# Patient Record
Sex: MALE | Race: WHITE | Employment: OTHER | URBAN - METROPOLITAN AREA
[De-identification: names, ages, dates, MRNs, and addresses within clinical notes are randomized per-mention and may not be internally consistent; named-entity substitution may affect disease eponyms.]

---

## 2019-10-05 ENCOUNTER — APPOINTMENT (OUTPATIENT)
Dept: GENERAL RADIOLOGY | Age: 84
DRG: 864 | End: 2019-10-05
Payer: MEDICARE

## 2019-10-05 ENCOUNTER — HOSPITAL ENCOUNTER (INPATIENT)
Age: 84
LOS: 4 days | Discharge: HOME | DRG: 864 | End: 2019-10-09
Attending: EMERGENCY MEDICINE | Admitting: FAMILY MEDICINE
Payer: MEDICARE

## 2019-10-05 DIAGNOSIS — R57.9 SHOCK (HCC): ICD-10-CM

## 2019-10-05 DIAGNOSIS — R50.9 FEVER, UNSPECIFIED FEVER CAUSE: Primary | ICD-10-CM

## 2019-10-05 PROBLEM — R65.21 SEPTIC SHOCK (HCC): Status: ACTIVE | Noted: 2019-10-05

## 2019-10-05 PROBLEM — A41.9 SEPTIC SHOCK (HCC): Status: ACTIVE | Noted: 2019-10-05

## 2019-10-05 LAB
ALBUMIN SERPL-MCNC: 4.5 G/DL (ref 3.5–5.2)
ALP BLD-CCNC: 108 U/L (ref 40–129)
ALT SERPL-CCNC: 23 U/L (ref 0–40)
ANION GAP SERPL CALCULATED.3IONS-SCNC: 14 MMOL/L (ref 7–16)
APTT: 31.3 SEC (ref 24.5–35.1)
AST SERPL-CCNC: 44 U/L (ref 0–39)
BACTERIA: ABNORMAL /HPF
BASOPHILS ABSOLUTE: 0.05 E9/L (ref 0–0.2)
BASOPHILS RELATIVE PERCENT: 0.5 % (ref 0–2)
BILIRUB SERPL-MCNC: 0.5 MG/DL (ref 0–1.2)
BILIRUBIN URINE: NEGATIVE
BLOOD, URINE: ABNORMAL
BUN BLDV-MCNC: 16 MG/DL (ref 8–23)
CALCIUM SERPL-MCNC: 10 MG/DL (ref 8.6–10.2)
CHLORIDE BLD-SCNC: 101 MMOL/L (ref 98–107)
CHP ED QC CHECK: YES
CLARITY: CLEAR
CO2: 29 MMOL/L (ref 22–29)
COLOR: YELLOW
CREAT SERPL-MCNC: 1.3 MG/DL (ref 0.7–1.2)
EOSINOPHILS ABSOLUTE: 0.05 E9/L (ref 0.05–0.5)
EOSINOPHILS RELATIVE PERCENT: 0.5 % (ref 0–6)
GFR AFRICAN AMERICAN: >60
GFR NON-AFRICAN AMERICAN: 52 ML/MIN/1.73
GLUCOSE BLD-MCNC: 99 MG/DL
GLUCOSE BLD-MCNC: 99 MG/DL (ref 74–99)
GLUCOSE URINE: NEGATIVE MG/DL
HCT VFR BLD CALC: 52.4 % (ref 37–54)
HEMOGLOBIN: 16.7 G/DL (ref 12.5–16.5)
IMMATURE GRANULOCYTES #: 0.15 E9/L
IMMATURE GRANULOCYTES %: 1.5 % (ref 0–5)
INR BLD: 1
KETONES, URINE: NEGATIVE MG/DL
LACTIC ACID: 1.3 MMOL/L (ref 0.5–2.2)
LACTIC ACID: 2.3 MMOL/L (ref 0.5–2.2)
LEUKOCYTE ESTERASE, URINE: NEGATIVE
LYMPHOCYTES ABSOLUTE: 1.54 E9/L (ref 1.5–4)
LYMPHOCYTES RELATIVE PERCENT: 15.7 % (ref 20–42)
MCH RBC QN AUTO: 27.1 PG (ref 26–35)
MCHC RBC AUTO-ENTMCNC: 31.9 % (ref 32–34.5)
MCV RBC AUTO: 84.9 FL (ref 80–99.9)
MONOCYTES ABSOLUTE: 1.53 E9/L (ref 0.1–0.95)
MONOCYTES RELATIVE PERCENT: 15.6 % (ref 2–12)
NEUTROPHILS ABSOLUTE: 6.48 E9/L (ref 1.8–7.3)
NEUTROPHILS RELATIVE PERCENT: 66.2 % (ref 43–80)
NITRITE, URINE: NEGATIVE
PDW BLD-RTO: 14.8 FL (ref 11.5–15)
PH UA: 6 (ref 5–9)
PLATELET # BLD: 259 E9/L (ref 130–450)
PMV BLD AUTO: 10.8 FL (ref 7–12)
POTASSIUM SERPL-SCNC: 4.2 MMOL/L (ref 3.5–5)
PROTEIN UA: ABNORMAL MG/DL
PROTHROMBIN TIME: 11.8 SEC (ref 9.3–12.4)
RBC # BLD: 6.17 E12/L (ref 3.8–5.8)
RBC # BLD: NORMAL 10*6/UL
RBC UA: ABNORMAL /HPF (ref 0–2)
SODIUM BLD-SCNC: 144 MMOL/L (ref 132–146)
SPECIFIC GRAVITY UA: 1.01 (ref 1–1.03)
TOTAL PROTEIN: 8.4 G/DL (ref 6.4–8.3)
TROPONIN: <0.01 NG/ML (ref 0–0.03)
UROBILINOGEN, URINE: 0.2 E.U./DL
WBC # BLD: 9.8 E9/L (ref 4.5–11.5)
WBC UA: ABNORMAL /HPF (ref 0–5)

## 2019-10-05 PROCEDURE — 85730 THROMBOPLASTIN TIME PARTIAL: CPT

## 2019-10-05 PROCEDURE — 84484 ASSAY OF TROPONIN QUANT: CPT

## 2019-10-05 PROCEDURE — 87581 M.PNEUMON DNA AMP PROBE: CPT

## 2019-10-05 PROCEDURE — 2580000003 HC RX 258: Performed by: EMERGENCY MEDICINE

## 2019-10-05 PROCEDURE — 85025 COMPLETE CBC W/AUTO DIFF WBC: CPT

## 2019-10-05 PROCEDURE — 85610 PROTHROMBIN TIME: CPT

## 2019-10-05 PROCEDURE — 84145 PROCALCITONIN (PCT): CPT

## 2019-10-05 PROCEDURE — 80053 COMPREHEN METABOLIC PANEL: CPT

## 2019-10-05 PROCEDURE — 87040 BLOOD CULTURE FOR BACTERIA: CPT

## 2019-10-05 PROCEDURE — 83605 ASSAY OF LACTIC ACID: CPT

## 2019-10-05 PROCEDURE — 87798 DETECT AGENT NOS DNA AMP: CPT

## 2019-10-05 PROCEDURE — 96365 THER/PROPH/DIAG IV INF INIT: CPT

## 2019-10-05 PROCEDURE — 93005 ELECTROCARDIOGRAM TRACING: CPT | Performed by: EMERGENCY MEDICINE

## 2019-10-05 PROCEDURE — 2580000003 HC RX 258

## 2019-10-05 PROCEDURE — 2000000000 HC ICU R&B

## 2019-10-05 PROCEDURE — 71045 X-RAY EXAM CHEST 1 VIEW: CPT

## 2019-10-05 PROCEDURE — 96375 TX/PRO/DX INJ NEW DRUG ADDON: CPT

## 2019-10-05 PROCEDURE — 81001 URINALYSIS AUTO W/SCOPE: CPT

## 2019-10-05 PROCEDURE — 6370000000 HC RX 637 (ALT 250 FOR IP): Performed by: EMERGENCY MEDICINE

## 2019-10-05 PROCEDURE — 82533 TOTAL CORTISOL: CPT

## 2019-10-05 PROCEDURE — 6360000002 HC RX W HCPCS: Performed by: EMERGENCY MEDICINE

## 2019-10-05 PROCEDURE — 96367 TX/PROPH/DG ADDL SEQ IV INF: CPT

## 2019-10-05 PROCEDURE — 87633 RESP VIRUS 12-25 TARGETS: CPT

## 2019-10-05 PROCEDURE — 36415 COLL VENOUS BLD VENIPUNCTURE: CPT

## 2019-10-05 PROCEDURE — 87088 URINE BACTERIA CULTURE: CPT

## 2019-10-05 PROCEDURE — 2500000003 HC RX 250 WO HCPCS

## 2019-10-05 PROCEDURE — 96376 TX/PRO/DX INJ SAME DRUG ADON: CPT

## 2019-10-05 PROCEDURE — 87486 CHLMYD PNEUM DNA AMP PROBE: CPT

## 2019-10-05 PROCEDURE — 02HV33Z INSERTION OF INFUSION DEVICE INTO SUPERIOR VENA CAVA, PERCUTANEOUS APPROACH: ICD-10-PCS | Performed by: EMERGENCY MEDICINE

## 2019-10-05 PROCEDURE — 99285 EMERGENCY DEPT VISIT HI MDM: CPT

## 2019-10-05 RX ORDER — ACETAMINOPHEN 500 MG
1000 TABLET ORAL ONCE
Status: COMPLETED | OUTPATIENT
Start: 2019-10-05 | End: 2019-10-05

## 2019-10-05 RX ORDER — LEVOTHYROXINE SODIUM 137 UG/1
137 TABLET ORAL DAILY
Status: ON HOLD | COMMUNITY
End: 2019-10-09 | Stop reason: HOSPADM

## 2019-10-05 RX ORDER — HYDROCORTISONE 5 MG/1
5 TABLET ORAL DAILY
Status: ON HOLD | COMMUNITY
End: 2019-10-09 | Stop reason: HOSPADM

## 2019-10-05 RX ORDER — KETOROLAC TROMETHAMINE 30 MG/ML
15 INJECTION, SOLUTION INTRAMUSCULAR; INTRAVENOUS ONCE
Status: COMPLETED | OUTPATIENT
Start: 2019-10-05 | End: 2019-10-05

## 2019-10-05 RX ORDER — SODIUM CHLORIDE 9 MG/ML
INJECTION, SOLUTION INTRAVENOUS CONTINUOUS
Status: DISCONTINUED | OUTPATIENT
Start: 2019-10-05 | End: 2019-10-06

## 2019-10-05 RX ORDER — 0.9 % SODIUM CHLORIDE 0.9 %
1000 INTRAVENOUS SOLUTION INTRAVENOUS ONCE
Status: COMPLETED | OUTPATIENT
Start: 2019-10-05 | End: 2019-10-05

## 2019-10-05 RX ADMIN — Medication 2 MCG/MIN: at 21:13

## 2019-10-05 RX ADMIN — SODIUM CHLORIDE: 9 INJECTION, SOLUTION INTRAVENOUS at 20:06

## 2019-10-05 RX ADMIN — SODIUM CHLORIDE 1000 ML: 9 INJECTION, SOLUTION INTRAVENOUS at 18:31

## 2019-10-05 RX ADMIN — CEFEPIME HYDROCHLORIDE 2 G: 2 INJECTION, POWDER, FOR SOLUTION INTRAVENOUS at 20:36

## 2019-10-05 RX ADMIN — SODIUM CHLORIDE 1000 ML: 9 INJECTION, SOLUTION INTRAVENOUS at 18:15

## 2019-10-05 RX ADMIN — HYDROCORTISONE SODIUM SUCCINATE 100 MG: 100 INJECTION, POWDER, FOR SOLUTION INTRAMUSCULAR; INTRAVENOUS at 20:36

## 2019-10-05 RX ADMIN — WATER 10 ML: 1 INJECTION INTRAMUSCULAR; INTRAVENOUS; SUBCUTANEOUS at 20:36

## 2019-10-05 RX ADMIN — KETOROLAC TROMETHAMINE 15 MG: 30 INJECTION, SOLUTION INTRAMUSCULAR; INTRAVENOUS at 20:06

## 2019-10-05 RX ADMIN — SODIUM CHLORIDE 1000 ML: 9 INJECTION, SOLUTION INTRAVENOUS at 20:34

## 2019-10-05 RX ADMIN — ACETAMINOPHEN 1000 MG: 500 TABLET ORAL at 18:31

## 2019-10-05 RX ADMIN — VANCOMYCIN HYDROCHLORIDE 2000 MG: 10 INJECTION, POWDER, LYOPHILIZED, FOR SOLUTION INTRAVENOUS at 21:55

## 2019-10-05 ASSESSMENT — PAIN SCALES - GENERAL
PAINLEVEL_OUTOF10: 10
PAINLEVEL_OUTOF10: 0
PAINLEVEL_OUTOF10: 6

## 2019-10-06 ENCOUNTER — APPOINTMENT (OUTPATIENT)
Dept: ULTRASOUND IMAGING | Age: 84
DRG: 864 | End: 2019-10-06
Payer: MEDICARE

## 2019-10-06 PROBLEM — R94.31 QT PROLONGATION: Status: ACTIVE | Noted: 2019-10-06

## 2019-10-06 PROBLEM — E87.20 LACTIC ACIDOSIS: Status: ACTIVE | Noted: 2019-10-06

## 2019-10-06 PROBLEM — E23.0 HYPOPITUITARISM (HCC): Status: ACTIVE | Noted: 2019-10-06

## 2019-10-06 PROBLEM — E03.9 HYPOTHYROIDISM: Status: ACTIVE | Noted: 2019-10-06

## 2019-10-06 PROBLEM — I48.91 ATRIAL FIBRILLATION WITH RVR (HCC): Status: ACTIVE | Noted: 2019-10-06

## 2019-10-06 PROBLEM — R53.1 WEAKNESS: Status: ACTIVE | Noted: 2019-10-06

## 2019-10-06 PROBLEM — N28.9 RENAL INSUFFICIENCY: Status: ACTIVE | Noted: 2019-10-06

## 2019-10-06 PROBLEM — I45.10 RBBB: Status: ACTIVE | Noted: 2019-10-06

## 2019-10-06 LAB
ANION GAP SERPL CALCULATED.3IONS-SCNC: 8 MMOL/L (ref 7–16)
BASOPHILS ABSOLUTE: 0.02 E9/L (ref 0–0.2)
BASOPHILS RELATIVE PERCENT: 0.2 % (ref 0–2)
BUN BLDV-MCNC: 15 MG/DL (ref 8–23)
CALCIUM SERPL-MCNC: 8.6 MG/DL (ref 8.6–10.2)
CHLORIDE BLD-SCNC: 105 MMOL/L (ref 98–107)
CO2: 26 MMOL/L (ref 22–29)
CORTISOL TOTAL: 97.3 MCG/DL (ref 2.68–18.4)
CREAT SERPL-MCNC: 1.1 MG/DL (ref 0.7–1.2)
D DIMER: 407 NG/ML DDU
EKG ATRIAL RATE: 104 BPM
EKG ATRIAL RATE: 62 BPM
EKG P AXIS: 30 DEGREES
EKG P-R INTERVAL: 204 MS
EKG Q-T INTERVAL: 382 MS
EKG Q-T INTERVAL: 458 MS
EKG QRS DURATION: 144 MS
EKG QRS DURATION: 94 MS
EKG QTC CALCULATION (BAZETT): 464 MS
EKG QTC CALCULATION (BAZETT): 502 MS
EKG R AXIS: -63 DEGREES
EKG R AXIS: -89 DEGREES
EKG T AXIS: 0 DEGREES
EKG T AXIS: 4 DEGREES
EKG VENTRICULAR RATE: 104 BPM
EKG VENTRICULAR RATE: 62 BPM
EOSINOPHILS ABSOLUTE: 0 E9/L (ref 0.05–0.5)
EOSINOPHILS RELATIVE PERCENT: 0 % (ref 0–6)
FILM ARRAY ADENOVIRUS: NORMAL
FILM ARRAY BORDETELLA PERTUSSIS: NORMAL
FILM ARRAY CHLAMYDOPHILIA PNEUMONIAE: NORMAL
FILM ARRAY CORONAVIRUS 229E: NORMAL
FILM ARRAY CORONAVIRUS HKU1: NORMAL
FILM ARRAY CORONAVIRUS NL63: NORMAL
FILM ARRAY CORONAVIRUS OC43: NORMAL
FILM ARRAY INFLUENZA A VIRUS 09H1: NORMAL
FILM ARRAY INFLUENZA A VIRUS H1: NORMAL
FILM ARRAY INFLUENZA A VIRUS H3: NORMAL
FILM ARRAY INFLUENZA A VIRUS: NORMAL
FILM ARRAY INFLUENZA B: NORMAL
FILM ARRAY METAPNEUMOVIRUS: NORMAL
FILM ARRAY MYCOPLASMA PNEUMONIAE: NORMAL
FILM ARRAY PARAINFLUENZA VIRUS 1: NORMAL
FILM ARRAY PARAINFLUENZA VIRUS 2: NORMAL
FILM ARRAY PARAINFLUENZA VIRUS 3: NORMAL
FILM ARRAY PARAINFLUENZA VIRUS 4: NORMAL
FILM ARRAY RESPIRATORY SYNCITIAL VIRUS: NORMAL
FILM ARRAY RHINOVIRUS/ENTEROVIRUS: NORMAL
GFR AFRICAN AMERICAN: >60
GFR NON-AFRICAN AMERICAN: >60 ML/MIN/1.73
GLUCOSE BLD-MCNC: 173 MG/DL (ref 74–99)
HCT VFR BLD CALC: 43 % (ref 37–54)
HEMOGLOBIN: 13.3 G/DL (ref 12.5–16.5)
IMMATURE GRANULOCYTES #: 0.12 E9/L
IMMATURE GRANULOCYTES %: 1.3 % (ref 0–5)
LYMPHOCYTES ABSOLUTE: 0.7 E9/L (ref 1.5–4)
LYMPHOCYTES RELATIVE PERCENT: 7.7 % (ref 20–42)
MAGNESIUM: 1.9 MG/DL (ref 1.6–2.6)
MCH RBC QN AUTO: 26.3 PG (ref 26–35)
MCHC RBC AUTO-ENTMCNC: 30.9 % (ref 32–34.5)
MCV RBC AUTO: 85 FL (ref 80–99.9)
MONOCYTES ABSOLUTE: 0.65 E9/L (ref 0.1–0.95)
MONOCYTES RELATIVE PERCENT: 7.1 % (ref 2–12)
NEUTROPHILS ABSOLUTE: 7.65 E9/L (ref 1.8–7.3)
NEUTROPHILS RELATIVE PERCENT: 83.7 % (ref 43–80)
PDW BLD-RTO: 14.4 FL (ref 11.5–15)
PLATELET # BLD: 251 E9/L (ref 130–450)
PMV BLD AUTO: 10.8 FL (ref 7–12)
POTASSIUM SERPL-SCNC: 3.7 MMOL/L (ref 3.5–5)
PROCALCITONIN: 0.19 NG/ML (ref 0–0.08)
PROCALCITONIN: 0.21 NG/ML (ref 0–0.08)
RBC # BLD: 5.06 E12/L (ref 3.8–5.8)
SODIUM BLD-SCNC: 139 MMOL/L (ref 132–146)
T3 TOTAL: 84.8 NG/DL (ref 80–200)
T4 FREE: 1.38 NG/DL (ref 0.93–1.7)
TROPONIN: <0.01 NG/ML (ref 0–0.03)
TROPONIN: <0.01 NG/ML (ref 0–0.03)
TSH SERPL DL<=0.05 MIU/L-ACNC: 0.15 UIU/ML (ref 0.27–4.2)
WBC # BLD: 9.1 E9/L (ref 4.5–11.5)

## 2019-10-06 PROCEDURE — 36415 COLL VENOUS BLD VENIPUNCTURE: CPT

## 2019-10-06 PROCEDURE — 6360000002 HC RX W HCPCS: Performed by: INTERNAL MEDICINE

## 2019-10-06 PROCEDURE — 2580000003 HC RX 258

## 2019-10-06 PROCEDURE — 93010 ELECTROCARDIOGRAM REPORT: CPT | Performed by: INTERNAL MEDICINE

## 2019-10-06 PROCEDURE — 6370000000 HC RX 637 (ALT 250 FOR IP): Performed by: INTERNAL MEDICINE

## 2019-10-06 PROCEDURE — 2580000003 HC RX 258: Performed by: FAMILY MEDICINE

## 2019-10-06 PROCEDURE — 94664 DEMO&/EVAL PT USE INHALER: CPT

## 2019-10-06 PROCEDURE — 84443 ASSAY THYROID STIM HORMONE: CPT

## 2019-10-06 PROCEDURE — 93005 ELECTROCARDIOGRAM TRACING: CPT | Performed by: INTERNAL MEDICINE

## 2019-10-06 PROCEDURE — 84484 ASSAY OF TROPONIN QUANT: CPT

## 2019-10-06 PROCEDURE — 84439 ASSAY OF FREE THYROXINE: CPT

## 2019-10-06 PROCEDURE — 80048 BASIC METABOLIC PNL TOTAL CA: CPT

## 2019-10-06 PROCEDURE — 83735 ASSAY OF MAGNESIUM: CPT

## 2019-10-06 PROCEDURE — 6360000002 HC RX W HCPCS: Performed by: FAMILY MEDICINE

## 2019-10-06 PROCEDURE — 85025 COMPLETE CBC W/AUTO DIFF WBC: CPT

## 2019-10-06 PROCEDURE — 2580000003 HC RX 258: Performed by: INTERNAL MEDICINE

## 2019-10-06 PROCEDURE — 94640 AIRWAY INHALATION TREATMENT: CPT

## 2019-10-06 PROCEDURE — 84480 ASSAY TRIIODOTHYRONINE (T3): CPT

## 2019-10-06 PROCEDURE — 93970 EXTREMITY STUDY: CPT

## 2019-10-06 PROCEDURE — 2000000000 HC ICU R&B

## 2019-10-06 PROCEDURE — 85378 FIBRIN DEGRADE SEMIQUANT: CPT

## 2019-10-06 PROCEDURE — 6370000000 HC RX 637 (ALT 250 FOR IP): Performed by: FAMILY MEDICINE

## 2019-10-06 PROCEDURE — 84145 PROCALCITONIN (PCT): CPT

## 2019-10-06 PROCEDURE — 99223 1ST HOSP IP/OBS HIGH 75: CPT | Performed by: INTERNAL MEDICINE

## 2019-10-06 RX ORDER — HYDROCORTISONE 5 MG/1
5 TABLET ORAL DAILY
Status: DISCONTINUED | OUTPATIENT
Start: 2019-10-06 | End: 2019-10-06

## 2019-10-06 RX ORDER — HEPARIN SODIUM 10000 [USP'U]/ML
5000 INJECTION, SOLUTION INTRAVENOUS; SUBCUTANEOUS EVERY 8 HOURS
Status: DISCONTINUED | OUTPATIENT
Start: 2019-10-06 | End: 2019-10-06

## 2019-10-06 RX ORDER — ACETAMINOPHEN 325 MG/1
650 TABLET ORAL EVERY 4 HOURS PRN
Status: DISCONTINUED | OUTPATIENT
Start: 2019-10-06 | End: 2019-10-06

## 2019-10-06 RX ORDER — MAGNESIUM SULFATE 1 G/100ML
1 INJECTION INTRAVENOUS ONCE
Status: COMPLETED | OUTPATIENT
Start: 2019-10-06 | End: 2019-10-06

## 2019-10-06 RX ORDER — HYDROCORTISONE 5 MG/1
5 TABLET ORAL
Status: DISCONTINUED | OUTPATIENT
Start: 2019-10-06 | End: 2019-10-09 | Stop reason: HOSPADM

## 2019-10-06 RX ORDER — ONDANSETRON 2 MG/ML
4 INJECTION INTRAMUSCULAR; INTRAVENOUS EVERY 6 HOURS PRN
Status: DISCONTINUED | OUTPATIENT
Start: 2019-10-06 | End: 2019-10-06 | Stop reason: ALTCHOICE

## 2019-10-06 RX ORDER — SODIUM CHLORIDE 9 MG/ML
INJECTION, SOLUTION INTRAVENOUS CONTINUOUS
Status: DISCONTINUED | OUTPATIENT
Start: 2019-10-06 | End: 2019-10-06

## 2019-10-06 RX ORDER — HEPARIN SODIUM 10000 [USP'U]/ML
5000 INJECTION, SOLUTION INTRAVENOUS; SUBCUTANEOUS EVERY 8 HOURS SCHEDULED
Status: DISCONTINUED | OUTPATIENT
Start: 2019-10-06 | End: 2019-10-09 | Stop reason: HOSPADM

## 2019-10-06 RX ORDER — SODIUM CHLORIDE 0.9 % (FLUSH) 0.9 %
10 SYRINGE (ML) INJECTION EVERY 12 HOURS SCHEDULED
Status: DISCONTINUED | OUTPATIENT
Start: 2019-10-06 | End: 2019-10-09 | Stop reason: HOSPADM

## 2019-10-06 RX ORDER — SODIUM CHLORIDE 0.9 % (FLUSH) 0.9 %
10 SYRINGE (ML) INJECTION PRN
Status: DISCONTINUED | OUTPATIENT
Start: 2019-10-06 | End: 2019-10-09 | Stop reason: HOSPADM

## 2019-10-06 RX ORDER — POTASSIUM CHLORIDE 29.8 MG/ML
20 INJECTION INTRAVENOUS ONCE
Status: COMPLETED | OUTPATIENT
Start: 2019-10-06 | End: 2019-10-06

## 2019-10-06 RX ORDER — FLUCONAZOLE 100 MG/1
400 TABLET ORAL DAILY
Status: DISCONTINUED | OUTPATIENT
Start: 2019-10-06 | End: 2019-10-08

## 2019-10-06 RX ORDER — POLYETHYLENE GLYCOL 3350 17 G/17G
17 POWDER, FOR SOLUTION ORAL DAILY PRN
Status: DISCONTINUED | OUTPATIENT
Start: 2019-10-06 | End: 2019-10-09 | Stop reason: HOSPADM

## 2019-10-06 RX ORDER — LEVOTHYROXINE SODIUM 0.12 MG/1
125 TABLET ORAL DAILY
Status: DISCONTINUED | OUTPATIENT
Start: 2019-10-06 | End: 2019-10-09 | Stop reason: HOSPADM

## 2019-10-06 RX ORDER — ACETAMINOPHEN 325 MG/1
650 TABLET ORAL EVERY 4 HOURS PRN
Status: DISCONTINUED | OUTPATIENT
Start: 2019-10-06 | End: 2019-10-09 | Stop reason: HOSPADM

## 2019-10-06 RX ORDER — MAGNESIUM SULFATE 1 G/100ML
1 INJECTION INTRAVENOUS PRN
Status: DISCONTINUED | OUTPATIENT
Start: 2019-10-06 | End: 2019-10-09 | Stop reason: HOSPADM

## 2019-10-06 RX ORDER — HYDROCORTISONE 5 MG/1
10 TABLET ORAL EVERY MORNING
Status: DISCONTINUED | OUTPATIENT
Start: 2019-10-07 | End: 2019-10-09 | Stop reason: HOSPADM

## 2019-10-06 RX ORDER — DOXYCYCLINE HYCLATE 100 MG/1
100 CAPSULE ORAL EVERY 12 HOURS SCHEDULED
Status: DISCONTINUED | OUTPATIENT
Start: 2019-10-06 | End: 2019-10-06

## 2019-10-06 RX ORDER — POTASSIUM CHLORIDE 29.8 MG/ML
20 INJECTION INTRAVENOUS PRN
Status: DISCONTINUED | OUTPATIENT
Start: 2019-10-06 | End: 2019-10-09 | Stop reason: HOSPADM

## 2019-10-06 RX ORDER — FLUDROCORTISONE ACETATE 0.1 MG/1
0.1 TABLET ORAL DAILY
Status: DISCONTINUED | OUTPATIENT
Start: 2019-10-06 | End: 2019-10-09 | Stop reason: HOSPADM

## 2019-10-06 RX ADMIN — CEFEPIME HYDROCHLORIDE 2 G: 2 INJECTION, POWDER, FOR SOLUTION INTRAVENOUS at 13:55

## 2019-10-06 RX ADMIN — HEPARIN SODIUM 5000 UNITS: 10000 INJECTION INTRAVENOUS; SUBCUTANEOUS at 21:23

## 2019-10-06 RX ADMIN — HEPARIN SODIUM 5000 UNITS: 10000 INJECTION INTRAVENOUS; SUBCUTANEOUS at 13:54

## 2019-10-06 RX ADMIN — POTASSIUM CHLORIDE 20 MEQ: 29.8 INJECTION, SOLUTION INTRAVENOUS at 10:36

## 2019-10-06 RX ADMIN — IPRATROPIUM BROMIDE 0.5 MG: 0.5 SOLUTION RESPIRATORY (INHALATION) at 20:15

## 2019-10-06 RX ADMIN — HYDROCORTISONE SODIUM SUCCINATE 50 MG: 100 INJECTION, POWDER, FOR SOLUTION INTRAMUSCULAR; INTRAVENOUS at 01:58

## 2019-10-06 RX ADMIN — MAGNESIUM SULFATE 1 G: 1 INJECTION INTRAVENOUS at 10:36

## 2019-10-06 RX ADMIN — HEPARIN SODIUM 5000 UNITS: 10000 INJECTION, SOLUTION INTRAVENOUS; SUBCUTANEOUS at 07:52

## 2019-10-06 RX ADMIN — Medication 10 ML: at 21:23

## 2019-10-06 RX ADMIN — HYDROCORTISONE 5 MG: 5 TABLET ORAL at 17:18

## 2019-10-06 RX ADMIN — CEFTRIAXONE SODIUM 1 G: 1 INJECTION, POWDER, FOR SOLUTION INTRAMUSCULAR; INTRAVENOUS at 10:36

## 2019-10-06 RX ADMIN — SODIUM CHLORIDE: 9 INJECTION, SOLUTION INTRAVENOUS at 05:11

## 2019-10-06 RX ADMIN — LEVOTHYROXINE SODIUM 125 MCG: 125 TABLET ORAL at 06:54

## 2019-10-06 RX ADMIN — Medication 10 ML: at 21:28

## 2019-10-06 RX ADMIN — CEFEPIME HYDROCHLORIDE 1 G: 1 INJECTION, POWDER, FOR SOLUTION INTRAMUSCULAR; INTRAVENOUS at 05:09

## 2019-10-06 RX ADMIN — Medication 1.5 G: at 21:22

## 2019-10-06 RX ADMIN — SODIUM CHLORIDE: 9 INJECTION, SOLUTION INTRAVENOUS at 11:57

## 2019-10-06 RX ADMIN — FLUCONAZOLE 400 MG: 100 TABLET ORAL at 13:56

## 2019-10-06 RX ADMIN — HYDROCORTISONE SODIUM SUCCINATE 50 MG: 100 INJECTION, POWDER, FOR SOLUTION INTRAMUSCULAR; INTRAVENOUS at 07:44

## 2019-10-06 RX ADMIN — WATER 10 ML: 1 INJECTION INTRAMUSCULAR; INTRAVENOUS; SUBCUTANEOUS at 01:58

## 2019-10-06 RX ADMIN — IPRATROPIUM BROMIDE 0.5 MG: 0.5 SOLUTION RESPIRATORY (INHALATION) at 16:10

## 2019-10-06 RX ADMIN — HYDROCORTISONE 5 MG: 5 TABLET ORAL at 11:58

## 2019-10-06 RX ADMIN — DOXYCYCLINE HYCLATE 100 MG: 100 CAPSULE ORAL at 10:36

## 2019-10-06 RX ADMIN — HEPARIN SODIUM 5000 UNITS: 10000 INJECTION, SOLUTION INTRAVENOUS; SUBCUTANEOUS at 01:58

## 2019-10-06 RX ADMIN — Medication 10 ML: at 10:36

## 2019-10-06 RX ADMIN — FLUDROCORTISONE ACETATE 0.1 MG: 0.1 TABLET ORAL at 11:58

## 2019-10-06 ASSESSMENT — PAIN SCALES - GENERAL
PAINLEVEL_OUTOF10: 0

## 2019-10-07 LAB
ALBUMIN SERPL-MCNC: 3.1 G/DL (ref 3.5–5.2)
ALP BLD-CCNC: 72 U/L (ref 40–129)
ALT SERPL-CCNC: 18 U/L (ref 0–40)
ANION GAP SERPL CALCULATED.3IONS-SCNC: 10 MMOL/L (ref 7–16)
AST SERPL-CCNC: 24 U/L (ref 0–39)
BASOPHILS ABSOLUTE: 0.04 E9/L (ref 0–0.2)
BASOPHILS RELATIVE PERCENT: 0.3 % (ref 0–2)
BILIRUB SERPL-MCNC: 0.2 MG/DL (ref 0–1.2)
BUN BLDV-MCNC: 22 MG/DL (ref 8–23)
CALCIUM SERPL-MCNC: 8.6 MG/DL (ref 8.6–10.2)
CHLORIDE BLD-SCNC: 107 MMOL/L (ref 98–107)
CO2: 23 MMOL/L (ref 22–29)
CREAT SERPL-MCNC: 1.2 MG/DL (ref 0.7–1.2)
EOSINOPHILS ABSOLUTE: 0.14 E9/L (ref 0.05–0.5)
EOSINOPHILS RELATIVE PERCENT: 1.2 % (ref 0–6)
GFR AFRICAN AMERICAN: >60
GFR NON-AFRICAN AMERICAN: 57 ML/MIN/1.73
GLUCOSE BLD-MCNC: 95 MG/DL (ref 74–99)
HCT VFR BLD CALC: 37.4 % (ref 37–54)
HEMOGLOBIN: 11.7 G/DL (ref 12.5–16.5)
IMMATURE GRANULOCYTES #: 0.14 E9/L
IMMATURE GRANULOCYTES %: 1.2 % (ref 0–5)
LYMPHOCYTES ABSOLUTE: 2.02 E9/L (ref 1.5–4)
LYMPHOCYTES RELATIVE PERCENT: 16.7 % (ref 20–42)
MAGNESIUM: 2.1 MG/DL (ref 1.6–2.6)
MCH RBC QN AUTO: 26.4 PG (ref 26–35)
MCHC RBC AUTO-ENTMCNC: 31.3 % (ref 32–34.5)
MCV RBC AUTO: 84.2 FL (ref 80–99.9)
MONOCYTES ABSOLUTE: 1.83 E9/L (ref 0.1–0.95)
MONOCYTES RELATIVE PERCENT: 15.2 % (ref 2–12)
NEUTROPHILS ABSOLUTE: 7.89 E9/L (ref 1.8–7.3)
NEUTROPHILS RELATIVE PERCENT: 65.4 % (ref 43–80)
PDW BLD-RTO: 14.5 FL (ref 11.5–15)
PHOSPHORUS: 3.1 MG/DL (ref 2.5–4.5)
PLATELET # BLD: 218 E9/L (ref 130–450)
PMV BLD AUTO: 11.3 FL (ref 7–12)
POTASSIUM REFLEX MAGNESIUM: 3.8 MMOL/L (ref 3.5–5)
RBC # BLD: 4.44 E12/L (ref 3.8–5.8)
RBC # BLD: NORMAL 10*6/UL
SODIUM BLD-SCNC: 140 MMOL/L (ref 132–146)
TOTAL PROTEIN: 6 G/DL (ref 6.4–8.3)
URINE CULTURE, ROUTINE: NORMAL
WBC # BLD: 12.1 E9/L (ref 4.5–11.5)

## 2019-10-07 PROCEDURE — 6360000002 HC RX W HCPCS: Performed by: INTERNAL MEDICINE

## 2019-10-07 PROCEDURE — 6370000000 HC RX 637 (ALT 250 FOR IP): Performed by: INTERNAL MEDICINE

## 2019-10-07 PROCEDURE — 97166 OT EVAL MOD COMPLEX 45 MIN: CPT

## 2019-10-07 PROCEDURE — 97161 PT EVAL LOW COMPLEX 20 MIN: CPT

## 2019-10-07 PROCEDURE — 36415 COLL VENOUS BLD VENIPUNCTURE: CPT

## 2019-10-07 PROCEDURE — 97530 THERAPEUTIC ACTIVITIES: CPT

## 2019-10-07 PROCEDURE — 84100 ASSAY OF PHOSPHORUS: CPT

## 2019-10-07 PROCEDURE — 2580000003 HC RX 258: Performed by: INTERNAL MEDICINE

## 2019-10-07 PROCEDURE — 83735 ASSAY OF MAGNESIUM: CPT

## 2019-10-07 PROCEDURE — 80053 COMPREHEN METABOLIC PANEL: CPT

## 2019-10-07 PROCEDURE — 85025 COMPLETE CBC W/AUTO DIFF WBC: CPT

## 2019-10-07 PROCEDURE — 1200000000 HC SEMI PRIVATE

## 2019-10-07 PROCEDURE — 87449 NOS EACH ORGANISM AG IA: CPT

## 2019-10-07 PROCEDURE — 94640 AIRWAY INHALATION TREATMENT: CPT

## 2019-10-07 PROCEDURE — 6370000000 HC RX 637 (ALT 250 FOR IP): Performed by: FAMILY MEDICINE

## 2019-10-07 PROCEDURE — 2580000003 HC RX 258: Performed by: FAMILY MEDICINE

## 2019-10-07 PROCEDURE — 97535 SELF CARE MNGMENT TRAINING: CPT

## 2019-10-07 RX ADMIN — HEPARIN SODIUM 5000 UNITS: 10000 INJECTION INTRAVENOUS; SUBCUTANEOUS at 13:26

## 2019-10-07 RX ADMIN — CEFEPIME HYDROCHLORIDE 2 G: 2 INJECTION, POWDER, FOR SOLUTION INTRAVENOUS at 13:28

## 2019-10-07 RX ADMIN — HEPARIN SODIUM 5000 UNITS: 10000 INJECTION INTRAVENOUS; SUBCUTANEOUS at 22:30

## 2019-10-07 RX ADMIN — HEPARIN SODIUM 5000 UNITS: 10000 INJECTION INTRAVENOUS; SUBCUTANEOUS at 05:45

## 2019-10-07 RX ADMIN — FLUDROCORTISONE ACETATE 0.1 MG: 0.1 TABLET ORAL at 08:27

## 2019-10-07 RX ADMIN — HYDROCORTISONE 5 MG: 5 TABLET ORAL at 13:26

## 2019-10-07 RX ADMIN — IPRATROPIUM BROMIDE 0.5 MG: 0.5 SOLUTION RESPIRATORY (INHALATION) at 21:33

## 2019-10-07 RX ADMIN — Medication 1.5 G: at 22:30

## 2019-10-07 RX ADMIN — IPRATROPIUM BROMIDE 0.5 MG: 0.5 SOLUTION RESPIRATORY (INHALATION) at 08:16

## 2019-10-07 RX ADMIN — IPRATROPIUM BROMIDE 0.5 MG: 0.5 SOLUTION RESPIRATORY (INHALATION) at 11:47

## 2019-10-07 RX ADMIN — Medication 10 ML: at 08:27

## 2019-10-07 RX ADMIN — HYDROCORTISONE 10 MG: 5 TABLET ORAL at 08:26

## 2019-10-07 RX ADMIN — IPRATROPIUM BROMIDE 0.5 MG: 0.5 SOLUTION RESPIRATORY (INHALATION) at 16:08

## 2019-10-07 RX ADMIN — CEFEPIME HYDROCHLORIDE 2 G: 2 INJECTION, POWDER, FOR SOLUTION INTRAVENOUS at 00:35

## 2019-10-07 RX ADMIN — FLUCONAZOLE 400 MG: 100 TABLET ORAL at 08:26

## 2019-10-07 RX ADMIN — HYDROCORTISONE 5 MG: 5 TABLET ORAL at 18:48

## 2019-10-07 RX ADMIN — Medication 10 ML: at 22:30

## 2019-10-07 RX ADMIN — LEVOTHYROXINE SODIUM 125 MCG: 125 TABLET ORAL at 05:45

## 2019-10-07 ASSESSMENT — PAIN SCALES - GENERAL
PAINLEVEL_OUTOF10: 0

## 2019-10-08 LAB
(1,3)-BETA-D-GLUCAN (FUNGITELL) INTERPRETATION: NEGATIVE
(1,3)-BETA-D-GLUCAN (FUNGITELL): <31 PG/ML
ALBUMIN SERPL-MCNC: 3.3 G/DL (ref 3.5–5.2)
ALP BLD-CCNC: 78 U/L (ref 40–129)
ALT SERPL-CCNC: 16 U/L (ref 0–40)
ANION GAP SERPL CALCULATED.3IONS-SCNC: 13 MMOL/L (ref 7–16)
AST SERPL-CCNC: 21 U/L (ref 0–39)
BASOPHILS ABSOLUTE: 0.04 E9/L (ref 0–0.2)
BASOPHILS RELATIVE PERCENT: 0.4 % (ref 0–2)
BILIRUB SERPL-MCNC: 0.3 MG/DL (ref 0–1.2)
BUN BLDV-MCNC: 17 MG/DL (ref 8–23)
CALCIUM SERPL-MCNC: 8.8 MG/DL (ref 8.6–10.2)
CHLORIDE BLD-SCNC: 105 MMOL/L (ref 98–107)
CO2: 22 MMOL/L (ref 22–29)
CREAT SERPL-MCNC: 1.1 MG/DL (ref 0.7–1.2)
EOSINOPHILS ABSOLUTE: 0.28 E9/L (ref 0.05–0.5)
EOSINOPHILS RELATIVE PERCENT: 2.6 % (ref 0–6)
GFR AFRICAN AMERICAN: >60
GFR NON-AFRICAN AMERICAN: >60 ML/MIN/1.73
GLUCOSE BLD-MCNC: 90 MG/DL (ref 74–99)
HCT VFR BLD CALC: 40 % (ref 37–54)
HEMOGLOBIN: 12.8 G/DL (ref 12.5–16.5)
IMMATURE GRANULOCYTES #: 0.15 E9/L
IMMATURE GRANULOCYTES %: 1.4 % (ref 0–5)
LYMPHOCYTES ABSOLUTE: 2.16 E9/L (ref 1.5–4)
LYMPHOCYTES RELATIVE PERCENT: 20.3 % (ref 20–42)
MAGNESIUM: 2.1 MG/DL (ref 1.6–2.6)
MCH RBC QN AUTO: 26.8 PG (ref 26–35)
MCHC RBC AUTO-ENTMCNC: 32 % (ref 32–34.5)
MCV RBC AUTO: 83.7 FL (ref 80–99.9)
MONOCYTES ABSOLUTE: 1.54 E9/L (ref 0.1–0.95)
MONOCYTES RELATIVE PERCENT: 14.4 % (ref 2–12)
NEUTROPHILS ABSOLUTE: 6.49 E9/L (ref 1.8–7.3)
NEUTROPHILS RELATIVE PERCENT: 60.9 % (ref 43–80)
PDW BLD-RTO: 14.6 FL (ref 11.5–15)
PHOSPHORUS: 3.2 MG/DL (ref 2.5–4.5)
PLATELET # BLD: 236 E9/L (ref 130–450)
PMV BLD AUTO: 11.1 FL (ref 7–12)
POTASSIUM REFLEX MAGNESIUM: 3.8 MMOL/L (ref 3.5–5)
RBC # BLD: 4.78 E12/L (ref 3.8–5.8)
RBC # BLD: NORMAL 10*6/UL
SODIUM BLD-SCNC: 140 MMOL/L (ref 132–146)
TOTAL PROTEIN: 6.5 G/DL (ref 6.4–8.3)
WBC # BLD: 10.7 E9/L (ref 4.5–11.5)

## 2019-10-08 PROCEDURE — 2580000003 HC RX 258: Performed by: FAMILY MEDICINE

## 2019-10-08 PROCEDURE — 6360000002 HC RX W HCPCS: Performed by: INTERNAL MEDICINE

## 2019-10-08 PROCEDURE — 80053 COMPREHEN METABOLIC PANEL: CPT

## 2019-10-08 PROCEDURE — 85025 COMPLETE CBC W/AUTO DIFF WBC: CPT

## 2019-10-08 PROCEDURE — 2580000003 HC RX 258: Performed by: INTERNAL MEDICINE

## 2019-10-08 PROCEDURE — 36415 COLL VENOUS BLD VENIPUNCTURE: CPT

## 2019-10-08 PROCEDURE — 83735 ASSAY OF MAGNESIUM: CPT

## 2019-10-08 PROCEDURE — 1200000000 HC SEMI PRIVATE

## 2019-10-08 PROCEDURE — 6370000000 HC RX 637 (ALT 250 FOR IP): Performed by: INTERNAL MEDICINE

## 2019-10-08 PROCEDURE — 84100 ASSAY OF PHOSPHORUS: CPT

## 2019-10-08 PROCEDURE — 6370000000 HC RX 637 (ALT 250 FOR IP): Performed by: FAMILY MEDICINE

## 2019-10-08 PROCEDURE — 94640 AIRWAY INHALATION TREATMENT: CPT

## 2019-10-08 RX ADMIN — HYDROCORTISONE 5 MG: 5 TABLET ORAL at 13:27

## 2019-10-08 RX ADMIN — Medication 10 ML: at 21:04

## 2019-10-08 RX ADMIN — CEFEPIME HYDROCHLORIDE 2 G: 2 INJECTION, POWDER, FOR SOLUTION INTRAVENOUS at 02:02

## 2019-10-08 RX ADMIN — IPRATROPIUM BROMIDE 0.5 MG: 0.5 SOLUTION RESPIRATORY (INHALATION) at 15:29

## 2019-10-08 RX ADMIN — IPRATROPIUM BROMIDE 0.5 MG: 0.5 SOLUTION RESPIRATORY (INHALATION) at 12:20

## 2019-10-08 RX ADMIN — IPRATROPIUM BROMIDE 0.5 MG: 0.5 SOLUTION RESPIRATORY (INHALATION) at 09:10

## 2019-10-08 RX ADMIN — Medication 10 ML: at 08:33

## 2019-10-08 RX ADMIN — HEPARIN SODIUM 5000 UNITS: 10000 INJECTION INTRAVENOUS; SUBCUTANEOUS at 13:32

## 2019-10-08 RX ADMIN — HYDROCORTISONE 5 MG: 5 TABLET ORAL at 18:24

## 2019-10-08 RX ADMIN — FLUCONAZOLE 400 MG: 100 TABLET ORAL at 08:32

## 2019-10-08 RX ADMIN — HEPARIN SODIUM 5000 UNITS: 10000 INJECTION INTRAVENOUS; SUBCUTANEOUS at 21:04

## 2019-10-08 RX ADMIN — HYDROCORTISONE 10 MG: 5 TABLET ORAL at 08:32

## 2019-10-08 RX ADMIN — FLUDROCORTISONE ACETATE 0.1 MG: 0.1 TABLET ORAL at 08:33

## 2019-10-08 RX ADMIN — LEVOTHYROXINE SODIUM 125 MCG: 125 TABLET ORAL at 06:19

## 2019-10-08 RX ADMIN — HEPARIN SODIUM 5000 UNITS: 10000 INJECTION INTRAVENOUS; SUBCUTANEOUS at 06:19

## 2019-10-08 RX ADMIN — IPRATROPIUM BROMIDE 0.5 MG: 0.5 SOLUTION RESPIRATORY (INHALATION) at 19:26

## 2019-10-08 ASSESSMENT — PAIN SCALES - GENERAL
PAINLEVEL_OUTOF10: 2
PAINLEVEL_OUTOF10: 0
PAINLEVEL_OUTOF10: 0

## 2019-10-09 VITALS
WEIGHT: 207.5 LBS | DIASTOLIC BLOOD PRESSURE: 87 MMHG | BODY MASS INDEX: 27.5 KG/M2 | HEIGHT: 73 IN | OXYGEN SATURATION: 92 % | TEMPERATURE: 96.9 F | RESPIRATION RATE: 18 BRPM | HEART RATE: 77 BPM | SYSTOLIC BLOOD PRESSURE: 132 MMHG

## 2019-10-09 LAB
ALBUMIN SERPL-MCNC: 3.7 G/DL (ref 3.5–5.2)
ALP BLD-CCNC: 88 U/L (ref 40–129)
ALT SERPL-CCNC: 16 U/L (ref 0–40)
ANION GAP SERPL CALCULATED.3IONS-SCNC: 13 MMOL/L (ref 7–16)
AST SERPL-CCNC: 23 U/L (ref 0–39)
BASOPHILS ABSOLUTE: 0.08 E9/L (ref 0–0.2)
BASOPHILS RELATIVE PERCENT: 0.8 % (ref 0–2)
BILIRUB SERPL-MCNC: 0.4 MG/DL (ref 0–1.2)
BUN BLDV-MCNC: 17 MG/DL (ref 8–23)
CALCIUM SERPL-MCNC: 9.7 MG/DL (ref 8.6–10.2)
CHLORIDE BLD-SCNC: 103 MMOL/L (ref 98–107)
CO2: 26 MMOL/L (ref 22–29)
CREAT SERPL-MCNC: 1.1 MG/DL (ref 0.7–1.2)
EOSINOPHILS ABSOLUTE: 0.46 E9/L (ref 0.05–0.5)
EOSINOPHILS RELATIVE PERCENT: 4.7 % (ref 0–6)
GFR AFRICAN AMERICAN: >60
GFR NON-AFRICAN AMERICAN: >60 ML/MIN/1.73
GLUCOSE BLD-MCNC: 88 MG/DL (ref 74–99)
HCT VFR BLD CALC: 44.9 % (ref 37–54)
HEMOGLOBIN: 13.8 G/DL (ref 12.5–16.5)
IMMATURE GRANULOCYTES #: 0.34 E9/L
IMMATURE GRANULOCYTES %: 3.5 % (ref 0–5)
LYMPHOCYTES ABSOLUTE: 2.5 E9/L (ref 1.5–4)
LYMPHOCYTES RELATIVE PERCENT: 25.5 % (ref 20–42)
MAGNESIUM: 2.4 MG/DL (ref 1.6–2.6)
MCH RBC QN AUTO: 26.3 PG (ref 26–35)
MCHC RBC AUTO-ENTMCNC: 30.7 % (ref 32–34.5)
MCV RBC AUTO: 85.5 FL (ref 80–99.9)
MONOCYTES ABSOLUTE: 1.27 E9/L (ref 0.1–0.95)
MONOCYTES RELATIVE PERCENT: 12.9 % (ref 2–12)
NEUTROPHILS ABSOLUTE: 5.16 E9/L (ref 1.8–7.3)
NEUTROPHILS RELATIVE PERCENT: 52.6 % (ref 43–80)
PDW BLD-RTO: 14.6 FL (ref 11.5–15)
PHOSPHORUS: 3.6 MG/DL (ref 2.5–4.5)
PLATELET # BLD: 285 E9/L (ref 130–450)
PMV BLD AUTO: 11.2 FL (ref 7–12)
POTASSIUM REFLEX MAGNESIUM: 4.4 MMOL/L (ref 3.5–5)
RBC # BLD: 5.25 E12/L (ref 3.8–5.8)
SODIUM BLD-SCNC: 142 MMOL/L (ref 132–146)
TOTAL PROTEIN: 7.3 G/DL (ref 6.4–8.3)
WBC # BLD: 9.8 E9/L (ref 4.5–11.5)

## 2019-10-09 PROCEDURE — 85025 COMPLETE CBC W/AUTO DIFF WBC: CPT

## 2019-10-09 PROCEDURE — 84100 ASSAY OF PHOSPHORUS: CPT

## 2019-10-09 PROCEDURE — 80053 COMPREHEN METABOLIC PANEL: CPT

## 2019-10-09 PROCEDURE — 2580000003 HC RX 258: Performed by: INTERNAL MEDICINE

## 2019-10-09 PROCEDURE — 94640 AIRWAY INHALATION TREATMENT: CPT

## 2019-10-09 PROCEDURE — 83735 ASSAY OF MAGNESIUM: CPT

## 2019-10-09 PROCEDURE — 6370000000 HC RX 637 (ALT 250 FOR IP): Performed by: FAMILY MEDICINE

## 2019-10-09 PROCEDURE — 6360000002 HC RX W HCPCS: Performed by: INTERNAL MEDICINE

## 2019-10-09 PROCEDURE — 6370000000 HC RX 637 (ALT 250 FOR IP): Performed by: INTERNAL MEDICINE

## 2019-10-09 PROCEDURE — 36415 COLL VENOUS BLD VENIPUNCTURE: CPT

## 2019-10-09 RX ORDER — LEVOTHYROXINE SODIUM 0.12 MG/1
125 TABLET ORAL DAILY
Qty: 30 TABLET | Refills: 3 | Status: ON HOLD | OUTPATIENT
Start: 2019-10-10 | End: 2021-12-07

## 2019-10-09 RX ORDER — HYDROCORTISONE 5 MG/1
5 TABLET ORAL
Qty: 30 TABLET | Refills: 0 | Status: ON HOLD | OUTPATIENT
Start: 2019-10-09 | End: 2021-12-07

## 2019-10-09 RX ORDER — HYDROCORTISONE 10 MG/1
10 TABLET ORAL
Qty: 30 TABLET | Refills: 0 | Status: ON HOLD | OUTPATIENT
Start: 2019-10-09 | End: 2021-12-07

## 2019-10-09 RX ORDER — FLUDROCORTISONE ACETATE 0.1 MG/1
0.1 TABLET ORAL
Qty: 30 TABLET | Refills: 0 | Status: SHIPPED | OUTPATIENT
Start: 2019-10-09 | End: 2019-11-08

## 2019-10-09 RX ADMIN — IPRATROPIUM BROMIDE 0.5 MG: 0.5 SOLUTION RESPIRATORY (INHALATION) at 08:26

## 2019-10-09 RX ADMIN — Medication 10 ML: at 09:48

## 2019-10-09 RX ADMIN — HYDROCORTISONE 10 MG: 5 TABLET ORAL at 09:47

## 2019-10-09 RX ADMIN — HEPARIN SODIUM 5000 UNITS: 10000 INJECTION INTRAVENOUS; SUBCUTANEOUS at 06:16

## 2019-10-09 RX ADMIN — LEVOTHYROXINE SODIUM 125 MCG: 125 TABLET ORAL at 06:16

## 2019-10-09 RX ADMIN — FLUDROCORTISONE ACETATE 0.1 MG: 0.1 TABLET ORAL at 09:47

## 2019-10-10 LAB
BLOOD CULTURE, ROUTINE: NORMAL
CULTURE, BLOOD 2: NORMAL

## 2020-03-14 ENCOUNTER — HOSPITAL ENCOUNTER (INPATIENT)
Age: 85
LOS: 9 days | Discharge: NURSING, REHAB OR LONG-TERM CARE | DRG: 870 | End: 2020-03-23
Attending: EMERGENCY MEDICINE | Admitting: INTERNAL MEDICINE
Payer: MEDICARE

## 2020-03-14 ENCOUNTER — APPOINTMENT (OUTPATIENT)
Dept: CT IMAGING | Age: 85
DRG: 870 | End: 2020-03-14
Payer: MEDICARE

## 2020-03-14 ENCOUNTER — APPOINTMENT (OUTPATIENT)
Dept: GENERAL RADIOLOGY | Age: 85
DRG: 870 | End: 2020-03-14
Payer: MEDICARE

## 2020-03-14 PROBLEM — J96.01 ACUTE RESPIRATORY FAILURE WITH HYPOXIA (HCC): Status: ACTIVE | Noted: 2020-03-14

## 2020-03-14 LAB
AADO2: 549.2 MMHG
AADO2: 571.9 MMHG
ALBUMIN SERPL-MCNC: 3.1 G/DL (ref 3.5–5.2)
ALBUMIN SERPL-MCNC: 3.5 G/DL (ref 3.5–5.2)
ALP BLD-CCNC: 64 U/L (ref 40–129)
ALP BLD-CCNC: 75 U/L (ref 40–129)
ALT SERPL-CCNC: 20 U/L (ref 0–40)
ALT SERPL-CCNC: 25 U/L (ref 0–40)
AMMONIA: 37 UMOL/L (ref 16–60)
ANION GAP SERPL CALCULATED.3IONS-SCNC: 15 MMOL/L (ref 7–16)
ANION GAP SERPL CALCULATED.3IONS-SCNC: 15 MMOL/L (ref 7–16)
ANISOCYTOSIS: ABNORMAL
APTT: 30.7 SEC (ref 24.5–35.1)
APTT: 34.6 SEC (ref 24.5–35.1)
AST SERPL-CCNC: 47 U/L (ref 0–39)
AST SERPL-CCNC: 54 U/L (ref 0–39)
B.E.: -7.7 MMOL/L (ref -3–3)
B.E.: -7.9 MMOL/L (ref -3–3)
BASOPHILS ABSOLUTE: 0.05 E9/L (ref 0–0.2)
BASOPHILS RELATIVE PERCENT: 0.2 % (ref 0–2)
BILIRUB SERPL-MCNC: 0.3 MG/DL (ref 0–1.2)
BILIRUB SERPL-MCNC: 0.4 MG/DL (ref 0–1.2)
BILIRUBIN URINE: NEGATIVE
BLOOD, URINE: NEGATIVE
BUN BLDV-MCNC: 22 MG/DL (ref 8–23)
BUN BLDV-MCNC: 24 MG/DL (ref 8–23)
CALCIUM IONIZED: 1.09 MMOL/L (ref 1.15–1.33)
CALCIUM SERPL-MCNC: 7.4 MG/DL (ref 8.6–10.2)
CALCIUM SERPL-MCNC: 7.5 MG/DL (ref 8.6–10.2)
CHLORIDE BLD-SCNC: 103 MMOL/L (ref 98–107)
CHLORIDE BLD-SCNC: 105 MMOL/L (ref 98–107)
CK MB: 12.9 NG/ML (ref 0–7.7)
CLARITY: CLEAR
CO2: 20 MMOL/L (ref 22–29)
CO2: 21 MMOL/L (ref 22–29)
COHB: 0.6 % (ref 0–1.5)
COHB: 0.7 % (ref 0–1.5)
COLOR: YELLOW
CREAT SERPL-MCNC: 2.5 MG/DL (ref 0.7–1.2)
CREAT SERPL-MCNC: 3.1 MG/DL (ref 0.7–1.2)
CRITICAL: ABNORMAL
CRITICAL: ABNORMAL
DATE ANALYZED: ABNORMAL
DATE ANALYZED: ABNORMAL
DATE OF COLLECTION: ABNORMAL
DATE OF COLLECTION: ABNORMAL
EOSINOPHILS ABSOLUTE: 0.01 E9/L (ref 0.05–0.5)
EOSINOPHILS RELATIVE PERCENT: 0 % (ref 0–6)
FIO2: 100 %
FIO2: 100 %
GFR AFRICAN AMERICAN: 23
GFR AFRICAN AMERICAN: 26
GFR AFRICAN AMERICAN: 30
GFR NON-AFRICAN AMERICAN: 19 ML/MIN/1.73
GFR NON-AFRICAN AMERICAN: 21 ML/MIN/1.73
GFR NON-AFRICAN AMERICAN: 24 ML/MIN/1.73
GLUCOSE BLD-MCNC: 122 MG/DL (ref 74–99)
GLUCOSE BLD-MCNC: 129 MG/DL (ref 74–99)
GLUCOSE BLD-MCNC: 89 MG/DL (ref 74–99)
GLUCOSE URINE: NEGATIVE MG/DL
HCO3: 18.9 MMOL/L (ref 22–26)
HCO3: 19.1 MMOL/L (ref 22–26)
HCT VFR BLD CALC: 40.2 % (ref 37–54)
HCT VFR BLD CALC: 45.3 % (ref 37–54)
HEMOGLOBIN: 12.3 G/DL (ref 12.5–16.5)
HEMOGLOBIN: 13.4 G/DL (ref 12.5–16.5)
HHB: 4.1 % (ref 0–5)
HHB: 6 % (ref 0–5)
IMMATURE GRANULOCYTES #: 0.39 E9/L
IMMATURE GRANULOCYTES %: 1.9 % (ref 0–5)
KETONES, URINE: NEGATIVE MG/DL
LAB: ABNORMAL
LAB: ABNORMAL
LACTIC ACID, SEPSIS: 1.5 MMOL/L (ref 0.5–1.9)
LACTIC ACID, SEPSIS: 1.9 MMOL/L (ref 0.5–1.9)
LEUKOCYTE ESTERASE, URINE: NEGATIVE
LYMPHOCYTES ABSOLUTE: 1.66 E9/L (ref 1.5–4)
LYMPHOCYTES RELATIVE PERCENT: 8 % (ref 20–42)
Lab: ABNORMAL
Lab: ABNORMAL
MAGNESIUM: 1.5 MG/DL (ref 1.6–2.6)
MAGNESIUM: 2.3 MG/DL (ref 1.6–2.6)
MCH RBC QN AUTO: 24.3 PG (ref 26–35)
MCH RBC QN AUTO: 24.5 PG (ref 26–35)
MCHC RBC AUTO-ENTMCNC: 29.6 % (ref 32–34.5)
MCHC RBC AUTO-ENTMCNC: 30.6 % (ref 32–34.5)
MCV RBC AUTO: 79.9 FL (ref 80–99.9)
MCV RBC AUTO: 82.1 FL (ref 80–99.9)
METER GLUCOSE: 123 MG/DL (ref 74–99)
METER GLUCOSE: 77 MG/DL (ref 74–99)
METHB: 0.1 % (ref 0–1.5)
METHB: 0.3 % (ref 0–1.5)
MODE: AC
MODE: AC
MONOCYTES ABSOLUTE: 3.18 E9/L (ref 0.1–0.95)
MONOCYTES RELATIVE PERCENT: 15.3 % (ref 2–12)
NEUTROPHILS ABSOLUTE: 15.52 E9/L (ref 1.8–7.3)
NEUTROPHILS RELATIVE PERCENT: 74.6 % (ref 43–80)
NITRITE, URINE: NEGATIVE
O2 CONTENT: 18.4 ML/DL
O2 CONTENT: 18.7 ML/DL
O2 SATURATION: 94 % (ref 92–98.5)
O2 SATURATION: 95.9 % (ref 92–98.5)
O2HB: 93.2 % (ref 94–97)
O2HB: 95 % (ref 94–97)
OPERATOR ID: 187
OPERATOR ID: 914
PATIENT TEMP: 37 C
PATIENT TEMP: 37 C
PCO2: 42.1 MMHG (ref 35–45)
PCO2: 44.5 MMHG (ref 35–45)
PDW BLD-RTO: 15.8 FL (ref 11.5–15)
PDW BLD-RTO: 15.9 FL (ref 11.5–15)
PEEP/CPAP: 5 CMH2O
PEEP/CPAP: 5 CMH2O
PERFORMED ON: ABNORMAL
PFO2: 0.74 MMHG/%
PFO2: 0.94 MMHG/%
PH BLOOD GAS: 7.25 (ref 7.35–7.45)
PH BLOOD GAS: 7.27 (ref 7.35–7.45)
PH UA: 7.5 (ref 5–9)
PLATELET # BLD: 217 E9/L (ref 130–450)
PLATELET # BLD: 271 E9/L (ref 130–450)
PMV BLD AUTO: 11.1 FL (ref 7–12)
PMV BLD AUTO: 11.2 FL (ref 7–12)
PO2: 74 MMHG (ref 75–100)
PO2: 94.3 MMHG (ref 75–100)
POC CHLORIDE: 107 MMOL/L (ref 100–108)
POC CREATININE: 2.8 MG/DL (ref 0.7–1.2)
POC POTASSIUM: 3.6 MMOL/L (ref 3.5–5)
POC SODIUM: 139 MMOL/L (ref 132–146)
POTASSIUM REFLEX MAGNESIUM: 3.2 MMOL/L (ref 3.5–5)
POTASSIUM REFLEX MAGNESIUM: 3.5 MMOL/L (ref 3.5–5)
PRO-BNP: 4241 PG/ML (ref 0–450)
PROTEIN UA: NEGATIVE MG/DL
RBC # BLD: 5.03 E12/L (ref 3.8–5.8)
RBC # BLD: 5.52 E12/L (ref 3.8–5.8)
RI(T): 582 %
RI(T): 7.73
RR MECHANICAL: 16 B/MIN
RR MECHANICAL: 16 B/MIN
SODIUM BLD-SCNC: 138 MMOL/L (ref 132–146)
SODIUM BLD-SCNC: 141 MMOL/L (ref 132–146)
SOURCE, BLOOD GAS: ABNORMAL
SOURCE, BLOOD GAS: ABNORMAL
SPECIFIC GRAVITY UA: 1.02 (ref 1–1.03)
T4 FREE: 1.22 NG/DL (ref 0.93–1.7)
T4 TOTAL: 8.4 MCG/DL (ref 4.5–11.7)
THB: 13.9 G/DL (ref 11.5–16.5)
THB: 14 G/DL (ref 11.5–16.5)
TIME ANALYZED: 1619
TIME ANALYZED: 2249
TOTAL CK: 307 U/L (ref 20–200)
TOTAL PROTEIN: 6.2 G/DL (ref 6.4–8.3)
TOTAL PROTEIN: 6.5 G/DL (ref 6.4–8.3)
TROPONIN: 0.08 NG/ML (ref 0–0.03)
TROPONIN: 0.12 NG/ML (ref 0–0.03)
TROPONIN: 0.16 NG/ML (ref 0–0.03)
TSH SERPL DL<=0.05 MIU/L-ACNC: 0.43 UIU/ML (ref 0.27–4.2)
UROBILINOGEN, URINE: 0.2 E.U./DL
VT MECHANICAL: 500 ML
VT MECHANICAL: 500 ML
WBC # BLD: 20.8 E9/L (ref 4.5–11.5)
WBC # BLD: 33.1 E9/L (ref 4.5–11.5)

## 2020-03-14 PROCEDURE — 81003 URINALYSIS AUTO W/O SCOPE: CPT

## 2020-03-14 PROCEDURE — 6360000002 HC RX W HCPCS: Performed by: EMERGENCY MEDICINE

## 2020-03-14 PROCEDURE — 2500000003 HC RX 250 WO HCPCS: Performed by: EMERGENCY MEDICINE

## 2020-03-14 PROCEDURE — 99223 1ST HOSP IP/OBS HIGH 75: CPT | Performed by: INTERNAL MEDICINE

## 2020-03-14 PROCEDURE — 82330 ASSAY OF CALCIUM: CPT

## 2020-03-14 PROCEDURE — 2500000003 HC RX 250 WO HCPCS

## 2020-03-14 PROCEDURE — G0480 DRUG TEST DEF 1-7 CLASSES: HCPCS

## 2020-03-14 PROCEDURE — 36620 INSERTION CATHETER ARTERY: CPT

## 2020-03-14 PROCEDURE — 93005 ELECTROCARDIOGRAM TRACING: CPT | Performed by: STUDENT IN AN ORGANIZED HEALTH CARE EDUCATION/TRAINING PROGRAM

## 2020-03-14 PROCEDURE — 82805 BLOOD GASES W/O2 SATURATION: CPT

## 2020-03-14 PROCEDURE — 93005 ELECTROCARDIOGRAM TRACING: CPT | Performed by: EMERGENCY MEDICINE

## 2020-03-14 PROCEDURE — 82553 CREATINE MB FRACTION: CPT

## 2020-03-14 PROCEDURE — 83880 ASSAY OF NATRIURETIC PEPTIDE: CPT

## 2020-03-14 PROCEDURE — 94761 N-INVAS EAR/PLS OXIMETRY MLT: CPT

## 2020-03-14 PROCEDURE — 36556 INSERT NON-TUNNEL CV CATH: CPT

## 2020-03-14 PROCEDURE — 82962 GLUCOSE BLOOD TEST: CPT

## 2020-03-14 PROCEDURE — 96375 TX/PRO/DX INJ NEW DRUG ADDON: CPT

## 2020-03-14 PROCEDURE — 84295 ASSAY OF SERUM SODIUM: CPT

## 2020-03-14 PROCEDURE — 2500000003 HC RX 250 WO HCPCS: Performed by: STUDENT IN AN ORGANIZED HEALTH CARE EDUCATION/TRAINING PROGRAM

## 2020-03-14 PROCEDURE — 0100U HC RESPIRPTHGN MULT REV TRANS & AMP PRB TECH 21 TRGT: CPT

## 2020-03-14 PROCEDURE — 82570 ASSAY OF URINE CREATININE: CPT

## 2020-03-14 PROCEDURE — 83930 ASSAY OF BLOOD OSMOLALITY: CPT

## 2020-03-14 PROCEDURE — 6370000000 HC RX 637 (ALT 250 FOR IP): Performed by: STUDENT IN AN ORGANIZED HEALTH CARE EDUCATION/TRAINING PROGRAM

## 2020-03-14 PROCEDURE — 94770 HC ETCO2 MONITOR DAILY: CPT

## 2020-03-14 PROCEDURE — 84300 ASSAY OF URINE SODIUM: CPT

## 2020-03-14 PROCEDURE — 6360000002 HC RX W HCPCS: Performed by: INTERNAL MEDICINE

## 2020-03-14 PROCEDURE — 2580000003 HC RX 258: Performed by: INTERNAL MEDICINE

## 2020-03-14 PROCEDURE — 82435 ASSAY OF BLOOD CHLORIDE: CPT

## 2020-03-14 PROCEDURE — 83605 ASSAY OF LACTIC ACID: CPT

## 2020-03-14 PROCEDURE — 51702 INSERT TEMP BLADDER CATH: CPT

## 2020-03-14 PROCEDURE — 0BH17EZ INSERTION OF ENDOTRACHEAL AIRWAY INTO TRACHEA, VIA NATURAL OR ARTIFICIAL OPENING: ICD-10-PCS | Performed by: EMERGENCY MEDICINE

## 2020-03-14 PROCEDURE — 74176 CT ABD & PELVIS W/O CONTRAST: CPT

## 2020-03-14 PROCEDURE — 36415 COLL VENOUS BLD VENIPUNCTURE: CPT

## 2020-03-14 PROCEDURE — 5A1955Z RESPIRATORY VENTILATION, GREATER THAN 96 CONSECUTIVE HOURS: ICD-10-PCS | Performed by: EMERGENCY MEDICINE

## 2020-03-14 PROCEDURE — 85027 COMPLETE CBC AUTOMATED: CPT

## 2020-03-14 PROCEDURE — 70450 CT HEAD/BRAIN W/O DYE: CPT

## 2020-03-14 PROCEDURE — 72125 CT NECK SPINE W/O DYE: CPT

## 2020-03-14 PROCEDURE — 83935 ASSAY OF URINE OSMOLALITY: CPT

## 2020-03-14 PROCEDURE — 80053 COMPREHEN METABOLIC PANEL: CPT

## 2020-03-14 PROCEDURE — 71045 X-RAY EXAM CHEST 1 VIEW: CPT

## 2020-03-14 PROCEDURE — 80307 DRUG TEST PRSMV CHEM ANLYZR: CPT

## 2020-03-14 PROCEDURE — 82947 ASSAY GLUCOSE BLOOD QUANT: CPT

## 2020-03-14 PROCEDURE — 84133 ASSAY OF URINE POTASSIUM: CPT

## 2020-03-14 PROCEDURE — 82550 ASSAY OF CK (CPK): CPT

## 2020-03-14 PROCEDURE — 87040 BLOOD CULTURE FOR BACTERIA: CPT

## 2020-03-14 PROCEDURE — 31500 INSERT EMERGENCY AIRWAY: CPT

## 2020-03-14 PROCEDURE — 02HV33Z INSERTION OF INFUSION DEVICE INTO SUPERIOR VENA CAVA, PERCUTANEOUS APPROACH: ICD-10-PCS | Performed by: EMERGENCY MEDICINE

## 2020-03-14 PROCEDURE — 74018 RADEX ABDOMEN 1 VIEW: CPT

## 2020-03-14 PROCEDURE — 94002 VENT MGMT INPAT INIT DAY: CPT

## 2020-03-14 PROCEDURE — 6360000002 HC RX W HCPCS

## 2020-03-14 PROCEDURE — 84439 ASSAY OF FREE THYROXINE: CPT

## 2020-03-14 PROCEDURE — 99291 CRITICAL CARE FIRST HOUR: CPT

## 2020-03-14 PROCEDURE — 94640 AIRWAY INHALATION TREATMENT: CPT

## 2020-03-14 PROCEDURE — 84132 ASSAY OF SERUM POTASSIUM: CPT

## 2020-03-14 PROCEDURE — 84484 ASSAY OF TROPONIN QUANT: CPT

## 2020-03-14 PROCEDURE — 82565 ASSAY OF CREATININE: CPT

## 2020-03-14 PROCEDURE — 82436 ASSAY OF URINE CHLORIDE: CPT

## 2020-03-14 PROCEDURE — 2580000003 HC RX 258: Performed by: STUDENT IN AN ORGANIZED HEALTH CARE EDUCATION/TRAINING PROGRAM

## 2020-03-14 PROCEDURE — 83735 ASSAY OF MAGNESIUM: CPT

## 2020-03-14 PROCEDURE — 87081 CULTURE SCREEN ONLY: CPT

## 2020-03-14 PROCEDURE — 96376 TX/PRO/DX INJ SAME DRUG ADON: CPT

## 2020-03-14 PROCEDURE — 71250 CT THORAX DX C-: CPT

## 2020-03-14 PROCEDURE — 87450 HC DIRECT STREP B ANTIGEN: CPT

## 2020-03-14 PROCEDURE — 6360000002 HC RX W HCPCS: Performed by: STUDENT IN AN ORGANIZED HEALTH CARE EDUCATION/TRAINING PROGRAM

## 2020-03-14 PROCEDURE — 85730 THROMBOPLASTIN TIME PARTIAL: CPT

## 2020-03-14 PROCEDURE — 82140 ASSAY OF AMMONIA: CPT

## 2020-03-14 PROCEDURE — 2580000003 HC RX 258: Performed by: EMERGENCY MEDICINE

## 2020-03-14 PROCEDURE — 96365 THER/PROPH/DIAG IV INF INIT: CPT

## 2020-03-14 PROCEDURE — 2000000000 HC ICU R&B

## 2020-03-14 PROCEDURE — 85025 COMPLETE CBC W/AUTO DIFF WBC: CPT

## 2020-03-14 PROCEDURE — 36620 INSERTION CATHETER ARTERY: CPT | Performed by: INTERNAL MEDICINE

## 2020-03-14 PROCEDURE — 84443 ASSAY THYROID STIM HORMONE: CPT

## 2020-03-14 RX ORDER — PROMETHAZINE HYDROCHLORIDE 25 MG/1
12.5 TABLET ORAL EVERY 6 HOURS PRN
Status: DISCONTINUED | OUTPATIENT
Start: 2020-03-14 | End: 2020-03-23 | Stop reason: HOSPADM

## 2020-03-14 RX ORDER — HEPARIN SODIUM 1000 [USP'U]/ML
80 INJECTION, SOLUTION INTRAVENOUS; SUBCUTANEOUS PRN
Status: DISCONTINUED | OUTPATIENT
Start: 2020-03-14 | End: 2020-03-14

## 2020-03-14 RX ORDER — SODIUM CHLORIDE 0.9 % (FLUSH) 0.9 %
10 SYRINGE (ML) INJECTION PRN
Status: DISCONTINUED | OUTPATIENT
Start: 2020-03-14 | End: 2020-03-15 | Stop reason: SDUPTHER

## 2020-03-14 RX ORDER — ACETAMINOPHEN 650 MG/1
650 SUPPOSITORY RECTAL EVERY 6 HOURS PRN
Status: DISCONTINUED | OUTPATIENT
Start: 2020-03-14 | End: 2020-03-23 | Stop reason: HOSPADM

## 2020-03-14 RX ORDER — SODIUM CHLORIDE 0.9 % (FLUSH) 0.9 %
10 SYRINGE (ML) INJECTION EVERY 12 HOURS SCHEDULED
Status: DISCONTINUED | OUTPATIENT
Start: 2020-03-14 | End: 2020-03-23 | Stop reason: HOSPADM

## 2020-03-14 RX ORDER — HEPARIN SODIUM 10000 [USP'U]/100ML
12 INJECTION, SOLUTION INTRAVENOUS CONTINUOUS
Status: DISCONTINUED | OUTPATIENT
Start: 2020-03-14 | End: 2020-03-23

## 2020-03-14 RX ORDER — ROCURONIUM BROMIDE 10 MG/ML
100 INJECTION, SOLUTION INTRAVENOUS ONCE
Status: COMPLETED | OUTPATIENT
Start: 2020-03-14 | End: 2020-03-14

## 2020-03-14 RX ORDER — LIDOCAINE HYDROCHLORIDE 10 MG/ML
5 INJECTION, SOLUTION INFILTRATION; PERINEURAL ONCE
Status: COMPLETED | OUTPATIENT
Start: 2020-03-14 | End: 2020-03-14

## 2020-03-14 RX ORDER — ALBUTEROL SULFATE 2.5 MG/3ML
2.5 SOLUTION RESPIRATORY (INHALATION) EVERY 4 HOURS
Status: DISCONTINUED | OUTPATIENT
Start: 2020-03-14 | End: 2020-03-19

## 2020-03-14 RX ORDER — AMIODARONE HYDROCHLORIDE 50 MG/ML
150 INJECTION, SOLUTION INTRAVENOUS ONCE
Status: COMPLETED | OUTPATIENT
Start: 2020-03-14 | End: 2020-03-14

## 2020-03-14 RX ORDER — HEPARIN SODIUM 1000 [USP'U]/ML
INJECTION, SOLUTION INTRAVENOUS; SUBCUTANEOUS
Status: DISPENSED
Start: 2020-03-14 | End: 2020-03-15

## 2020-03-14 RX ORDER — HEPARIN SODIUM 1000 [USP'U]/ML
80 INJECTION, SOLUTION INTRAVENOUS; SUBCUTANEOUS ONCE
Status: DISCONTINUED | OUTPATIENT
Start: 2020-03-14 | End: 2020-03-14

## 2020-03-14 RX ORDER — HEPARIN SODIUM 1000 [USP'U]/ML
3000 INJECTION, SOLUTION INTRAVENOUS; SUBCUTANEOUS PRN
Status: DISCONTINUED | OUTPATIENT
Start: 2020-03-14 | End: 2020-03-23

## 2020-03-14 RX ORDER — POTASSIUM CHLORIDE 7.45 MG/ML
10 INJECTION INTRAVENOUS ONCE
Status: DISCONTINUED | OUTPATIENT
Start: 2020-03-14 | End: 2020-03-14

## 2020-03-14 RX ORDER — PANTOPRAZOLE SODIUM 40 MG/10ML
40 INJECTION, POWDER, LYOPHILIZED, FOR SOLUTION INTRAVENOUS DAILY
Status: DISCONTINUED | OUTPATIENT
Start: 2020-03-15 | End: 2020-03-20

## 2020-03-14 RX ORDER — 0.9 % SODIUM CHLORIDE 0.9 %
30 INTRAVENOUS SOLUTION INTRAVENOUS ONCE
Status: COMPLETED | OUTPATIENT
Start: 2020-03-14 | End: 2020-03-14

## 2020-03-14 RX ORDER — ROCURONIUM BROMIDE 10 MG/ML
INJECTION, SOLUTION INTRAVENOUS
Status: COMPLETED
Start: 2020-03-14 | End: 2020-03-14

## 2020-03-14 RX ORDER — HEPARIN SODIUM 1000 [USP'U]/ML
6000 INJECTION, SOLUTION INTRAVENOUS; SUBCUTANEOUS ONCE
Status: COMPLETED | OUTPATIENT
Start: 2020-03-14 | End: 2020-03-14

## 2020-03-14 RX ORDER — HEPARIN SODIUM 10000 [USP'U]/100ML
18 INJECTION, SOLUTION INTRAVENOUS CONTINUOUS
Status: DISCONTINUED | OUTPATIENT
Start: 2020-03-14 | End: 2020-03-14

## 2020-03-14 RX ORDER — POTASSIUM CHLORIDE 29.8 MG/ML
20 INJECTION INTRAVENOUS ONCE
Status: COMPLETED | OUTPATIENT
Start: 2020-03-14 | End: 2020-03-14

## 2020-03-14 RX ORDER — HEPARIN SODIUM 1000 [USP'U]/ML
6000 INJECTION, SOLUTION INTRAVENOUS; SUBCUTANEOUS PRN
Status: DISCONTINUED | OUTPATIENT
Start: 2020-03-14 | End: 2020-03-23

## 2020-03-14 RX ORDER — ETOMIDATE 2 MG/ML
20 INJECTION INTRAVENOUS ONCE
Status: COMPLETED | OUTPATIENT
Start: 2020-03-14 | End: 2020-03-14

## 2020-03-14 RX ORDER — ACETAMINOPHEN 650 MG/1
650 SUPPOSITORY RECTAL ONCE
Status: COMPLETED | OUTPATIENT
Start: 2020-03-14 | End: 2020-03-14

## 2020-03-14 RX ORDER — ONDANSETRON 2 MG/ML
4 INJECTION INTRAMUSCULAR; INTRAVENOUS EVERY 6 HOURS PRN
Status: DISCONTINUED | OUTPATIENT
Start: 2020-03-14 | End: 2020-03-15

## 2020-03-14 RX ORDER — ACETAMINOPHEN 325 MG/1
650 TABLET ORAL EVERY 6 HOURS PRN
Status: DISCONTINUED | OUTPATIENT
Start: 2020-03-14 | End: 2020-03-23 | Stop reason: HOSPADM

## 2020-03-14 RX ORDER — SODIUM CHLORIDE 0.9 % (FLUSH) 0.9 %
10 SYRINGE (ML) INJECTION PRN
Status: DISCONTINUED | OUTPATIENT
Start: 2020-03-14 | End: 2020-03-23 | Stop reason: HOSPADM

## 2020-03-14 RX ORDER — HEPARIN SODIUM 1000 [USP'U]/ML
40 INJECTION, SOLUTION INTRAVENOUS; SUBCUTANEOUS PRN
Status: DISCONTINUED | OUTPATIENT
Start: 2020-03-14 | End: 2020-03-14

## 2020-03-14 RX ORDER — LEVOTHYROXINE SODIUM 0.12 MG/1
125 TABLET ORAL DAILY
Status: DISCONTINUED | OUTPATIENT
Start: 2020-03-15 | End: 2020-03-23 | Stop reason: HOSPADM

## 2020-03-14 RX ORDER — SODIUM CHLORIDE 0.9 % (FLUSH) 0.9 %
10 SYRINGE (ML) INJECTION EVERY 12 HOURS SCHEDULED
Status: DISCONTINUED | OUTPATIENT
Start: 2020-03-14 | End: 2020-03-15 | Stop reason: SDUPTHER

## 2020-03-14 RX ORDER — SODIUM CHLORIDE 9 MG/ML
10 INJECTION INTRAVENOUS DAILY
Status: DISCONTINUED | OUTPATIENT
Start: 2020-03-15 | End: 2020-03-20

## 2020-03-14 RX ORDER — HEPARIN SODIUM 10000 [USP'U]/100ML
INJECTION, SOLUTION INTRAVENOUS
Status: COMPLETED
Start: 2020-03-14 | End: 2020-03-14

## 2020-03-14 RX ORDER — FENTANYL CITRATE 50 UG/ML
100 INJECTION, SOLUTION INTRAMUSCULAR; INTRAVENOUS ONCE
Status: COMPLETED | OUTPATIENT
Start: 2020-03-14 | End: 2020-03-14

## 2020-03-14 RX ORDER — MAGNESIUM SULFATE IN WATER 40 MG/ML
2 INJECTION, SOLUTION INTRAVENOUS ONCE
Status: COMPLETED | OUTPATIENT
Start: 2020-03-14 | End: 2020-03-14

## 2020-03-14 RX ORDER — POLYETHYLENE GLYCOL 3350 17 G/17G
17 POWDER, FOR SOLUTION ORAL DAILY PRN
Status: DISCONTINUED | OUTPATIENT
Start: 2020-03-14 | End: 2020-03-23 | Stop reason: HOSPADM

## 2020-03-14 RX ORDER — LIDOCAINE HYDROCHLORIDE 10 MG/ML
INJECTION, SOLUTION EPIDURAL; INFILTRATION; INTRACAUDAL; PERINEURAL
Status: COMPLETED
Start: 2020-03-14 | End: 2020-03-14

## 2020-03-14 RX ADMIN — ALBUTEROL SULFATE 2.5 MG: 2.5 SOLUTION RESPIRATORY (INHALATION) at 23:53

## 2020-03-14 RX ADMIN — LIDOCAINE HYDROCHLORIDE 5 ML: 10 INJECTION, SOLUTION EPIDURAL; INFILTRATION; INTRACAUDAL; PERINEURAL at 22:01

## 2020-03-14 RX ADMIN — HYDROCORTISONE SODIUM SUCCINATE 100 MG: 100 INJECTION, POWDER, FOR SOLUTION INTRAMUSCULAR; INTRAVENOUS at 16:52

## 2020-03-14 RX ADMIN — ROCURONIUM BROMIDE 100 MG: 10 SOLUTION INTRAVENOUS at 14:48

## 2020-03-14 RX ADMIN — AMIODARONE HYDROCHLORIDE 1 MG/MIN: 50 INJECTION, SOLUTION INTRAVENOUS at 19:27

## 2020-03-14 RX ADMIN — HEPARIN SODIUM 6000 UNITS: 1000 INJECTION, SOLUTION INTRAVENOUS; SUBCUTANEOUS at 19:33

## 2020-03-14 RX ADMIN — SODIUM CHLORIDE 2994 ML: 9 INJECTION, SOLUTION INTRAVENOUS at 15:04

## 2020-03-14 RX ADMIN — LIDOCAINE HYDROCHLORIDE 5 ML: 10 INJECTION, SOLUTION INFILTRATION; PERINEURAL at 22:01

## 2020-03-14 RX ADMIN — ROCURONIUM BROMIDE 100 MG: 10 INJECTION, SOLUTION INTRAVENOUS at 14:48

## 2020-03-14 RX ADMIN — ACETAMINOPHEN 650 MG: 650 SUPPOSITORY RECTAL at 15:25

## 2020-03-14 RX ADMIN — AMIODARONE HYDROCHLORIDE 150 MG: 50 INJECTION, SOLUTION INTRAVENOUS at 19:10

## 2020-03-14 RX ADMIN — MAGNESIUM SULFATE HEPTAHYDRATE 2 G: 40 INJECTION, SOLUTION INTRAVENOUS at 17:35

## 2020-03-14 RX ADMIN — DOXYCYCLINE 100 MG: 100 INJECTION, POWDER, LYOPHILIZED, FOR SOLUTION INTRAVENOUS at 16:53

## 2020-03-14 RX ADMIN — HEPARIN SODIUM 12 UNITS/KG/HR: 10000 INJECTION, SOLUTION INTRAVENOUS at 19:33

## 2020-03-14 RX ADMIN — THIAMINE HYDROCHLORIDE 200 MG: 100 INJECTION, SOLUTION INTRAMUSCULAR; INTRAVENOUS at 23:18

## 2020-03-14 RX ADMIN — VANCOMYCIN HYDROCHLORIDE 2000 MG: 10 INJECTION, POWDER, LYOPHILIZED, FOR SOLUTION INTRAVENOUS at 23:30

## 2020-03-14 RX ADMIN — Medication 25 MCG/HR: at 15:54

## 2020-03-14 RX ADMIN — ETOMIDATE 20 MG: 20 INJECTION, SOLUTION INTRAVENOUS at 14:48

## 2020-03-14 RX ADMIN — POTASSIUM CHLORIDE 20 MEQ: 400 INJECTION, SOLUTION INTRAVENOUS at 18:15

## 2020-03-14 RX ADMIN — ALBUTEROL SULFATE 2.5 MG: 2.5 SOLUTION RESPIRATORY (INHALATION) at 22:08

## 2020-03-14 RX ADMIN — VASOPRESSIN 0.02 UNITS/MIN: 20 INJECTION INTRAVENOUS at 19:24

## 2020-03-14 RX ADMIN — FENTANYL CITRATE 100 MCG: 50 INJECTION, SOLUTION INTRAMUSCULAR; INTRAVENOUS at 15:25

## 2020-03-14 RX ADMIN — CEFTRIAXONE SODIUM 1 G: 1 INJECTION, POWDER, FOR SOLUTION INTRAMUSCULAR; INTRAVENOUS at 16:53

## 2020-03-14 RX ADMIN — Medication 2 MCG/MIN: at 17:39

## 2020-03-14 ASSESSMENT — PAIN SCALES - GENERAL
PAINLEVEL_OUTOF10: 0

## 2020-03-14 ASSESSMENT — PULMONARY FUNCTION TESTS
PIF_VALUE: 29
PIF_VALUE: 31
PIF_VALUE: 30
PIF_VALUE: 27
PIF_VALUE: 25
PIF_VALUE: 25
PIF_VALUE: 30
PIF_VALUE: 33
PIF_VALUE: 29
PIF_VALUE: 0
PIF_VALUE: 26

## 2020-03-14 NOTE — H&P
has never smoked. He has never used smokeless tobacco.  ETOH:   reports previous alcohol use. Family History:    Reviewed in detail and negative for DM, CAD, Cancer, CVA. Positive as follows:    History reviewed. No pertinent family history. REVIEW OF SYSTEMS:   Pertinent positives as noted in the HPI. All other systems reviewed and negative. PHYSICAL EXAM:    BP 95/60   Pulse 163   Temp 99.6 °F (37.6 °C) (Axillary)   Resp 16   Wt 220 lb (99.8 kg)   SpO2 98%   BMI 29.03 kg/m²     General appearance:  Unresponsive; dry urine on pt clothing  HEENT:  Normal cephalic, atraumatic without obvious deformity. Pupils equal, round, and reactive to light. Extra ocular muscles intact. Conjunctivae/corneas clear. Neck: Supple, with full range of motion. No jugular venous distention. Trachea midline. Respiratory:  Normal respiratory effort. Clear to auscultation, bilaterally without Rales/Wheezes/Rhonchi. Intubated. Coarse breath sounds b/l   Cardiovascular:  Regular rate and rhythm with normal S1/S2 without murmurs, rubs or gallops. Abdomen: Soft, non-tender, non-distended with normal bowel sounds. Musculoskeletal:  No clubbing, cyanosis or edema bilaterally. Skin: Skin color, texture, turgor normal.  No rashes or lesions. Neurologic:  Sedated     XR CHEST PORTABLE   Final Result   Central venous catheter terminating in the SVC. No pneumothorax. No   new abnormal findings. CT Head WO Contrast   Final Result   No acute intracranial process         CT Cervical Spine WO Contrast   Final Result   1. Degenerative changes in the disc space of C5-6, C6-7 mainly. 2. Loss of height of vertebral bodies of C5, C6 and C7 likely to be   more an old event but there are no previous studies available for   comparison to determine the baseline. Age is not determined based on   this study. See above comments and recommendations. 3. Otherwise no acute displaced fractures seen in the cervical spine.

## 2020-03-14 NOTE — ED PROVIDER NOTES
large bore needle was used to identify the vein. A guide wire was then inserted into the vein through the needle. A triple lumen catheter was then inserted into the vessel over the guide wire using the Seldinger technique. All ports showed good, free flowing blood return and were flushed with saline solution. The catheter was then securely fastened to the skin with suture at 15 cm. Two sutures were placed into the kit included tube clamp\", \"proximal eyelets\", \"a suture end from each of the securing sutures was extended around the catheter and tied to the proximal eyelets as an added measure to prevent dislodgement. An antibiotic disk was placed and the site was then covered with a sterile dressing. A post procedure X-ray was ordered and is still pending at this time. The patient tolerated the procedure well. Complications: Initial attempt was to the right femoral vein but was unsuccessful and the wire bent.         [WL]   1826 Patient presents to the ED for evaluation. Work-up was performed with concerns for but not limited to pneumonia, stroke. Patient was found to be hypotensive and tachycardic upon arrival and had no response to commands. He was sedated and intubated and placed on the ventilator. He is found to have pneumonia and believe this is likely the cause to his septic shock. After he was given a sepsis bolus of fluid he remained hypotensive so a central line was placed. Levo was started. Patient requires continued workup and management of their symptoms and will be admitted to the hospital for further evaluation and treatment.        [WL]      ED Course User Index  Joselyn Damian MD  [WL] Norbert Johnson, DO          --------------------------------------------- PAST HISTORY ---------------------------------------------  Past Medical History:  has no past medical history on file. Past Surgical History:  has no past surgical history on file.     Social History:  reports that he has never smoked. He has never used smokeless tobacco. He reports previous alcohol use. He reports that he does not use drugs. Family History: family history is not on file. The patients home medications have been reviewed.     Allergies: Codeine; Diazepam; Lorazepam; and Morphine    -------------------------------------------------- RESULTS -------------------------------------------------    Lab  Results for orders placed or performed during the hospital encounter of 03/14/20   CBC Auto Differential   Result Value Ref Range    WBC 20.8 (H) 4.5 - 11.5 E9/L    RBC 5.03 3.80 - 5.80 E12/L    Hemoglobin 12.3 (L) 12.5 - 16.5 g/dL    Hematocrit 40.2 37.0 - 54.0 %    MCV 79.9 (L) 80.0 - 99.9 fL    MCH 24.5 (L) 26.0 - 35.0 pg    MCHC 30.6 (L) 32.0 - 34.5 %    RDW 15.8 (H) 11.5 - 15.0 fL    Platelets 992 967 - 766 E9/L    MPV 11.2 7.0 - 12.0 fL    Neutrophils % 74.6 43.0 - 80.0 %    Immature Granulocytes % 1.9 0.0 - 5.0 %    Lymphocytes % 8.0 (L) 20.0 - 42.0 %    Monocytes % 15.3 (H) 2.0 - 12.0 %    Eosinophils % 0.0 0.0 - 6.0 %    Basophils % 0.2 0.0 - 2.0 %    Neutrophils Absolute 15.52 (H) 1.80 - 7.30 E9/L    Immature Granulocytes # 0.39 E9/L    Lymphocytes Absolute 1.66 1.50 - 4.00 E9/L    Monocytes Absolute 3.18 (H) 0.10 - 0.95 E9/L    Eosinophils Absolute 0.01 (L) 0.05 - 0.50 E9/L    Basophils Absolute 0.05 0.00 - 0.20 E9/L    Anisocytosis 1+    TSH without Reflex   Result Value Ref Range    TSH 0.435 0.270 - 4.200 uIU/mL   APTT   Result Value Ref Range    aPTT 30.7 24.5 - 35.1 sec   Troponin   Result Value Ref Range    Troponin 0.08 (H) 0.00 - 0.03 ng/mL   Urinalysis   Result Value Ref Range    Color, UA Yellow Straw/Yellow    Clarity, UA Clear Clear    Glucose, Ur Negative Negative mg/dL    Bilirubin Urine Negative Negative    Ketones, Urine Negative Negative mg/dL    Specific Gravity, UA 1.020 1.005 - 1.030    Blood, Urine Negative Negative    pH, UA 7.5 5.0 - 9.0    Protein, UA Negative Negative Value Ref Range    Meter Glucose 77 74 - 99 mg/dL   EKG 12 Lead   Result Value Ref Range    Ventricular Rate 139 BPM    Atrial Rate 139 BPM    P-R Interval 160 ms    QRS Duration 142 ms    Q-T Interval 342 ms    QTc Calculation (Bazett) 520 ms    R Axis -103 degrees    T Axis 5 degrees       Radiology  XR CHEST PORTABLE   Final Result   Central venous catheter terminating in the SVC. No pneumothorax. No   new abnormal findings. CT Head WO Contrast   Final Result   No acute intracranial process         CT Cervical Spine WO Contrast   Final Result   1. Degenerative changes in the disc space of C5-6, C6-7 mainly. 2. Loss of height of vertebral bodies of C5, C6 and C7 likely to be   more an old event but there are no previous studies available for   comparison to determine the baseline. Age is not determined based on   this study. See above comments and recommendations. 3. Otherwise no acute displaced fractures seen in the cervical spine. CT CHEST WO CONTRAST   Final Result   1. Endotracheal tube just above the sabas, can be pulled back about 2   cm. 2. NG tube coiled in the body of the stomach, can be pulled back about   5 cm.      3, Bilateral multi segmental atelectasis in the lower lobes with   consolidation but there is an overall patent central airways. The   pathways of multisegmental atelectasis of the lower lobes are. Underlying pneumonia or aspiration cannot be excluded. Please   correlate clinically. CT ABDOMEN PELVIS WO CONTRAST Additional Contrast? None   Final Result      1. Suspected pneumonia at the right lower lobe. Left lower lobe   atelectasis. 2. Colonic diverticulosis and hiatus hernia. XR ABDOMEN FOR NG/OG/NE TUBE PLACEMENT   Final Result      Satisfactory position of nasogastric tube. XR CHEST PORTABLE   Final Result   Endotracheal tube in good position.  NG tube partial coiled   in the body of the stomach and be pulled back stabilized during their ED course. Please note that the withdrawal or failure to initiate urgent interventions for this patient would likely result in a life threatening deterioration or permanent disability. Accordingly this patient received 30 minutes of critical care time, excluding separately billable procedures. Clinical Impression  1. Pneumonia due to organism    2. Septic shock ([address]    3. Acute respiratory failure, unsp w hypoxia or hypercapnia (HCC)          Disposition  Patient's disposition: Admit to CCU/ICU  Patient's condition is critical.    NOTE:  This report was transcribed using voice recognition software. Efforts were made to ensure accuracy; however, inadvertent computerized transcription errors may be present.             Luis Rose DO  Resident  03/14/20 [id number]

## 2020-03-15 ENCOUNTER — APPOINTMENT (OUTPATIENT)
Dept: GENERAL RADIOLOGY | Age: 85
DRG: 870 | End: 2020-03-15
Payer: MEDICARE

## 2020-03-15 PROBLEM — R57.9 SHOCK (HCC): Status: ACTIVE | Noted: 2019-10-05

## 2020-03-15 LAB
AADO2: 186 MMHG
AADO2: 423.6 MMHG
AADO2: 560.6 MMHG
ACETAMINOPHEN LEVEL: <5 MCG/ML (ref 10–30)
ADENOVIRUS BY PCR: NOT DETECTED
ANION GAP SERPL CALCULATED.3IONS-SCNC: 16 MMOL/L (ref 7–16)
ANION GAP SERPL CALCULATED.3IONS-SCNC: 17 MMOL/L (ref 7–16)
ANION GAP SERPL CALCULATED.3IONS-SCNC: 18 MMOL/L (ref 7–16)
ANISOCYTOSIS: ABNORMAL
APTT: 154.5 SEC (ref 24.5–35.1)
APTT: 66.5 SEC (ref 24.5–35.1)
APTT: 86.5 SEC (ref 24.5–35.1)
B.E.: -10.1 MMOL/L (ref -3–3)
B.E.: -8.9 MMOL/L
B.E.: -9.8 MMOL/L (ref -3–3)
BASOPHILS ABSOLUTE: 0 E9/L (ref 0–0.2)
BASOPHILS RELATIVE PERCENT: 0.3 % (ref 0–2)
BORDETELLA PARAPERTUSSIS BY PCR: NOT DETECTED
BORDETELLA PERTUSSIS BY PCR: NOT DETECTED
BUN BLDV-MCNC: 26 MG/DL (ref 8–23)
BUN BLDV-MCNC: 28 MG/DL (ref 8–23)
BUN BLDV-MCNC: 29 MG/DL (ref 8–23)
CALCIUM SERPL-MCNC: 6.6 MG/DL (ref 8.6–10.2)
CALCIUM SERPL-MCNC: 6.8 MG/DL (ref 8.6–10.2)
CALCIUM SERPL-MCNC: 6.9 MG/DL (ref 8.6–10.2)
CHLAMYDOPHILIA PNEUMONIAE BY PCR: NOT DETECTED
CHLORIDE BLD-SCNC: 97 MMOL/L (ref 98–107)
CHLORIDE BLD-SCNC: 99 MMOL/L (ref 98–107)
CHLORIDE BLD-SCNC: 99 MMOL/L (ref 98–107)
CHLORIDE URINE RANDOM: 157 MMOL/L
CO2: 16 MMOL/L (ref 22–29)
CO2: 18 MMOL/L (ref 22–29)
CO2: 21 MMOL/L (ref 22–29)
COHB: 0.3 % (ref 0–1.5)
COHB: 0.8 % (ref 0–1.5)
COHB: 0.8 % (ref 0–1.5)
CORONAVIRUS 229E BY PCR: NOT DETECTED
CORONAVIRUS HKU1 BY PCR: NOT DETECTED
CORONAVIRUS NL63 BY PCR: NOT DETECTED
CORONAVIRUS OC43 BY PCR: NOT DETECTED
CREAT SERPL-MCNC: 2.9 MG/DL (ref 0.7–1.2)
CREAT SERPL-MCNC: 2.9 MG/DL (ref 0.7–1.2)
CREAT SERPL-MCNC: 3 MG/DL (ref 0.7–1.2)
CREATININE URINE: 67 MG/DL (ref 40–278)
CRITICAL: ABNORMAL
DATE ANALYZED: ABNORMAL
DATE OF COLLECTION: ABNORMAL
EKG ATRIAL RATE: 85 BPM
EKG P AXIS: 35 DEGREES
EKG P-R INTERVAL: 214 MS
EKG Q-T INTERVAL: 462 MS
EKG QRS DURATION: 104 MS
EKG QTC CALCULATION (BAZETT): 549 MS
EKG R AXIS: -68 DEGREES
EKG T AXIS: 25 DEGREES
EKG VENTRICULAR RATE: 85 BPM
EOSINOPHILS ABSOLUTE: 0 E9/L (ref 0.05–0.5)
EOSINOPHILS RELATIVE PERCENT: 0 % (ref 0–6)
ETHANOL: <10 MG/DL (ref 0–0.08)
FIO2: 100 %
FIO2: 40 %
FIO2: 80 %
GFR AFRICAN AMERICAN: 24
GFR AFRICAN AMERICAN: 25
GFR AFRICAN AMERICAN: 25
GFR NON-AFRICAN AMERICAN: 20 ML/MIN/1.73
GFR NON-AFRICAN AMERICAN: 21 ML/MIN/1.73
GFR NON-AFRICAN AMERICAN: 21 ML/MIN/1.73
GLUCOSE BLD-MCNC: 203 MG/DL (ref 74–99)
GLUCOSE BLD-MCNC: 247 MG/DL (ref 74–99)
GLUCOSE BLD-MCNC: 283 MG/DL (ref 74–99)
HCO3: 17.2 MMOL/L (ref 22–26)
HCO3: 17.2 MMOL/L (ref 22–26)
HCO3: 17.9 MMOL/L
HCT VFR BLD CALC: 40.1 % (ref 37–54)
HEMOGLOBIN: 11.9 G/DL (ref 12.5–16.5)
HHB: 24.8 %
HHB: 4.5 % (ref 0–5)
HHB: 4.6 % (ref 0–5)
HUMAN METAPNEUMOVIRUS BY PCR: NOT DETECTED
HUMAN RHINOVIRUS/ENTEROVIRUS BY PCR: NOT DETECTED
INFLUENZA A H1-2009 BY PCR: DETECTED
INFLUENZA B BY PCR: NOT DETECTED
L. PNEUMOPHILA SEROGP 1 UR AG: NORMAL
LAB: ABNORMAL
LACTIC ACID: 3.7 MMOL/L (ref 0.5–2.2)
LACTIC ACID: 3.7 MMOL/L (ref 0.5–2.2)
LACTIC ACID: 4 MMOL/L (ref 0.5–2.2)
LACTIC ACID: 4.6 MMOL/L (ref 0.5–2.2)
LYMPHOCYTES ABSOLUTE: 0 E9/L (ref 1.5–4)
LYMPHOCYTES RELATIVE PERCENT: 2.3 % (ref 20–42)
Lab: ABNORMAL
MCH RBC QN AUTO: 23.9 PG (ref 26–35)
MCHC RBC AUTO-ENTMCNC: 29.7 % (ref 32–34.5)
MCV RBC AUTO: 80.7 FL (ref 80–99.9)
METHB: 0.3 % (ref 0–1.5)
METHB: 0.3 % (ref 0–1.5)
METHB: 0.4 % (ref 0–1.5)
MODE: AC
MONOCYTES ABSOLUTE: 3.46 E9/L (ref 0.1–0.95)
MONOCYTES RELATIVE PERCENT: 7.8 % (ref 2–12)
MYCOPLASMA PNEUMONIAE BY PCR: NOT DETECTED
NEUTROPHILS ABSOLUTE: 39.84 E9/L (ref 1.8–7.3)
NEUTROPHILS RELATIVE PERCENT: 92.2 % (ref 43–80)
O2 CONTENT: 12.9 ML/DL
O2 CONTENT: 17.4 ML/DL
O2 CONTENT: 17.6 ML/DL
O2 SATURATION: 74.9 %
O2 SATURATION: 95.4 % (ref 92–98.5)
O2 SATURATION: 95.4 % (ref 92–98.5)
O2HB: 74.1 %
O2HB: 94.3 % (ref 94–97)
O2HB: 94.8 % (ref 94–97)
OPERATOR ID: 2577
OPERATOR ID: 3957
OPERATOR ID: ABNORMAL
OSMOLALITY URINE: 498 MOSM/KG (ref 300–900)
OSMOLALITY: 292 MOSM/KG (ref 285–310)
OVALOCYTES: ABNORMAL
PARAINFLUENZA VIRUS 1 BY PCR: NOT DETECTED
PARAINFLUENZA VIRUS 2 BY PCR: NOT DETECTED
PARAINFLUENZA VIRUS 3 BY PCR: NOT DETECTED
PARAINFLUENZA VIRUS 4 BY PCR: NOT DETECTED
PATIENT TEMP: 37 C
PCO2: 41.7 MMHG (ref 35–45)
PCO2: 42 MMHG (ref 40–52)
PCO2: 43.1 MMHG (ref 35–45)
PDW BLD-RTO: 15.7 FL (ref 11.5–15)
PEEP/CPAP: 5 CMH2O
PEEP/CPAP: 8 CMH2O
PEEP/CPAP: 8 CMH2O
PFO2: 0.84 MMHG/%
PFO2: 1.02 MMHG/%
PFO2: 1.04 MMHG/%
PH BLOOD GAS: 7.22 (ref 7.35–7.45)
PH BLOOD GAS: 7.23 (ref 7.35–7.45)
PH BLOOD GAS: 7.25 (ref 7.3–7.42)
PLATELET # BLD: 278 E9/L (ref 130–450)
PMV BLD AUTO: 10.9 FL (ref 7–12)
PO2: 40.9 MMHG (ref 30–50)
PO2: 83 MMHG (ref 75–100)
PO2: 84.3 MMHG (ref 75–100)
POIKILOCYTES: ABNORMAL
POTASSIUM REFLEX MAGNESIUM: 3.7 MMOL/L (ref 3.5–5)
POTASSIUM REFLEX MAGNESIUM: 3.7 MMOL/L (ref 3.5–5)
POTASSIUM REFLEX MAGNESIUM: 4.2 MMOL/L (ref 3.5–5)
POTASSIUM, UR: 30 MMOL/L
RBC # BLD: 4.97 E12/L (ref 3.8–5.8)
RESPIRATORY SYNCYTIAL VIRUS BY PCR: NOT DETECTED
RI(T): 4.55
RI(T): 5.1
RI(T): 6.65
RR MECHANICAL: 16 B/MIN
RR MECHANICAL: 18 B/MIN
RR MECHANICAL: 18 B/MIN
SALICYLATE, SERUM: 0.7 MG/DL (ref 0–30)
SODIUM BLD-SCNC: 133 MMOL/L (ref 132–146)
SODIUM BLD-SCNC: 133 MMOL/L (ref 132–146)
SODIUM BLD-SCNC: 135 MMOL/L (ref 132–146)
SODIUM URINE: 177 MMOL/L
SOURCE, BLOOD GAS: ABNORMAL
STREP PNEUMONIAE ANTIGEN, URINE: NORMAL
THB: 12.4 G/DL (ref 11.5–16.5)
THB: 13 G/DL (ref 11.5–16.5)
THB: 13.2 G/DL (ref 11.5–16.5)
TIME ANALYZED: 1117
TIME ANALYZED: 309
TIME ANALYZED: 531
TOTAL CK: 1119 U/L (ref 20–200)
TRICYCLIC ANTIDEPRESSANTS SCREEN SERUM: NEGATIVE NG/ML
TROPONIN: 0.18 NG/ML (ref 0–0.03)
VANCOMYCIN RANDOM: 21 MCG/ML (ref 5–40)
VT MECHANICAL: 500 ML
WBC # BLD: 43.3 E9/L (ref 4.5–11.5)

## 2020-03-15 PROCEDURE — 6370000000 HC RX 637 (ALT 250 FOR IP): Performed by: INTERNAL MEDICINE

## 2020-03-15 PROCEDURE — 2580000003 HC RX 258: Performed by: INTERNAL MEDICINE

## 2020-03-15 PROCEDURE — 94640 AIRWAY INHALATION TREATMENT: CPT

## 2020-03-15 PROCEDURE — 85025 COMPLETE CBC W/AUTO DIFF WBC: CPT

## 2020-03-15 PROCEDURE — 6360000002 HC RX W HCPCS: Performed by: SPECIALIST

## 2020-03-15 PROCEDURE — 6360000002 HC RX W HCPCS: Performed by: INTERNAL MEDICINE

## 2020-03-15 PROCEDURE — 36556 INSERT NON-TUNNEL CV CATH: CPT | Performed by: INTERNAL MEDICINE

## 2020-03-15 PROCEDURE — APPSS60 APP SPLIT SHARED TIME 46-60 MINUTES: Performed by: NURSE PRACTITIONER

## 2020-03-15 PROCEDURE — 93010 ELECTROCARDIOGRAM REPORT: CPT | Performed by: INTERNAL MEDICINE

## 2020-03-15 PROCEDURE — 2500000003 HC RX 250 WO HCPCS: Performed by: INTERNAL MEDICINE

## 2020-03-15 PROCEDURE — 36415 COLL VENOUS BLD VENIPUNCTURE: CPT

## 2020-03-15 PROCEDURE — 82805 BLOOD GASES W/O2 SATURATION: CPT

## 2020-03-15 PROCEDURE — 83605 ASSAY OF LACTIC ACID: CPT

## 2020-03-15 PROCEDURE — 87070 CULTURE OTHR SPECIMN AEROBIC: CPT

## 2020-03-15 PROCEDURE — 71045 X-RAY EXAM CHEST 1 VIEW: CPT

## 2020-03-15 PROCEDURE — 85730 THROMBOPLASTIN TIME PARTIAL: CPT

## 2020-03-15 PROCEDURE — 36556 INSERT NON-TUNNEL CV CATH: CPT

## 2020-03-15 PROCEDURE — 2000000000 HC ICU R&B

## 2020-03-15 PROCEDURE — 84484 ASSAY OF TROPONIN QUANT: CPT

## 2020-03-15 PROCEDURE — 94003 VENT MGMT INPAT SUBQ DAY: CPT

## 2020-03-15 PROCEDURE — 87206 SMEAR FLUORESCENT/ACID STAI: CPT

## 2020-03-15 PROCEDURE — C9113 INJ PANTOPRAZOLE SODIUM, VIA: HCPCS | Performed by: INTERNAL MEDICINE

## 2020-03-15 PROCEDURE — 82550 ASSAY OF CK (CPK): CPT

## 2020-03-15 PROCEDURE — 80202 ASSAY OF VANCOMYCIN: CPT

## 2020-03-15 PROCEDURE — 99222 1ST HOSP IP/OBS MODERATE 55: CPT | Performed by: INTERNAL MEDICINE

## 2020-03-15 PROCEDURE — 02HV33Z INSERTION OF INFUSION DEVICE INTO SUPERIOR VENA CAVA, PERCUTANEOUS APPROACH: ICD-10-PCS | Performed by: INTERNAL MEDICINE

## 2020-03-15 PROCEDURE — 99291 CRITICAL CARE FIRST HOUR: CPT | Performed by: INTERNAL MEDICINE

## 2020-03-15 PROCEDURE — 93005 ELECTROCARDIOGRAM TRACING: CPT | Performed by: INTERNAL MEDICINE

## 2020-03-15 PROCEDURE — 80048 BASIC METABOLIC PNL TOTAL CA: CPT

## 2020-03-15 RX ORDER — OSELTAMIVIR PHOSPHATE 30 MG/1
30 CAPSULE ORAL DAILY
Status: DISCONTINUED | OUTPATIENT
Start: 2020-03-15 | End: 2020-03-15 | Stop reason: CLARIF

## 2020-03-15 RX ORDER — 0.9 % SODIUM CHLORIDE 0.9 %
1000 INTRAVENOUS SOLUTION INTRAVENOUS ONCE
Status: COMPLETED | OUTPATIENT
Start: 2020-03-15 | End: 2020-03-15

## 2020-03-15 RX ORDER — OSELTAMIVIR PHOSPHATE 30 MG/1
30 CAPSULE ORAL DAILY
Status: COMPLETED | OUTPATIENT
Start: 2020-03-15 | End: 2020-03-19

## 2020-03-15 RX ORDER — FLUDROCORTISONE ACETATE 0.1 MG/1
0.1 TABLET ORAL DAILY
Status: DISCONTINUED | OUTPATIENT
Start: 2020-03-15 | End: 2020-03-23 | Stop reason: HOSPADM

## 2020-03-15 RX ORDER — LEVOFLOXACIN 5 MG/ML
750 INJECTION, SOLUTION INTRAVENOUS ONCE
Status: COMPLETED | OUTPATIENT
Start: 2020-03-15 | End: 2020-03-15

## 2020-03-15 RX ORDER — FLUDROCORTISONE ACETATE 0.1 MG/1
0.1 TABLET ORAL DAILY
COMMUNITY

## 2020-03-15 RX ORDER — AMIODARONE HYDROCHLORIDE 200 MG/1
400 TABLET ORAL 2 TIMES DAILY
Status: DISCONTINUED | OUTPATIENT
Start: 2020-03-15 | End: 2020-03-20

## 2020-03-15 RX ADMIN — MIDAZOLAM 1 MG/HR: 5 INJECTION INTRAMUSCULAR; INTRAVENOUS at 00:05

## 2020-03-15 RX ADMIN — PIPERACILLIN AND TAZOBACTAM 3.38 G: 3; .375 INJECTION, POWDER, LYOPHILIZED, FOR SOLUTION INTRAVENOUS at 11:32

## 2020-03-15 RX ADMIN — HYDROCORTISONE SODIUM SUCCINATE 50 MG: 100 INJECTION, POWDER, FOR SOLUTION INTRAMUSCULAR; INTRAVENOUS at 00:00

## 2020-03-15 RX ADMIN — SODIUM BICARBONATE 100 MEQ: 84 INJECTION, SOLUTION INTRAVENOUS at 12:43

## 2020-03-15 RX ADMIN — HYDROCORTISONE SODIUM SUCCINATE 50 MG: 100 INJECTION, POWDER, FOR SOLUTION INTRAMUSCULAR; INTRAVENOUS at 03:47

## 2020-03-15 RX ADMIN — ASCORBIC ACID 1500 MG: 500 INJECTION INTRAVENOUS at 10:38

## 2020-03-15 RX ADMIN — AMIODARONE HYDROCHLORIDE 0.5 MG/MIN: 50 INJECTION, SOLUTION INTRAVENOUS at 03:27

## 2020-03-15 RX ADMIN — ASCORBIC ACID 1500 MG: 500 INJECTION INTRAVENOUS at 00:44

## 2020-03-15 RX ADMIN — HYDROCORTISONE SODIUM SUCCINATE 50 MG: 100 INJECTION, POWDER, FOR SOLUTION INTRAMUSCULAR; INTRAVENOUS at 14:23

## 2020-03-15 RX ADMIN — FLUDROCORTISONE ACETATE 0.1 MG: 0.1 TABLET ORAL at 12:43

## 2020-03-15 RX ADMIN — ALBUTEROL SULFATE 2.5 MG: 2.5 SOLUTION RESPIRATORY (INHALATION) at 03:39

## 2020-03-15 RX ADMIN — HYDROCORTISONE SODIUM SUCCINATE 50 MG: 100 INJECTION, POWDER, FOR SOLUTION INTRAMUSCULAR; INTRAVENOUS at 20:53

## 2020-03-15 RX ADMIN — ALBUTEROL SULFATE 2.5 MG: 2.5 SOLUTION RESPIRATORY (INHALATION) at 11:48

## 2020-03-15 RX ADMIN — PERAMIVIR 100 MG: 600 SOLUTION INTRAVENOUS at 04:31

## 2020-03-15 RX ADMIN — SODIUM CHLORIDE, PRESERVATIVE FREE 10 ML: 5 INJECTION INTRAVENOUS at 08:02

## 2020-03-15 RX ADMIN — HYDROCORTISONE SODIUM SUCCINATE 50 MG: 100 INJECTION, POWDER, FOR SOLUTION INTRAMUSCULAR; INTRAVENOUS at 08:02

## 2020-03-15 RX ADMIN — LEVOTHYROXINE SODIUM 125 MCG: 0.12 TABLET ORAL at 06:33

## 2020-03-15 RX ADMIN — AMIODARONE HYDROCHLORIDE 400 MG: 200 TABLET ORAL at 10:37

## 2020-03-15 RX ADMIN — VASOPRESSIN 0.02 UNITS/MIN: 20 INJECTION INTRAVENOUS at 03:26

## 2020-03-15 RX ADMIN — VASOPRESSIN 0.03 UNITS/MIN: 20 INJECTION INTRAVENOUS at 13:35

## 2020-03-15 RX ADMIN — ASCORBIC ACID 1500 MG: 500 INJECTION INTRAVENOUS at 04:32

## 2020-03-15 RX ADMIN — ASCORBIC ACID 1500 MG: 500 INJECTION, SOLUTION INTRAMUSCULAR; INTRAVENOUS; SUBCUTANEOUS at 16:47

## 2020-03-15 RX ADMIN — Medication 100 MCG/HR: at 08:47

## 2020-03-15 RX ADMIN — PIPERACILLIN AND TAZOBACTAM 3.38 G: 3; .375 INJECTION, POWDER, LYOPHILIZED, FOR SOLUTION INTRAVENOUS at 00:44

## 2020-03-15 RX ADMIN — ALBUTEROL SULFATE 2.5 MG: 2.5 SOLUTION RESPIRATORY (INHALATION) at 21:36

## 2020-03-15 RX ADMIN — ASCORBIC ACID 1500 MG: 500 INJECTION, SOLUTION INTRAMUSCULAR; INTRAVENOUS; SUBCUTANEOUS at 22:43

## 2020-03-15 RX ADMIN — SODIUM CHLORIDE, PRESERVATIVE FREE 10 ML: 5 INJECTION INTRAVENOUS at 00:47

## 2020-03-15 RX ADMIN — THIAMINE HYDROCHLORIDE 200 MG: 100 INJECTION, SOLUTION INTRAMUSCULAR; INTRAVENOUS at 10:38

## 2020-03-15 RX ADMIN — OSELTAMIVIR PHOSPHATE 30 MG: 30 CAPSULE ORAL at 04:31

## 2020-03-15 RX ADMIN — ALBUTEROL SULFATE 2.5 MG: 2.5 SOLUTION RESPIRATORY (INHALATION) at 16:15

## 2020-03-15 RX ADMIN — PANTOPRAZOLE SODIUM 40 MG: 40 INJECTION, POWDER, FOR SOLUTION INTRAVENOUS at 08:02

## 2020-03-15 RX ADMIN — Medication 30 MCG/MIN: at 03:16

## 2020-03-15 RX ADMIN — THIAMINE HYDROCHLORIDE 200 MG: 100 INJECTION, SOLUTION INTRAMUSCULAR; INTRAVENOUS at 23:37

## 2020-03-15 RX ADMIN — ALBUTEROL SULFATE 2.5 MG: 2.5 SOLUTION RESPIRATORY (INHALATION) at 08:44

## 2020-03-15 RX ADMIN — Medication 15 MCG/MIN: at 13:35

## 2020-03-15 RX ADMIN — AMIODARONE HYDROCHLORIDE 400 MG: 200 TABLET ORAL at 20:59

## 2020-03-15 RX ADMIN — SODIUM CHLORIDE 1000 ML: 9 INJECTION, SOLUTION INTRAVENOUS at 09:57

## 2020-03-15 RX ADMIN — SODIUM CHLORIDE, PRESERVATIVE FREE 10 ML: 5 INJECTION INTRAVENOUS at 20:53

## 2020-03-15 RX ADMIN — SODIUM BICARBONATE: 84 INJECTION, SOLUTION INTRAVENOUS at 16:15

## 2020-03-15 RX ADMIN — LEVOFLOXACIN 750 MG: 5 INJECTION, SOLUTION INTRAVENOUS at 11:17

## 2020-03-15 ASSESSMENT — PAIN SCALES - GENERAL
PAINLEVEL_OUTOF10: 0
PAINLEVEL_OUTOF10: 4
PAINLEVEL_OUTOF10: 0

## 2020-03-15 ASSESSMENT — PULMONARY FUNCTION TESTS
PIF_VALUE: 19
PIF_VALUE: 21
PIF_VALUE: 22
PIF_VALUE: 23
PIF_VALUE: 21
PIF_VALUE: 22
PIF_VALUE: 21
PEFR_L/MIN: 70
PIF_VALUE: 21
PIF_VALUE: 20
PIF_VALUE: 21
PIF_VALUE: 22
PIF_VALUE: 24
PIF_VALUE: 20
PIF_VALUE: 22
PIF_VALUE: 19
PIF_VALUE: 27
PIF_VALUE: 25
PIF_VALUE: 20
PIF_VALUE: 19
PIF_VALUE: 19
PIF_VALUE: 22
PIF_VALUE: 19
PIF_VALUE: 20
PEFR_L/MIN: 70
PIF_VALUE: 20
PIF_VALUE: 21
PIF_VALUE: 25
PIF_VALUE: 20
PIF_VALUE: 21

## 2020-03-15 NOTE — CONSULTS
Inpatient Cardiology Consultation      Reason for Consult: Elevated troponin/ Atrial flutter with RVR vs VT     Consulting Physician: Dr. Crissy Godoy    Requesting Physician:  Dr. Reynaldo Henry    Date of Consultation: 3/15/2020    HISTORY OF PRESENT ILLNESS:   Mr. Bettie Antunez is an 80-year-old  obese male not known to [facility] cardiology. 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 [facility] on 3/14/2020 at approximately 2:30 PM after being found down (time down unknown) during a welfare check. Patient was noted to be unresponsive with emesis, and dried urine on patient's underware. EMS was summoned. Per ED documentation, patient was noted to be in sinus tachycardia with PACs. EKG revealed sinus tachycardia, rate 135 bpm, right bundle branch block. He was hypotensive and started on Levophed. He was intubated for acute hypoxic respiratory failure with suspected pneumonia. Upon admission: Blood pressure 63/43, heart rate 132, RR 16, axillary temperature 99.6 °F. Labs: Sodium 138, potassium 3.2, BUN 22, creatinine 2.5 (baseline unknown), troponin 0.08, total , albumin 3.1, ALT 20, AST 47, TSH normal, WBC 20.8, hemoglobin 12.3, hematocrit 40.2, platelet count 988. Blood cultures pending. UA negative. Respiratory panel: Positive influenza A. Patient was noted to have runs of SVT versus VT (strips are unavailable for review). Per documentation patient was ordered a bolus of amiodarone and was started on an amiodarone drip. Cardiology on call overnight recommended starting vasopressin and tapering off the Levophed. Troponins have been cycled (Troponin 0.08>>0.12>>0.16), BNP 4241. >>1119; CKMB 12.9. Lactic acid 4.6. Currently, patient is intubated and sedated. On IV heparin, levophed, vasopressin and amiodarone. Telemetry: SR with PVCs.      Please note: past medical records were reviewed per electronic medical Daily  sodium chloride flush 0.9 % injection 10 mL, 10 mL, Intravenous, 2 times per day  sodium chloride flush 0.9 % injection 10 mL, 10 mL, Intravenous, PRN  acetaminophen (TYLENOL) tablet 650 mg, 650 mg, Oral, Q6H PRN **OR** acetaminophen (TYLENOL) suppository 650 mg, 650 mg, Rectal, Q6H PRN  polyethylene glycol (GLYCOLAX) packet 17 g, 17 g, Oral, Daily PRN  promethazine (PHENERGAN) tablet 12.5 mg, 12.5 mg, Oral, Q6H PRN **OR** ondansetron (ZOFRAN) injection 4 mg, 4 mg, Intravenous, Q6H PRN  perflutren lipid microspheres (DEFINITY) injection 1.65 mg, 1.5 mL, Intravenous, ONCE PRN  albuterol (PROVENTIL) nebulizer solution 2.5 mg, 2.5 mg, Nebulization, Q4H  hydrocortisone sodium succinate PF (SOLU-CORTEF) injection 50 mg, 50 mg, Intravenous, Q6H  pantoprazole (PROTONIX) injection 40 mg, 40 mg, Intravenous, Daily **AND** sodium chloride (PF) 0.9 % injection 10 mL, 10 mL, Intravenous, Daily  piperacillin-tazobactam (ZOSYN) 3.375 g in dextrose 5 % 100 mL IVPB extended infusion (mini-bag), 3.375 g, Intravenous, Q12H  thiamine (B-1) 200 mg in sodium chloride 0.9 % 100 mL IVPB, 200 mg, Intravenous, Q12H  ascorbic acid 1,500 mg in sodium chloride 0.9 % 100 mL IVPB, 1,500 mg, Intravenous, Q6H  midazolam (VERSED) 100 mg in dextrose 5 % 100 mL infusion, 1 mg/hr, Intravenous, Continuous    Allergies:  Codeine; Diazepam; Lorazepam; and Morphine    Social History: The Following information was obtained from the patient's medical record due to the patient is intubated and sedated  Patient was living alone prior to admission.   Lifelong non-smoker  Alcohol and illicit drug use --unknown due to patient's condition    Family History: Noncontributory due to advanced age    REVIEW OF SYSTEMS:   Unable to assess due to the patient is intubated and sedated    PHYSICAL EXAM:   BP (!) 93/56   Pulse 80   Temp 98.6 °F (37 °C) (Bladder)   Resp 18   Ht 6' 0.84\" (1.85 m)   Wt 220 lb 14.4 oz (100.2 kg)   SpO2 95%   BMI 29.28 kg/m² on 3/14/2020 after being found down during a welfare check up (downtime unknown). 2.  Acute hypoxic respiratory failure-intubated  3. Septic shock secondary to pneumonia -- on antibiotics. 4.  Elevated troponin in the setting of Rhabdomyolysis   3. Atrial flutter with RVR on EKG. (strips unavailable for review). Patient remains on amiodarone IV drip. 4.  Hypothyroidism-on replacement therapy  5. Hyponatremia  6. Hyperkalemia  7. Acute renal insufficiency/probable chronic  8.  + Influenza A  9. Adrenal insufficiency  10. Hypotension -- on vasopressin   11. DDD  12. Hiatal hernia  13. Diverticulosis    -Change IV amiodarone to PO  -Continue as per ICU team   -Continue Heparin IV gtt (due to Atrial Flutter on EKG): API3C3-IPVb score at least 3.   -Wean pressors as tolerated  -Supportive treatment   -Recommend hydration in the setting of dehydration / rhado  -ECHO to assess LV function and VHD  -Further recommendations pending the above. Above discussed with Dr. Yolanda Patricia / nursing staff. Electronically signed by KRISTIN Connors - CNP on 3/15/20 at 7:58 AM EDT    Reason for consult: Elevated troponin with questionable VT. Patient seen with KRISTIN Connors  . Agree with the findings and A/P. Management plan was discussed. I have personally interviewed the patient, independently performed a focused cardiac exam, reviewed the pertinent laboratory and diagnostic testing results and directly participated in the medical decision-making. HPI: 45-year-old obese non-smoker male who is seen in consultation in the ICU due to elevated troponin and questionable ventricular tachycardia. He has history of asthma/COPD, BPH, adrenal insufficiency, pituitary adenoma, chronic kidney disease, chronic right bundle branch block, hypothyroidism and hiatal hernia. He lives alone at home. He was found unresponsive on March 14, 2020 and the downtime was unknown.   EMS found him in sinus tachycardia with hypotension with low-grade fever. Patient was intubated and was admitted to the ICU. He has been receiving Levophed and vasopressin for pressure support and he was placed on intravenous amiodarone due to possible VT. He was found to be positive for influenza A and there was suspicion of pneumonia. Reviewed the PMH, social history, FH and ROS from APRN note. Agree with the findings. See the full consult note for details. PE:   BP (!) 93/56   Pulse 82   Temp 99.5 °F (37.5 °C)   Resp 18   Ht 6' 0.84\" (1.85 m)   Wt 220 lb 14.4 oz (100.2 kg)   SpO2 94%   BMI 29.28 kg/m²   CONST: Elderly obese male who appears of stated age. He is on mechanical ventilation and agitated at times   HEENT: Head- normocephalic, atraumatic. Neck: no tenderness, no jugular venous distention. No carotid bruit noted. LUNGS: Decreased air entry bilaterally with no wheezing. CARDIOVASCULAR:  RRR, distant heart sounds, no murmur, s3, s4 or rub noted. PV: No extremity edema. Pedal pulses palpable. ABDOMEN: Soft, non-tender to light palpation. Bowel sounds present. No palpable masses no hepatosplenomegaly or splenomegaly; no abdominal bruit / pulsation  SKIN: dry. NEURO / PSYCH: Unable to assess. EKG as per my interpretation: Atrial flutter with 2:1  block at 151 bpm  with a complete right bundle branch block. CXR :   Increasing atelectasis and/or infiltrate at the left base. 2. The right-sided central venous catheter has been pulled back with   the tip now probably in the right brachiocephalic vein.            Lab Review         Recent Labs     03/14/20  1516 03/14/20  1920 03/15/20  0300   WBC 20.8* 33.1* 43.3*   HGB 12.3* 13.4 11.9*   HCT 40.2 45.3 40.1    271 278       Recent Labs     03/14/20  1516 03/14/20  1853 03/14/20  1920 03/15/20  0300     --  141 133   K 3.2*  --  3.5 3.7     --  105 99   CO2 20*  --  21* 16*   BUN 22  --  24* 26*   CREATININE 2.5* 2.8* 3.1* 3.0*       Recent Labs 03/14/20  1920   AST 54*   ALT 25   ALKPHOS 75       Recent Labs     03/14/20  1516 03/14/20  2245 03/15/20  0300   CKTOTAL 307*  --  1,119*   CKMB  --  12.9*  --        Last 3 Troponin:    Lab Results   Component Value Date    TROPONINI 0.18 03/15/2020    TROPONINI 0.16 03/14/2020    TROPONINI 0.12 03/14/2020            Recent Labs     03/14/20  2245   PROBNP 4,241*         Assessment:  -Elevated troponin: Probably due to rhabdomyolysis, hypotension, tachycardia, hypoxia, and acute kidney injury/chronic kidney disease.  -Wide-complex tachycardia: Probably due to paroxysmal atrial flutter with complete right bundle branch block.  -Acute hypoxic respiratory failure: Due to influenza A and pneumonia  -Septic shock and leukocytosis. -Rhabdomyolysis. -Acute kidney injury/chronic kidney disease.  -History of adrenal insufficiency and pituitary adenoma.  -Hypothyroidism. Plan:  -Switch IV amiodarone to via  mg twice daily   -Continue intravenous heparin to prevent CVA. -Wean pressors as allowed by blood pressure.  -Echocardiogram to assess LV systolic function and to rule out significant valvular heart disease.  -Keep potassium above 4 and magnesium above 2.  -Hydration due to rhabdomyolysis and acute kidney injury. Thank you for the consult. Will follow. Electronically signed by Dae Kumar MD on 3/15/2020 at 2:07 PM  [id number] [facility].

## 2020-03-15 NOTE — PLAN OF CARE
Son called to inform us that patient had a recent Zoledronic acid infusion for his osteopenia at the [facility]. He had never had this before.  Claudell Cota

## 2020-03-15 NOTE — PROGRESS NOTES
Hospitalist Progress Note      Synopsis: Patient admitted on 3/14/2020 with septic shock 2/2 influenza A with aspiration pneumonia. Pt on tamiflu and zosyn. Pt also with DONNA which is improving with IVF. Pt with known hx of COPD/asthma, BPH, adrenal insufficiency w/ hx of pituitary adenoma s/p ESS with hypopituitarism. Subjective    Clinically improving. Feeling better. Stable overnight. No other overnight issues reported. No CP, SOB, palpitations, blurred vision, HA, lightheadedness, LOC or focal neurological deficits    Exam:  BP (!) 93/56   Pulse 83   Temp 99.5 °F (37.5 °C)   Resp 18   Ht 6' 0.84\" (1.85 m)   Wt 220 lb 14.4 oz (100.2 kg)   SpO2 92%   BMI 29.28 kg/m²     General appearance:  on sedation but awake and restless   HEENT:  Normal cephalic, atraumatic without obvious deformity. Pupils equal, round, and reactive to light. Extra ocular muscles intact. Conjunctivae/corneas clear. Neck: Supple, with full range of motion. No jugular venous distention. Trachea midline. Respiratory:  Normal respiratory effort. Clear to auscultation, bilaterally without Rales/Wheezes/Rhonchi. Intubated. Coarse breath sounds b/l   Cardiovascular:  Regular rate and rhythm with normal S1/S2 without murmurs, rubs or gallops. Abdomen: Soft, non-tender, non-distended with normal bowel sounds. Musculoskeletal:  No clubbing, cyanosis or edema bilaterally. Skin: Skin color, texture, turgor normal.  No rashes or lesions.   Neurologic:  on sedation but awake and restless     Medications:  Reviewed    Infusion Medications    sodium bicarbonate infusion      fentaNYL 5 mcg/ml in 0.9%  ml infusion 125 mcg/hr (03/15/20 0956)    norepinephrine 25 mcg/min (03/15/20 1145)    vasopressin (Septic Shock) infusion 0.03 Units/min (03/15/20 1205)    heparin (porcine) 7 Units/kg/hr (03/15/20 1147)    midazolam 3 mg/hr (03/15/20 1117)     Scheduled Medications    peramivir (RAPIVAP) IVPB  100 mg Intravenous Daily  oseltamivir  30 mg Oral Daily    amiodarone  400 mg Oral BID    fludrocortisone  0.1 mg Oral Daily    ascorbic acid  1,500 mg Intravenous Q6H    levothyroxine  125 mcg Oral Daily    sodium chloride flush  10 mL Intravenous 2 times per day    albuterol  2.5 mg Nebulization Q4H    hydrocortisone sodium succinate PF  50 mg Intravenous Q6H    pantoprazole  40 mg Intravenous Daily    And    sodium chloride (PF)  10 mL Intravenous Daily    piperacillin-tazobactam  3.375 g Intravenous Q12H    thiamine (VITAMIN B1) IVPB  200 mg Intravenous Q12H     PRN Meds: heparin (porcine), heparin (porcine), sodium chloride flush, acetaminophen **OR** acetaminophen, polyethylene glycol, promethazine **OR** [DISCONTINUED] ondansetron, perflutren lipid microspheres    I/O    Intake/Output Summary (Last 24 hours) at 3/15/2020 1301  Last data filed at 3/15/2020 1200  Gross per 24 hour   Intake 3115 ml   Output 945 ml   Net 2170 ml       Labs:   Recent Labs     03/14/20  1516 03/14/20  1920 03/15/20  0300   WBC 20.8* 33.1* 43.3*   HGB 12.3* 13.4 11.9*   HCT 40.2 45.3 40.1    271 278       Recent Labs     03/14/20  1516 03/14/20  1853 03/14/20  1920 03/15/20  0300     --  141 133   K 3.2*  --  3.5 3.7     --  105 99   CO2 20*  --  21* 16*   BUN 22  --  24* 26*   CREATININE 2.5* 2.8* 3.1* 3.0*   CALCIUM 7.4*  --  7.5* 6.9*       Recent Labs     03/14/20  1516 03/14/20  1920   PROT 6.2* 6.5   ALKPHOS 64 75   ALT 20 25   AST 47* 54*   BILITOT 0.4 0.3       No results for input(s): INR in the last 72 hours. Recent Labs     03/14/20  1516 03/14/20  1920 03/14/20  2245 03/15/20  0300 03/15/20  0900   CKTOTAL 307*  --   --  1,119*  --    TROPONINI 0.08* 0.12* 0.16*  --  0.18*       Chronic labs:  Lab Results   Component Value Date    TSH 0.435 03/14/2020    INR 1.0 10/05/2019       Radiology:  Imaging studies reviewed today.     ASSESSMENT:  Septic shock 2/2 influenza A and pneumonia   Acute respiratory failure with hypoxia  SVT vs. V tach? DONNA on CKD  Elevated troponin  Adrenal insufficiency  Hypothyroidism  Hypokalemia  Elevated troponin      PLAN:  Bolus as clinically indicated; nephrology initiating bicarbonate   Wean vent, sedation, pressors as able  Cardiology consulted; recommended weaning off levophed and starting vasopressin as well as starting amiodarone bolus/drip    Continue antibiotics and tamiflu, follow cultures  Stress dose steroids  Continue levothyroxine  Replace K, Mg as indicated  Repeat troponin pending   Infectious workup            Diet: Diet NPO Effective Now  Code Status: Full Code  PT/OT Eval Status: On hold  DVT Prophylaxis:   heparin  Recommended disposition at discharge:  pending medical progression     +++++++++++++++++++++++++++++++++++++++++++++++++  Luciano Mills MD   [facility].  +++++++++++++++++++++++++++++++++++++++++++++++++  NOTE: This report was transcribed using voice recognition software.  Every effort was made to ensure accuracy; however, inadvertent computerized transcription errors may be present.

## 2020-03-15 NOTE — FLOWSHEET NOTE
Patient reaches for lines and tubes despite verbal redirection. Restraints remain in place for patient safety.        Estefany Martin RN  3/15/2020  12:00 AM

## 2020-03-15 NOTE — PROGRESS NOTES
Pharmacy Consultation Note  (Antibiotic Dosing and Monitoring)    Initial consult date: 3/14/20  Consulting physician: Sondra  Drug(s): Vancomycin  Indication: Septic Shock/Pneumonia    Age/  Gender Height Weight IBW Dosing weight  Allergy Information   88 y.o./male 6' 0.84\" (185 cm) 220 lb (99.8 kg)     Ideal body weight: 79.5 kg (175 lb 5 oz)  Adjusted ideal body weight: 87.8 kg (193 lb 8.8 oz)  87.7 (Adj)  Codeine; Diazepam; Lorazepam; and Morphine      Temp (24hrs), Av.6 °F (37 °C), Min:97.5 °F (36.4 °C), Max:99.9 °F (37.7 °C)          Date  WBC BUN SCr CrCl  (mL/min) Drug/Dose Time   Given Level(s)   (Time) Comments   3/14 33.1 24 3.1  20   Vancomycin 2,000 mg IV  2330     3/15 43.4 26/3 3 21 - - Random 21 @0900                              Intake/Output Summary (Last 24 hours) at 3/15/2020 0829  Last data filed at 3/15/2020 0600  Gross per 24 hour   Intake 2875 ml   Output 620 ml   Net 2255 ml       Historical Cultures:  No results found for: ORG  No results for input(s): BC in the last 72 hours. Cultures:   3/15: Resp Panel + Influenza A    Assessment:  · 80 y.o. male with a PMH significant for multiple cancers and COPD was found down at home after 2 days during a welfare visit. His O2 sat at the time was 86% for which he was brought to the E and intubated to maintain airway protection following no response to commands  Subsequently, he was also found to be hypotensive with a leukocytosis and lactate of 4.6. CXR was read as Underlying pneumonia or aspiration cannot be excluded and the patient has been initiated on antibiotics for sepsis septic shock/pneumonia. · ID has been consulted. The patient is currently on Zosyn, Tamiflu, Peramivir and Vancomycin.   · Goal trough level = 15-20 mcg/mL  · sCr 3 (baseline 1.1)    Plan:  · Random level tomorrow AM  · Monitor renal function   · Clinical pharmacy to follow      Licha Hall PharmD, BCPS 3/15/2020 8:48 AM

## 2020-03-15 NOTE — CONSULTS
[address] [place] [address] [facility]    Consultation Note     Admit Date: 3/14/2020  2:40 PM    Reason for Consult:   Influenza A with pneumonia    Attending Physician:  Konstantin Ames MD     Chief Complaint: Unresponsive with emesis    HISTORY OF PRESENT ILLNESS:   The patient is a 80 y.o.  male known to the Infectious Diseases service. The patient is admitted through the emergency room after being resuscitated by emergency medical services. This information is taken from chart discussion with the and staff with intensive care unit and reviewed cultures radiographs and labs. Patient is known to cardiology division has problems with cardiac arrhythmias atrial flutter and has a past history of COPD. He was found down at home after welfare check and was found to be unresponsive and emesis as well as incontinence of urine. When EMS was summoned he was found to be in sinus tachycardia with PACs he also was hypotensive and in the field started on Levophed and intubated. Radiographs of this emergency room including a CT of the chest shows bilateral lower lobe infiltrates consistent with aspiration; his respiratory viral panel was positive for influenza A. He was started on Prevnar and Tamiflu as well as Zosyn after having initially gotten doxy and Rocephin in the emergency room. Stool urine for Legionella and strep pneumo are pending. She is responding to therapy although is intubated he demonstrates movement in all 4 extremities his eyes are open. His FiO2 is 60% and has a low-grade temperature. He has prerenal azotemia with a BUN of 24 creatinine 3.1 and renal injury that is acute. He had a proBNP of 4241 and his white count 33,000 with a hemoglobin of 13 which has evolved into 43,000 WBCs and hemoglobin dropped to 11.9.   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 No pneumothorax. No   new abnormal findings. CT Head WO Contrast   Final Result   No acute intracranial process         CT Cervical Spine WO Contrast   Final Result   1. Degenerative changes in the disc space of C5-6, C6-7 mainly. 2. Loss of height of vertebral bodies of C5, C6 and C7 likely to be   more an old event but there are no previous studies available for   comparison to determine the baseline. Age is not determined based on   this study. See above comments and recommendations. 3. Otherwise no acute displaced fractures seen in the cervical spine. CT CHEST WO CONTRAST   Final Result   1. Endotracheal tube just above the sabas, can be pulled back about 2   cm. 2. NG tube coiled in the body of the stomach, can be pulled back about   5 cm.      3, Bilateral multi segmental atelectasis in the lower lobes with   consolidation but there is an overall patent central airways. The   pathways of multisegmental atelectasis of the lower lobes are. Underlying pneumonia or aspiration cannot be excluded. Please   correlate clinically. CT ABDOMEN PELVIS WO CONTRAST Additional Contrast? None   Final Result      1. Suspected pneumonia at the right lower lobe. Left lower lobe   atelectasis. 2. Colonic diverticulosis and hiatus hernia. XR ABDOMEN FOR NG/OG/NE TUBE PLACEMENT   Final Result      Satisfactory position of nasogastric tube. XR CHEST PORTABLE   Final Result   Endotracheal tube in good position. NG tube partial coiled   in the body of the stomach and be pulled back about 5 cm.       No acute cardiac pulmonary process. XR CHEST PORTABLE    (Results Pending)   XR CHEST PORTABLE    (Results Pending)       Microbiology:  Pending  No results for input(s): BC in the last 72 hours. No results for input(s): ORG in the last 72 hours. No results for input(s): Gaynel Quarry in the last 72 hours. No results for input(s): STREPNEUMAGU in the last 72 hours. No results for input(s): LP1UAG in the last 72 hours. No results for input(s): ASO in the last 72 hours. No results for input(s): CULTRESP in the last 72 hours. Assessment:  · Influenza A with aspiration pneumonia-this combination has a 7% mortality with comorbidities such as respirations presenting with    Plan:    · Cont continue Zosyn and Tamiflu plus the Prevnar daily  · Agree with vitamin C every 6 although the studies are not completely supporting other studies of the seem to help and I agree with him in this case  · 1 dose of Levaquin pending the urine for Legionella antigen  · Discussed with critical care/pulmonary  · Check cultures  · Baseline ESR, CRP  · Monitor labs  · Will follow with you    Thank you for having us see this patient in consultation. I will be discussing this case with the treating physicians.       Electronically signed by Zeinab Bardales MD on 3/15/2020 at 10:59 AM

## 2020-03-15 NOTE — PROGRESS NOTES
heparin (porcine) 9 Units/kg/hr (03/15/20 0442)    midazolam 1 mg/hr (03/15/20 0005)     Scheduled Meds:   peramivir (RAPIVAP) IVPB  100 mg Intravenous Daily    oseltamivir  30 mg Oral Daily    levothyroxine  125 mcg Oral Daily    sodium chloride flush  10 mL Intravenous 2 times per day    albuterol  2.5 mg Nebulization Q4H    hydrocortisone sodium succinate PF  50 mg Intravenous Q6H    pantoprazole  40 mg Intravenous Daily    And    sodium chloride (PF)  10 mL Intravenous Daily    piperacillin-tazobactam  3.375 g Intravenous Q12H    thiamine (VITAMIN B1) IVPB  200 mg Intravenous Q12H    ascorbic acid  1,500 mg Intravenous Q6H     PRN Meds: heparin (porcine), heparin (porcine), sodium chloride flush, acetaminophen **OR** acetaminophen, polyethylene glycol, promethazine **OR** ondansetron, perflutren lipid microspheres  Nutrition:   NG/OG tube TF type: Pulmocare/Nephro/Glucerna/Jevity        At rate: ml/h    Labs and Imaging Studies     CBC:   Recent Labs     03/14/20  1516 03/14/20  1920 03/15/20  0300   WBC 20.8* 33.1* 43.3*   HGB 12.3* 13.4 11.9*   HCT 40.2 45.3 40.1   MCV 79.9* 82.1 80.7    271 278       BMP:    Recent Labs     03/14/20 1516 03/14/20  1853 03/14/20  1920 03/15/20  0300     --  141 133   K 3.2*  --  3.5 3.7     --  105 99   CO2 20*  --  21* 16*   BUN 22  --  24* 26*   CREATININE 2.5* 2.8* 3.1* 3.0*   GLUCOSE 89  --  129* 283*       LIVER PROFILE:   Recent Labs     03/14/20 1516 03/14/20 1920   AST 47* 54*   ALT 20 25   BILITOT 0.4 0.3   ALKPHOS 64 75       PT/INR:   No results for input(s): PROTIME, INR in the last 72 hours.     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 tach's/SVTs in the ER, started on amiodarone, consulted cardiology, was hypotensive started on levo after 30 cc/kg fluid bolus. He was also started on vaso-. And was transferred to ICU for further care. His chest CT did show bilateral infiltrates, CT head, CT spine, CT abdomen were unremarkable.             -                Neurologic  Altered mental status  CT head, CT spine unremarkable  Neg  ammonia, UDS/SDS  Normal urine and serum osmolalities        Cardiovascular  Septic shock  2/2 pneumonia  On Unasyn  On levo/vaso-   on vitamin C protocol with steroids  Follow NICOM-lfuid resp, given 1000 bolus  Gave two amp of bicarb  follow pancultures  Respiratory panel flu A+  Follow ABG/chest x-ray daily  CXR R sided infiltrate     VT/SVT  On amiodarone drip/heparin drip  Follow echo   cardiology following  Trending up  trop, CK, CK-MB  Cardio recommendations     Pulmonary  Acute hypoxic/hypercapnic respiratory failure  Secondary to pneumonia  On vent/sedated  acidotic ABGs   On Unasyn/follow pancultures        Gastrointestinal  PUD history  PPIs daily     Endocrine  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 Jazmyn Ruiz

## 2020-03-15 NOTE — PROCEDURES
Statement    This procedure was supervised. The critical elements of this procedure was monitored and if needed I was available for the needs of the procedure.      Yina Nelson DO, Kita Badder  Director, [facility]  Professor of Medicine

## 2020-03-15 NOTE — PLAN OF CARE
Stage  CI HR MAP TPRI SVI   Baseline 3.8 88 90 1892 43   Challenge 4.4 88 87 1566 52   Result (%Ä) 16.8 0 -3.3 -17.2 18.7     Alyssa Wharton 9:08 AM

## 2020-03-15 NOTE — CONSULTS
82/57 -- -- 136 16 95 % --   03/14/20 1611 105/62 -- -- 138 16 96 % --   03/14/20 1609 (!) 100/55 -- -- 112 16 96 % --   03/14/20 1606 109/61 -- -- 111 16 97 % --   03/14/20 1522 (!) 136/91 -- -- 130 16 98 % --   03/14/20 1519 (!) 151/86 -- -- 133 16 98 % --   03/14/20 1514 136/82 -- -- 149 16 100 % --   03/14/20 1509 128/77 -- -- 149 16 100 % --   03/14/20 1505 125/75 -- -- 146 16 -- --       No intake or output data in the 24 hours ending 03/14/20 2101  Wt Readings from Last 2 Encounters:   03/14/20 220 lb 7.4 oz (100 kg)   10/08/19 207 lb 8 oz (94.1 kg)     Body mass index is 29.09 kg/m².       PHYSICAL EXAMINATION:  General appearance - sedated and intubated  Neck - supple, no significant adenopathy  Chest - decreased air entry noted bilaterally  Heart - normal rate, regular rhythm, normal S1, S2, no murmurs, rubs, clicks or gallops  Abdomen - soft, nontender, nondistended, no masses or organomegaly  Neurological - sedated and intubated, but following commands  Extremities - peripheral pulses normal, no pedal edema, no clubbing or cyanosis  Skin - normal coloration and turgor, no rashes, no suspicious skin lesions noted      Any additional physical findings:    MEDICATIONS:  Scheduled Meds:   sodium chloride flush  10 mL Intravenous 2 times per day    hydrocortisone sodium succinate PF  50 mg Intravenous Q6H    piperacillin-tazobactam  2.25 g Intravenous Q6H    [START ON 3/15/2020] pantoprazole  40 mg Intravenous Daily    And    [START ON 3/15/2020] sodium chloride (PF)  10 mL Intravenous Daily     Continuous Infusions:   fentaNYL 5 mcg/ml in 0.9%  ml infusion 75 mcg/hr (03/14/20 1815)    norepinephrine 20 mcg/min (03/14/20 1839)    vasopressin (Septic Shock) infusion 0.03 Units/min (03/14/20 1938)    amiodarone 450mg/250ml D5W infusion 1 mg/min (03/14/20 1927)    heparin (porcine) 12 Units/kg/hr (03/14/20 1933)     PRN Meds:   sodium chloride flush, 10 mL, PRN  heparin (porcine), 6,000 Units, PRN  heparin (porcine), 3,000 Units, PRN        VENT SETTINGS (Comprehensive) (if applicable):  Vent Information  Vent Type:  Other(comment)(alejandro ht 70)  Vent Mode: AC/VC  Vt Ordered: 500 mL  Rate Set: 16 bmp  Peak Flow: 57 L/min  Pressure Support: 0 cmH20  FiO2 : 100 %  PEEP/CPAP: 5  Cuff Pressure (cm H2O): 29 cm H2O  Additional Respiratory  Assessments  Pulse: 88  Resp: 18  SpO2: 95 %  End Tidal CO2: 36 (%)  Subglottic Suction Done?: Yes  Cuff Pressure (cm H2O): 29 cm H2O    ABGs:   Recent Labs     03/14/20  1619   PH 7.250*   PCO2 44.5   PO2 94.3   HCO3 19.1*   BE -7.9*   O2SAT 95.9       Laboratory findings:  Complete Blood Count:   Recent Labs     03/14/20 1516 03/14/20 1920   WBC 20.8* 33.1*   HGB 12.3* 13.4   HCT 40.2 45.3    271        Last 3 Blood Glucose:   Recent Labs     03/14/20 1516 03/14/20  1920   GLUCOSE 89 129*        PT/INR:    Lab Results   Component Value Date    PROTIME 11.8 10/05/2019    INR 1.0 10/05/2019     PTT:    Lab Results   Component Value Date    APTT 34.6 03/14/2020       Comprehensive Metabolic Profile:   Recent Labs     03/14/20 1516 03/14/20  1853 03/14/20 1920     --  141   K 3.2*  --  3.5     --  105   CO2 20*  --  21*   BUN 22  --  24*   CREATININE 2.5* 2.8* 3.1*   GLUCOSE 89  --  129*   CALCIUM 7.4*  --  7.5*   PROT 6.2*  --  6.5   LABALBU 3.1*  --  3.5   BILITOT 0.4  --  0.3   ALKPHOS 64  --  75   AST 47*  --  54*   ALT 20  --  25      Magnesium:   Lab Results   Component Value Date    MG 2.3 03/14/2020     Phosphorus:   Lab Results   Component Value Date    PHOS 3.6 10/09/2019     Ionized Calcium: No results found for: CAION   Troponin:   Recent Labs     03/14/20  1516 03/14/20 1920   TROPONINI 0.08* 0.12*       Other pertinent Labs:     Radiology/Imaging:   Chest Xray (3/14/2020):    ASSESSMENT  And PLAN:     Patient is a 79-year-old male, with history of multiple cancers, 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

## 2020-03-15 NOTE — PROGRESS NOTES
03/15/20 0435   Vent Information   Vent Type 840   Vent Mode AC/VC   Vt Ordered 500 mL   Rate Set 18 bmp   Peak Flow 70 L/min   Pressure Support 0 cmH20   FiO2  80 %   Sensitivity 3   PEEP/CPAP 8   I Time/ I Time % 0 s   Changes made based on last ABG'S

## 2020-03-15 NOTE — PROGRESS NOTES
Dr. James Lopez notified of consult via perfect serve. Dr. Suleiman Flower covering for Dr. Emi Navarrete notified of consult via perfect serve.        Gilberto Rehman RN  3/15/2020  3:59 AM

## 2020-03-15 NOTE — CONSULTS
activity     Days per week: Not on file     Minutes per session: Not on file    Stress: Not on file   Relationships    Social connections     Talks on phone: Not on file     Gets together: Not on file     Attends [employer] service: Not on file     Active member of club or organization: Not on file     Attends meetings of clubs or organizations: Not on file     Relationship status: Not on file    Intimate partner violence     Fear of current or ex partner: Not on file     Emotionally abused: Not on file     Physically abused: Not on file     Forced sexual activity: Not on file   Other Topics Concern    Not on file   Social History Narrative    Not on file       Family History  History reviewed. No pertinent family history. Scheduled Meds:   peramivir (RAPIVAP) IVPB  100 mg Intravenous Daily    oseltamivir  30 mg Oral Daily    amiodarone  400 mg Oral BID    levofloxacin  750 mg Intravenous Once    fludrocortisone  0.1 mg Oral Daily    sodium bicarbonate  100 mEq Intravenous Once    levothyroxine  125 mcg Oral Daily    sodium chloride flush  10 mL Intravenous 2 times per day    albuterol  2.5 mg Nebulization Q4H    hydrocortisone sodium succinate PF  50 mg Intravenous Q6H    pantoprazole  40 mg Intravenous Daily    And    sodium chloride (PF)  10 mL Intravenous Daily    piperacillin-tazobactam  3.375 g Intravenous Q12H    thiamine (VITAMIN B1) IVPB  200 mg Intravenous Q12H    ascorbic acid  1,500 mg Intravenous Q6H       Continuous Infusions:  [unfilled]    PRN meds  heparin (porcine), heparin (porcine), sodium chloride flush, acetaminophen **OR** acetaminophen, polyethylene glycol, promethazine **OR** [DISCONTINUED] ondansetron, perflutren lipid microspheres    Allergies:  Codeine; Diazepam; Lorazepam; and Morphine    Review of Systems     Pertinent positives and negatives as in HPI. Other systems reviewed and were negative.      LAST 3 CMP  Recent Labs     03/14/20  1516 03/14/20  2358 03/14/20  1920 03/15/20  0300     --  141 133   K 3.2*  --  3.5 3.7     --  105 99   CO2 20*  --  21* 16*   BUN 22  --  24* 26*   CREATININE 2.5* 2.8* 3.1* 3.0*   GLUCOSE 89  --  129* 283*   CALCIUM 7.4*  --  7.5* 6.9*   MG 1.5*  --  2.3  --    PROT 6.2*  --  6.5  --    LABALBU 3.1*  --  3.5  --    BILITOT 0.4  --  0.3  --    ALKPHOS 64  --  75  --    AST 47*  --  54*  --        LAST 3 CBC:  Recent Labs     03/14/20  1516 03/14/20  1920 03/15/20  0300   WBC 20.8* 33.1* 43.3*   RBC 5.03 5.52 4.97   HGB 12.3* 13.4 11.9*   HCT 40.2 45.3 40.1    271 278         Intake/Output Summary (Last 24 hours) at 3/15/2020 1233  Last data filed at 3/15/2020 1200  Gross per 24 hour   Intake 3115 ml   Output 945 ml   Net 2170 ml     Patient Vitals for the past 24 hrs:   BP Temp Temp src Pulse Resp SpO2 Height Weight   03/15/20 1200 -- 99.5 °F (37.5 °C) -- 83 -- 92 % -- --   03/15/20 1150 -- -- -- 83 -- 94 % -- --   03/15/20 1000 -- -- -- 87 -- 95 % -- --   03/15/20 0900 -- -- -- 87 18 94 % -- --   03/15/20 0850 -- -- -- 90 8 97 % -- --   03/15/20 0800 -- 99.5 °F (37.5 °C) -- 84 18 95 % -- --   03/15/20 0700 -- -- -- 84 18 95 % -- --   03/15/20 0600 -- -- -- 80 18 95 % -- --   03/15/20 0500 -- -- -- 78 18 94 % -- --   03/15/20 0435 -- -- -- 86 18 96 % -- --   03/15/20 0400 -- 98.6 °F (37 °C) Bladder 79 16 97 % -- --   03/15/20 0339 -- -- -- -- 17 95 % -- --   03/15/20 0300 -- -- -- 84 20 95 % -- --   03/15/20 0200 -- -- -- 87 15 91 % -- --   03/15/20 0100 -- -- -- 87 29 95 % -- --   03/15/20 0041 -- -- -- 91 16 96 % -- --   03/15/20 0000 (!) 93/56 98.1 °F (36.7 °C) Bladder 86 16 95 % -- 220 lb 14.4 oz (100.2 kg)   03/14/20 2353 -- -- -- -- 22 -- -- --   03/14/20 2300 -- -- -- 86 16 94 % -- --   03/14/20 2200 -- -- -- 90 16 92 % -- --   03/14/20 2145 -- -- -- 87 16 91 % -- --   03/14/20 2136 -- -- -- -- -- -- 6' 0.84\" (1.85 m) --   03/14/20 2115 96/67 -- -- 82 16 92 % -- --   03/14/20 2100 97/75 -- -- 89 16 92 % -- -- --   03/14/20 1501 -- -- -- 119 16 99 % -- --   03/14/20 1500 92/60 -- -- 119 16 100 % -- --   03/14/20 1457 (!) 82/50 99.6 °F (37.6 °C) Axillary 122 16 100 % -- --   03/14/20 1455 (!) 63/48 -- -- 132 16 100 % -- --   03/14/20 1444 80/66 -- -- 126 18 100 % -- 220 lb (99.8 kg)       General appearance:  Appears stated age  Skin: color, texture, turgor normal. No rashes or lesions. Neck: supple, no masses, no JVD, No carotid bruits, No thyromegaly  Lungs: respirations unlabored. dominished air entry at bases . No rales, wheezes, no rhonchi. Equal chest excursion with respirations. Heart RRR. pmi not laterally displaced. No S3 or S4, no rub  Abdomen:  Soft, ND, NT. Bowel sounds present. No HSM. No epigastric bruit, no increased Ao pulsation,  Extremities: warm to touch. No LE edema or cyanosis. No fem bruit. 2+ upstrokes and equal bilaterally. PT/Pedal 2+ equal bilat  Neuro: Cr N 2-12 grossly intact. No focal motor neuro deficit. No alteration in recent remote memory.     Assessment/Plan                                    1 Acute renal failure , in the setting of septic shock , with hypotensive episode                                      Renal functions appear to be improving as he now has started making urine                                      Although has metabolic acidosis - will initiate bicarbonate                                      2 Influenza A complicated with possible aspiration pneumonia / acute respiratory                                       Failure / intubated and on vent                                         3 Shock , ? septic culture results pending                                       4 H/O chronic A fib                                                                         5 Hypopituitarism, prior h/o pituitary gland surgery 1980                                        6 COPD         His kidneys are appearing normal on CT scan , he now is non oliguric , will initiate bicarb drip   And

## 2020-03-15 NOTE — PROGRESS NOTES
Pharmacy Consultation Note  (Antibiotic Dosing and Monitoring)    Initial consult date: 3/14/20  Consulting physician: Sondra  Drug(s): Vancomycin  Indication: Septic Shock/Pneumonia    Age/  Gender Height Weight IBW Dosing weight  Allergy Information   88 y.o./male 6' 0.84\" (185 cm) 220 lb (99.8 kg)     Ideal body weight: 79.5 kg (175 lb 5 oz)  Adjusted ideal body weight: 87.7 kg (193 lb 5.9 oz)  87.7 (Adj)  Codeine; Diazepam; Lorazepam; and Morphine      Temp (24hrs), Av.8 °F (37.1 °C), Min:97.5 °F (36.4 °C), Max:99.9 °F (37.7 °C)          Date  WBC BUN SCr CrCl  (mL/min) Drug/Dose Time   Given Level(s)   (Time) Comments   3/14/20 33.1 24 3.1  20   Vancomycin 2,000 mg IV                                              Intake/Output Summary (Last 24 hours) at 3/14/2020 2206  Last data filed at 3/14/2020 2000  Gross per 24 hour   Intake --   Output 200 ml   Net -200 ml       Historical Cultures:  No results found for: ORG  No results for input(s): BC in the last 72 hours. Cultures:  available culture and sensitivity results were reviewed in [facility]    Assessment:  · 80 y.o. male has been initiated on antibiotics for sepsis/pneumonia.   · Estimated CrCl = 20 mL/min  · Goal trough level = 15-20 mcg/mL    Plan:  · Will initiate vancomycin at a dose of 2,000 mg  · Monitor renal function   · Clinical pharmacy to follow      TAM Mendez [facility] 3/14/2020 10:06 PM

## 2020-03-15 NOTE — FLOWSHEET NOTE
03/15/20 0930   Restraint Order   Length of Order 24   Order Upon Application Yes   Face to Face Yes   Assessment   Less Restrictive Alternative DE;RO;VR;PM   Special Consideration/Risk Factors N   Justification   Clinical Justification T;E;U   Education   Discontinuation Criteria Absence   Criteria Explained Yes   Patient's Response NL   Family Notification O  (no family available)   Restraint Monitoring Q60 Minutes   Visual/Safety Check (q 60 mins) SD   Restraint  Monitoring Q2 Hours   Circulation NS   Range of Motion P   Fluids N   Food/Meal N   Elimination UC   Restraint Type   Soft Restraint B Wrist CONTINUED       Pt continues to reach for lines and tubes despite attempts to deter. Restraints continued for pt safety.  Brenita Balloon

## 2020-03-15 NOTE — FLOWSHEET NOTE
03/15/20 1600   Assessment   Less Restrictive Alternative DE;RP;RO;VR;PM   Special Consideration/Risk Factors N   Justification   Clinical Justification T;E;H   Education   Discontinuation Criteria Absence   Criteria Explained Yes   Patient's Response NL   Family Notification O  (son)   Restraint Monitoring Q60 Minutes   Visual/Safety Check (q 60 mins) SD   Restraint  Monitoring Q2 Hours   Circulation NS   Range of Motion P   Fluids N   Food/Meal N   Elimination UC   Restraint Type   Soft Restraint B Wrist CONTINUED   Vital Signs   Temp 99 °F (37.2 °C)   Pulse 82   Resp 18   Level of Consciousness 1       Pt continues to reach for lines and tubes despite attempts to deter. Restraints continued for pt safety.  Liz Greer

## 2020-03-16 ENCOUNTER — APPOINTMENT (OUTPATIENT)
Dept: GENERAL RADIOLOGY | Age: 85
DRG: 870 | End: 2020-03-16
Payer: MEDICARE

## 2020-03-16 LAB
AADO2: 420.3 MMHG
ANION GAP SERPL CALCULATED.3IONS-SCNC: 15 MMOL/L (ref 7–16)
ANION GAP SERPL CALCULATED.3IONS-SCNC: 17 MMOL/L (ref 7–16)
ANISOCYTOSIS: ABNORMAL
APTT: 55.8 SEC (ref 24.5–35.1)
B.E.: -3.7 MMOL/L (ref -3–3)
BASOPHILS ABSOLUTE: 0 E9/L (ref 0–0.2)
BASOPHILS RELATIVE PERCENT: 0.2 % (ref 0–2)
BUN BLDV-MCNC: 28 MG/DL (ref 8–23)
BUN BLDV-MCNC: 28 MG/DL (ref 8–23)
BURR CELLS: ABNORMAL
CALCIUM SERPL-MCNC: 6.5 MG/DL (ref 8.6–10.2)
CALCIUM SERPL-MCNC: 6.6 MG/DL (ref 8.6–10.2)
CHLORIDE BLD-SCNC: 97 MMOL/L (ref 98–107)
CHLORIDE BLD-SCNC: 97 MMOL/L (ref 98–107)
CO2: 22 MMOL/L (ref 22–29)
CO2: 26 MMOL/L (ref 22–29)
COHB: 0.5 % (ref 0–1.5)
CREAT SERPL-MCNC: 2.4 MG/DL (ref 0.7–1.2)
CREAT SERPL-MCNC: 2.5 MG/DL (ref 0.7–1.2)
CRITICAL: ABNORMAL
DATE ANALYZED: ABNORMAL
DATE OF COLLECTION: ABNORMAL
EKG ATRIAL RATE: 135 BPM
EKG ATRIAL RATE: 139 BPM
EKG P AXIS: 96 DEGREES
EKG P-R INTERVAL: 140 MS
EKG P-R INTERVAL: 160 MS
EKG Q-T INTERVAL: 328 MS
EKG Q-T INTERVAL: 342 MS
EKG QRS DURATION: 136 MS
EKG QRS DURATION: 142 MS
EKG QTC CALCULATION (BAZETT): 492 MS
EKG QTC CALCULATION (BAZETT): 520 MS
EKG R AXIS: -103 DEGREES
EKG R AXIS: -99 DEGREES
EKG T AXIS: 17 DEGREES
EKG T AXIS: 5 DEGREES
EKG VENTRICULAR RATE: 135 BPM
EKG VENTRICULAR RATE: 139 BPM
EOSINOPHILS ABSOLUTE: 0 E9/L (ref 0.05–0.5)
EOSINOPHILS RELATIVE PERCENT: 0 % (ref 0–6)
FIO2: 80 %
GFR AFRICAN AMERICAN: 30
GFR AFRICAN AMERICAN: 31
GFR NON-AFRICAN AMERICAN: 24 ML/MIN/1.73
GFR NON-AFRICAN AMERICAN: 26 ML/MIN/1.73
GLUCOSE BLD-MCNC: 247 MG/DL (ref 74–99)
GLUCOSE BLD-MCNC: 275 MG/DL (ref 74–99)
HCO3: 21.2 MMOL/L (ref 22–26)
HCT VFR BLD CALC: 35.9 % (ref 37–54)
HEMOGLOBIN: 11.1 G/DL (ref 12.5–16.5)
HHB: 3.3 % (ref 0–5)
LAB: ABNORMAL
LYMPHOCYTES ABSOLUTE: 0 E9/L (ref 1.5–4)
LYMPHOCYTES RELATIVE PERCENT: 1.1 % (ref 20–42)
Lab: ABNORMAL
MAGNESIUM: 1.7 MG/DL (ref 1.6–2.6)
MAGNESIUM: 2.1 MG/DL (ref 1.6–2.6)
MCH RBC QN AUTO: 23.9 PG (ref 26–35)
MCHC RBC AUTO-ENTMCNC: 30.9 % (ref 32–34.5)
MCV RBC AUTO: 77.4 FL (ref 80–99.9)
METER GLUCOSE: 198 MG/DL (ref 74–99)
METER GLUCOSE: 198 MG/DL (ref 74–99)
METER GLUCOSE: 199 MG/DL (ref 74–99)
METER GLUCOSE: 277 MG/DL (ref 74–99)
METHB: 0.2 % (ref 0–1.5)
MODE: AC
MONOCYTES ABSOLUTE: 1.55 E9/L (ref 0.1–0.95)
MONOCYTES RELATIVE PERCENT: 4.3 % (ref 2–12)
MRSA CULTURE ONLY: NORMAL
NEUTROPHILS ABSOLUTE: 37.15 E9/L (ref 1.8–7.3)
NEUTROPHILS RELATIVE PERCENT: 95.7 % (ref 43–80)
O2 CONTENT: 16.8 ML/DL
O2 SATURATION: 96.7 % (ref 92–98.5)
O2HB: 96 % (ref 94–97)
OPERATOR ID: 2577
OVALOCYTES: ABNORMAL
PATIENT TEMP: 37 C
PCO2: 37.7 MMHG (ref 35–45)
PDW BLD-RTO: 15.7 FL (ref 11.5–15)
PEEP/CPAP: 20 CMH2O
PFO2: 1.13 MMHG/%
PH BLOOD GAS: 7.37 (ref 7.35–7.45)
PLATELET # BLD: 248 E9/L (ref 130–450)
PMV BLD AUTO: 10.7 FL (ref 7–12)
PO2: 90.5 MMHG (ref 75–100)
POIKILOCYTES: ABNORMAL
POLYCHROMASIA: ABNORMAL
POTASSIUM REFLEX MAGNESIUM: 3.5 MMOL/L (ref 3.5–5)
POTASSIUM REFLEX MAGNESIUM: 3.6 MMOL/L (ref 3.5–5)
RBC # BLD: 4.64 E12/L (ref 3.8–5.8)
RI(T): 4.64
RR MECHANICAL: 18 B/MIN
SCHISTOCYTES: ABNORMAL
SODIUM BLD-SCNC: 136 MMOL/L (ref 132–146)
SODIUM BLD-SCNC: 138 MMOL/L (ref 132–146)
SOURCE, BLOOD GAS: ABNORMAL
THB: 12.4 G/DL (ref 11.5–16.5)
TIME ANALYZED: 547
TOTAL CK: 642 U/L (ref 20–200)
VANCOMYCIN RANDOM: 9.9 MCG/ML (ref 5–40)
VT MECHANICAL: 500 ML
WBC # BLD: 38.7 E9/L (ref 4.5–11.5)

## 2020-03-16 PROCEDURE — 2000000000 HC ICU R&B

## 2020-03-16 PROCEDURE — 6370000000 HC RX 637 (ALT 250 FOR IP): Performed by: INTERNAL MEDICINE

## 2020-03-16 PROCEDURE — 36415 COLL VENOUS BLD VENIPUNCTURE: CPT

## 2020-03-16 PROCEDURE — 83735 ASSAY OF MAGNESIUM: CPT

## 2020-03-16 PROCEDURE — 82805 BLOOD GASES W/O2 SATURATION: CPT

## 2020-03-16 PROCEDURE — 2500000003 HC RX 250 WO HCPCS: Performed by: INTERNAL MEDICINE

## 2020-03-16 PROCEDURE — 85025 COMPLETE CBC W/AUTO DIFF WBC: CPT

## 2020-03-16 PROCEDURE — 2580000003 HC RX 258: Performed by: INTERNAL MEDICINE

## 2020-03-16 PROCEDURE — 99233 SBSQ HOSP IP/OBS HIGH 50: CPT | Performed by: INTERNAL MEDICINE

## 2020-03-16 PROCEDURE — 85730 THROMBOPLASTIN TIME PARTIAL: CPT

## 2020-03-16 PROCEDURE — 99232 SBSQ HOSP IP/OBS MODERATE 35: CPT | Performed by: INTERNAL MEDICINE

## 2020-03-16 PROCEDURE — 6360000002 HC RX W HCPCS: Performed by: INTERNAL MEDICINE

## 2020-03-16 PROCEDURE — 6360000002 HC RX W HCPCS

## 2020-03-16 PROCEDURE — 82550 ASSAY OF CK (CPK): CPT

## 2020-03-16 PROCEDURE — 94640 AIRWAY INHALATION TREATMENT: CPT

## 2020-03-16 PROCEDURE — 2580000003 HC RX 258: Performed by: SPECIALIST

## 2020-03-16 PROCEDURE — 82962 GLUCOSE BLOOD TEST: CPT

## 2020-03-16 PROCEDURE — 80202 ASSAY OF VANCOMYCIN: CPT

## 2020-03-16 PROCEDURE — 80048 BASIC METABOLIC PNL TOTAL CA: CPT

## 2020-03-16 PROCEDURE — 94003 VENT MGMT INPAT SUBQ DAY: CPT

## 2020-03-16 PROCEDURE — 71045 X-RAY EXAM CHEST 1 VIEW: CPT

## 2020-03-16 PROCEDURE — 6360000002 HC RX W HCPCS: Performed by: SPECIALIST

## 2020-03-16 PROCEDURE — C9113 INJ PANTOPRAZOLE SODIUM, VIA: HCPCS | Performed by: INTERNAL MEDICINE

## 2020-03-16 RX ORDER — MAGNESIUM SULFATE IN WATER 40 MG/ML
2 INJECTION, SOLUTION INTRAVENOUS ONCE
Status: COMPLETED | OUTPATIENT
Start: 2020-03-16 | End: 2020-03-16

## 2020-03-16 RX ORDER — MAGNESIUM SULFATE 1 G/100ML
1 INJECTION INTRAVENOUS ONCE
Status: COMPLETED | OUTPATIENT
Start: 2020-03-16 | End: 2020-03-16

## 2020-03-16 RX ORDER — NICOTINE POLACRILEX 4 MG
15 LOZENGE BUCCAL PRN
Status: DISCONTINUED | OUTPATIENT
Start: 2020-03-16 | End: 2020-03-17 | Stop reason: SDUPTHER

## 2020-03-16 RX ORDER — DEXTROSE MONOHYDRATE 25 G/50ML
12.5 INJECTION, SOLUTION INTRAVENOUS PRN
Status: DISCONTINUED | OUTPATIENT
Start: 2020-03-16 | End: 2020-03-17 | Stop reason: SDUPTHER

## 2020-03-16 RX ORDER — POTASSIUM CHLORIDE 29.8 MG/ML
20 INJECTION INTRAVENOUS ONCE
Status: COMPLETED | OUTPATIENT
Start: 2020-03-16 | End: 2020-03-16

## 2020-03-16 RX ORDER — POTASSIUM CHLORIDE 7.45 MG/ML
10 INJECTION INTRAVENOUS ONCE
Status: COMPLETED | OUTPATIENT
Start: 2020-03-16 | End: 2020-03-16

## 2020-03-16 RX ORDER — POTASSIUM CHLORIDE 7.45 MG/ML
INJECTION INTRAVENOUS
Status: COMPLETED
Start: 2020-03-16 | End: 2020-03-16

## 2020-03-16 RX ORDER — DEXTROSE MONOHYDRATE 50 MG/ML
100 INJECTION, SOLUTION INTRAVENOUS PRN
Status: DISCONTINUED | OUTPATIENT
Start: 2020-03-16 | End: 2020-03-23 | Stop reason: HOSPADM

## 2020-03-16 RX ORDER — POTASSIUM CHLORIDE 7.45 MG/ML
10 INJECTION INTRAVENOUS
Status: COMPLETED | OUTPATIENT
Start: 2020-03-16 | End: 2020-03-16

## 2020-03-16 RX ORDER — CHLORHEXIDINE GLUCONATE 0.12 MG/ML
15 RINSE ORAL 2 TIMES DAILY
Status: DISCONTINUED | OUTPATIENT
Start: 2020-03-16 | End: 2020-03-20 | Stop reason: ALTCHOICE

## 2020-03-16 RX ADMIN — Medication 100 MCG/HR: at 17:24

## 2020-03-16 RX ADMIN — INSULIN LISPRO 1 UNITS: 100 INJECTION, SOLUTION INTRAVENOUS; SUBCUTANEOUS at 23:50

## 2020-03-16 RX ADMIN — SODIUM BICARBONATE: 84 INJECTION, SOLUTION INTRAVENOUS at 05:02

## 2020-03-16 RX ADMIN — PANTOPRAZOLE SODIUM 40 MG: 40 INJECTION, POWDER, FOR SOLUTION INTRAVENOUS at 08:02

## 2020-03-16 RX ADMIN — MIDAZOLAM 3 MG/HR: 5 INJECTION INTRAMUSCULAR; INTRAVENOUS at 07:18

## 2020-03-16 RX ADMIN — Medication 20 MCG/MIN: at 17:24

## 2020-03-16 RX ADMIN — THIAMINE HYDROCHLORIDE 200 MG: 100 INJECTION, SOLUTION INTRAMUSCULAR; INTRAVENOUS at 23:03

## 2020-03-16 RX ADMIN — PIPERACILLIN AND TAZOBACTAM 3.38 G: 3; .375 INJECTION, POWDER, LYOPHILIZED, FOR SOLUTION INTRAVENOUS at 00:32

## 2020-03-16 RX ADMIN — INSULIN LISPRO 3 UNITS: 100 INJECTION, SOLUTION INTRAVENOUS; SUBCUTANEOUS at 11:41

## 2020-03-16 RX ADMIN — INSULIN LISPRO 1 UNITS: 100 INJECTION, SOLUTION INTRAVENOUS; SUBCUTANEOUS at 21:04

## 2020-03-16 RX ADMIN — MAGNESIUM SULFATE HEPTAHYDRATE 1 G: 1 INJECTION, SOLUTION INTRAVENOUS at 11:42

## 2020-03-16 RX ADMIN — ASCORBIC ACID 1500 MG: 500 INJECTION, SOLUTION INTRAMUSCULAR; INTRAVENOUS; SUBCUTANEOUS at 11:35

## 2020-03-16 RX ADMIN — ALBUTEROL SULFATE 2.5 MG: 2.5 SOLUTION RESPIRATORY (INHALATION) at 08:00

## 2020-03-16 RX ADMIN — AMPICILLIN SODIUM AND SULBACTAM SODIUM 3 G: 2; 1 INJECTION, POWDER, FOR SOLUTION INTRAMUSCULAR; INTRAVENOUS at 21:09

## 2020-03-16 RX ADMIN — CHLORHEXIDINE GLUCONATE 0.12% ORAL RINSE 15 ML: 1.2 LIQUID ORAL at 21:08

## 2020-03-16 RX ADMIN — Medication 20 MCG/MIN: at 15:21

## 2020-03-16 RX ADMIN — HYDROCORTISONE SODIUM SUCCINATE 50 MG: 100 INJECTION, POWDER, FOR SOLUTION INTRAMUSCULAR; INTRAVENOUS at 14:42

## 2020-03-16 RX ADMIN — ALBUTEROL SULFATE 2.5 MG: 2.5 SOLUTION RESPIRATORY (INHALATION) at 17:19

## 2020-03-16 RX ADMIN — OSELTAMIVIR PHOSPHATE 30 MG: 30 CAPSULE ORAL at 08:02

## 2020-03-16 RX ADMIN — AMIODARONE HYDROCHLORIDE 400 MG: 200 TABLET ORAL at 21:08

## 2020-03-16 RX ADMIN — HEPARIN SODIUM 7 UNITS/KG/HR: 10000 INJECTION, SOLUTION INTRAVENOUS at 00:09

## 2020-03-16 RX ADMIN — POTASSIUM CHLORIDE 10 MEQ: 7.46 INJECTION, SOLUTION INTRAVENOUS at 15:48

## 2020-03-16 RX ADMIN — ALBUTEROL SULFATE 2.5 MG: 2.5 SOLUTION RESPIRATORY (INHALATION) at 11:30

## 2020-03-16 RX ADMIN — HYDROCORTISONE SODIUM SUCCINATE 50 MG: 100 INJECTION, POWDER, FOR SOLUTION INTRAMUSCULAR; INTRAVENOUS at 21:08

## 2020-03-16 RX ADMIN — Medication 22 MCG/MIN: at 01:06

## 2020-03-16 RX ADMIN — PERAMIVIR 100 MG: 600 SOLUTION INTRAVENOUS at 10:19

## 2020-03-16 RX ADMIN — CHLORHEXIDINE GLUCONATE 0.12% ORAL RINSE 15 ML: 1.2 LIQUID ORAL at 11:38

## 2020-03-16 RX ADMIN — POTASSIUM CHLORIDE 10 MEQ: 7.46 INJECTION, SOLUTION INTRAVENOUS at 14:43

## 2020-03-16 RX ADMIN — VASOPRESSIN 0.03 UNITS/MIN: 20 INJECTION INTRAVENOUS at 01:57

## 2020-03-16 RX ADMIN — ALBUTEROL SULFATE 2.5 MG: 2.5 SOLUTION RESPIRATORY (INHALATION) at 20:39

## 2020-03-16 RX ADMIN — HYDROCORTISONE SODIUM SUCCINATE 50 MG: 100 INJECTION, POWDER, FOR SOLUTION INTRAMUSCULAR; INTRAVENOUS at 08:01

## 2020-03-16 RX ADMIN — THIAMINE HYDROCHLORIDE 200 MG: 100 INJECTION, SOLUTION INTRAMUSCULAR; INTRAVENOUS at 10:55

## 2020-03-16 RX ADMIN — SODIUM CHLORIDE, PRESERVATIVE FREE 10 ML: 5 INJECTION INTRAVENOUS at 21:09

## 2020-03-16 RX ADMIN — POTASSIUM CHLORIDE 10 MEQ: 7.46 INJECTION, SOLUTION INTRAVENOUS at 03:37

## 2020-03-16 RX ADMIN — ASCORBIC ACID 1500 MG: 500 INJECTION, SOLUTION INTRAMUSCULAR; INTRAVENOUS; SUBCUTANEOUS at 23:02

## 2020-03-16 RX ADMIN — LEVOTHYROXINE SODIUM 125 MCG: 0.12 TABLET ORAL at 08:01

## 2020-03-16 RX ADMIN — SODIUM CHLORIDE, PRESERVATIVE FREE 10 ML: 5 INJECTION INTRAVENOUS at 08:02

## 2020-03-16 RX ADMIN — FLUDROCORTISONE ACETATE 0.1 MG: 0.1 TABLET ORAL at 08:03

## 2020-03-16 RX ADMIN — POTASSIUM CHLORIDE 10 MEQ: 7.45 INJECTION INTRAVENOUS at 03:37

## 2020-03-16 RX ADMIN — SODIUM CHLORIDE, PRESERVATIVE FREE 10 ML: 5 INJECTION INTRAVENOUS at 08:09

## 2020-03-16 RX ADMIN — POTASSIUM CHLORIDE 20 MEQ: 400 INJECTION, SOLUTION INTRAVENOUS at 11:36

## 2020-03-16 RX ADMIN — INSULIN LISPRO 1 UNITS: 100 INJECTION, SOLUTION INTRAVENOUS; SUBCUTANEOUS at 17:00

## 2020-03-16 RX ADMIN — AMIODARONE HYDROCHLORIDE 400 MG: 200 TABLET ORAL at 08:03

## 2020-03-16 RX ADMIN — PIPERACILLIN AND TAZOBACTAM 3.38 G: 3; .375 INJECTION, POWDER, LYOPHILIZED, FOR SOLUTION INTRAVENOUS at 11:49

## 2020-03-16 RX ADMIN — MAGNESIUM SULFATE HEPTAHYDRATE 2 G: 40 INJECTION, SOLUTION INTRAVENOUS at 14:43

## 2020-03-16 RX ADMIN — ALBUTEROL SULFATE 2.5 MG: 2.5 SOLUTION RESPIRATORY (INHALATION) at 01:22

## 2020-03-16 RX ADMIN — HYDROCORTISONE SODIUM SUCCINATE 50 MG: 100 INJECTION, POWDER, FOR SOLUTION INTRAMUSCULAR; INTRAVENOUS at 03:30

## 2020-03-16 RX ADMIN — ALBUTEROL SULFATE 2.5 MG: 2.5 SOLUTION RESPIRATORY (INHALATION) at 04:06

## 2020-03-16 RX ADMIN — ASCORBIC ACID 1500 MG: 500 INJECTION, SOLUTION INTRAMUSCULAR; INTRAVENOUS; SUBCUTANEOUS at 07:20

## 2020-03-16 RX ADMIN — Medication 100 MCG/HR: at 01:06

## 2020-03-16 RX ADMIN — ASCORBIC ACID 1500 MG: 500 INJECTION, SOLUTION INTRAMUSCULAR; INTRAVENOUS; SUBCUTANEOUS at 17:37

## 2020-03-16 ASSESSMENT — PULMONARY FUNCTION TESTS
PIF_VALUE: 19
PIF_VALUE: 20
PIF_VALUE: 19
PIF_VALUE: 26
PIF_VALUE: 21
PIF_VALUE: 19
PIF_VALUE: 20
PIF_VALUE: 19
PIF_VALUE: 19
PIF_VALUE: 20
PIF_VALUE: 19
PIF_VALUE: 20
PIF_VALUE: 21
PIF_VALUE: 19
PIF_VALUE: 19
PIF_VALUE: 20
PIF_VALUE: 20
PIF_VALUE: 21
PIF_VALUE: 19

## 2020-03-16 ASSESSMENT — PAIN SCALES - GENERAL
PAINLEVEL_OUTOF10: 0

## 2020-03-16 NOTE — PROGRESS NOTES
03/16/2020    RDW 15.7 03/16/2020     03/16/2020    MPV 10.7 03/16/2020     CMP:    Lab Results   Component Value Date     03/16/2020    K 3.6 03/16/2020    CL 97 03/16/2020    CO2 22 03/16/2020    BUN 28 03/16/2020    CREATININE 2.5 03/16/2020    GFRAA 30 03/16/2020    LABGLOM 24 03/16/2020    GLUCOSE 275 03/16/2020    PROT 6.5 03/14/2020    LABALBU 3.5 03/14/2020    CALCIUM 6.5 03/16/2020    BILITOT 0.3 03/14/2020    ALKPHOS 75 03/14/2020    AST 54 03/14/2020    ALT 25 03/14/2020     Imaging Studies:    EKG: Rhythm Strip: Sinus with ecotpy    Assessment and Plan     Patient is a 68-year-old male, with history of multiple cancers, 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 tach's/SVTs in the ER, started on amiodarone, consulted cardiology, was hypotensive started on levo after 30 cc/kg fluid bolus. He was also started on vaso-. And was transferred to ICU for further care. His chest CT did show bilateral infiltrates, CT head, CT spine, CT abdomen were unremarkable.             -                Neurologic  Altered mental status  CT head, CT spine unremarkable  Neg  ammonia, UDS/SDS  Normal urine and serum osmolalities        Cardiovascular  Septic shock  2/2 pneumonia  On Unasyn  On levo/vaso-   on vitamin C protocol with steroids  Follow NICOM-lfuid resp, given 1000 bolus  Gave two amp of bicarb  follow pancultures  Respiratory panel flu A+  Follow ABG/chest x-ray daily  CXR R sided infiltrate     VT/SVT  On amiodarone drip/heparin drip  Follow echo   cardiology following  Trending up  trop, CK, CK-MB  Cardio recommendations     Pulmonary  Acute hypoxic/hypercapnic respiratory failure  Secondary to pneumonia  On vent/sedated  acidotic ABGs   On Unasyn/follow pancultures        Gastrointestinal  PUD history  PPIs daily     Endocrine  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 levels        Genitourinary/Renal  DONNA on CKD  Baseline creatinine 1.4 in 2013  Creatinine today 3  fena 5.8%  Monitor urine output  CT abdomen no signs of renal obstruction     Hematology/Oncology  History of malignant skin cancers    Brittany Medina DO, MPH  Professor of Internal  Medicine

## 2020-03-16 NOTE — PROGRESS NOTES
PROGRESS NOTE     CARDIOLOGY    Chief complaint: Seen today for follow up, management & recommendations for elevated troponin, paroxysmal atrial fibrillation    He is intubated and sedated on the ventilator. Wt Readings from Last 3 Encounters:   03/16/20 236 lb (107 kg)   10/08/19 207 lb 8 oz (94.1 kg)     Temp Readings from Last 3 Encounters:   03/16/20 98.4 °F (36.9 °C) (Bladder)   10/09/19 96.9 °F (36.1 °C) (Temporal)     BP Readings from Last 3 Encounters:   03/15/20 (!) 93/56   10/09/19 132/87     Pulse Readings from Last 3 Encounters:   03/16/20 83   10/09/19 77         Intake/Output Summary (Last 24 hours) at 3/16/2020 1338  Last data filed at 3/16/2020 1200  Gross per 24 hour   Intake 2254 ml   Output 2760 ml   Net -506 ml       Recent Labs     03/14/20  1920 03/15/20  0300 03/16/20  0525   WBC 33.1* 43.3* 38.7*   HGB 13.4 11.9* 11.1*   HCT 45.3 40.1 35.9*   MCV 82.1 80.7 77.4*    278 248     Recent Labs     03/14/20  1516  03/14/20  1920  03/15/20  1426 03/15/20  2220 03/16/20  0525     --  141   < > 133 135 136   K 3.2*  --  3.5   < > 4.2 3.7 3.6     --  105   < > 99 97* 97*   CO2 20*  --  21*   < > 18* 21* 22   BUN 22  --  24*   < > 28* 29* 28*   CREATININE 2.5*   < > 3.1*   < > 2.9* 2.9* 2.5*   MG 1.5*  --  2.3  --   --   --  1.7    < > = values in this interval not displayed. No results for input(s): PROTIME, INR in the last 72 hours. Recent Labs     03/14/20  1516 03/14/20  1920 03/14/20  2245 03/15/20  0300 03/15/20  0900 03/16/20  0525   CKTOTAL 307*  --   --  1,119*  --  642*   CKMB  --   --  12.9*  --   --   --    TROPONINI 0.08* 0.12* 0.16*  --  0.18*  --      No results for input(s): BNP in the last 72 hours. No results for input(s): CHOL, HDL, TRIG in the last 72 hours.     Invalid input(s): CHOLHDLR, LDLCALCU      chlorhexidine (PERIDEX) 0.12 % solution 15 mL, BID  insulin lispro (HUMALOG) injection vial 0-6 Units, Q4H  glucose (GLUTOSE) 40 % oral gel 15 g, PRN  dextrose 50 % IV solution, PRN  glucagon (rDNA) injection 1 mg, PRN  dextrose 5 % solution, PRN  peramivir (RAPIVAB) 100 mg in dextrose 5 % 100 mL IVPB, Daily  oseltamivir (TAMIFLU) capsule 30 mg, Daily  amiodarone (CORDARONE) tablet 400 mg, BID  fludrocortisone (FLORINEF) tablet 0.1 mg, Daily  ascorbic acid 1,500 mg in sodium chloride 0.9 % 100 mL IVPB, Q6H  sodium bicarbonate 150 mEq in dextrose 5 % 1,000 mL infusion, Continuous  fentaNYL 5 mcg/ml in 0.9%  ml infusion, Continuous  norepinephrine (LEVOPHED) 16 mg in dextrose 5% 250 mL infusion, Continuous  vasopressin 20 Units in dextrose 5 % 100 mL infusion, Continuous  heparin (porcine) injection 6,000 Units, PRN  heparin (porcine) injection 3,000 Units, PRN  heparin 25,000 units in dextrose 5% 250 mL infusion, Continuous  levothyroxine (SYNTHROID) tablet 125 mcg, Daily  sodium chloride flush 0.9 % injection 10 mL, 2 times per day  sodium chloride flush 0.9 % injection 10 mL, PRN  acetaminophen (TYLENOL) tablet 650 mg, Q6H PRN    Or  acetaminophen (TYLENOL) suppository 650 mg, Q6H PRN  polyethylene glycol (GLYCOLAX) packet 17 g, Daily PRN  promethazine (PHENERGAN) tablet 12.5 mg, Q6H PRN  perflutren lipid microspheres (DEFINITY) injection 1.65 mg, ONCE PRN  albuterol (PROVENTIL) nebulizer solution 2.5 mg, Q4H  hydrocortisone sodium succinate PF (SOLU-CORTEF) injection 50 mg, Q6H  pantoprazole (PROTONIX) injection 40 mg, Daily    And  sodium chloride (PF) 0.9 % injection 10 mL, Daily  piperacillin-tazobactam (ZOSYN) 3.375 g in dextrose 5 % 100 mL IVPB extended infusion (mini-bag), Q12H  thiamine (B-1) 200 mg in sodium chloride 0.9 % 100 mL IVPB, Q12H  midazolam (VERSED) 100 mg in dextrose 5 % 100 mL infusion, Continuous        Review of systems:     Unobtainable         Physical exam:    Constitutional: As above  Eyes: Closed  ENT: clear, no bleeding. No external masses. Lips normal formation. Debated.   Neck: supple, no JVD, no bruits, no

## 2020-03-16 NOTE — PROGRESS NOTES
Progress Note  3/16/2020 11:42 AM  Subjective:   Admit Date: 3/14/2020  PCP: No primary care provider on file. Interval History: Patient examined remains intubated , remains on pressors , opens eyes to verbal commands     Diet: DIET TUBE FEED CONTINUOUS/CYCLIC NPO; STANDARD WITH FIBER; Nasogastric; 25    Data:   Scheduled Meds:   chlorhexidine  15 mL Mouth/Throat BID    magnesium sulfate  1 g Intravenous Once    insulin lispro  0-6 Units Subcutaneous Q4H    peramivir (RAPIVAP) IVPB  100 mg Intravenous Daily    oseltamivir  30 mg Oral Daily    amiodarone  400 mg Oral BID    fludrocortisone  0.1 mg Oral Daily    ascorbic acid  1,500 mg Intravenous Q6H    levothyroxine  125 mcg Oral Daily    sodium chloride flush  10 mL Intravenous 2 times per day    albuterol  2.5 mg Nebulization Q4H    hydrocortisone sodium succinate PF  50 mg Intravenous Q6H    pantoprazole  40 mg Intravenous Daily    And    sodium chloride (PF)  10 mL Intravenous Daily    piperacillin-tazobactam  3.375 g Intravenous Q12H    thiamine (VITAMIN B1) IVPB  200 mg Intravenous Q12H     Continuous Infusions:   dextrose      sodium bicarbonate infusion 75 mL/hr at 03/16/20 0502    fentaNYL 5 mcg/ml in 0.9%  ml infusion 100 mcg/hr (03/16/20 0106)    norepinephrine 15 mcg/min (03/16/20 1135)    vasopressin (Septic Shock) infusion Stopped (03/16/20 1135)    heparin (porcine) 7 Units/kg/hr (03/16/20 0009)    midazolam 3 mg/hr (03/16/20 0718)     PRN Meds:glucose, dextrose, glucagon (rDNA), dextrose, heparin (porcine), heparin (porcine), sodium chloride flush, acetaminophen **OR** acetaminophen, polyethylene glycol, promethazine **OR** [DISCONTINUED] ondansetron, perflutren lipid microspheres  I/O last 3 completed shifts:   In: 2374 [I.V.:2134; NG/GT:240]  Out: 2465 [Urine:2465]  I/O this shift:  In: 120 [NG/GT:120]  Out: 425 [Urine:425]    Intake/Output Summary (Last 24 hours) at 3/16/2020 1142  Last data filed at 3/16/2020 1022  Gross per 24 hour   Intake 2494 ml   Output 2615 ml   Net -121 ml     CBC:   Recent Labs     20  1920 03/15/20  0300 20  0525   WBC 33.1* 43.3* 38.7*   HGB 13.4 11.9* 11.1*    278 248     BMP:    Recent Labs     03/15/20  1426 03/15/20  2220 20  0525    135 136   K 4.2 3.7 3.6   CL 99 97* 97*   CO2 18* 21* 22   BUN 28* 29* 28*   CREATININE 2.9* 2.9* 2.5*   GLUCOSE 203* 247* 275*     Hepatic:   Recent Labs     20  1516 20  1920   AST 47* 54*   ALT 20 25   BILITOT 0.4 0.3   ALKPHOS 64 75     Troponin:   Recent Labs     20  19220  2245 03/15/20  0900   TROPONINI 0.12* 0.16* 0.18*     BNP: No results for input(s): BNP in the last 72 hours. Lipids: No results for input(s): CHOL, HDL in the last 72 hours. Invalid input(s): LDLCALCU  ABGs: No results found for: PHART, PO2ART, KLD1RTV  INR: No results for input(s): INR in the last 72 hours. -----------------------------------------------------------------  RAD: Ct Abdomen Pelvis Wo Contrast Additional Contrast? None    Result Date: 3/14/2020  Patient MRN:  [id number] : 1931 Age: 80 years Gender: Male Order Date:  3/14/2020 3:15 PM EXAM: CT ABDOMEN PELVIS WO CONTRAST NUMBER OF IMAGES \ views:  360 INDICATION: Renal failure and ams COMPARISON: None TECHNIQUE: Axial computerized tomography sections of the abdomen and pelvis with sagittal and coronal MPR reconstructions were obtained from the top of the diaphragm to the pelvis. Low-dose CT  acquisition technique included one of following options; 1 . Automated exposure control, 2. Adjustment of MA and or KV according to patient's size or 3. Use of iterative reconstruction. FINDINGS: THORACIC BASE: There is suspected pneumonia at the right lower lobe. There is atelectasis at the left base. LIVER: Unremarkable. BILIARY: The gallbladder is surgically absent. PANCREAS: Unremarkable. SPLEEN: Unremarkable. ADRENALS:  Unremarkable.  KIDNEYS:  No evidence of hydronephrosis. GI: No evidence of free air or bowel obstruction. The appendix is normal. There is colonic diverticulosis. Hiatus hernia noted. NG tube present. PELVIS: Fat-containing right inguinal hernia noted. MSK: No acute osseous findings. OTHER: None. 1. Suspected pneumonia at the right lower lobe. Left lower lobe atelectasis. 2. Colonic diverticulosis and hiatus hernia. Ct Head Wo Contrast    Result Date: 3/14/2020  Patient MRN:  [id number] : 1931 Age: 80 years Gender: Male Order Date:  3/14/2020 3:00 PM EXAM: CT HEAD WO CONTRAST Technique: Low-dose CT  acquisition technique included one of following options; 1 . Automated exposure control, 2. Adjustment of MA and or KV according to patient's size. 3. Use of interactive reconstruction. Dosage: 1175.4 mGy-cm. INDICATION:  AMS  COMPARISON: None FINDINGS:  There is considerable mucosal thickening in the maxillary sinuses and left ethmoid air cells and to a lesser degree right ethmoid air cells. There are no skull fractures. There is mild chronic ischemic/degenerative changes in the white matter. There is no mass effect, edema or hemorrhage. No acute intracranial process     Ct Chest Wo Contrast    Result Date: 3/14/2020  Patient MRN:  [id number] : 1931 Age: 80 years Gender: Male Order Date:  3/14/2020 3:15 PM TECHNIQUE/NUMBER OF IMAGES/COMPARISON/CLINICAL HISTORY: CT chest. Axial images were obtained sagittal and coronal reconstructions are. The 299 images History altered mental status, intubation. FINDINGS: Endotracheal tube tip is just above the sabas, can be pulled back about 2 cm . NG tube is in the stomach been partial coiled. The can be pulled back about 5 cm. There are no signs for subcutaneous emphysema or pneumomediastinum or normal thorax. Areas of loss of volume with consolidation and patent central airways are seen in multiple segments of both right and left lower lobes with some air bronchograms.  The this represents a C1 and C2 are preserved. The Degenerative changes are seen predominant in the left-sided facet joint of C2-3. All facets appear well aligned. Pedicles and spinous process and transverse process appears intact in the cervical spine. The Some encroachments of the neural foramina are seen in the left side at C5-6. The on the right side encroachments of the neural foramina are seen mildly in C5-6 level. There is no cervical spine canal stenosis. No acute displaced fractures are seen in the cervical spine. 1. Degenerative changes in the disc space of C5-6, C6-7 mainly. 2. Loss of height of vertebral bodies of C5, C6 and C7 likely to be more an old event but there are no previous studies available for comparison to determine the baseline. Age is not determined based on this study. See above comments and recommendations. 3. Otherwise no acute displaced fractures seen in the cervical spine. Xr Chest Portable    Result Date: 3/15/2020  Patient MRN: [id number] : 1931 Age:  80 years Gender: Male Order Date: 3/15/2020 5:45 PM Exam: XR CHEST PORTABLE Number of Images: 1 view Indication:   Left IJV insertion Left IJV insertion Comparison: 15 March 2020 FINDINGS: Left jugular central venous catheter terminates in the SVC. There is no pneumothorax. The right IJ catheter remains in a high position. The endotracheal tube is stable. The nasogastric tube is not clearly changed. There is a stable cardiac mediastinal silhouette. There is persistent opacity at the left lower lobe which may relate to atelectasis and/or infiltrate. New left-sided central venous catheter terminating in the SVC. No pneumothorax. Otherwise, no significant interval change.      Xr Chest Portable    Result Date: 3/15/2020  Patient MRN: [id number] : 1931 Age:  80 years Gender: Male Order Date: 3/15/2020 6:00 AM Exam: XR CHEST PORTABLE Number of Images: 1 view Indication:   PNA PNA Comparison: 2020 FINDINGS: Stable endotracheal and Male Order Date:  3/14/2020 3:15 PM EXAM: XR ABDOMEN FOR NG/OG/NE TUBE PLACEMENT COMPARISON: None INDICATION:  Confirm G-tube placement Confirm G-tube placement FINDINGS: Nasogastric tube courses below the level diaphragm with distal tip coiled in inspected location of the stomach. Satisfactory position of nasogastric tube. Objective:   Vitals: BP (!) 93/56   Pulse 83   Temp 98.2 °F (36.8 °C)   Resp 18   Ht 6' 0.84\" (1.85 m)   Wt 236 lb (107 kg)   SpO2 94%   BMI 31.28 kg/m²   General appearance: appears stated age   Skin:  No rashes or lesions  HEENT: Head: Normocephalic, no lesions, without obvious abnormality.   Neck: no adenopathy, no carotid bruit, no JVD, supple, symmetrical, trachea midline and thyroid not enlarged, symmetric, no tenderness/mass/nodules  Lungs: diminished breath sounds bibasilar  Heart: regular rate and rhythm, S1, S2 normal, no murmur, click, rub or gallop  Abdomen: soft, non-tender; bowel sounds normal; no masses,  no organomegaly  Extremities: extremities normal, atraumatic, no cyanosis or edema  Neurologic: Mental status: Alert, oriented, thought content appropriate    Assessment:   Patient Active Problem List:     Shock ([address]     Hypopituitarism ([address]     Weakness     Hypothyroidism     Lactic acidosis     Renal insufficiency     Atrial fibrillation with RVR (HCC)     QT prolongation     RBBB     Acute respiratory failure with hypoxia (HCC)    Plan:                                   1 Acute renal failure , in the setting of septic shock , with hypotensive episode                                      Renal functions appear to be improving as he now has started making urine                                      metabolic acidosis better on bicarb drip                                       2 Influenza A complicated with possible aspiration pneumonia / acute respiratory                                       Failure / intubated and on vent 3 Shock , ? septic culture results so far negative                                        4 H/O chronic A fib                                                                         5 Hypopituitarism, prior h/o pituitary gland surgery 1980                                         6 COPD          His kidneys are appearing normal on CT scan , he now is non oliguric , will continue bicarb drip    renal functions improving        Aaron Raines

## 2020-03-16 NOTE — PROGRESS NOTES
Units/kg/hr (03/16/20 0009)    midazolam 2 mg/hr (03/16/20 1141)     Scheduled Meds:   chlorhexidine  15 mL Mouth/Throat BID    insulin lispro  0-6 Units Subcutaneous Q4H    peramivir (RAPIVAP) IVPB  100 mg Intravenous Daily    oseltamivir  30 mg Oral Daily    amiodarone  400 mg Oral BID    fludrocortisone  0.1 mg Oral Daily    ascorbic acid  1,500 mg Intravenous Q6H    levothyroxine  125 mcg Oral Daily    sodium chloride flush  10 mL Intravenous 2 times per day    albuterol  2.5 mg Nebulization Q4H    hydrocortisone sodium succinate PF  50 mg Intravenous Q6H    pantoprazole  40 mg Intravenous Daily    And    sodium chloride (PF)  10 mL Intravenous Daily    piperacillin-tazobactam  3.375 g Intravenous Q12H    thiamine (VITAMIN B1) IVPB  200 mg Intravenous Q12H     PRN Meds: glucose, dextrose, glucagon (rDNA), dextrose, heparin (porcine), heparin (porcine), sodium chloride flush, acetaminophen **OR** acetaminophen, polyethylene glycol, promethazine **OR** [DISCONTINUED] ondansetron, perflutren lipid microspheres  Nutrition:   NG/OG tube TF type: Pulmocare/Nephro/Glucerna/Jevity        At rate: ml/h    Labs and Imaging Studies     CBC:   Recent Labs     03/14/20  1920 03/15/20  0300 03/16/20  0525   WBC 33.1* 43.3* 38.7*   HGB 13.4 11.9* 11.1*   HCT 45.3 40.1 35.9*   MCV 82.1 80.7 77.4*    278 248       BMP:    Recent Labs     03/15/20  1426 03/15/20  2220 03/16/20  0525    135 136   K 4.2 3.7 3.6   CL 99 97* 97*   CO2 18* 21* 22   BUN 28* 29* 28*   CREATININE 2.9* 2.9* 2.5*   GLUCOSE 203* 247* 275*       LIVER PROFILE:   Recent Labs     03/14/20  1516 03/14/20  1920   AST 47* 54*   ALT 20 25   BILITOT 0.4 0.3   ALKPHOS 64 75       PT/INR:   No results for input(s): PROTIME, INR in the last 72 hours.     APTT:   Recent Labs     03/15/20  1100 03/15/20  1815 03/16/20  0240   APTT 86.5* 66.5* 55.8*       Fasting Lipid Panel:    No results found for: CHOL, TRIG, HDL    Cardiac Enzymes:    Lab Results   Component Value Date    CKTOTAL 642 (H) 03/16/2020    CKTOTAL 1,119 (H) 03/15/2020    CKTOTAL 307 (H) 03/14/2020    CKMB 12.9 (H) 03/14/2020    TROPONINI 0.18 (H) 03/15/2020    TROPONINI 0.16 (H) 03/14/2020    TROPONINI 0.12 (H) 03/14/2020       Notable Cultures:      Blood cultures   Blood Culture, Routine   Date Value Ref Range Status   03/14/2020 24 Hours- no growth  Preliminary     Respiratory cultures No results found for: RESPCULTURE No results found for: LABGRAM  Urine   Urine Culture, Routine   Date Value Ref Range Status   10/05/2019 Growth not present  Final     Legionella No results found for: LABLEGI  C Diff PCR No results found for: CDIFPCR  Wound culture/abscess: No results for input(s): WNDABS in the last 72 hours. Tip culture:No results for input(s): CXCATHTIP in the last 72 hours. Antibiotic  Days  Day started                                Oxygen:     Vent Information  $Ventilation: $Subsequent Day  Equipment ID: [id number]  Vent Type: 840  Vent Mode: AC/VC  Vt Ordered: 500 mL  Rate Set: 18 bmp  Peak Flow: 70 L/min  Pressure Support: 0 cmH20  FiO2 : 40 %  Sensitivity: 3  PEEP/CPAP: 8  I Time/ I Time %: 0 s  Cuff Pressure (cm H2O): 29 cm H2O  Humidification Source: Heated wire  Humidification Temp: 37  Humidification Temp Measured: 37  Circuit Condensation: Drained  Additional Respiratory  Assessments  Pulse: 83  Resp: 18  SpO2: 94 %  End Tidal CO2: 36 (%)  Position: Zimmerman's  Humidification Source: Heated wire  Humidification Temp: 37  Circuit Condensation: Drained  Oral Care Completed?: Yes  Oral Care:  Other (Comment), Mouth swabbed(peridex)  Subglottic Suction Done?: Yes(no secretions)  Cuff Pressure (cm H2O): 29 cm H2O  Skin barrier applied: Yes     Nasal cannula L/min     Face mask %     Reservoirs mask %       ABG   Vent Settings     PH   Mode      PCO2   TV      PO2   RR      HCO3   PS      Sat%   PEEP      FIO2   FIO2        P/F          Lines:  Site  Day  Date inserted     TLC

## 2020-03-16 NOTE — PROGRESS NOTES
Intravenous Daily    oseltamivir  30 mg Oral Daily    amiodarone  400 mg Oral BID    fludrocortisone  0.1 mg Oral Daily    ascorbic acid  1,500 mg Intravenous Q6H    levothyroxine  125 mcg Oral Daily    sodium chloride flush  10 mL Intravenous 2 times per day    albuterol  2.5 mg Nebulization Q4H    hydrocortisone sodium succinate PF  50 mg Intravenous Q6H    pantoprazole  40 mg Intravenous Daily    And    sodium chloride (PF)  10 mL Intravenous Daily    piperacillin-tazobactam  3.375 g Intravenous Q12H    thiamine (VITAMIN B1) IVPB  200 mg Intravenous Q12H     PRN Meds: glucose, dextrose, glucagon (rDNA), dextrose, heparin (porcine), heparin (porcine), sodium chloride flush, acetaminophen **OR** acetaminophen, polyethylene glycol, promethazine **OR** [DISCONTINUED] ondansetron, perflutren lipid microspheres    I/O    Intake/Output Summary (Last 24 hours) at 3/16/2020 1625  Last data filed at 3/16/2020 1401  Gross per 24 hour   Intake 2919 ml   Output 2905 ml   Net 14 ml       Labs:   Recent Labs     03/14/20  1920 03/15/20  0300 03/16/20  0525   WBC 33.1* 43.3* 38.7*   HGB 13.4 11.9* 11.1*   HCT 45.3 40.1 35.9*    278 248       Recent Labs     03/15/20  2220 03/16/20  0525 03/16/20  1355    136 138   K 3.7 3.6 3.5   CL 97* 97* 97*   CO2 21* 22 26   BUN 29* 28* 28*   CREATININE 2.9* 2.5* 2.4*   CALCIUM 6.8* 6.5* 6.6*       Recent Labs     03/14/20  1516 03/14/20  1920   PROT 6.2* 6.5   ALKPHOS 64 75   ALT 20 25   AST 47* 54*   BILITOT 0.4 0.3       No results for input(s): INR in the last 72 hours. Recent Labs     03/14/20  1516 03/14/20  1920 03/14/20  2245 03/15/20  0300 03/15/20  0900 03/16/20  0525   CKTOTAL 307*  --   --  1,119*  --  642*   TROPONINI 0.08* 0.12* 0.16*  --  0.18*  --        Chronic labs:  Lab Results   Component Value Date    TSH 0.435 03/14/2020    INR 1.0 10/05/2019       Radiology:  Imaging studies reviewed today.     ASSESSMENT:  Septic shock 2/2 influenza A and

## 2020-03-16 NOTE — CARE COORDINATION
3/16 Transition of Care. CM/MARIANO following. Patient admitted on 3/14/2020 with septic shock 2/2 influenza A with aspiration pneumonia. Currently on Vent, On pressors,ATB's. He was found down unresponsive at Home. He lives alone. Unclear at this time discharge need. Will touch base with family when more appropriate to discuss discharge planning.   Drea Llamas RN, CM

## 2020-03-16 NOTE — FLOWSHEET NOTE
03/16/20 0800   Restraint Order   Length of Order 24   Order Upon Application Yes   Face to Face Yes   Assessment   Less Restrictive Alternative DE;RO;VR;PM   Special Consideration/Risk Factors N   Justification   Clinical Justification E;H   Education   Discontinuation Criteria Absence   Criteria Explained Yes   Patient's Response NL   Family Notification O  (son)   Restraint Monitoring Q60 Minutes   Visual/Safety Check (q 60 mins) SD   Restraint  Monitoring Q2 Hours   Circulation NS   Range of Motion P   Fluids N   Food/Meal N   Elimination UC   Restraint Type   Soft Restraint B Wrist CONTINUED   Vital Signs   Temp 98.2 °F (36.8 °C)   Pulse 89   Resp 20   Level of Consciousness 1     Pt continues to reach for lines and tubes despite attempts to deter. Restraints continued for pt safety.  Asad Arellano

## 2020-03-17 ENCOUNTER — APPOINTMENT (OUTPATIENT)
Dept: GENERAL RADIOLOGY | Age: 85
DRG: 870 | End: 2020-03-17
Payer: MEDICARE

## 2020-03-17 LAB
AADO2: 156.4 MMHG
ANION GAP SERPL CALCULATED.3IONS-SCNC: 10 MMOL/L (ref 7–16)
ANION GAP SERPL CALCULATED.3IONS-SCNC: 12 MMOL/L (ref 7–16)
ANION GAP SERPL CALCULATED.3IONS-SCNC: 13 MMOL/L (ref 7–16)
ANISOCYTOSIS: ABNORMAL
APTT: 38.5 SEC (ref 24.5–35.1)
APTT: 49 SEC (ref 24.5–35.1)
APTT: 53.5 SEC (ref 24.5–35.1)
B.E.: 6 MMOL/L (ref -3–3)
BASOPHILS ABSOLUTE: 0 E9/L (ref 0–0.2)
BASOPHILS RELATIVE PERCENT: 0.1 % (ref 0–2)
BUN BLDV-MCNC: 23 MG/DL (ref 8–23)
BUN BLDV-MCNC: 25 MG/DL (ref 8–23)
BUN BLDV-MCNC: 25 MG/DL (ref 8–23)
CALCIUM SERPL-MCNC: 6.3 MG/DL (ref 8.6–10.2)
CALCIUM SERPL-MCNC: 6.6 MG/DL (ref 8.6–10.2)
CALCIUM SERPL-MCNC: 6.6 MG/DL (ref 8.6–10.2)
CHLORIDE BLD-SCNC: 100 MMOL/L (ref 98–107)
CHLORIDE BLD-SCNC: 107 MMOL/L (ref 98–107)
CHLORIDE BLD-SCNC: 97 MMOL/L (ref 98–107)
CO2: 28 MMOL/L (ref 22–29)
CO2: 29 MMOL/L (ref 22–29)
CO2: 29 MMOL/L (ref 22–29)
COHB: 0.6 % (ref 0–1.5)
CREAT SERPL-MCNC: 1.7 MG/DL (ref 0.7–1.2)
CREAT SERPL-MCNC: 1.9 MG/DL (ref 0.7–1.2)
CREAT SERPL-MCNC: 2 MG/DL (ref 0.7–1.2)
CRITICAL: ABNORMAL
CULTURE, RESPIRATORY: NORMAL
DATE ANALYZED: ABNORMAL
DATE OF COLLECTION: ABNORMAL
EOSINOPHILS ABSOLUTE: 0 E9/L (ref 0.05–0.5)
EOSINOPHILS RELATIVE PERCENT: 0 % (ref 0–6)
FIO2: 40 %
GFR AFRICAN AMERICAN: 38
GFR AFRICAN AMERICAN: 41
GFR AFRICAN AMERICAN: 46
GFR NON-AFRICAN AMERICAN: 32 ML/MIN/1.73
GFR NON-AFRICAN AMERICAN: 34 ML/MIN/1.73
GFR NON-AFRICAN AMERICAN: 38 ML/MIN/1.73
GLUCOSE BLD-MCNC: 153 MG/DL (ref 74–99)
GLUCOSE BLD-MCNC: 188 MG/DL (ref 74–99)
GLUCOSE BLD-MCNC: 190 MG/DL (ref 74–99)
HCO3: 29.9 MMOL/L (ref 22–26)
HCT VFR BLD CALC: 34.2 % (ref 37–54)
HEMOGLOBIN: 10.7 G/DL (ref 12.5–16.5)
HHB: 4.4 % (ref 0–5)
LAB: ABNORMAL
LV EF: 63 %
LVEF MODALITY: NORMAL
LYMPHOCYTES ABSOLUTE: 0 E9/L (ref 1.5–4)
LYMPHOCYTES RELATIVE PERCENT: 1.3 % (ref 20–42)
Lab: ABNORMAL
MAGNESIUM: 2.4 MG/DL (ref 1.6–2.6)
MAGNESIUM: 2.4 MG/DL (ref 1.6–2.6)
MCH RBC QN AUTO: 24.2 PG (ref 26–35)
MCHC RBC AUTO-ENTMCNC: 31.3 % (ref 32–34.5)
MCV RBC AUTO: 77.2 FL (ref 80–99.9)
METER GLUCOSE: 156 MG/DL (ref 74–99)
METER GLUCOSE: 188 MG/DL (ref 74–99)
METER GLUCOSE: 188 MG/DL (ref 74–99)
METER GLUCOSE: 192 MG/DL (ref 74–99)
METER GLUCOSE: 195 MG/DL (ref 74–99)
METER GLUCOSE: 247 MG/DL (ref 74–99)
METHB: 0.3 % (ref 0–1.5)
MODE: AC
MONOCYTES ABSOLUTE: 1.48 E9/L (ref 0.1–0.95)
MONOCYTES RELATIVE PERCENT: 5.2 % (ref 2–12)
NEUTROPHILS ABSOLUTE: 28.03 E9/L (ref 1.8–7.3)
NEUTROPHILS RELATIVE PERCENT: 94.8 % (ref 43–80)
O2 CONTENT: 15.4 ML/DL
O2 SATURATION: 95.6 % (ref 92–98.5)
O2HB: 94.7 % (ref 94–97)
OPERATOR ID: ABNORMAL
OVALOCYTES: ABNORMAL
PATIENT TEMP: 37 C
PCO2: 40.6 MMHG (ref 35–45)
PDW BLD-RTO: 15.9 FL (ref 11.5–15)
PEEP/CPAP: 8 CMH2O
PFO2: 1.8 MMHG/%
PH BLOOD GAS: 7.49 (ref 7.35–7.45)
PLATELET # BLD: 255 E9/L (ref 130–450)
PMV BLD AUTO: 11.2 FL (ref 7–12)
PO2: 72.1 MMHG (ref 75–100)
POIKILOCYTES: ABNORMAL
POLYCHROMASIA: ABNORMAL
POTASSIUM REFLEX MAGNESIUM: 2.9 MMOL/L (ref 3.5–5)
POTASSIUM REFLEX MAGNESIUM: 3.2 MMOL/L (ref 3.5–5)
POTASSIUM REFLEX MAGNESIUM: 3.6 MMOL/L (ref 3.5–5)
RBC # BLD: 4.43 E12/L (ref 3.8–5.8)
RI(T): 2.17
RR MECHANICAL: 18 B/MIN
SMEAR, RESPIRATORY: NORMAL
SODIUM BLD-SCNC: 139 MMOL/L (ref 132–146)
SODIUM BLD-SCNC: 141 MMOL/L (ref 132–146)
SODIUM BLD-SCNC: 145 MMOL/L (ref 132–146)
SOURCE, BLOOD GAS: ABNORMAL
THB: 11.5 G/DL (ref 11.5–16.5)
TIME ANALYZED: 431
TOTAL CK: 294 U/L (ref 20–200)
VT MECHANICAL: 500 ML
WBC # BLD: 29.5 E9/L (ref 4.5–11.5)

## 2020-03-17 PROCEDURE — 2500000003 HC RX 250 WO HCPCS: Performed by: INTERNAL MEDICINE

## 2020-03-17 PROCEDURE — 85730 THROMBOPLASTIN TIME PARTIAL: CPT

## 2020-03-17 PROCEDURE — 6370000000 HC RX 637 (ALT 250 FOR IP): Performed by: INTERNAL MEDICINE

## 2020-03-17 PROCEDURE — 71045 X-RAY EXAM CHEST 1 VIEW: CPT

## 2020-03-17 PROCEDURE — 82962 GLUCOSE BLOOD TEST: CPT

## 2020-03-17 PROCEDURE — 2000000000 HC ICU R&B

## 2020-03-17 PROCEDURE — 94003 VENT MGMT INPAT SUBQ DAY: CPT

## 2020-03-17 PROCEDURE — 6360000002 HC RX W HCPCS: Performed by: INTERNAL MEDICINE

## 2020-03-17 PROCEDURE — 6360000004 HC RX CONTRAST MEDICATION: Performed by: INTERNAL MEDICINE

## 2020-03-17 PROCEDURE — 99232 SBSQ HOSP IP/OBS MODERATE 35: CPT | Performed by: INTERNAL MEDICINE

## 2020-03-17 PROCEDURE — 6360000002 HC RX W HCPCS: Performed by: STUDENT IN AN ORGANIZED HEALTH CARE EDUCATION/TRAINING PROGRAM

## 2020-03-17 PROCEDURE — C9113 INJ PANTOPRAZOLE SODIUM, VIA: HCPCS | Performed by: INTERNAL MEDICINE

## 2020-03-17 PROCEDURE — 2580000003 HC RX 258: Performed by: INTERNAL MEDICINE

## 2020-03-17 PROCEDURE — 99291 CRITICAL CARE FIRST HOUR: CPT | Performed by: INTERNAL MEDICINE

## 2020-03-17 PROCEDURE — 85025 COMPLETE CBC W/AUTO DIFF WBC: CPT

## 2020-03-17 PROCEDURE — 80048 BASIC METABOLIC PNL TOTAL CA: CPT

## 2020-03-17 PROCEDURE — 94640 AIRWAY INHALATION TREATMENT: CPT

## 2020-03-17 PROCEDURE — 2580000003 HC RX 258: Performed by: SPECIALIST

## 2020-03-17 PROCEDURE — 82805 BLOOD GASES W/O2 SATURATION: CPT

## 2020-03-17 PROCEDURE — 36415 COLL VENOUS BLD VENIPUNCTURE: CPT

## 2020-03-17 PROCEDURE — 82550 ASSAY OF CK (CPK): CPT

## 2020-03-17 PROCEDURE — 93306 TTE W/DOPPLER COMPLETE: CPT

## 2020-03-17 PROCEDURE — 6360000002 HC RX W HCPCS: Performed by: SPECIALIST

## 2020-03-17 PROCEDURE — 83735 ASSAY OF MAGNESIUM: CPT

## 2020-03-17 PROCEDURE — 2580000003 HC RX 258

## 2020-03-17 RX ORDER — POTASSIUM CHLORIDE 29.8 MG/ML
20 INJECTION INTRAVENOUS
Status: COMPLETED | OUTPATIENT
Start: 2020-03-17 | End: 2020-03-17

## 2020-03-17 RX ORDER — POTASSIUM CHLORIDE 29.8 MG/ML
40 INJECTION INTRAVENOUS ONCE
Status: COMPLETED | OUTPATIENT
Start: 2020-03-17 | End: 2020-03-18

## 2020-03-17 RX ORDER — SODIUM CHLORIDE 9 MG/ML
INJECTION, SOLUTION INTRAVENOUS CONTINUOUS
Status: DISCONTINUED | OUTPATIENT
Start: 2020-03-17 | End: 2020-03-18

## 2020-03-17 RX ORDER — POTASSIUM CHLORIDE 7.45 MG/ML
10 INJECTION INTRAVENOUS
Status: DISCONTINUED | OUTPATIENT
Start: 2020-03-17 | End: 2020-03-17

## 2020-03-17 RX ORDER — POTASSIUM CHLORIDE 29.8 MG/ML
20 INJECTION INTRAVENOUS ONCE
Status: COMPLETED | OUTPATIENT
Start: 2020-03-17 | End: 2020-03-17

## 2020-03-17 RX ORDER — NICOTINE POLACRILEX 4 MG
15 LOZENGE BUCCAL PRN
Status: DISCONTINUED | OUTPATIENT
Start: 2020-03-17 | End: 2020-03-23 | Stop reason: HOSPADM

## 2020-03-17 RX ORDER — ASPIRIN 81 MG/1
81 TABLET, CHEWABLE ORAL DAILY
Status: DISCONTINUED | OUTPATIENT
Start: 2020-03-17 | End: 2020-03-23 | Stop reason: HOSPADM

## 2020-03-17 RX ORDER — DEXTROSE MONOHYDRATE 25 G/50ML
12.5 INJECTION, SOLUTION INTRAVENOUS PRN
Status: DISCONTINUED | OUTPATIENT
Start: 2020-03-17 | End: 2020-03-23 | Stop reason: HOSPADM

## 2020-03-17 RX ORDER — DEXTROSE MONOHYDRATE 50 MG/ML
100 INJECTION, SOLUTION INTRAVENOUS PRN
Status: DISCONTINUED | OUTPATIENT
Start: 2020-03-17 | End: 2020-03-22

## 2020-03-17 RX ADMIN — HEPARIN SODIUM 3000 UNITS: 1000 INJECTION, SOLUTION INTRAVENOUS; SUBCUTANEOUS at 06:37

## 2020-03-17 RX ADMIN — INSULIN LISPRO 2 UNITS: 100 INJECTION, SOLUTION INTRAVENOUS; SUBCUTANEOUS at 11:16

## 2020-03-17 RX ADMIN — LEVOTHYROXINE SODIUM 125 MCG: 0.12 TABLET ORAL at 06:43

## 2020-03-17 RX ADMIN — FLUDROCORTISONE ACETATE 0.1 MG: 0.1 TABLET ORAL at 08:18

## 2020-03-17 RX ADMIN — ASCORBIC ACID 1500 MG: 500 INJECTION, SOLUTION INTRAMUSCULAR; INTRAVENOUS; SUBCUTANEOUS at 05:30

## 2020-03-17 RX ADMIN — CHLORHEXIDINE GLUCONATE 0.12% ORAL RINSE 15 ML: 1.2 LIQUID ORAL at 19:25

## 2020-03-17 RX ADMIN — SODIUM CHLORIDE, PRESERVATIVE FREE 10 ML: 5 INJECTION INTRAVENOUS at 08:17

## 2020-03-17 RX ADMIN — PERAMIVIR 100 MG: 600 SOLUTION INTRAVENOUS at 09:59

## 2020-03-17 RX ADMIN — ASPIRIN 81 MG 81 MG: 81 TABLET ORAL at 11:16

## 2020-03-17 RX ADMIN — AMIODARONE HYDROCHLORIDE 400 MG: 200 TABLET ORAL at 08:17

## 2020-03-17 RX ADMIN — PANTOPRAZOLE SODIUM 40 MG: 40 INJECTION, POWDER, FOR SOLUTION INTRAVENOUS at 08:17

## 2020-03-17 RX ADMIN — HEPARIN SODIUM 9 UNITS/KG/HR: 10000 INJECTION, SOLUTION INTRAVENOUS at 11:17

## 2020-03-17 RX ADMIN — PERFLUTREN 1.65 MG: 6.52 INJECTION, SUSPENSION INTRAVENOUS at 15:55

## 2020-03-17 RX ADMIN — POTASSIUM CHLORIDE 20 MEQ: 400 INJECTION, SOLUTION INTRAVENOUS at 08:58

## 2020-03-17 RX ADMIN — AMPICILLIN SODIUM AND SULBACTAM SODIUM 3 G: 2; 1 INJECTION, POWDER, FOR SOLUTION INTRAMUSCULAR; INTRAVENOUS at 16:02

## 2020-03-17 RX ADMIN — POTASSIUM CHLORIDE 20 MEQ: 400 INJECTION, SOLUTION INTRAVENOUS at 14:41

## 2020-03-17 RX ADMIN — Medication 7 MCG/MIN: at 11:17

## 2020-03-17 RX ADMIN — Medication 100 MCG/HR: at 08:10

## 2020-03-17 RX ADMIN — WATER 10 ML: 1 INJECTION INTRAMUSCULAR; INTRAVENOUS; SUBCUTANEOUS at 02:56

## 2020-03-17 RX ADMIN — INSULIN LISPRO 2 UNITS: 100 INJECTION, SOLUTION INTRAVENOUS; SUBCUTANEOUS at 16:09

## 2020-03-17 RX ADMIN — THIAMINE HYDROCHLORIDE 200 MG: 100 INJECTION, SOLUTION INTRAMUSCULAR; INTRAVENOUS at 11:07

## 2020-03-17 RX ADMIN — ALBUTEROL SULFATE 2.5 MG: 2.5 SOLUTION RESPIRATORY (INHALATION) at 08:03

## 2020-03-17 RX ADMIN — POTASSIUM CHLORIDE 40 MEQ: 400 INJECTION, SOLUTION INTRAVENOUS at 21:45

## 2020-03-17 RX ADMIN — ALBUTEROL SULFATE 2.5 MG: 2.5 SOLUTION RESPIRATORY (INHALATION) at 16:13

## 2020-03-17 RX ADMIN — HYDROCORTISONE SODIUM SUCCINATE 50 MG: 100 INJECTION, POWDER, FOR SOLUTION INTRAMUSCULAR; INTRAVENOUS at 14:01

## 2020-03-17 RX ADMIN — THIAMINE HYDROCHLORIDE 200 MG: 100 INJECTION, SOLUTION INTRAMUSCULAR; INTRAVENOUS at 21:46

## 2020-03-17 RX ADMIN — HYDROCORTISONE SODIUM SUCCINATE 50 MG: 100 INJECTION, POWDER, FOR SOLUTION INTRAMUSCULAR; INTRAVENOUS at 08:17

## 2020-03-17 RX ADMIN — SODIUM CHLORIDE: 9 INJECTION, SOLUTION INTRAVENOUS at 12:32

## 2020-03-17 RX ADMIN — AMPICILLIN SODIUM AND SULBACTAM SODIUM 3 G: 2; 1 INJECTION, POWDER, FOR SOLUTION INTRAMUSCULAR; INTRAVENOUS at 19:26

## 2020-03-17 RX ADMIN — Medication 10 ML: at 08:27

## 2020-03-17 RX ADMIN — HEPARIN SODIUM 3000 UNITS: 1000 INJECTION, SOLUTION INTRAVENOUS; SUBCUTANEOUS at 21:45

## 2020-03-17 RX ADMIN — ALBUTEROL SULFATE 2.5 MG: 2.5 SOLUTION RESPIRATORY (INHALATION) at 11:47

## 2020-03-17 RX ADMIN — ALBUTEROL SULFATE 2.5 MG: 2.5 SOLUTION RESPIRATORY (INHALATION) at 04:50

## 2020-03-17 RX ADMIN — SODIUM CHLORIDE, PRESERVATIVE FREE 10 ML: 5 INJECTION INTRAVENOUS at 19:26

## 2020-03-17 RX ADMIN — ALBUTEROL SULFATE 2.5 MG: 2.5 SOLUTION RESPIRATORY (INHALATION) at 00:14

## 2020-03-17 RX ADMIN — ALBUTEROL SULFATE 2.5 MG: 2.5 SOLUTION RESPIRATORY (INHALATION) at 23:22

## 2020-03-17 RX ADMIN — INSULIN LISPRO 1 UNITS: 100 INJECTION, SOLUTION INTRAVENOUS; SUBCUTANEOUS at 08:14

## 2020-03-17 RX ADMIN — ASCORBIC ACID 1500 MG: 500 INJECTION, SOLUTION INTRAMUSCULAR; INTRAVENOUS; SUBCUTANEOUS at 22:27

## 2020-03-17 RX ADMIN — POTASSIUM CHLORIDE 20 MEQ: 400 INJECTION, SOLUTION INTRAVENOUS at 11:07

## 2020-03-17 RX ADMIN — CHLORHEXIDINE GLUCONATE 0.12% ORAL RINSE 15 ML: 1.2 LIQUID ORAL at 08:17

## 2020-03-17 RX ADMIN — HYDROCORTISONE SODIUM SUCCINATE 50 MG: 100 INJECTION, POWDER, FOR SOLUTION INTRAMUSCULAR; INTRAVENOUS at 19:26

## 2020-03-17 RX ADMIN — SODIUM BICARBONATE: 84 INJECTION, SOLUTION INTRAVENOUS at 10:42

## 2020-03-17 RX ADMIN — SODIUM CHLORIDE, PRESERVATIVE FREE 10 ML: 5 INJECTION INTRAVENOUS at 08:18

## 2020-03-17 RX ADMIN — ALBUTEROL SULFATE 2.5 MG: 2.5 SOLUTION RESPIRATORY (INHALATION) at 20:14

## 2020-03-17 RX ADMIN — OSELTAMIVIR PHOSPHATE 30 MG: 30 CAPSULE ORAL at 08:30

## 2020-03-17 RX ADMIN — AMPICILLIN SODIUM AND SULBACTAM SODIUM 3 G: 2; 1 INJECTION, POWDER, FOR SOLUTION INTRAMUSCULAR; INTRAVENOUS at 08:31

## 2020-03-17 RX ADMIN — Medication 10 ML: at 10:34

## 2020-03-17 RX ADMIN — ASCORBIC ACID 1500 MG: 500 INJECTION, SOLUTION INTRAMUSCULAR; INTRAVENOUS; SUBCUTANEOUS at 16:51

## 2020-03-17 RX ADMIN — AMIODARONE HYDROCHLORIDE 400 MG: 200 TABLET ORAL at 19:26

## 2020-03-17 RX ADMIN — HYDROCORTISONE SODIUM SUCCINATE 50 MG: 100 INJECTION, POWDER, FOR SOLUTION INTRAMUSCULAR; INTRAVENOUS at 02:55

## 2020-03-17 RX ADMIN — INSULIN LISPRO 1 UNITS: 100 INJECTION, SOLUTION INTRAVENOUS; SUBCUTANEOUS at 19:25

## 2020-03-17 RX ADMIN — ASCORBIC ACID 1500 MG: 500 INJECTION, SOLUTION INTRAMUSCULAR; INTRAVENOUS; SUBCUTANEOUS at 12:30

## 2020-03-17 RX ADMIN — AMPICILLIN SODIUM AND SULBACTAM SODIUM 3 G: 2; 1 INJECTION, POWDER, FOR SOLUTION INTRAMUSCULAR; INTRAVENOUS at 12:25

## 2020-03-17 RX ADMIN — POTASSIUM CHLORIDE 20 MEQ: 400 INJECTION, SOLUTION INTRAVENOUS at 12:13

## 2020-03-17 RX ADMIN — INSULIN LISPRO 1 UNITS: 100 INJECTION, SOLUTION INTRAVENOUS; SUBCUTANEOUS at 04:10

## 2020-03-17 ASSESSMENT — PULMONARY FUNCTION TESTS
PIF_VALUE: 20
PIF_VALUE: 20
PIF_VALUE: 19
PIF_VALUE: 20
PIF_VALUE: 19
PIF_VALUE: 20
PIF_VALUE: 19
PIF_VALUE: 19
PIF_VALUE: 20
PIF_VALUE: 19
PIF_VALUE: 19
PIF_VALUE: 20
PIF_VALUE: 21
PIF_VALUE: 20
PIF_VALUE: 19
PIF_VALUE: 20
PIF_VALUE: 19
PIF_VALUE: 20
PIF_VALUE: 19

## 2020-03-17 NOTE — PLAN OF CARE
Problem: Restraint Use - Nonviolent/Non-Self-Destructive Behavior:  Goal: Absence of restraint-related injury  Description: Absence of restraint-related injury  3/17/2020 1159 by Shawn Patino RN  Outcome: Met This Shift     Problem: Gas Exchange - Impaired:  Goal: Levels of oxygenation will improve  Description: Levels of oxygenation will improve  3/17/2020 1159 by Shawn Patino RN  Outcome: Met This Shift     Problem: Infection, Septic Shock:  Goal: Will show no infection signs and symptoms  Description: Will show no infection signs and symptoms  3/17/2020 1159 by Shawn Paitno RN  Outcome: Met This Shift     Problem: Restraint Use - Nonviolent/Non-Self-Destructive Behavior:  Goal: Absence of restraint indications  Description: Absence of restraint indications  3/17/2020 1159 by Shawn Patino RN  Outcome: Not Met This Shift

## 2020-03-17 NOTE — PROGRESS NOTES
Progress Note  3/17/2020 12:10 PM  Subjective:   Admit Date: 3/14/2020  PCP: No primary care provider on file. Interval History: Patient examined remains intubated on low dose pressors     Diet: DIET TUBE FEED CONTINUOUS/CYCLIC NPO; STANDARD WITH FIBER; Nasogastric; 25    Data:   Scheduled Meds:   potassium chloride  20 mEq Intravenous Q1H    aspirin  81 mg Oral Daily    insulin lispro  0-12 Units Subcutaneous TID WC    insulin lispro  0-6 Units Subcutaneous Nightly    chlorhexidine  15 mL Mouth/Throat BID    ampicillin-sulbactam  3 g Intravenous 4x Daily    oseltamivir  30 mg Oral Daily    amiodarone  400 mg Oral BID    fludrocortisone  0.1 mg Oral Daily    ascorbic acid  1,500 mg Intravenous Q6H    levothyroxine  125 mcg Oral Daily    sodium chloride flush  10 mL Intravenous 2 times per day    albuterol  2.5 mg Nebulization Q4H    hydrocortisone sodium succinate PF  50 mg Intravenous Q6H    pantoprazole  40 mg Intravenous Daily    And    sodium chloride (PF)  10 mL Intravenous Daily    thiamine (VITAMIN B1) IVPB  200 mg Intravenous Q12H     Continuous Infusions:   dextrose      dextrose      sodium bicarbonate infusion 75 mL/hr at 03/17/20 1042    fentaNYL 5 mcg/ml in 0.9%  ml infusion 100 mcg/hr (03/17/20 0810)    norepinephrine 7 mcg/min (03/17/20 1117)    heparin (porcine) 9 Units/kg/hr (03/17/20 1117)     PRN Meds:glucose, dextrose, glucagon (rDNA), dextrose, dextrose, heparin (porcine), heparin (porcine), sodium chloride flush, acetaminophen **OR** acetaminophen, polyethylene glycol, promethazine **OR** [DISCONTINUED] ondansetron, perflutren lipid microspheres  I/O last 3 completed shifts:   In: 7721 [I.V.:3769; NG/GT:681; IV Piggyback:300]  Out: 5295 [Urine:5295]  I/O this shift:  In: 60 [NG/GT:60]  Out: 1025 [Urine:1025]    Intake/Output Summary (Last 24 hours) at 3/17/2020 1210  Last data filed at 3/17/2020 1200  Gross per 24 hour   Intake 4690 ml   Output 5670 ml   Net -980 ml appendix is normal. There is colonic diverticulosis. Hiatus hernia noted. NG tube present. PELVIS: Fat-containing right inguinal hernia noted. MSK: No acute osseous findings. OTHER: None. 1. Suspected pneumonia at the right lower lobe. Left lower lobe atelectasis. 2. Colonic diverticulosis and hiatus hernia. Ct Head Wo Contrast    Result Date: 3/14/2020  Patient MRN:  [id number] : 1931 Age: 80 years Gender: Male Order Date:  3/14/2020 3:00 PM EXAM: CT HEAD WO CONTRAST Technique: Low-dose CT  acquisition technique included one of following options; 1 . Automated exposure control, 2. Adjustment of MA and or KV according to patient's size. 3. Use of interactive reconstruction. Dosage: 1175.4 mGy-cm. INDICATION:  AMS  COMPARISON: None FINDINGS:  There is considerable mucosal thickening in the maxillary sinuses and left ethmoid air cells and to a lesser degree right ethmoid air cells. There are no skull fractures. There is mild chronic ischemic/degenerative changes in the white matter. There is no mass effect, edema or hemorrhage. No acute intracranial process     Ct Chest Wo Contrast    Result Date: 3/14/2020  Patient MRN:  [id number] : 1931 Age: 80 years Gender: Male Order Date:  3/14/2020 3:15 PM TECHNIQUE/NUMBER OF IMAGES/COMPARISON/CLINICAL HISTORY: CT chest. Axial images were obtained sagittal and coronal reconstructions are. The 299 images History altered mental status, intubation. FINDINGS: Endotracheal tube tip is just above the sabas, can be pulled back about 2 cm . NG tube is in the stomach been partial coiled. The can be pulled back about 5 cm. There are no signs for subcutaneous emphysema or pneumomediastinum or normal thorax. Areas of loss of volume with consolidation and patent central airways are seen in multiple segments of both right and left lower lobes with some air bronchograms. The this represents a pattern of atelectasis with consolidation.  Underlying pneumonia or aspiration cannot be excluded. Minimal pleural reaction is seen in both costophrenic sulcus posteriorly. Heart has a normal size. Calcifications are seen in coronary arteries are. The there is normal diameters for the thoracic aorta and central pulmonary arteries are. There is no pericardial effusion. Some lymph nodes seen in the mediastinum are borderline size and number. Upper abdominal structures are not fully covered on this study. 1. Endotracheal tube just above the sabas, can be pulled back about 2 cm. 2. NG tube coiled in the body of the stomach, can be pulled back about 5 cm. 3, Bilateral multi segmental atelectasis in the lower lobes with consolidation but there is an overall patent central airways. The pathways of multisegmental atelectasis of the lower lobes are. Underlying pneumonia or aspiration cannot be excluded. Please correlate clinically. Ct Cervical Spine Wo Contrast    Result Date: 3/14/2020  Patient MRN:  [id number] : 1931 Age: 80 years Gender: Male Order Date:  3/14/2020 3:00 PM TECHNIQUE/NUMBER OF IMAGES/COMPARISON/CLINICAL HISTORY: CT scanning cervical spine. Axial images with sagittal and coronal and oblique reconstructions 12 images 66year-old male patient with altered mental status intubation. History altered mental status. Intubated. Trauma. FINDINGS: Degenerative disc disease seen mainly posteriorly in the levels of C5-C6, C6-7, and C7-T1. Some loss of height of the vertebral bodies at the level of C5, C6 and C7 are likely to be more chronic findings but the absence of previous studies available for comparison to determine the baseline, age cannot be determined. The MRI study would be in better advantage to investigate for bone marrow edema/bruise if age determination is needed in clinical management. The odontoid process is intact. The articular relation between the occipital condyles and C1, and between C1 and C2 are preserved.  The Degenerative changes are seen predominant in the left-sided facet joint of C2-3. All facets appear well aligned. Pedicles and spinous process and transverse process appears intact in the cervical spine. The Some encroachments of the neural foramina are seen in the left side at C5-6. The on the right side encroachments of the neural foramina are seen mildly in C5-6 level. There is no cervical spine canal stenosis. No acute displaced fractures are seen in the cervical spine. 1. Degenerative changes in the disc space of C5-6, C6-7 mainly. 2. Loss of height of vertebral bodies of C5, C6 and C7 likely to be more an old event but there are no previous studies available for comparison to determine the baseline. Age is not determined based on this study. See above comments and recommendations. 3. Otherwise no acute displaced fractures seen in the cervical spine. Xr Chest Portable    Result Date: 3/15/2020  Patient MRN: [id number] : 1931 Age:  80 years Gender: Male Order Date: 3/15/2020 5:45 PM Exam: XR CHEST PORTABLE Number of Images: 1 view Indication:   Left IJV insertion Left IJV insertion Comparison: 15 March 2020 FINDINGS: Left jugular central venous catheter terminates in the SVC. There is no pneumothorax. The right IJ catheter remains in a high position. The endotracheal tube is stable. The nasogastric tube is not clearly changed. There is a stable cardiac mediastinal silhouette. There is persistent opacity at the left lower lobe which may relate to atelectasis and/or infiltrate. New left-sided central venous catheter terminating in the SVC. No pneumothorax. Otherwise, no significant interval change. Xr Chest Portable    Result Date: 3/15/2020  Patient MRN: [id number] : 1931 Age:  80 years Gender: Male Order Date: 3/15/2020 6:00 AM Exam: XR CHEST PORTABLE Number of Images: 1 view Indication:   PNA PNA Comparison: 2020 FINDINGS: Stable endotracheal and nasogastric tubes.  The right-sided central venous catheter has been pulled back probably into the right brachiocephalic vein. There is increasing airlessness at the left base which may relate to atelectasis and/or infiltrate. 1. Increasing atelectasis and/or infiltrate at the left base. 2. The right-sided central venous catheter has been pulled back with the tip now probably in the right brachiocephalic vein. Xr Chest Portable    Result Date: 3/14/2020  Patient MRN: [id number] : 1931 Age:  80 years Gender: Male Order Date: 3/14/2020 5:15 PM Exam: XR CHEST PORTABLE Number of Images: 1 view Indication:   central line placement central line placement Comparison: 2020, 1512 hours. FINDINGS: Central venous catheter on the right has been placed which terminates in the SVC. There is no pneumothorax. Tracheal and nasogastric tubes are indwelling. The endotracheal tube is been pulled back to the upper margin of the thoracic inlet. Central venous catheter terminating in the SVC. No pneumothorax. No new abnormal findings. Xr Chest Portable    Result Date: 3/14/2020  Patient MRN:  [id number] : 1931 Age: 80 years Gender: Male Order Date:  3/14/2020 3:00 PM TECHNIQUE/NUMBER OF IMAGES/COMPARISON/CLINICAL HISTORY: Chest AP 1 image one view. Comparison 2018. Altered mental status. Intubation. FINDINGS: Endotracheal tube in good position the level of the arch of the aorta. NG tube is in the area of the stomach may partially coiled. The can pulled back about 5 cm. The Heart has normal size. The no acute infiltrates, consolidations or pleural effusions are seen. There is no perihilar vascular congestion. The     Endotracheal tube in good position. NG tube partial coiled in the body of the stomach and be pulled back about 5 cm. No acute cardiac pulmonary process.     Xr Abdomen For Ng/og/ne Tube Placement    Result Date: 3/14/2020  Patient MRN:  [id number] : 1931 Age: 80 years Gender: Male Order Date:  3/14/2020 3:15 PM EXAM: XR ABDOMEN FOR NG/OG/NE TUBE PLACEMENT COMPARISON: None INDICATION:  Confirm G-tube placement Confirm G-tube placement FINDINGS: Nasogastric tube courses below the level diaphragm with distal tip coiled in inspected location of the stomach. Satisfactory position of nasogastric tube. Objective:   Vitals: BP (!) 93/56   Pulse 83   Temp 98.1 °F (36.7 °C) (Bladder)   Resp 18   Ht 6' 0.84\" (1.85 m)   Wt 223 lb 11.2 oz (101.5 kg)   SpO2 93%   BMI 29.65 kg/m²   General appearance: appears stated age   Skin:  No rashes or lesions  HEENT: Head: Normocephalic, no lesions, without obvious abnormality.   Neck: no adenopathy, no carotid bruit, no JVD, supple, symmetrical, trachea midline and thyroid not enlarged, symmetric, no tenderness/mass/nodules  Lungs: diminished breath sounds bilaterally  Heart: regular rate and rhythm, S1, S2 normal, no murmur, click, rub or gallop  Abdomen: soft, non-tender; bowel sounds normal; no masses,  no organomegaly  Extremities: extremities normal, atraumatic, no cyanosis or edema  Neurologic: Mental status: Alert, oriented, thought content appropriate    Assessment:   Patient Active Problem List:     Shock ([address]     Hypopituitarism ([address]     Weakness     Hypothyroidism     Lactic acidosis     Renal insufficiency     Atrial fibrillation with RVR (HCC)     QT prolongation     RBBB     Acute respiratory failure with hypoxia (HCC)    Plan:                                    1 Acute renal failure , in the setting of septic shock , with hypotensive episode                                      Renal functions appear to be improving as he now has started making urine                                      metabolic acidosis better on bicarb drip - will discontinue as CO2 29                                       2 Influenza A complicated with possible aspiration pneumonia / acute respiratory                                       Failure / intubated and on vent                                        3 Shock , ? septic culture results so far negative                                        8 H/O chronic A fib                                        5 Hypopituitarism, prior h/o pituitary gland surgery 1980                                         6 COPD                                        7 Hypokalemia - replace K         His kidneys are appearing normal on CT scan , he now is non oliguric , will discontinue bicarb drip    renal functions improving        [facility]

## 2020-03-17 NOTE — PROGRESS NOTES
Therapies:  · Oral Diet Orders: NPO   · Tube Feeding (TF) Orders:   · Feeding Route: Orogastric  · Formula: Standard w/Fiber  · Rate (ml/hr):25 ml/hr    · Volume (ml/day): 600 ml tv  · Water Flushes: 30 ml q 4 hr  · Current TF & Flush Orders Provides: 720 kcals, 33 gm pro, 484 ml free water     · Anthropometric Measures:  · Ht: 6' 0.84\" (185 cm)   · Current Body Wt: 223 lb (101.2 kg)(3/17 actual)  · Admission Body Wt: 220 lb (99.8 kg)(3/14 first measured)  · Usual Body Wt: 207 lb (93.9 kg)(10/2019 EMR actual )  · Weight Change: EMR reflects ~ 13lb wt gain x 5 mon   · Ideal Body Wt: 190 lb (86.2 kg), % Ideal Body 117%  · BMI Classification: BMI 25.0 - 29.9 Overweight    Nutrition Interventions:   Continued Inpatient Monitoring, Education not appropriate at this time    Nutrition Evaluation:   · Evaluation: Goals set   · Goals: Pt to tolerate TF at goal rate   · Monitoring: TF Intake, TF Tolerance, Skin Integrity, I&O, Mental Status/Confusion, Weight, Monitor Bowel Function, Pertinent Labs, Monitor Hemodynamic Status      Electronically signed by Karina Nicole RD, LD on 3/17/20 at 1:27 PM EDT    Contact Number: Ext [phone]

## 2020-03-17 NOTE — PROGRESS NOTES
PROGRESS NOTE     CARDIOLOGY    Chief complaint: Seen today for follow up, management & recommendations for elevated troponin, paroxysmal atrial fibrillation    He was intubated and sedated on the ventilator when I saw him this morning. Wt Readings from Last 3 Encounters:   03/17/20 223 lb 11.2 oz (101.5 kg)   10/08/19 207 lb 8 oz (94.1 kg)     Temp Readings from Last 3 Encounters:   03/17/20 98.2 °F (36.8 °C) (Bladder)   10/09/19 96.9 °F (36.1 °C) (Temporal)     BP Readings from Last 3 Encounters:   03/15/20 (!) 93/56   10/09/19 132/87     Pulse Readings from Last 3 Encounters:   03/17/20 79   10/09/19 77         Intake/Output Summary (Last 24 hours) at 3/17/2020 1432  Last data filed at 3/17/2020 1400  Gross per 24 hour   Intake 4087 ml   Output 5670 ml   Net -1583 ml       Recent Labs     03/15/20  0300 03/16/20  0525 03/17/20  0410   WBC 43.3* 38.7* 29.5*   HGB 11.9* 11.1* 10.7*   HCT 40.1 35.9* 34.2*   MCV 80.7 77.4* 77.2*    248 255     Recent Labs     03/16/20  0525 03/16/20  1355 03/17/20  0410 03/17/20  1400    138 139 141   K 3.6 3.5 2.9* 3.6   CL 97* 97* 97* 100   CO2 22 26 29 29   BUN 28* 28* 25* 23   CREATININE 2.5* 2.4* 2.0* 1.9*   MG 1.7 2.1 2.4  --      No results for input(s): PROTIME, INR in the last 72 hours. Recent Labs     03/14/20  1920 03/14/20  2245 03/15/20  0300 03/15/20  0900 03/16/20  0525 03/17/20  0410   CKTOTAL  --   --  1,119*  --  642* 294*   CKMB  --  12.9*  --   --   --   --    TROPONINI 0.12* 0.16*  --  0.18*  --   --      No results for input(s): BNP in the last 72 hours. No results for input(s): CHOL, HDL, TRIG in the last 72 hours.     Invalid input(s): CHOLHDLR, LDLCALCU      aspirin chewable tablet 81 mg, Daily  insulin lispro (HUMALOG) injection vial 0-12 Units, TID WC  insulin lispro (HUMALOG) injection vial 0-6 Units, Nightly  glucose (GLUTOSE) 40 % oral gel 15 g, PRN  dextrose 50 % IV solution, PRN  glucagon (rDNA) injection 1 mg, PRN  dextrose 5 % solution, PRN  0.9 % sodium chloride infusion, Continuous  potassium chloride 10 mEq/100 mL IVPB (Peripheral Line), Q2H  chlorhexidine (PERIDEX) 0.12 % solution 15 mL, BID  dextrose 5 % solution, PRN  ampicillin-sulbactam (UNASYN) 3 g ivpb minibag, 4x Daily  oseltamivir (TAMIFLU) capsule 30 mg, Daily  amiodarone (CORDARONE) tablet 400 mg, BID  fludrocortisone (FLORINEF) tablet 0.1 mg, Daily  ascorbic acid 1,500 mg in sodium chloride 0.9 % 100 mL IVPB, Q6H  fentaNYL 5 mcg/ml in 0.9%  ml infusion, Continuous  norepinephrine (LEVOPHED) 16 mg in dextrose 5% 250 mL infusion, Continuous  heparin (porcine) injection 6,000 Units, PRN  heparin (porcine) injection 3,000 Units, PRN  heparin 25,000 units in dextrose 5% 250 mL infusion, Continuous  levothyroxine (SYNTHROID) tablet 125 mcg, Daily  sodium chloride flush 0.9 % injection 10 mL, 2 times per day  sodium chloride flush 0.9 % injection 10 mL, PRN  acetaminophen (TYLENOL) tablet 650 mg, Q6H PRN    Or  acetaminophen (TYLENOL) suppository 650 mg, Q6H PRN  polyethylene glycol (GLYCOLAX) packet 17 g, Daily PRN  promethazine (PHENERGAN) tablet 12.5 mg, Q6H PRN  perflutren lipid microspheres (DEFINITY) injection 1.65 mg, ONCE PRN  albuterol (PROVENTIL) nebulizer solution 2.5 mg, Q4H  hydrocortisone sodium succinate PF (SOLU-CORTEF) injection 50 mg, Q6H  pantoprazole (PROTONIX) injection 40 mg, Daily    And  sodium chloride (PF) 0.9 % injection 10 mL, Daily  thiamine (B-1) 200 mg in sodium chloride 0.9 % 100 mL IVPB, Q12H        Review of systems:     Unobtainable         Physical exam:    Constitutional: As above  Eyes: Closed  ENT: clear, no bleeding. No external masses. Lips normal formation. Debated. Neck: supple, no JVD, no bruits, no lymphadenopathy. No masses. trachea midline. Heart: Regular rate & rhythm, normal S1 & S2. No heave. Lungs: CTA on the ventilator. No accessory muscles. Abd: soft, non-tender. Normal bowel sounds. Neuro:  Full ROM X 4,

## 2020-03-17 NOTE — PROGRESS NOTES
 heparin (porcine) 9 Units/kg/hr (03/17/20 5654)    midazolam Stopped (03/17/20 0841)     Scheduled Meds:   chlorhexidine  15 mL Mouth/Throat BID    insulin lispro  0-6 Units Subcutaneous Q4H    ampicillin-sulbactam  3 g Intravenous 4x Daily    peramivir (RAPIVAP) IVPB  100 mg Intravenous Daily    oseltamivir  30 mg Oral Daily    amiodarone  400 mg Oral BID    fludrocortisone  0.1 mg Oral Daily    ascorbic acid  1,500 mg Intravenous Q6H    levothyroxine  125 mcg Oral Daily    sodium chloride flush  10 mL Intravenous 2 times per day    albuterol  2.5 mg Nebulization Q4H    hydrocortisone sodium succinate PF  50 mg Intravenous Q6H    pantoprazole  40 mg Intravenous Daily    And    sodium chloride (PF)  10 mL Intravenous Daily    thiamine (VITAMIN B1) IVPB  200 mg Intravenous Q12H     PRN Meds: glucose, dextrose, glucagon (rDNA), dextrose, heparin (porcine), heparin (porcine), sodium chloride flush, acetaminophen **OR** acetaminophen, polyethylene glycol, promethazine **OR** [DISCONTINUED] ondansetron, perflutren lipid microspheres  Nutrition:   NG/OG tube TF type: Pulmocare/Nephro/Glucerna/Jevity        At rate: ml/h    Labs and Imaging Studies     CBC:   Recent Labs     03/15/20  0300 03/16/20  0525 03/17/20  0410   WBC 43.3* 38.7* 29.5*   HGB 11.9* 11.1* 10.7*   HCT 40.1 35.9* 34.2*   MCV 80.7 77.4* 77.2*    248 255       BMP:    Recent Labs     03/16/20  0525 03/16/20  1355 03/17/20  0410    138 139   K 3.6 3.5 2.9*   CL 97* 97* 97*   CO2 22 26 29   BUN 28* 28* 25*   CREATININE 2.5* 2.4* 2.0*   GLUCOSE 275* 247* 188*       LIVER PROFILE:   Recent Labs     03/14/20  1516 03/14/20  1920   AST 47* 54*   ALT 20 25   BILITOT 0.4 0.3   ALKPHOS 64 75       PT/INR:   No results for input(s): PROTIME, INR in the last 72 hours.     APTT:   Recent Labs     03/15/20  1815 03/16/20  0240 03/17/20  0559   APTT 66.5* 55.8* 38.5*       Fasting Lipid Panel:    No results found for: CHOL, TRIG, PO2   RR      HCO3   PS      Sat%   PEEP      FIO2   FIO2        P/F          Lines:  Site  Day  Date inserted     TLC              PICC              Arterial line              Peripheral line              HD cath            Urethral Catheter Temperature probe 16 fr-Output (mL): 275 mL    Imaging Studies:    EKG: Rhythm Strip: .    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 tach's/SVTs in the ER, started on amiodarone, consulted cardiology, was hypotensive started on levo after 30 cc/kg fluid bolus. He was also started on vaso-. And was transferred to ICU for further care. His chest CT did show bilateral infiltrates, CT head, CT spine, CT abdomen were unremarkable.             -                Neurologic  Altered mental status  CT head, CT spine unremarkable  Neg  ammonia, UDS/SDS  Normal urine and serum osmolalities        Cardiovascular  Septic shock  2/2 pneumonia  On Unasyn  On levo/vaso  Off vaso, on 10 of levo   on vitamin C protocol with steroids  On bicarb drip  follow pancultures  Respiratory panel flu A+  Follow ABG/chest x-ray daily  CXR R sided infiltrate     VT/SVT  On amiodarone drip/heparin drip  Follow echo   cardiology following  Trending up  trop, CK, CK-MB  Cardio recommendations  amio drip changed to oral amio     Pulmonary  Acute hypoxic/hypercapnic respiratory failure  Secondary to pneumonia  On vent/sedated  acidotic ABGs   On Unasyn/follow pancultures        Gastrointestinal  PUD history  PPIs daily  Start TF today  Started on LDSS     Endocrine  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

## 2020-03-17 NOTE — PLAN OF CARE
Problem: Restraint Use - Nonviolent/Non-Self-Destructive Behavior:  Goal: Absence of restraint-related injury  Description: Absence of restraint-related injury  3/17/2020 1331 by Haely Martin RN  Outcome: Met This Shift  3/17/2020 1159 by Ifeoma Walsh RN  Outcome: Met This Shift  3/17/2020 0031 by Rosa Cole RN  Outcome: Met This Shift     Problem: Gas Exchange - Impaired:  Goal: Levels of oxygenation will improve  Description: Levels of oxygenation will improve  3/17/2020 1331 by Haley Martin RN  Outcome: Met This Shift  3/17/2020 1159 by Ifeoma Walsh RN  Outcome: Met This Shift  3/17/2020 0031 by Rosa Cole RN  Outcome: Ongoing     Problem: Restraint Use - Nonviolent/Non-Self-Destructive Behavior:  Goal: Absence of restraint indications  Description: Absence of restraint indications  3/17/2020 1331 by Haley Martin RN  Outcome: Not Met This Shift  3/17/2020 1159 by Ifeoma Walsh RN  Outcome: Not Met This Shift  3/17/2020 0031 by Rosa Cole RN  Outcome: Not Met This Shift

## 2020-03-18 ENCOUNTER — APPOINTMENT (OUTPATIENT)
Dept: GENERAL RADIOLOGY | Age: 85
DRG: 870 | End: 2020-03-18
Payer: MEDICARE

## 2020-03-18 LAB
AADO2: 168.4 MMHG
ANION GAP SERPL CALCULATED.3IONS-SCNC: 10 MMOL/L (ref 7–16)
ANION GAP SERPL CALCULATED.3IONS-SCNC: 10 MMOL/L (ref 7–16)
ANION GAP SERPL CALCULATED.3IONS-SCNC: 13 MMOL/L (ref 7–16)
ANISOCYTOSIS: ABNORMAL
APTT: 62.4 SEC (ref 24.5–35.1)
B.E.: 4.9 MMOL/L (ref -3–3)
BASOPHILS ABSOLUTE: 0.02 E9/L (ref 0–0.2)
BASOPHILS RELATIVE PERCENT: 0.1 % (ref 0–2)
BUN BLDV-MCNC: 25 MG/DL (ref 8–23)
BUN BLDV-MCNC: 27 MG/DL (ref 8–23)
BUN BLDV-MCNC: 29 MG/DL (ref 8–23)
CALCIUM IONIZED: 1 MMOL/L (ref 1.15–1.33)
CALCIUM SERPL-MCNC: 6.4 MG/DL (ref 8.6–10.2)
CALCIUM SERPL-MCNC: 6.7 MG/DL (ref 8.6–10.2)
CALCIUM SERPL-MCNC: 6.9 MG/DL (ref 8.6–10.2)
CHLORIDE BLD-SCNC: 111 MMOL/L (ref 98–107)
CHLORIDE BLD-SCNC: 113 MMOL/L (ref 98–107)
CHLORIDE BLD-SCNC: 117 MMOL/L (ref 98–107)
CO2: 26 MMOL/L (ref 22–29)
CO2: 27 MMOL/L (ref 22–29)
CO2: 27 MMOL/L (ref 22–29)
COHB: 0.4 % (ref 0–1.5)
CREAT SERPL-MCNC: 1.4 MG/DL (ref 0.7–1.2)
CREAT SERPL-MCNC: 1.5 MG/DL (ref 0.7–1.2)
CREAT SERPL-MCNC: 1.5 MG/DL (ref 0.7–1.2)
CRITICAL: ABNORMAL
DATE ANALYZED: ABNORMAL
DATE OF COLLECTION: ABNORMAL
EKG ATRIAL RATE: 68 BPM
EKG Q-T INTERVAL: 372 MS
EKG QRS DURATION: 138 MS
EKG QTC CALCULATION (BAZETT): 589 MS
EKG R AXIS: -106 DEGREES
EKG T AXIS: 12 DEGREES
EKG VENTRICULAR RATE: 151 BPM
EOSINOPHILS ABSOLUTE: 0 E9/L (ref 0.05–0.5)
EOSINOPHILS RELATIVE PERCENT: 0 % (ref 0–6)
FIO2: 40 %
GFR AFRICAN AMERICAN: 53
GFR AFRICAN AMERICAN: 53
GFR AFRICAN AMERICAN: 58
GFR NON-AFRICAN AMERICAN: 44 ML/MIN/1.73
GFR NON-AFRICAN AMERICAN: 44 ML/MIN/1.73
GFR NON-AFRICAN AMERICAN: 48 ML/MIN/1.73
GLUCOSE BLD-MCNC: 153 MG/DL (ref 74–99)
GLUCOSE BLD-MCNC: 161 MG/DL (ref 74–99)
GLUCOSE BLD-MCNC: 176 MG/DL (ref 74–99)
HCO3: 28.9 MMOL/L (ref 22–26)
HCT VFR BLD CALC: 32.2 % (ref 37–54)
HEMOGLOBIN: 9.9 G/DL (ref 12.5–16.5)
HHB: 7.6 % (ref 0–5)
IMMATURE GRANULOCYTES #: 0.23 E9/L
IMMATURE GRANULOCYTES %: 1.2 % (ref 0–5)
LAB: ABNORMAL
LYMPHOCYTES ABSOLUTE: 0.84 E9/L (ref 1.5–4)
LYMPHOCYTES RELATIVE PERCENT: 4.5 % (ref 20–42)
Lab: ABNORMAL
MAGNESIUM: 2.4 MG/DL (ref 1.6–2.6)
MCH RBC QN AUTO: 24.2 PG (ref 26–35)
MCHC RBC AUTO-ENTMCNC: 30.7 % (ref 32–34.5)
MCV RBC AUTO: 78.7 FL (ref 80–99.9)
METER GLUCOSE: 167 MG/DL (ref 74–99)
METER GLUCOSE: 174 MG/DL (ref 74–99)
METER GLUCOSE: 181 MG/DL (ref 74–99)
METER GLUCOSE: 186 MG/DL (ref 74–99)
METER GLUCOSE: 189 MG/DL (ref 74–99)
METER GLUCOSE: 211 MG/DL (ref 74–99)
METHB: 0.2 % (ref 0–1.5)
MODE: AC
MONOCYTES ABSOLUTE: 1.52 E9/L (ref 0.1–0.95)
MONOCYTES RELATIVE PERCENT: 8.2 % (ref 2–12)
NEUTROPHILS ABSOLUTE: 15.9 E9/L (ref 1.8–7.3)
NEUTROPHILS RELATIVE PERCENT: 86 % (ref 43–80)
O2 CONTENT: 13.5 ML/DL
O2 SATURATION: 92.4 % (ref 92–98.5)
O2HB: 91.8 % (ref 94–97)
OPERATOR ID: ABNORMAL
OVALOCYTES: ABNORMAL
PATIENT TEMP: 37 C
PCO2: 40.1 MMHG (ref 35–45)
PDW BLD-RTO: 16.1 FL (ref 11.5–15)
PEEP/CPAP: 8 CMH2O
PFO2: 1.52 MMHG/%
PH BLOOD GAS: 7.47 (ref 7.35–7.45)
PLATELET # BLD: 235 E9/L (ref 130–450)
PMV BLD AUTO: 11.2 FL (ref 7–12)
PO2: 60.7 MMHG (ref 75–100)
POIKILOCYTES: ABNORMAL
POLYCHROMASIA: ABNORMAL
POTASSIUM REFLEX MAGNESIUM: 3.2 MMOL/L (ref 3.5–5)
POTASSIUM REFLEX MAGNESIUM: 3.3 MMOL/L (ref 3.5–5)
POTASSIUM REFLEX MAGNESIUM: 3.5 MMOL/L (ref 3.5–5)
RBC # BLD: 4.09 E12/L (ref 3.8–5.8)
RI(T): 2.77
RR MECHANICAL: 18 B/MIN
SCHISTOCYTES: ABNORMAL
SODIUM BLD-SCNC: 150 MMOL/L (ref 132–146)
SODIUM BLD-SCNC: 150 MMOL/L (ref 132–146)
SODIUM BLD-SCNC: 154 MMOL/L (ref 132–146)
SOURCE, BLOOD GAS: ABNORMAL
TARGET CELLS: ABNORMAL
THB: 10.4 G/DL (ref 11.5–16.5)
TIME ANALYZED: 441
TOTAL CK: 147 U/L (ref 20–200)
VT MECHANICAL: 500 ML
WBC # BLD: 18.5 E9/L (ref 4.5–11.5)

## 2020-03-18 PROCEDURE — 2580000003 HC RX 258: Performed by: INTERNAL MEDICINE

## 2020-03-18 PROCEDURE — 99233 SBSQ HOSP IP/OBS HIGH 50: CPT | Performed by: INTERNAL MEDICINE

## 2020-03-18 PROCEDURE — 6360000002 HC RX W HCPCS: Performed by: SPECIALIST

## 2020-03-18 PROCEDURE — 83735 ASSAY OF MAGNESIUM: CPT

## 2020-03-18 PROCEDURE — 2580000003 HC RX 258: Performed by: SPECIALIST

## 2020-03-18 PROCEDURE — 82330 ASSAY OF CALCIUM: CPT

## 2020-03-18 PROCEDURE — 6360000002 HC RX W HCPCS: Performed by: INTERNAL MEDICINE

## 2020-03-18 PROCEDURE — 36415 COLL VENOUS BLD VENIPUNCTURE: CPT

## 2020-03-18 PROCEDURE — 6370000000 HC RX 637 (ALT 250 FOR IP): Performed by: INTERNAL MEDICINE

## 2020-03-18 PROCEDURE — 71045 X-RAY EXAM CHEST 1 VIEW: CPT

## 2020-03-18 PROCEDURE — 82962 GLUCOSE BLOOD TEST: CPT

## 2020-03-18 PROCEDURE — 99232 SBSQ HOSP IP/OBS MODERATE 35: CPT | Performed by: INTERNAL MEDICINE

## 2020-03-18 PROCEDURE — 94003 VENT MGMT INPAT SUBQ DAY: CPT

## 2020-03-18 PROCEDURE — 82805 BLOOD GASES W/O2 SATURATION: CPT

## 2020-03-18 PROCEDURE — C9113 INJ PANTOPRAZOLE SODIUM, VIA: HCPCS | Performed by: INTERNAL MEDICINE

## 2020-03-18 PROCEDURE — 85025 COMPLETE CBC W/AUTO DIFF WBC: CPT

## 2020-03-18 PROCEDURE — 82550 ASSAY OF CK (CPK): CPT

## 2020-03-18 PROCEDURE — 2000000000 HC ICU R&B

## 2020-03-18 PROCEDURE — 80048 BASIC METABOLIC PNL TOTAL CA: CPT

## 2020-03-18 PROCEDURE — 85730 THROMBOPLASTIN TIME PARTIAL: CPT

## 2020-03-18 PROCEDURE — 94640 AIRWAY INHALATION TREATMENT: CPT

## 2020-03-18 PROCEDURE — 2500000003 HC RX 250 WO HCPCS: Performed by: INTERNAL MEDICINE

## 2020-03-18 RX ORDER — POTASSIUM CHLORIDE 29.8 MG/ML
20 INJECTION INTRAVENOUS
Status: COMPLETED | OUTPATIENT
Start: 2020-03-18 | End: 2020-03-18

## 2020-03-18 RX ORDER — SULFACETAMIDE SODIUM 100 MG/ML
1 SOLUTION/ DROPS OPHTHALMIC 4 TIMES DAILY
Status: DISCONTINUED | OUTPATIENT
Start: 2020-03-18 | End: 2020-03-23 | Stop reason: HOSPADM

## 2020-03-18 RX ORDER — POTASSIUM CHLORIDE 29.8 MG/ML
20 INJECTION INTRAVENOUS ONCE
Status: DISCONTINUED | OUTPATIENT
Start: 2020-03-18 | End: 2020-03-18

## 2020-03-18 RX ADMIN — ASCORBIC ACID 1500 MG: 500 INJECTION, SOLUTION INTRAMUSCULAR; INTRAVENOUS; SUBCUTANEOUS at 11:17

## 2020-03-18 RX ADMIN — INSULIN LISPRO 4 UNITS: 100 INJECTION, SOLUTION INTRAVENOUS; SUBCUTANEOUS at 09:44

## 2020-03-18 RX ADMIN — AMIODARONE HYDROCHLORIDE 400 MG: 200 TABLET ORAL at 08:49

## 2020-03-18 RX ADMIN — POTASSIUM CHLORIDE 20 MEQ: 400 INJECTION, SOLUTION INTRAVENOUS at 09:45

## 2020-03-18 RX ADMIN — LEVOTHYROXINE SODIUM 125 MCG: 0.12 TABLET ORAL at 06:11

## 2020-03-18 RX ADMIN — ALBUTEROL SULFATE 2.5 MG: 2.5 SOLUTION RESPIRATORY (INHALATION) at 13:14

## 2020-03-18 RX ADMIN — INSULIN LISPRO 2 UNITS: 100 INJECTION, SOLUTION INTRAVENOUS; SUBCUTANEOUS at 12:03

## 2020-03-18 RX ADMIN — PANTOPRAZOLE SODIUM 40 MG: 40 INJECTION, POWDER, FOR SOLUTION INTRAVENOUS at 08:43

## 2020-03-18 RX ADMIN — HYDROCORTISONE SODIUM SUCCINATE 50 MG: 100 INJECTION, POWDER, FOR SOLUTION INTRAMUSCULAR; INTRAVENOUS at 08:43

## 2020-03-18 RX ADMIN — SODIUM CHLORIDE, PRESERVATIVE FREE 10 ML: 5 INJECTION INTRAVENOUS at 20:26

## 2020-03-18 RX ADMIN — THIAMINE HYDROCHLORIDE 200 MG: 100 INJECTION, SOLUTION INTRAMUSCULAR; INTRAVENOUS at 11:17

## 2020-03-18 RX ADMIN — SODIUM CHLORIDE: 9 INJECTION, SOLUTION INTRAVENOUS at 01:40

## 2020-03-18 RX ADMIN — AMPICILLIN SODIUM AND SULBACTAM SODIUM 3 G: 2; 1 INJECTION, POWDER, FOR SOLUTION INTRAMUSCULAR; INTRAVENOUS at 20:26

## 2020-03-18 RX ADMIN — SODIUM CHLORIDE, PRESERVATIVE FREE 10 ML: 5 INJECTION INTRAVENOUS at 08:44

## 2020-03-18 RX ADMIN — AMPICILLIN SODIUM AND SULBACTAM SODIUM 3 G: 2; 1 INJECTION, POWDER, FOR SOLUTION INTRAMUSCULAR; INTRAVENOUS at 13:17

## 2020-03-18 RX ADMIN — SODIUM CHLORIDE, PRESERVATIVE FREE 10 ML: 5 INJECTION INTRAVENOUS at 08:52

## 2020-03-18 RX ADMIN — ALBUTEROL SULFATE 2.5 MG: 2.5 SOLUTION RESPIRATORY (INHALATION) at 04:03

## 2020-03-18 RX ADMIN — AMIODARONE HYDROCHLORIDE 400 MG: 200 TABLET ORAL at 20:26

## 2020-03-18 RX ADMIN — SULFACETAMIDE SODIUM 1 DROP: 100 SOLUTION OPHTHALMIC at 20:27

## 2020-03-18 RX ADMIN — SULFACETAMIDE SODIUM 1 DROP: 100 SOLUTION OPHTHALMIC at 13:21

## 2020-03-18 RX ADMIN — ASCORBIC ACID 1500 MG: 500 INJECTION, SOLUTION INTRAMUSCULAR; INTRAVENOUS; SUBCUTANEOUS at 16:31

## 2020-03-18 RX ADMIN — HEPARIN SODIUM 11 UNITS/KG/HR: 10000 INJECTION, SOLUTION INTRAVENOUS at 13:11

## 2020-03-18 RX ADMIN — INSULIN LISPRO 1 UNITS: 100 INJECTION, SOLUTION INTRAVENOUS; SUBCUTANEOUS at 20:57

## 2020-03-18 RX ADMIN — FLUDROCORTISONE ACETATE 0.1 MG: 0.1 TABLET ORAL at 08:49

## 2020-03-18 RX ADMIN — ALBUTEROL SULFATE 2.5 MG: 2.5 SOLUTION RESPIRATORY (INHALATION) at 21:48

## 2020-03-18 RX ADMIN — CHLORHEXIDINE GLUCONATE 0.12% ORAL RINSE 15 ML: 1.2 LIQUID ORAL at 20:26

## 2020-03-18 RX ADMIN — POTASSIUM CHLORIDE 20 MEQ: 400 INJECTION, SOLUTION INTRAVENOUS at 10:44

## 2020-03-18 RX ADMIN — ALBUTEROL SULFATE 2.5 MG: 2.5 SOLUTION RESPIRATORY (INHALATION) at 17:46

## 2020-03-18 RX ADMIN — CALCIUM GLUCONATE 2 G: 98 INJECTION, SOLUTION INTRAVENOUS at 12:17

## 2020-03-18 RX ADMIN — HYDROCORTISONE SODIUM SUCCINATE 50 MG: 100 INJECTION, POWDER, FOR SOLUTION INTRAMUSCULAR; INTRAVENOUS at 20:26

## 2020-03-18 RX ADMIN — HYDROCORTISONE SODIUM SUCCINATE 50 MG: 100 INJECTION, POWDER, FOR SOLUTION INTRAMUSCULAR; INTRAVENOUS at 14:45

## 2020-03-18 RX ADMIN — AMPICILLIN SODIUM AND SULBACTAM SODIUM 3 G: 2; 1 INJECTION, POWDER, FOR SOLUTION INTRAMUSCULAR; INTRAVENOUS at 16:30

## 2020-03-18 RX ADMIN — OSELTAMIVIR PHOSPHATE 30 MG: 30 CAPSULE ORAL at 08:49

## 2020-03-18 RX ADMIN — HYDROCORTISONE SODIUM SUCCINATE 50 MG: 100 INJECTION, POWDER, FOR SOLUTION INTRAMUSCULAR; INTRAVENOUS at 03:30

## 2020-03-18 RX ADMIN — ALBUTEROL SULFATE 2.5 MG: 2.5 SOLUTION RESPIRATORY (INHALATION) at 07:44

## 2020-03-18 RX ADMIN — ASPIRIN 81 MG 81 MG: 81 TABLET ORAL at 08:43

## 2020-03-18 RX ADMIN — AMPICILLIN SODIUM AND SULBACTAM SODIUM 3 G: 2; 1 INJECTION, POWDER, FOR SOLUTION INTRAMUSCULAR; INTRAVENOUS at 08:49

## 2020-03-18 RX ADMIN — INSULIN LISPRO 2 UNITS: 100 INJECTION, SOLUTION INTRAVENOUS; SUBCUTANEOUS at 16:43

## 2020-03-18 RX ADMIN — CHLORHEXIDINE GLUCONATE 0.12% ORAL RINSE 15 ML: 1.2 LIQUID ORAL at 08:44

## 2020-03-18 RX ADMIN — SULFACETAMIDE SODIUM 1 DROP: 100 SOLUTION OPHTHALMIC at 16:31

## 2020-03-18 RX ADMIN — ASCORBIC ACID 1500 MG: 500 INJECTION, SOLUTION INTRAMUSCULAR; INTRAVENOUS; SUBCUTANEOUS at 04:28

## 2020-03-18 ASSESSMENT — PULMONARY FUNCTION TESTS
PIF_VALUE: 17
PIF_VALUE: 20
PIF_VALUE: 20
PIF_VALUE: 19
PIF_VALUE: 19
PIF_VALUE: 17
PIF_VALUE: 18
PIF_VALUE: 17
PIF_VALUE: 26
PIF_VALUE: 17
PIF_VALUE: 20
PIF_VALUE: 21
PIF_VALUE: 20
PIF_VALUE: 17
PIF_VALUE: 17
PIF_VALUE: 16
PIF_VALUE: 21
PIF_VALUE: 20
PIF_VALUE: 19
PIF_VALUE: 17
PIF_VALUE: 20
PIF_VALUE: 18
PIF_VALUE: 17

## 2020-03-18 ASSESSMENT — PAIN SCALES - GENERAL
PAINLEVEL_OUTOF10: 0

## 2020-03-18 NOTE — PROGRESS NOTES
1400 -- 98.2 °F (36.8 °C) Bladder 79 18 93 % --   03/17/20 1300 -- -- -- 82 19 94 % --   03/17/20 1200 -- 98.1 °F (36.7 °C) Bladder 83 18 93 % --   03/17/20 1147 -- -- -- 84 18 92 % --   @      Intake/Output Summary (Last 24 hours) at 3/18/2020 1038  Last data filed at 3/18/2020 0800  Gross per 24 hour   Intake 4596.24 ml   Output 3385 ml   Net 1211.24 ml         Wt Readings from Last 3 Encounters:   03/18/20 229 lb 8 oz (104.1 kg)   10/08/19 207 lb 8 oz (94.1 kg)       Constitutional:  Pt is intubated  Head: normocephalic, atraumatic  Neck: no JVD  Cardiovascular: regular rate and rhythm, no murmurs, gallops, or rubs  Respiratory:  No rales, rhochi, or wheezes  Gastrointestinal:  Soft, nontender, nondistended, bowel sounds x 4  Ext: no edema  Skin: dry, no rash  Neuro: sedated    MEDS (scheduled):    potassium chloride  20 mEq Intravenous Q1H    sulfacetamide  1 drop Left Eye 4x Daily    aspirin  81 mg Oral Daily    insulin lispro  0-12 Units Subcutaneous TID WC    insulin lispro  0-6 Units Subcutaneous Nightly    chlorhexidine  15 mL Mouth/Throat BID    ampicillin-sulbactam  3 g Intravenous 4x Daily    oseltamivir  30 mg Oral Daily    amiodarone  400 mg Oral BID    fludrocortisone  0.1 mg Oral Daily    ascorbic acid  1,500 mg Intravenous Q6H    levothyroxine  125 mcg Oral Daily    sodium chloride flush  10 mL Intravenous 2 times per day    albuterol  2.5 mg Nebulization Q4H    hydrocortisone sodium succinate PF  50 mg Intravenous Q6H    pantoprazole  40 mg Intravenous Daily    And    sodium chloride (PF)  10 mL Intravenous Daily    thiamine (VITAMIN B1) IVPB  200 mg Intravenous Q12H       MEDS (infusions):   dextrose      dextrose      fentaNYL 5 mcg/ml in 0.9%  ml infusion 25 mcg/hr (03/17/20 2000)    norepinephrine 2.5 mcg/min (03/18/20 0900)    heparin (porcine) 11 Units/kg/hr (03/17/20 2100)       MEDS (prn):  glucose, dextrose, glucagon (rDNA), dextrose, dextrose, heparin

## 2020-03-18 NOTE — FLOWSHEET NOTE
Patient continuing to pull on lines/tubes, unable to redirect at this time. Soft bilateral wrist restraints continued at this time.

## 2020-03-18 NOTE — PROGRESS NOTES
PROGRESS NOTE     CARDIOLOGY    Chief complaint: Seen today for follow up, management & recommendations for elevated troponin, paroxysmal atrial fibrillation    He was intubated and sedated on the ventilator when I saw him this morning. Wt Readings from Last 3 Encounters:   03/18/20 229 lb 8 oz (104.1 kg)   10/08/19 207 lb 8 oz (94.1 kg)     Temp Readings from Last 3 Encounters:   03/18/20 98.2 °F (36.8 °C) (Bladder)   10/09/19 96.9 °F (36.1 °C) (Temporal)     BP Readings from Last 3 Encounters:   03/15/20 (!) 93/56   10/09/19 132/87     Pulse Readings from Last 3 Encounters:   03/18/20 84   10/09/19 77         Intake/Output Summary (Last 24 hours) at 3/18/2020 0854  Last data filed at 3/18/2020 0600  Gross per 24 hour   Intake 4656.24 ml   Output 3460 ml   Net 1196.24 ml       Recent Labs     03/16/20  0525 03/17/20  0410 03/18/20  0426   WBC 38.7* 29.5* 18.5*   HGB 11.1* 10.7* 9.9*   HCT 35.9* 34.2* 32.2*   MCV 77.4* 77.2* 78.7*    255 235     Recent Labs     03/17/20  0410 03/17/20  1400 03/17/20 2030 03/18/20  0426    141 145 150*   K 2.9* 3.6 3.2* 3.3*   CL 97* 100 107 111*   CO2 29 29 28 26   BUN 25* 23 25* 25*   CREATININE 2.0* 1.9* 1.7* 1.5*   MG 2.4  --  2.4 2.4     No results for input(s): PROTIME, INR in the last 72 hours. Recent Labs     03/15/20  0900 03/16/20  0525 03/17/20  0410 03/18/20  0426   CKTOTAL  --  642* 294* 147   TROPONINI 0.18*  --   --   --      No results for input(s): BNP in the last 72 hours. No results for input(s): CHOL, HDL, TRIG in the last 72 hours.     Invalid input(s): CHOLHDLR, LDLCALCU      potassium chloride 20 mEq/50 mL IVPB (Central Line), Q1H  aspirin chewable tablet 81 mg, Daily  insulin lispro (HUMALOG) injection vial 0-12 Units, TID WC  insulin lispro (HUMALOG) injection vial 0-6 Units, Nightly  glucose (GLUTOSE) 40 % oral gel 15 g, PRN  dextrose 50 % IV solution, PRN  glucagon (rDNA) injection 1 mg, PRN  dextrose 5 % solution, PRN  0.9 % sodium chloride infusion, Continuous  chlorhexidine (PERIDEX) 0.12 % solution 15 mL, BID  dextrose 5 % solution, PRN  ampicillin-sulbactam (UNASYN) 3 g ivpb minibag, 4x Daily  oseltamivir (TAMIFLU) capsule 30 mg, Daily  amiodarone (CORDARONE) tablet 400 mg, BID  fludrocortisone (FLORINEF) tablet 0.1 mg, Daily  ascorbic acid 1,500 mg in sodium chloride 0.9 % 100 mL IVPB, Q6H  fentaNYL 5 mcg/ml in 0.9%  ml infusion, Continuous  norepinephrine (LEVOPHED) 16 mg in dextrose 5% 250 mL infusion, Continuous  heparin (porcine) injection 6,000 Units, PRN  heparin (porcine) injection 3,000 Units, PRN  heparin 25,000 units in dextrose 5% 250 mL infusion, Continuous  levothyroxine (SYNTHROID) tablet 125 mcg, Daily  sodium chloride flush 0.9 % injection 10 mL, 2 times per day  sodium chloride flush 0.9 % injection 10 mL, PRN  acetaminophen (TYLENOL) tablet 650 mg, Q6H PRN    Or  acetaminophen (TYLENOL) suppository 650 mg, Q6H PRN  polyethylene glycol (GLYCOLAX) packet 17 g, Daily PRN  promethazine (PHENERGAN) tablet 12.5 mg, Q6H PRN  albuterol (PROVENTIL) nebulizer solution 2.5 mg, Q4H  hydrocortisone sodium succinate PF (SOLU-CORTEF) injection 50 mg, Q6H  pantoprazole (PROTONIX) injection 40 mg, Daily    And  sodium chloride (PF) 0.9 % injection 10 mL, Daily  thiamine (B-1) 200 mg in sodium chloride 0.9 % 100 mL IVPB, Q12H        Review of systems:     Unobtainable         Physical exam:    Constitutional: As above  Eyes: Closed  ENT: clear, no bleeding. No external masses. Lips normal formation. Debated. Neck: supple, no JVD, no bruits, no lymphadenopathy. No masses. trachea midline. Heart: Regular rate & rhythm, normal S1 & S2. No heave. Lungs: CTA on the ventilator. No accessory muscles. Abd: soft, non-tender. Normal bowel sounds. Neuro: Full ROM X 4, EOMI, no tremors. EXT: Trace to mild bilateral lower extremity edema  Skin: warm, dry, intact. Good turgor.   Psych: A&O x 3, normal behavior, not anxious. Assessment/Recommendations  1. Abnormal troponin in the setting of acute on chronic renal insufficiency, pneumonia, rhabdo, hypoxemia, influenza respiratory infection. Possible type II non-ST elevation myocardial infarction. Echo is pending. 2. Paroxysmal atrial fibrillation. 3. Influenza respiratory infection/pneumonia  4. Sepsis. He continues on Levophed. 5. Replace potassium again.

## 2020-03-18 NOTE — PROGRESS NOTES
[address] [facility]   Department of Internal Medicine   Internal Medicine Residency  MICU Progress Note    Patient:  Naman Singh 80 y.o. male   MRN: [id number]       Date of Service: 3/18/2020    Allergy: Codeine; Diazepam; Lorazepam; and Morphine    Subjective     Patient was seen and examined in AM. Patient sedated and intubated    24 hour change: Stable overnight. No acute overnight issues reported. Objective     TEMPERATURE:  Current - Temp: 97.7 °F (36.5 °C); Max - Temp  Av.2 °F (36.8 °C)  Min: 97.7 °F (36.5 °C)  Max: 98.8 °F (37.1 °C)  RESPIRATIONS RANGE: Resp  Av.3  Min: 17  Max: 21  PULSE RANGE: Pulse  Av  Min: 75  Max: 89  BLOOD PRESSURE RANGE:  Systolic (22SFL), BUX:180 , Min:117 , PEA:655   ; Diastolic (73LBN), KRY:52, Min:54, Max:56    PULSE OXIMETRY RANGE: SpO2  Av.7 %  Min: 91 %  Max: 94 %    I & O - 24hr:    Intake/Output Summary (Last 24 hours) at 3/18/2020 1355  Last data filed at 3/18/2020 0800  Gross per 24 hour   Intake 3427.24 ml   Output 2885 ml   Net 542.24 ml     I/O last 3 completed shifts: In: 4656.2 [I.V.:3174.2; NG/GT:932; IV Piggyback:550]  Out: 9126 [Urine:3835] I/O this shift:  In: -   Out: 200 [Urine:200]   Weight change: 5 lb 12.8 oz (2.631 kg)    Physical Exam:  General Appearance:  Sedated and intubated   HEENT:    NC/AT, mucous membranes are moist   Neck:   Supple, no jugular venous distention. Resp:     Decreased breath sounds bilaterally   Heart:    RRR, S1 and S2 normal, no murmur, rub or gallop.     Abdomen:     Soft, non-tender, non-distended with normal bowel sounds   Extremities:   Atraumatic, no cyanosis or edema   Pulses:  Radial and pedal pulses are intact bilaterally   Neurologic:  sedated       Medications     Continuous Infusions:   dextrose      dextrose      fentaNYL 5 mcg/ml in 0.9%  ml infusion Stopped (20 0900)    norepinephrine 2 mcg/min (20 1351)    heparin (porcine) 11 Units/kg/hr (20 1311) saturation at that time was 86%, brought to the ER, no response to commands, was intubated, patient had some V. tach's/SVTs in the ER, started on amiodarone, consulted cardiology, was hypotensive started on levo after 30 cc/kg fluid bolus. He was also started on vaso-. And was transferred to ICU for further care. His chest CT did show bilateral infiltrates, CT head, CT spine, CT abdomen were unremarkable. -                Neurologic  Altered mental status  CT head, CT spine unremarkable  Neg  ammonia, UDS/SDS  Normal urine and serum osmolalities        Cardiovascular  Septic shock  2/2 pneumonia  On Unasyn  On levo/vaso-  Off vaso , on 3 levo   on vitamin C protocol with steroids  follow pancultures  Respiratory panel flu A+  Follow ABG/chest x-ray daily  CXR R sided infiltrate     VT/SVT  On amiodarone drip/heparin drip  Follow echo   cardiology following  Trending up  trop, CK, CK-MB  Cardio recommendations  amio drip changed to oral amio  On heparin drip     Pulmonary  Acute hypoxic/hypercapnic respiratory failure  Secondary to pneumonia  On vent/sedated  acidotic ABGs   On Unasyn/follow pancultures  Possible extubation today        Gastrointestinal  PUD history  PPIs daily  on TF, on LDSS     Endocrine  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 Riley Silva      I personally saw, examined and provided care for the patient. Radiographs, labs and medication list were reviewed by me independently.  Review of Residents documentation was

## 2020-03-18 NOTE — PROGRESS NOTES
Hospitalist Progress Note    Patient being seen in hospital coverage initial encounter:      Synopsis: Patient admitted on 3/14/2020 with septic shock 2/2 influenza A with aspiration pneumonia. Pt on tamiflu and zosyn. Pt also with DONNA which is improving with IVF. Pt with known hx of COPD/asthma, BPH, adrenal insufficiency w/ hx of pituitary adenoma s/p ESS with hypopituitarism. Subjective  Sedated, pressor requirements going down   Review of systems cannot be performed he does not respond    Discussion with staff and chart review:  Sodium has gone up to 150  Potassium is at 3.3  White cell count is dropping and is at 18.5  Hemoglobin is stable at 9.9    Exam:  BP (!) 93/56   Pulse 84   Temp 98.2 °F (36.8 °C) (Bladder)   Resp 19   Ht 6' 0.84\" (1.85 m)   Wt 229 lb 8 oz (104.1 kg)   SpO2 92%   BMI 30.42 kg/m²     General appearance:   Sedated  HEENT:  Normal cephalic, atraumatic without obvious deformity. Pupils equal, round, and reactive to light. Extra ocular muscles intact. The left eye demonstrates considerable erythema  Neck: Supple, with full range of motion. No jugular venous distention. Trachea midline. Respiratory:  Normal respiratory effort. Clear to auscultation, bilaterally without Rales/Wheezes/Rhonchi. Intubated. Coarse breath sounds b/l   Cardiovascular:  Regular rate and rhythm with normal S1/S2 without murmurs, rubs or gallops. Abdomen: Soft, non-tender, non-distended with normal bowel sounds. Musculoskeletal:  No clubbing, cyanosis or edema bilaterally. Skin: Skin color, texture, turgor normal.  No rashes or lesions.   Neurologic:  on sedation but awake and restless     Medications:  Reviewed    Infusion Medications    dextrose      sodium chloride 75 mL/hr at 03/18/20 0140    dextrose      fentaNYL 5 mcg/ml in 0.9%  ml infusion 25 mcg/hr (03/17/20 2000)    norepinephrine 3 mcg/min (03/18/20 0600)    heparin (porcine) 11 Units/kg/hr (03/17/20 2100)     Scheduled Medications    potassium chloride  20 mEq Intravenous Q1H    aspirin  81 mg Oral Daily    insulin lispro  0-12 Units Subcutaneous TID WC    insulin lispro  0-6 Units Subcutaneous Nightly    chlorhexidine  15 mL Mouth/Throat BID    ampicillin-sulbactam  3 g Intravenous 4x Daily    oseltamivir  30 mg Oral Daily    amiodarone  400 mg Oral BID    fludrocortisone  0.1 mg Oral Daily    ascorbic acid  1,500 mg Intravenous Q6H    levothyroxine  125 mcg Oral Daily    sodium chloride flush  10 mL Intravenous 2 times per day    albuterol  2.5 mg Nebulization Q4H    hydrocortisone sodium succinate PF  50 mg Intravenous Q6H    pantoprazole  40 mg Intravenous Daily    And    sodium chloride (PF)  10 mL Intravenous Daily    thiamine (VITAMIN B1) IVPB  200 mg Intravenous Q12H     PRN Meds: glucose, dextrose, glucagon (rDNA), dextrose, dextrose, heparin (porcine), heparin (porcine), sodium chloride flush, acetaminophen **OR** acetaminophen, polyethylene glycol, promethazine **OR** [DISCONTINUED] ondansetron    I/O    Intake/Output Summary (Last 24 hours) at 3/18/2020 0852  Last data filed at 3/18/2020 0600  Gross per 24 hour   Intake 4656.24 ml   Output 3460 ml   Net 1196.24 ml       Labs:   Recent Labs     03/16/20  0525 03/17/20 0410 03/18/20  0426   WBC 38.7* 29.5* 18.5*   HGB 11.1* 10.7* 9.9*   HCT 35.9* 34.2* 32.2*    255 235       Recent Labs     03/17/20  1400 03/17/20 2030 03/18/20  0426    145 150*   K 3.6 3.2* 3.3*    107 111*   CO2 29 28 26   BUN 23 25* 25*   CREATININE 1.9* 1.7* 1.5*   CALCIUM 6.6* 6.3* 6.4*       No results for input(s): PROT, ALB, ALKPHOS, ALT, AST, BILITOT, AMYLASE, LIPASE in the last 72 hours. No results for input(s): INR in the last 72 hours.     Recent Labs     03/15/20  0900 03/16/20  0525 03/17/20 0410 03/18/20  0426   CKTOTAL  --  642* 294* 147   TROPONINI 0.18*  --   --   --        Chronic labs:  Lab Results   Component Value Date    TSH 0.435 03/14/2020 INR 1.0 10/05/2019       Radiology:  Imaging studies reviewed today. ASSESSMENT:  Septic shock 2/2 influenza A and pneumonia   Acute respiratory failure with hypoxia  SVT vs. V tach? DONNA on CKD  Currently hypernatremic and hypokalemic-patient currently under the care of nephrology as well  Elevated troponin  Adrenal insufficiency  Hypothyroidism  Elevated troponin    Conjunctivitis and blepharitis of the left eye    PLAN:  Bolus as clinically indicated; nephrology initiating bicarbonate   Wean vent, sedation, pressors as able  Cardiology consulted; recommended weaning off levophed and starting vasopressin as well as starting amiodarone bolus/drip    Continue antibiotics; peramivir stopped; antibiotics switched to unasyn   ECHO pending  Stress dose steroids  Continue levothyroxine  Replace K, Mg as indicated  Infectious workup to continue  We will treat the left eye            Diet: DIET TUBE FEED CONTINUOUS/CYCLIC NPO; 1.5 Calorie without Fiber; Orogastric; Cyclic; 25; 50; 23 (hold for Synthroid )  Diet Tube Feed Modular: Protein Modular  Code Status: Full Code  PT/OT Eval Status: On hold  DVT Prophylaxis:   heparin  Recommended disposition at discharge:  pending medical progression     +++++++++++++++++++++++++++++++++++++++++++++++++  Nelta Cowden, MD   [facility].  +++++++++++++++++++++++++++++++++++++++++++++++++  NOTE: This report was transcribed using voice recognition software.  Every effort was made to ensure accuracy; however, inadvertent computerized transcription errors may be present.

## 2020-03-18 NOTE — PROGRESS NOTES
[address] [place] [address]  Department of Internal Medicine  Division of Pulmonary, Critical Care and Sleep Medicine  Progress Note      Patient:  Naman Singh 80 y.o. male   MRN: [id number]       Date of Service: 3/18/2020    Allergy: Codeine; Diazepam; Lorazepam; and Morphine    Subjective       Patient sedated and intubated  Stable overnight. Continue Peramivir + tamiflu  ID changed Zosyn to Unasyn with sputum growing normal oral nora    Objective     TEMPERATURE:  Current - Temp: 97.7 °F (36.5 °C); Max - Temp  Av.2 °F (36.8 °C)  Min: 97.7 °F (36.5 °C)  Max: 98.8 °F (37.1 °C)  RESPIRATIONS RANGE: Resp  Av.4  Min: 17  Max: 21  PULSE RANGE: Pulse  Av.1  Min: 75  Max: 94  BLOOD PRESSURE RANGE:  Systolic (58MXP), NTM:685 , Min:117 , OQR:171   ; Diastolic (02RAI), HPX:14, Min:54, Max:56    PULSE OXIMETRY RANGE: SpO2  Av.3 %  Min: 88 %  Max: 94 %    I & O - 24hr:    Intake/Output Summary (Last 24 hours) at 3/18/2020 1420  Last data filed at 3/18/2020 1358  Gross per 24 hour   Intake 4208.24 ml   Output 3560 ml   Net 648.24 ml     I/O last 3 completed shifts: In: 4656.2 [I.V.:3174.2; NG/GT:932; IV Piggyback:550]  Out: 2274 [Urine:3835] I/O this shift: In: 781 [I.V.:781]  Out: 1050 [Urine:1050]   Weight change: 5 lb 12.8 oz (2.631 kg)    Physical Exam:  General Appearance:  Sedated and intubated   HEENT:    NC/AT, mucous membranes are moist   Neck:   Supple, no jugular venous distention. Resp:     Decreased breath sounds bilaterally   Heart:    RRR, S1 and S2 normal, no murmur, rub or gallop.     Abdomen:     Soft, non-tender, non-distended with normal bowel sounds   Extremities:   Atraumatic, no cyanosis or edema   Pulses:  Radial and pedal pulses are intact bilaterally   Neurologic:  sedated       Labs and Imaging Studies     CBC:   Lab Results   Component Value Date    WBC 18.5 2020    RBC 4.09 2020    HGB 9.9 2020 HCT 32.2 03/18/2020    MCV 78.7 03/18/2020    MCH 24.2 03/18/2020    MCHC 30.7 03/18/2020    RDW 16.1 03/18/2020     03/18/2020    MPV 11.2 03/18/2020     CMP:    Lab Results   Component Value Date     03/18/2020    K 3.3 03/18/2020     03/18/2020    CO2 26 03/18/2020    BUN 25 03/18/2020    CREATININE 1.5 03/18/2020    GFRAA 53 03/18/2020    LABGLOM 44 03/18/2020    GLUCOSE 176 03/18/2020    PROT 6.5 03/14/2020    LABALBU 3.5 03/14/2020    CALCIUM 6.4 03/18/2020    BILITOT 0.3 03/14/2020    ALKPHOS 75 03/14/2020    AST 54 03/14/2020    ALT 25 03/14/2020     Imaging Studies:    EKG: Rhythm Strip: Sinus with ecotpy    Assessment and Plan     Patient is a 66-year-old male, with history of multiple cancers, 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 tach's/SVTs in the ER, started on amiodarone, consulted cardiology, was hypotensive started on levo after 30 cc/kg fluid bolus. He was also started on vaso-. And was transferred to ICU for further care. His chest CT did show bilateral infiltrates, CT head, CT spine, CT abdomen were unremarkable.               -             Impression:  Actue Hypoxemia Respiratory Failure  Metabolic encephalopathy  Septic shock  Type II NSTEMI  Pneumonia CAP On Unasyn  Panhypopit secondary to previous surgery   Shock on levo/vaso- on vitamin C protocol with steroids  VT / SVT on amiodarone gtt   PUD history on Prophalasis  Central hypogonadism -Secondary to TSS for pituitary adenoma / hormone replacements  Normal  TSH/T4 levels  DONNA on CKD Baseline creatinine 1.4 in 2013 - Creatinine improved - fena 5.8%  CT abdomen no signs of renal obstruction  History of malignant skin cancers    Oriana Celis DO, MPH  Professor of Internal

## 2020-03-18 NOTE — PLAN OF CARE
Pt attempting to reach for ETT despite verbal redirection.  Pt to remain in soft bilateral wrist restraints at this time for pt's safety    Problem: Restraint Use - Nonviolent/Non-Self-Destructive Behavior:  Goal: Absence of restraint-related injury  Description: Absence of restraint-related injury  Outcome: Met This Shift     Problem: Gas Exchange - Impaired:  Goal: Levels of oxygenation will improve  Description: Levels of oxygenation will improve  Outcome: Met This Shift     Problem: Infection, Septic Shock:  Goal: Will show no infection signs and symptoms  Description: Will show no infection signs and symptoms  Outcome: Met This Shift     Problem: Falls - Risk of:  Goal: Will remain free from falls  Description: Will remain free from falls  Outcome: Met This Shift  Goal: Absence of physical injury  Description: Absence of physical injury  Outcome: Met This Shift     Problem: Restraint Use - Nonviolent/Non-Self-Destructive Behavior:  Goal: Absence of restraint indications  Description: Absence of restraint indications  Outcome: Not Met This Shift     Problem: Discharge Planning:  Goal: Discharged to appropriate level of care  Description: Discharged to appropriate level of care  Outcome: Not Met This Shift     Problem: Infection - Ventilator-Associated Pneumonia:  Goal: Absence of pulmonary infection  Description: Absence of pulmonary infection  Outcome: Not Met This Shift

## 2020-03-18 NOTE — PROGRESS NOTES
Hospitalist Progress Note      Synopsis: Patient admitted on 3/14/2020 with septic shock 2/2 influenza A with aspiration pneumonia. Pt on tamiflu and zosyn. Pt also with DONNA which is improving with IVF. Pt with known hx of COPD/asthma, BPH, adrenal insufficiency w/ hx of pituitary adenoma s/p ESS with hypopituitarism. Subjective    Sedated, pressor requirements going down     Exam:  BP (!) 93/56   Pulse 82   Temp 98.1 °F (36.7 °C) (Bladder)   Resp 18   Ht 6' 0.84\" (1.85 m)   Wt 223 lb 11.2 oz (101.5 kg)   SpO2 93%   BMI 29.65 kg/m²     General appearance:  on sedation but awake and restless   HEENT:  Normal cephalic, atraumatic without obvious deformity. Pupils equal, round, and reactive to light. Extra ocular muscles intact. Conjunctivae/corneas clear. Neck: Supple, with full range of motion. No jugular venous distention. Trachea midline. Respiratory:  Normal respiratory effort. Clear to auscultation, bilaterally without Rales/Wheezes/Rhonchi. Intubated. Coarse breath sounds b/l   Cardiovascular:  Regular rate and rhythm with normal S1/S2 without murmurs, rubs or gallops. Abdomen: Soft, non-tender, non-distended with normal bowel sounds. Musculoskeletal:  No clubbing, cyanosis or edema bilaterally. Skin: Skin color, texture, turgor normal.  No rashes or lesions.   Neurologic:  on sedation but awake and restless     Medications:  Reviewed    Infusion Medications    dextrose      sodium chloride 75 mL/hr at 03/17/20 1232    dextrose      fentaNYL 5 mcg/ml in 0.9%  ml infusion Stopped (03/17/20 1300)    norepinephrine 4 mcg/min (03/17/20 1700)    heparin (porcine) 9 Units/kg/hr (03/17/20 1117)     Scheduled Medications    aspirin  81 mg Oral Daily    insulin lispro  0-12 Units Subcutaneous TID WC    insulin lispro  0-6 Units Subcutaneous Nightly    chlorhexidine  15 mL Mouth/Throat BID    ampicillin-sulbactam  3 g Intravenous 4x Daily    oseltamivir  30 mg Oral Daily    PLAN:  Bolus as clinically indicated; nephrology initiating bicarbonate   Wean vent, sedation, pressors as able  Cardiology consulted; recommended weaning off levophed and starting vasopressin as well as starting amiodarone bolus/drip    Continue antibiotics; peramivir stopped; antibiotics switched to unasyn   ECHO pending  Stress dose steroids  Continue levothyroxine  Replace K, Mg as indicated  Infectious workup            Diet: DIET TUBE FEED CONTINUOUS/CYCLIC NPO; 1.5 Calorie without Fiber; Orogastric; Cyclic; 25; 50; 23 (hold for Synthroid )  Diet Tube Feed Modular: Protein Modular  Code Status: Full Code  PT/OT Eval Status: On hold  DVT Prophylaxis:   heparin  Recommended disposition at discharge:  pending medical progression     +++++++++++++++++++++++++++++++++++++++++++++++++  Bennett Treviño MD   [facility].  +++++++++++++++++++++++++++++++++++++++++++++++++  NOTE: This report was transcribed using voice recognition software.  Every effort was made to ensure accuracy; however, inadvertent computerized transcription errors may be present.

## 2020-03-18 NOTE — PROGRESS NOTES
[address] [place] [address] [facility]  [facility]  Progress Note      Chief Complaint   Patient presents with    Shortness of Breath     pt had welfare check and found unresponsive on floor with emesis noted= pulse ox 86% on room air per ems-pt being bagged  on arrival to ed. pt to be intubated. unresponsive on arrival to ed. .       SUBJECTIVE:  Patient is tolerating medications. No reported adverse drug reactions. No nausea, vomiting, diarrhea. -NG tube tolerating tube feedings  Sedated and intubated at 40% FiO2  Temperature is down  Norepinephrine down to 2 mcg  Review of systems:  As stated above in the chief complaint, otherwise negative.     Medications:  Scheduled Meds:   sulfacetamide  1 drop Left Eye 4x Daily    calcium gluconate IVPB  2 g Intravenous Once    aspirin  81 mg Oral Daily    insulin lispro  0-12 Units Subcutaneous TID WC    insulin lispro  0-6 Units Subcutaneous Nightly    chlorhexidine  15 mL Mouth/Throat BID    ampicillin-sulbactam  3 g Intravenous 4x Daily    oseltamivir  30 mg Oral Daily    amiodarone  400 mg Oral BID    fludrocortisone  0.1 mg Oral Daily    ascorbic acid  1,500 mg Intravenous Q6H    levothyroxine  125 mcg Oral Daily    sodium chloride flush  10 mL Intravenous 2 times per day    albuterol  2.5 mg Nebulization Q4H    hydrocortisone sodium succinate PF  50 mg Intravenous Q6H    pantoprazole  40 mg Intravenous Daily    And    sodium chloride (PF)  10 mL Intravenous Daily     Continuous Infusions:   dextrose      dextrose      fentaNYL 5 mcg/ml in 0.9%  ml infusion Stopped (03/18/20 0900)    norepinephrine 2.5 mcg/min (03/18/20 0900)    heparin (porcine) 11 Units/kg/hr (03/17/20 2100)     PRN Meds:glucose, dextrose, glucagon (rDNA), dextrose, dextrose, heparin (porcine), heparin (porcine), sodium chloride flush, acetaminophen **OR** acetaminophen, polyethylene glycol, promethazine **OR** [DISCONTINUED] ondansetron    OBJECTIVE:  BP (!) 117/54 Pulse 79   Temp 97.7 °F (36.5 °C) (Bladder)   Resp 17   Ht 6' 0.84\" (1.85 m)   Wt 229 lb 8 oz (104.1 kg)   SpO2 92%   BMI 30.42 kg/m²   Temp  Av.2 °F (36.8 °C)  Min: 97.7 °F (36.5 °C)  Max: 98.8 °F (37.1 °C)  Constitutional: The patient is awake, intubated 40% FiO2  Skin: Warm and dry. No rashes were noted. HEENT: Round and reactive pupils. Moist mucous membranes. No ulcerations or thrush. NG tube in nares; left eye conjunctival hemorrhage  Neck: Supple to movements. Chest: No use of accessory muscles to breathe. Symmetrical expansion. No wheezing, crackles or rhonchi. Cardiovascular: S1 and S2 are rhythmic and regular. No murmurs appreciated. Abdomen: Positive bowel sounds to auscultation. Benign to palpation. No masses felt. No hepatosplenomegaly. Genitourinary: male baxter  Extremities: No clubbing, no cyanosis, no edema.   Lines: peripheral  Art line 3/14/2020 left radial  Triple-lumen catheter 3/15/2020 left IJ  Laboratory and Tests Review:  Lab Results   Component Value Date    WBC 18.5 (H) 2020    WBC 29.5 (H) 2020    WBC 38.7 (H) 2020    HGB 9.9 (L) 2020    HCT 32.2 (L) 2020    MCV 78.7 (L) 2020     2020     Lab Results   Component Value Date    NEUTROABS 15.90 (H) 2020    NEUTROABS 28.03 (H) 2020    NEUTROABS 37.15 (H) 2020     No results found for: RUST  Lab Results   Component Value Date    ALT 25 2020    AST 54 (H) 2020    ALKPHOS 75 2020    BILITOT 0.3 2020     Lab Results   Component Value Date     2020    K 3.3 2020     2020    CO2 26 2020    BUN 25 2020    CREATININE 1.5 2020    CREATININE 1.7 2020    CREATININE 1.9 2020    GFRAA 53 2020    LABGLOM 44 2020    GLUCOSE 176 2020    PROT 6.5 2020    LABALBU 3.5 2020    CALCIUM 6.4 2020    BILITOT 0.3 2020    ALKPHOS 75 2020    AST 54 03/14/2020    ALT 25 03/14/2020     No results found for: CRP  No results found for: [address]  Radiology:      Microbiology:   Lab Results   Component Value Date    BC 24 Hours- no growth 03/14/2020    BC 5 Days- no growth 10/05/2019     Lab Results   Component Value Date    BLOODCULT2 24 Hours- no growth 03/14/2020    BLOODCULT2 5 Days- no growth 10/05/2019     No results found for: WNDABS  Smear, Respiratory   Date Value Ref Range Status   03/15/2020   Final    Group 5: >25 PMN's/LPF and <10 Epithelial cells/LPF  Abundant Polymorphonuclear leukocytes  Rare Epithelial cells  Few Gram positive cocci in pairs       No results found for: MPNEUMO, CLAMYDCU, LABLEGI, AFBCX, FUNGSM, LABFUNG  CULTURE, RESPIRATORY   Date Value Ref Range Status   03/15/2020 Oral Pharyngeal Nora present  Final     No results found for: CXCATHTIP  No results found for: BFCS  No results found for: CXSURG  Urine Culture, Routine   Date Value Ref Range Status   10/05/2019 Growth not present  Final     MRSA Culture Only   Date Value Ref Range Status   03/14/2020 Methicillin resistant Staph aureus not isolated  Final       ASSESSMENT:  · Influenza A-on therapy  · Aspiration pneumonia-improving  · Acute kidney injury-improving slowly  · Leukocytosis resolving slowly    PLAN:  · Stop peramivir because of the national shortage she did get couple days and is doing better  · tamiflu  · Unasyn with sputum growing normal oral nora  · Check final cultures  · Monitor labs    Hazel Tariq  12:45 PM  3/18/2020

## 2020-03-18 NOTE — PLAN OF CARE
Problem: Restraint Use - Nonviolent/Non-Self-Destructive Behavior:  Goal: Absence of restraint-related injury  Description: Absence of restraint-related injury  3/18/2020 1213 by Carl Whitley RN  Outcome: Met This Shift     Problem: Restraint Use - Nonviolent/Non-Self-Destructive Behavior:  Goal: Absence of restraint indications  Description: Absence of restraint indications  3/18/2020 1213 by Carl Whitley RN  Outcome: Not Met This Shift

## 2020-03-19 ENCOUNTER — APPOINTMENT (OUTPATIENT)
Dept: GENERAL RADIOLOGY | Age: 85
DRG: 870 | End: 2020-03-19
Payer: MEDICARE

## 2020-03-19 LAB
AADO2: 159.1 MMHG
ANION GAP SERPL CALCULATED.3IONS-SCNC: 11 MMOL/L (ref 7–16)
ANISOCYTOSIS: ABNORMAL
APTT: 59.3 SEC (ref 24.5–35.1)
B.E.: 3 MMOL/L (ref -3–3)
BASOPHILS ABSOLUTE: 0 E9/L (ref 0–0.2)
BASOPHILS RELATIVE PERCENT: 0 % (ref 0–2)
BLOOD CULTURE, ROUTINE: NORMAL
BUN BLDV-MCNC: 30 MG/DL (ref 8–23)
CALCIUM SERPL-MCNC: 6.9 MG/DL (ref 8.6–10.2)
CHLORIDE BLD-SCNC: 116 MMOL/L (ref 98–107)
CO2: 26 MMOL/L (ref 22–29)
COHB: 0.3 % (ref 0–1.5)
CREAT SERPL-MCNC: 1.4 MG/DL (ref 0.7–1.2)
CRITICAL: ABNORMAL
CULTURE, BLOOD 2: NORMAL
DATE ANALYZED: ABNORMAL
DATE OF COLLECTION: ABNORMAL
EOSINOPHILS ABSOLUTE: 0 E9/L (ref 0.05–0.5)
EOSINOPHILS RELATIVE PERCENT: 0 % (ref 0–6)
FIO2: 40 %
GFR AFRICAN AMERICAN: 58
GFR NON-AFRICAN AMERICAN: 48 ML/MIN/1.73
GLUCOSE BLD-MCNC: 164 MG/DL (ref 74–99)
HCO3: 26 MMOL/L (ref 22–26)
HCT VFR BLD CALC: 29.7 % (ref 37–54)
HEMOGLOBIN: 8.9 G/DL (ref 12.5–16.5)
HHB: 4.4 % (ref 0–5)
LAB: ABNORMAL
LYMPHOCYTES ABSOLUTE: 0.39 E9/L (ref 1.5–4)
LYMPHOCYTES RELATIVE PERCENT: 4 % (ref 20–42)
Lab: ABNORMAL
MCH RBC QN AUTO: 23.9 PG (ref 26–35)
MCHC RBC AUTO-ENTMCNC: 30 % (ref 32–34.5)
MCV RBC AUTO: 79.8 FL (ref 80–99.9)
METER GLUCOSE: 110 MG/DL (ref 74–99)
METER GLUCOSE: 158 MG/DL (ref 74–99)
METER GLUCOSE: 159 MG/DL (ref 74–99)
METHB: 0.3 % (ref 0–1.5)
MODE: ABNORMAL
MONOCYTES ABSOLUTE: 1.16 E9/L (ref 0.1–0.95)
MONOCYTES RELATIVE PERCENT: 12 % (ref 2–12)
NEUTROPHILS ABSOLUTE: 8.15 E9/L (ref 1.8–7.3)
NEUTROPHILS RELATIVE PERCENT: 84 % (ref 43–80)
O2 CONTENT: 13.2 ML/DL
O2 SATURATION: 95.6 % (ref 92–98.5)
O2HB: 95 % (ref 94–97)
OPERATOR ID: 2577
OVALOCYTES: ABNORMAL
PATIENT TEMP: 37 C
PCO2: 33.5 MMHG (ref 35–45)
PDW BLD-RTO: 16.4 FL (ref 11.5–15)
PEEP/CPAP: 8 CMH2O
PFO2: 1.94 MMHG/%
PH BLOOD GAS: 7.51 (ref 7.35–7.45)
PLATELET # BLD: 226 E9/L (ref 130–450)
PMV BLD AUTO: 11.5 FL (ref 7–12)
PO2: 77.6 MMHG (ref 75–100)
POIKILOCYTES: ABNORMAL
POTASSIUM REFLEX MAGNESIUM: 3.8 MMOL/L (ref 3.5–5)
PS: 8 CMH20
RBC # BLD: 3.72 E12/L (ref 3.8–5.8)
RI(T): 2.05
SODIUM BLD-SCNC: 153 MMOL/L (ref 132–146)
SOURCE, BLOOD GAS: ABNORMAL
THB: 9.8 G/DL (ref 11.5–16.5)
TIME ANALYZED: 419
TOTAL CK: 98 U/L (ref 20–200)
WBC # BLD: 9.7 E9/L (ref 4.5–11.5)

## 2020-03-19 PROCEDURE — 85025 COMPLETE CBC W/AUTO DIFF WBC: CPT

## 2020-03-19 PROCEDURE — 2580000003 HC RX 258: Performed by: INTERNAL MEDICINE

## 2020-03-19 PROCEDURE — 6360000002 HC RX W HCPCS: Performed by: INTERNAL MEDICINE

## 2020-03-19 PROCEDURE — 80048 BASIC METABOLIC PNL TOTAL CA: CPT

## 2020-03-19 PROCEDURE — 71045 X-RAY EXAM CHEST 1 VIEW: CPT

## 2020-03-19 PROCEDURE — 94640 AIRWAY INHALATION TREATMENT: CPT

## 2020-03-19 PROCEDURE — 85730 THROMBOPLASTIN TIME PARTIAL: CPT

## 2020-03-19 PROCEDURE — 6370000000 HC RX 637 (ALT 250 FOR IP): Performed by: INTERNAL MEDICINE

## 2020-03-19 PROCEDURE — 2580000003 HC RX 258: Performed by: SPECIALIST

## 2020-03-19 PROCEDURE — 2000000000 HC ICU R&B

## 2020-03-19 PROCEDURE — 2700000000 HC OXYGEN THERAPY PER DAY

## 2020-03-19 PROCEDURE — 99231 SBSQ HOSP IP/OBS SF/LOW 25: CPT | Performed by: INTERNAL MEDICINE

## 2020-03-19 PROCEDURE — 6360000002 HC RX W HCPCS: Performed by: SPECIALIST

## 2020-03-19 PROCEDURE — 82962 GLUCOSE BLOOD TEST: CPT

## 2020-03-19 PROCEDURE — 99233 SBSQ HOSP IP/OBS HIGH 50: CPT | Performed by: INTERNAL MEDICINE

## 2020-03-19 PROCEDURE — 82550 ASSAY OF CK (CPK): CPT

## 2020-03-19 PROCEDURE — 94799 UNLISTED PULMONARY SVC/PX: CPT

## 2020-03-19 PROCEDURE — 82805 BLOOD GASES W/O2 SATURATION: CPT

## 2020-03-19 PROCEDURE — 6370000000 HC RX 637 (ALT 250 FOR IP): Performed by: SPECIALIST

## 2020-03-19 PROCEDURE — C9113 INJ PANTOPRAZOLE SODIUM, VIA: HCPCS | Performed by: INTERNAL MEDICINE

## 2020-03-19 PROCEDURE — 94003 VENT MGMT INPAT SUBQ DAY: CPT

## 2020-03-19 RX ORDER — FUROSEMIDE 10 MG/ML
40 INJECTION INTRAMUSCULAR; INTRAVENOUS ONCE
Status: COMPLETED | OUTPATIENT
Start: 2020-03-19 | End: 2020-03-19

## 2020-03-19 RX ORDER — OSELTAMIVIR PHOSPHATE 6 MG/ML
45 FOR SUSPENSION ORAL 2 TIMES DAILY
Status: DISCONTINUED | OUTPATIENT
Start: 2020-03-19 | End: 2020-03-23 | Stop reason: HOSPADM

## 2020-03-19 RX ORDER — DEXTROSE MONOHYDRATE 50 MG/ML
INJECTION, SOLUTION INTRAVENOUS CONTINUOUS
Status: DISCONTINUED | OUTPATIENT
Start: 2020-03-19 | End: 2020-03-19

## 2020-03-19 RX ORDER — ALBUTEROL SULFATE 2.5 MG/3ML
2.5 SOLUTION RESPIRATORY (INHALATION) 4 TIMES DAILY
Status: DISCONTINUED | OUTPATIENT
Start: 2020-03-20 | End: 2020-03-20

## 2020-03-19 RX ORDER — FUROSEMIDE 10 MG/ML
40 INJECTION INTRAMUSCULAR; INTRAVENOUS DAILY
Status: DISCONTINUED | OUTPATIENT
Start: 2020-03-19 | End: 2020-03-20

## 2020-03-19 RX ORDER — POTASSIUM CHLORIDE 29.8 MG/ML
40 INJECTION INTRAVENOUS ONCE
Status: COMPLETED | OUTPATIENT
Start: 2020-03-19 | End: 2020-03-19

## 2020-03-19 RX ORDER — DEXTROSE MONOHYDRATE 50 MG/ML
INJECTION, SOLUTION INTRAVENOUS CONTINUOUS
Status: ACTIVE | OUTPATIENT
Start: 2020-03-19 | End: 2020-03-21

## 2020-03-19 RX ORDER — POTASSIUM CHLORIDE 7.45 MG/ML
10 INJECTION INTRAVENOUS
Status: COMPLETED | OUTPATIENT
Start: 2020-03-19 | End: 2020-03-19

## 2020-03-19 RX ADMIN — DEXTROSE MONOHYDRATE: 50 INJECTION, SOLUTION INTRAVENOUS at 16:24

## 2020-03-19 RX ADMIN — POTASSIUM CHLORIDE 40 MEQ: 400 INJECTION, SOLUTION INTRAVENOUS at 02:07

## 2020-03-19 RX ADMIN — AMIODARONE HYDROCHLORIDE 400 MG: 200 TABLET ORAL at 08:34

## 2020-03-19 RX ADMIN — AMIODARONE HYDROCHLORIDE 400 MG: 200 TABLET ORAL at 20:21

## 2020-03-19 RX ADMIN — ALBUTEROL SULFATE 2.5 MG: 2.5 SOLUTION RESPIRATORY (INHALATION) at 09:50

## 2020-03-19 RX ADMIN — Medication 45 MG: at 20:21

## 2020-03-19 RX ADMIN — SULFACETAMIDE SODIUM 1 DROP: 100 SOLUTION OPHTHALMIC at 20:21

## 2020-03-19 RX ADMIN — SODIUM CHLORIDE, PRESERVATIVE FREE 10 ML: 5 INJECTION INTRAVENOUS at 08:35

## 2020-03-19 RX ADMIN — AMPICILLIN SODIUM AND SULBACTAM SODIUM 3 G: 2; 1 INJECTION, POWDER, FOR SOLUTION INTRAMUSCULAR; INTRAVENOUS at 16:23

## 2020-03-19 RX ADMIN — HYDROCORTISONE SODIUM SUCCINATE 50 MG: 100 INJECTION, POWDER, FOR SOLUTION INTRAMUSCULAR; INTRAVENOUS at 09:45

## 2020-03-19 RX ADMIN — HYDROCORTISONE SODIUM SUCCINATE 50 MG: 100 INJECTION, POWDER, FOR SOLUTION INTRAMUSCULAR; INTRAVENOUS at 15:07

## 2020-03-19 RX ADMIN — SULFACETAMIDE SODIUM 1 DROP: 100 SOLUTION OPHTHALMIC at 13:00

## 2020-03-19 RX ADMIN — CHLORHEXIDINE GLUCONATE 0.12% ORAL RINSE 15 ML: 1.2 LIQUID ORAL at 20:22

## 2020-03-19 RX ADMIN — ASPIRIN 81 MG 81 MG: 81 TABLET ORAL at 08:34

## 2020-03-19 RX ADMIN — SULFACETAMIDE SODIUM 1 DROP: 100 SOLUTION OPHTHALMIC at 18:08

## 2020-03-19 RX ADMIN — FLUDROCORTISONE ACETATE 0.1 MG: 0.1 TABLET ORAL at 08:34

## 2020-03-19 RX ADMIN — ALBUTEROL SULFATE 2.5 MG: 2.5 SOLUTION RESPIRATORY (INHALATION) at 01:22

## 2020-03-19 RX ADMIN — POTASSIUM CHLORIDE 10 MEQ: 7.46 INJECTION, SOLUTION INTRAVENOUS at 08:41

## 2020-03-19 RX ADMIN — AMPICILLIN SODIUM AND SULBACTAM SODIUM 3 G: 2; 1 INJECTION, POWDER, FOR SOLUTION INTRAMUSCULAR; INTRAVENOUS at 08:35

## 2020-03-19 RX ADMIN — SODIUM CHLORIDE, PRESERVATIVE FREE 10 ML: 5 INJECTION INTRAVENOUS at 08:48

## 2020-03-19 RX ADMIN — AMPICILLIN SODIUM AND SULBACTAM SODIUM 3 G: 2; 1 INJECTION, POWDER, FOR SOLUTION INTRAMUSCULAR; INTRAVENOUS at 20:20

## 2020-03-19 RX ADMIN — INSULIN LISPRO 1 UNITS: 100 INJECTION, SOLUTION INTRAVENOUS; SUBCUTANEOUS at 12:42

## 2020-03-19 RX ADMIN — INSULIN LISPRO 2 UNITS: 100 INJECTION, SOLUTION INTRAVENOUS; SUBCUTANEOUS at 08:05

## 2020-03-19 RX ADMIN — ALBUTEROL SULFATE 2.5 MG: 2.5 SOLUTION RESPIRATORY (INHALATION) at 04:08

## 2020-03-19 RX ADMIN — LEVOTHYROXINE SODIUM 125 MCG: 0.12 TABLET ORAL at 08:34

## 2020-03-19 RX ADMIN — SODIUM CHLORIDE, PRESERVATIVE FREE 10 ML: 5 INJECTION INTRAVENOUS at 20:21

## 2020-03-19 RX ADMIN — OSELTAMIVIR PHOSPHATE 30 MG: 30 CAPSULE ORAL at 08:34

## 2020-03-19 RX ADMIN — PANTOPRAZOLE SODIUM 40 MG: 40 INJECTION, POWDER, FOR SOLUTION INTRAVENOUS at 08:47

## 2020-03-19 RX ADMIN — POTASSIUM CHLORIDE 10 MEQ: 7.46 INJECTION, SOLUTION INTRAVENOUS at 12:40

## 2020-03-19 RX ADMIN — HYDROCORTISONE SODIUM SUCCINATE 50 MG: 100 INJECTION, POWDER, FOR SOLUTION INTRAMUSCULAR; INTRAVENOUS at 04:00

## 2020-03-19 RX ADMIN — ALBUTEROL SULFATE 2.5 MG: 2.5 SOLUTION RESPIRATORY (INHALATION) at 13:03

## 2020-03-19 RX ADMIN — Medication 45 MG: at 18:07

## 2020-03-19 RX ADMIN — SULFACETAMIDE SODIUM 1 DROP: 100 SOLUTION OPHTHALMIC at 08:48

## 2020-03-19 RX ADMIN — FUROSEMIDE 40 MG: 10 INJECTION, SOLUTION INTRAMUSCULAR; INTRAVENOUS at 12:40

## 2020-03-19 RX ADMIN — CHLORHEXIDINE GLUCONATE 0.12% ORAL RINSE 15 ML: 1.2 LIQUID ORAL at 08:36

## 2020-03-19 RX ADMIN — ALBUTEROL SULFATE 2.5 MG: 2.5 SOLUTION RESPIRATORY (INHALATION) at 17:04

## 2020-03-19 RX ADMIN — AMPICILLIN SODIUM AND SULBACTAM SODIUM 3 G: 2; 1 INJECTION, POWDER, FOR SOLUTION INTRAMUSCULAR; INTRAVENOUS at 12:40

## 2020-03-19 ASSESSMENT — PAIN SCALES - GENERAL
PAINLEVEL_OUTOF10: 0

## 2020-03-19 ASSESSMENT — PULMONARY FUNCTION TESTS
PIF_VALUE: 17
PIF_VALUE: 16
PIF_VALUE: 17
PIF_VALUE: 17
PIF_VALUE: 16
PIF_VALUE: 17
PIF_VALUE: 13
PIF_VALUE: 17
PIF_VALUE: 14
PIF_VALUE: 17
PIF_VALUE: 16
PIF_VALUE: 16
PIF_VALUE: 14
PIF_VALUE: 14

## 2020-03-19 NOTE — PROGRESS NOTES
Max: 99 °F (37.2 °C)  Constitutional: The patient is awake, extubated  Skin: Warm and dry. No rashes were noted. HEENT: Round and reactive pupils. Moist mucous membranes. No ulcerations or thrush. NG tube in nares; left eye conjunctival hemorrhage  Neck: Supple to movements. Chest: No use of accessory muscles to breathe. Symmetrical expansion. No wheezing, crackles or rhonchi. Decreased bilateral  Cardiovascular: S1 and S2 are rhythmic and regular. No murmurs appreciated. Abdomen: Positive bowel sounds to auscultation. Benign to palpation. No masses felt. No hepatosplenomegaly. Genitourinary: male baxter  Extremities: No clubbing, no cyanosis, no edema.   Lines: peripheral  Art line 3/14/2020 left radial  Triple-lumen catheter 3/15/2020 left IJ  Laboratory and Tests Review:  Lab Results   Component Value Date    WBC 9.7 03/19/2020    WBC 18.5 (H) 03/18/2020    WBC 29.5 (H) 03/17/2020    HGB 8.9 (L) 03/19/2020    HCT 29.7 (L) 03/19/2020    MCV 79.8 (L) 03/19/2020     03/19/2020     Lab Results   Component Value Date    NEUTROABS 8.15 (H) 03/19/2020    NEUTROABS 15.90 (H) 03/18/2020    NEUTROABS 28.03 (H) 03/17/2020     No results found for: [facility]  Lab Results   Component Value Date    ALT 25 03/14/2020    AST 54 (H) 03/14/2020    ALKPHOS 75 03/14/2020    BILITOT 0.3 03/14/2020     Lab Results   Component Value Date     03/19/2020    K 3.8 03/19/2020     03/19/2020    CO2 26 03/19/2020    BUN 30 03/19/2020    CREATININE 1.4 03/19/2020    CREATININE 1.4 03/18/2020    CREATININE 1.5 03/18/2020    GFRAA 58 03/19/2020    LABGLOM 48 03/19/2020    GLUCOSE 164 03/19/2020    PROT 6.5 03/14/2020    LABALBU 3.5 03/14/2020    CALCIUM 6.9 03/19/2020    BILITOT 0.3 03/14/2020    ALKPHOS 75 03/14/2020    AST 54 03/14/2020    ALT 25 03/14/2020     No results found for: CRP  No results found for: Doron Gruber  Radiology:      Microbiology:   Lab Results   Component Value Date    BC 24 Hours- no growth 03/14/2020    BC 5 Days- no growth 10/05/2019     Lab Results   Component Value Date    BLOODCULT2 24 Hours- no growth 03/14/2020    BLOODCULT2 5 Days- no growth 10/05/2019     No results found for: WNDABS  Smear, Respiratory   Date Value Ref Range Status   03/15/2020   Final    Group 5: >25 PMN's/LPF and <10 Epithelial cells/LPF  Abundant Polymorphonuclear leukocytes  Rare Epithelial cells  Few Gram positive cocci in pairs       No results found for: MPNEUMO, CLAMYDCU, LABLEGI, AFBCX, FUNGSM, LABFUNG  CULTURE, RESPIRATORY   Date Value Ref Range Status   03/15/2020 Oral Pharyngeal Nora present  Final     No results found for: CXCATHTIP  No results found for: BFCS  No results found for: CXSURG  Urine Culture, Routine   Date Value Ref Range Status   10/05/2019 Growth not present  Final     MRSA Culture Only   Date Value Ref Range Status   03/14/2020 Methicillin resistant Staph aureus not isolated  Final       ASSESSMENT:  · Influenza A-on therapy-continue past 5 days  · Aspiration pneumonia-improving  · Acute kidney injury-improving slowly  · Leukocytosis resolving slowly    PLAN:    · tamiflu should be continued for total 10 days  · Unasyn with sputum growing normal oral nora  · Check final cultures  · Monitor labs    Maxwell Chris  2:19 PM  3/19/2020

## 2020-03-19 NOTE — PLAN OF CARE
Problem: Discharge Planning:  Goal: Discharged to appropriate level of care  Description: Discharged to appropriate level of care  3/19/2020 1343 by Everett Sinclair RN  Outcome: Met This Shift  3/19/2020 0059 by Tommie Doty RN  Outcome: Ongoing     Problem: Gas Exchange - Impaired:  Goal: Levels of oxygenation will improve  Description: Levels of oxygenation will improve  3/19/2020 1343 by Everett Sinclair RN  Outcome: Met This Shift  3/19/2020 0059 by Tommie Doty RN  Outcome: Ongoing     Problem: Infection, Septic Shock:  Goal: Will show no infection signs and symptoms  Description: Will show no infection signs and symptoms  3/19/2020 1343 by Everett Sinclair RN  Outcome: Met This Shift  3/19/2020 0059 by Tommie Doty RN  Outcome: Ongoing     Problem: Infection - Ventilator-Associated Pneumonia:  Goal: Absence of pulmonary infection  Description: Absence of pulmonary infection  3/19/2020 1343 by Everett Sinclair RN  Outcome: Met This Shift  3/19/2020 0059 by Tommie Doty RN  Outcome: Ongoing     Problem: Falls - Risk of:  Goal: Will remain free from falls  Description: Will remain free from falls  3/19/2020 1343 by Everett Sinclair RN  Outcome: Met This Shift  3/19/2020 0059 by Tommie Doty RN  Outcome: Met This Shift  Goal: Absence of physical injury  Description: Absence of physical injury  3/19/2020 1343 by Everett Sinclair RN  Outcome: Met This Shift  3/19/2020 0059 by Tommie Doty RN  Outcome: Met This Shift

## 2020-03-19 NOTE — PLAN OF CARE
Problem: Restraint Use - Nonviolent/Non-Self-Destructive Behavior:  Goal: Absence of restraint-related injury  Description: Absence of restraint-related injury  3/19/2020 0059 by Rosa Cole RN  Outcome: Met This Shift     Problem: Falls - Risk of:  Goal: Will remain free from falls  Description: Will remain free from falls  Outcome: Met This Shift     Problem: Discharge Planning:  Goal: Discharged to appropriate level of care  Description: Discharged to appropriate level of care  Outcome: Ongoing     Problem: Gas Exchange - Impaired:  Goal: Levels of oxygenation will improve  Description: Levels of oxygenation will improve  Outcome: Ongoing     Problem: Infection, Septic Shock:  Goal: Will show no infection signs and symptoms  Description: Will show no infection signs and symptoms  Outcome: Ongoing     Problem: Infection - Ventilator-Associated Pneumonia:  Goal: Absence of pulmonary infection  Description: Absence of pulmonary infection  Outcome: Ongoing     Problem: Restraint Use - Nonviolent/Non-Self-Destructive Behavior:  Goal: Absence of restraint indications  Description: Absence of restraint indications  3/19/2020 0059 by Rosa Cole RN  Outcome: Not Met This Shift

## 2020-03-19 NOTE — PROGRESS NOTES
[facility]  Nephrology Attending Progress Note  Blair Huizar.  Gerardo Briggs MD        SUBJECTIVE:     3/18: pt seen in icu, intubated  3/19: pr seen in icu, extubated and no longer on tube feeds, ns running      PROBLEM LIST:    Patient Active Problem List   Diagnosis    Shock ([facility]    [facility]ism ([facility]    Weakness    Hypothyroidism    Lactic acidosis    Renal insufficiency    Atrial fibrillation with RVR ([facility]    QT prolongation    RBBB    Acute respiratory failure with hypoxia ([facility]        PAST MEDICAL HISTORY:    Past Medical History:   Diagnosis Date    A-fib ([facility]     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 ([facility]     Hypothyroid     Osteopenia     Renal insufficiency     Right bundle branch block        DIET:    DIET TUBE FEED CONTINUOUS/CYCLIC NPO; 1.5 Calorie without Fiber; Orogastric; Cyclic; 25; 50; 23 (hold for Synthroid )  Diet Tube Feed Modular: Protein Modular     PHYSICAL EXAM:     Patient Vitals for the past 24 hrs:   BP Temp Temp src Pulse Resp SpO2 Weight   03/19/20 1000 -- -- -- 72 18 95 % --   03/19/20 0950 -- -- -- -- 16 96 % --   03/19/20 0943 -- -- -- 74 16 94 % --   03/19/20 0900 -- -- -- 70 18 92 % --   03/19/20 0800 -- 99 °F (37.2 °C) Oral 71 20 91 % --   03/19/20 0700 -- -- -- 72 16 91 % --   03/19/20 0600 -- -- -- 74 19 91 % 228 lb (103.4 kg)   03/19/20 0500 -- -- -- 82 18 90 % --   03/19/20 0411 -- -- -- 76 17 94 % --   03/19/20 0400 -- 98.6 °F (37 °C) Bladder 77 18 92 % --   03/19/20 0300 -- -- -- 78 18 91 % --   03/19/20 0200 -- -- -- 79 19 90 % --   03/19/20 0123 -- -- -- 75 17 93 % --   03/19/20 0100 -- -- -- 75 18 92 % --   03/19/20 0000 -- 99 °F (37.2 °C) Bladder 76 19 90 % --   03/18/20 2300 -- -- -- 81 17 90 % --   03/18/20 2200 -- -- -- 77 20 91 % --   03/18/20 2157 -- -- -- 76 19 91 % --   03/18/20 2148 -- -- -- -- 18 91 % --   03/18/20 2100 -- -- -- 76 19 92 % --   03/18/20 2000 -- 99 °F (37.2 °C) Bladder 76 19 92 % --   03/18/20 1900 -- -- -- 84 20 94 % -- 03/18/20 1800 -- -- -- 80 18 93 % --   03/18/20 1746 -- -- -- -- 19 93 % --   03/18/20 1730 -- -- -- 80 15 93 % --   03/18/20 1700 -- -- -- 70 19 94 % --   03/18/20 1600 (!) 127/57 98.8 °F (37.1 °C) Bladder 73 18 93 % --   03/18/20 1500 -- -- -- 75 19 92 % --   03/18/20 1400 -- -- -- 91 19 (!) 88 % --   03/18/20 1358 -- -- -- 94 19 (!) 89 % --   03/18/20 1357 -- -- -- 88 19 91 % --   03/18/20 1313 -- -- -- 84 21 93 % --   03/18/20 1300 -- -- -- 75 19 92 % --   03/18/20 1200 (!) 117/54 97.7 °F (36.5 °C) Bladder 79 17 92 % --   03/18/20 1100 (!) 120/55 -- -- 76 18 92 % --   @      Intake/Output Summary (Last 24 hours) at 3/19/2020 1010  Last data filed at 3/19/2020 0800  Gross per 24 hour   Intake 2403.94 ml   Output 1850 ml   Net 553.94 ml         Wt Readings from Last 3 Encounters:   03/19/20 228 lb (103.4 kg)   10/08/19 207 lb 8 oz (94.1 kg)       Constitutional:  Pt is intubated  Head: normocephalic, atraumatic  Neck: no JVD  Cardiovascular: regular rate and rhythm, no murmurs, gallops, or rubs  Respiratory:  No rales, rhochi, or wheezes  Gastrointestinal:  Soft, nontender, nondistended, bowel sounds x 4  Ext: no edema  Skin: dry, no rash  Neuro: sedated    MEDS (scheduled):    potassium chloride  10 mEq Intravenous Q2H    sulfacetamide  1 drop Left Eye 4x Daily    aspirin  81 mg Oral Daily    insulin lispro  0-12 Units Subcutaneous TID WC    insulin lispro  0-6 Units Subcutaneous Nightly    chlorhexidine  15 mL Mouth/Throat BID    ampicillin-sulbactam  3 g Intravenous 4x Daily    amiodarone  400 mg Oral BID    fludrocortisone  0.1 mg Oral Daily    levothyroxine  125 mcg Oral Daily    sodium chloride flush  10 mL Intravenous 2 times per day    albuterol  2.5 mg Nebulization Q4H    hydrocortisone sodium succinate PF  50 mg Intravenous Q6H    pantoprazole  40 mg Intravenous Daily    And    sodium chloride (PF)  10 mL Intravenous Daily       MEDS (infusions):   dextrose      dextrose      fentaNYL 5 mcg/ml in 0.9%  ml infusion Stopped (03/18/20 0900)    norepinephrine 2 mcg/min (03/18/20 1351)    heparin (porcine) 11 Units/kg/hr (03/18/20 1311)       MEDS (prn):  glucose, dextrose, glucagon (rDNA), dextrose, dextrose, heparin (porcine), heparin (porcine), sodium chloride flush, acetaminophen **OR** acetaminophen, polyethylene glycol, promethazine **OR** [DISCONTINUED] ondansetron    DATA:    Recent Labs     03/17/20  0410 03/18/20  0426 03/19/20  0400   WBC 29.5* 18.5* 9.7   HGB 10.7* 9.9* 8.9*   HCT 34.2* 32.2* 29.7*   MCV 77.2* 78.7* 79.8*    235 226     Recent Labs     03/18/20  0426 03/18/20  1350 03/18/20  2310 03/19/20  0400   * 150* 154* 153*   K 3.3* 3.5 3.2* 3.8   * 113* 117* 116*   CO2 26 27 27 26   BUN 25* 27* 29* 30*   CREATININE 1.5* 1.5* 1.4* 1.4*   LABGLOM 44 44 48 48   GLUCOSE 176* 153* 161* 164*   CALCIUM 6.4* 6.7* 6.9* 6.9*   MG 2.4 2.4 2.4  --        Lab Results   Component Value Date    LABALBU 3.5 03/14/2020    LABALBU 3.1 (L) 03/14/2020    LABALBU 3.7 10/09/2019     Lab Results   Component Value Date    TSH 0.435 03/14/2020       Iron Studies  No results found for: IRON, TIBC, FERRITIN  No results found for: AJIKAYYM69  No results found for: FOLATE    No results found for: VITD25  No results found for: PTH    No components found for: URIC    Lab Results   Component Value Date    COLORU Yellow 03/14/2020    NITRU Negative 03/14/2020    GLUCOSEU Negative 03/14/2020    KETUA Negative 03/14/2020    UROBILINOGEN 0.2 03/14/2020    BILIRUBINUR Negative 03/14/2020       No results found for: LIPIDPAN      IMPRESSION/RECOMMENDATIONS:      1 Acute renal failure  in the setting of septic shock , with hypotensive episode   Renal function improving as he now has started making urine   Off hco3 drip     2 Influenza A  complicated with possible aspiration pneumonia  acute respiratory   intubated and on vent   Pressor dependent    3 H/O chronic A fib  Per cardiology     4

## 2020-03-19 NOTE — PROGRESS NOTES
dextrose      fentaNYL 5 mcg/ml in 0.9%  ml infusion Stopped (03/18/20 0900)    norepinephrine 2 mcg/min (03/18/20 1351)    heparin (porcine) 11 Units/kg/hr (03/18/20 1311)     Scheduled Medications    potassium chloride  10 mEq Intravenous Q2H    sulfacetamide  1 drop Left Eye 4x Daily    aspirin  81 mg Oral Daily    insulin lispro  0-12 Units Subcutaneous TID WC    insulin lispro  0-6 Units Subcutaneous Nightly    chlorhexidine  15 mL Mouth/Throat BID    ampicillin-sulbactam  3 g Intravenous 4x Daily    amiodarone  400 mg Oral BID    fludrocortisone  0.1 mg Oral Daily    levothyroxine  125 mcg Oral Daily    sodium chloride flush  10 mL Intravenous 2 times per day    albuterol  2.5 mg Nebulization Q4H    hydrocortisone sodium succinate PF  50 mg Intravenous Q6H    pantoprazole  40 mg Intravenous Daily    And    sodium chloride (PF)  10 mL Intravenous Daily     PRN Meds: glucose, dextrose, glucagon (rDNA), dextrose, dextrose, heparin (porcine), heparin (porcine), sodium chloride flush, acetaminophen **OR** acetaminophen, polyethylene glycol, promethazine **OR** [DISCONTINUED] ondansetron    I/O    Intake/Output Summary (Last 24 hours) at 3/19/2020 0950  Last data filed at 3/19/2020 0800  Gross per 24 hour   Intake 2403.94 ml   Output 1975 ml   Net 428.94 ml       Labs:   Recent Labs     03/17/20  0410 03/18/20  0426 03/19/20  0400   WBC 29.5* 18.5* 9.7   HGB 10.7* 9.9* 8.9*   HCT 34.2* 32.2* 29.7*    235 226       Recent Labs     03/18/20  1350 03/18/20  2310 03/19/20  0400   * 154* 153*   K 3.5 3.2* 3.8   * 117* 116*   CO2 27 27 26   BUN 27* 29* 30*   CREATININE 1.5* 1.4* 1.4*   CALCIUM 6.7* 6.9* 6.9*       No results for input(s): PROT, ALB, ALKPHOS, ALT, AST, BILITOT, AMYLASE, LIPASE in the last 72 hours. No results for input(s): INR in the last 72 hours.     Recent Labs     03/17/20  0410 03/18/20  0426 03/19/20  0400   CKTOTAL 294* 147 98       Chronic labs:  Lab Results   Component Value Date    TSH 0.435 03/14/2020    INR 1.0 10/05/2019       Radiology:  Imaging studies reviewed today. ASSESSMENT:  Septic shock 2/2 influenza A and pneumonia: Is on 2 antivirals and Zosyn has been changed to Unasyn  Acute respiratory failure with hypoxia  SVT vs. V tach? Type II NSTEMI-with elevated troponin  DONNA on CKD  Currently hypernatremic managed by nephrology  Panhypopituitarism surgical  · Adrenal insufficiency  · Hypothyroidism    Conjunctivitis and blepharitis of the left eye-improved and resolving    PLAN:  Steroid and thyroid support: Florinef and hydrocortisone  Continue Unasyn  Ascorbic acid for the septic shock  Pantoprazole peptic ulcer prophylaxis  Potassium IV protocol              Diet: DIET TUBE FEED CONTINUOUS/CYCLIC NPO; 1.5 Calorie without Fiber; Orogastric; Cyclic; 25; 50; 23 (hold for Synthroid )  Diet Tube Feed Modular: Protein Modular  Code Status: Full Code  PT/OT Eval Status: On hold  DVT Prophylaxis:   heparin  Recommended disposition at discharge:  pending medical progression     +++++++++++++++++++++++++++++++++++++++++++++++++  Darshana Chávez MD   [facility].  +++++++++++++++++++++++++++++++++++++++++++++++++  NOTE: This report was transcribed using voice recognition software.  Every effort was made to ensure accuracy; however, inadvertent computerized transcription errors may be present.

## 2020-03-19 NOTE — PROGRESS NOTES
[facility]  Department of Internal Medicine  Division of Pulmonary, Critical Care and Sleep Medicine  Progress Note      Patient:  Salima Valles 80 y.o. male   MRN: [id number]       Date of Service: 3/19/2020    Allergy: Codeine; Diazepam; Lorazepam; and Morphine    Subjective       3/19: pr seen in icu, extubated and no longer on tube feeds, ns running  Stable overnight. Continue Peramivir + tamiflu  ID changed Zosyn to Unasyn with sputum growing normal oral nora  D5 for high sodium  +1.4 liters    Objective     TEMPERATURE:  Current - Temp: 98.9 °F (37.2 °C); Max - Temp  Av.9 °F (37.2 °C)  Min: 98.6 °F (37 °C)  Max: 99 °F (37.2 °C)  RESPIRATIONS RANGE: Resp  Av.2  Min: 16  Max: 22  PULSE RANGE: Pulse  Av.7  Min: 70  Max: 84  BLOOD PRESSURE RANGE:  No data recorded.  ; No data recorded. PULSE OXIMETRY RANGE: SpO2  Av.6 %  Min: 90 %  Max: 96 %    I & O - 24hr:    Intake/Output Summary (Last 24 hours) at 3/19/2020 1822  Last data filed at 3/19/2020 1500  Gross per 24 hour   Intake 1622.94 ml   Output 2390 ml   Net -767.06 ml     I/O last 3 completed shifts: In: 1622.9 [I.V.:751.9; NG/GT:771; IV Piggyback:100]  Out: 3052 [Urine:2390] No intake/output data recorded. Weight change: -1 lb 8 oz (-0.68 kg)    Physical Exam:  General Appearance:  Alert off ventilator   HEENT:    NC/AT, mucous membranes are moist   Neck:   Supple, no jugular venous distention. Resp:     Decreased breath sounds bilaterally   Heart:    RRR, S1 and S2 normal, no murmur, rub or gallop.     Abdomen:     Soft, non-tender, non-distended with normal bowel sounds   Extremities:   Atraumatic, no cyanosis or edema   Pulses:  Radial and pedal pulses are intact bilaterally   Neurologic:  Weak but non focal       Labs and Imaging Studies     CBC:   Lab Results   Component Value Date    WBC 9.7 2020    RBC 3.72 2020    HGB 8.9 2020

## 2020-03-19 NOTE — FLOWSHEET NOTE
Patient is intermittently agitated and remains on the vent. Will reach for ETT and pull lines if restraints loosened. 2 point soft restraints continued for safety.

## 2020-03-19 NOTE — PROGRESS NOTES
Spontaneous Parameters performed    VT = 556 ml  f = 17  B/M  Ve = 9.45 L/M  NIF = -38  cmH2O  VC = 1.65 L  RSBI = 30      [facility] by [facility]

## 2020-03-19 NOTE — PROGRESS NOTES
PROGRESS NOTE     CARDIOLOGY    Chief complaint: Seen today for follow up, management & recommendations for elevated troponin, paroxysmal atrial fibrillation    He was intubated and sedated on the ventilator. He does open his eyes and look around. He does not communicate. Wt Readings from Last 3 Encounters:   03/19/20 228 lb (103.4 kg)   10/08/19 207 lb 8 oz (94.1 kg)     Temp Readings from Last 3 Encounters:   03/19/20 99 °F (37.2 °C) (Oral)   10/09/19 96.9 °F (36.1 °C) (Temporal)     BP Readings from Last 3 Encounters:   03/18/20 (!) 127/57   10/09/19 132/87     Pulse Readings from Last 3 Encounters:   03/19/20 78   10/09/19 77         Intake/Output Summary (Last 24 hours) at 3/19/2020 1242  Last data filed at 3/19/2020 0800  Gross per 24 hour   Intake 2403.94 ml   Output 1500 ml   Net 903.94 ml       Recent Labs     03/17/20  0410 03/18/20  0426 03/19/20  0400   WBC 29.5* 18.5* 9.7   HGB 10.7* 9.9* 8.9*   HCT 34.2* 32.2* 29.7*   MCV 77.2* 78.7* 79.8*    235 226     Recent Labs     03/18/20  0426 03/18/20  1350 03/18/20  2310 03/19/20  0400   * 150* 154* 153*   K 3.3* 3.5 3.2* 3.8   * 113* 117* 116*   CO2 26 27 27 26   BUN 25* 27* 29* 30*   CREATININE 1.5* 1.5* 1.4* 1.4*   MG 2.4 2.4 2.4  --      No results for input(s): PROTIME, INR in the last 72 hours. Recent Labs     03/17/20  0410 03/18/20  0426 03/19/20  0400   CKTOTAL 294* 147 98     No results for input(s): BNP in the last 72 hours. No results for input(s): CHOL, HDL, TRIG in the last 72 hours.     Invalid input(s): CHOLHDLR, LDLCALCU      furosemide (LASIX) injection 40 mg, Daily  insulin lispro (HUMALOG) injection vial 0-6 Units, Q4H  sulfacetamide (BLEPH-10) 10 % ophthalmic solution 1 drop, 4x Daily  aspirin chewable tablet 81 mg, Daily  glucose (GLUTOSE) 40 % oral gel 15 g, PRN  dextrose 50 % IV solution, PRN  glucagon (rDNA) injection 1 mg, PRN  dextrose 5 % solution, PRN  chlorhexidine (PERIDEX) 0.12 % solution 15 mL, BID  dextrose 5 % solution, PRN  ampicillin-sulbactam (UNASYN) 3 g ivpb minibag, 4x Daily  amiodarone (CORDARONE) tablet 400 mg, BID  fludrocortisone (FLORINEF) tablet 0.1 mg, Daily  norepinephrine (LEVOPHED) 16 mg in dextrose 5% 250 mL infusion, Continuous  heparin (porcine) injection 6,000 Units, PRN  heparin (porcine) injection 3,000 Units, PRN  heparin 25,000 units in dextrose 5% 250 mL infusion, Continuous  levothyroxine (SYNTHROID) tablet 125 mcg, Daily  sodium chloride flush 0.9 % injection 10 mL, 2 times per day  sodium chloride flush 0.9 % injection 10 mL, PRN  acetaminophen (TYLENOL) tablet 650 mg, Q6H PRN    Or  acetaminophen (TYLENOL) suppository 650 mg, Q6H PRN  polyethylene glycol (GLYCOLAX) packet 17 g, Daily PRN  promethazine (PHENERGAN) tablet 12.5 mg, Q6H PRN  albuterol (PROVENTIL) nebulizer solution 2.5 mg, Q4H  hydrocortisone sodium succinate PF (SOLU-CORTEF) injection 50 mg, Q6H  pantoprazole (PROTONIX) injection 40 mg, Daily    And  sodium chloride (PF) 0.9 % injection 10 mL, Daily        Review of systems:     Unobtainable         Physical exam:    Constitutional: As above  Eyes: Closed  ENT: clear, no bleeding. No external masses. Lips normal formation. Debated. Neck: supple, no JVD, no bruits, no lymphadenopathy. No masses. trachea midline. Heart: Regular rate & rhythm, normal S1 & S2. No heave. Lungs: CTA on the ventilator. No accessory muscles. Abd: soft, non-tender. Normal bowel sounds. Neuro: Full ROM X 4, EOMI, no tremors. EXT: No bilateral lower extremity edema  Skin: warm, dry, intact. Good turgor. Psych: A&O x 3, normal behavior, not anxious. Assessment/Recommendations  1. Abnormal troponin in the setting of acute on chronic renal insufficiency, pneumonia, rhabdo, hypoxemia, influenza respiratory infection. Possible type II non-ST elevation myocardial infarction. However, the echo shows normal LV size and systolic function with no wall motion abnormalities.

## 2020-03-20 ENCOUNTER — APPOINTMENT (OUTPATIENT)
Dept: GENERAL RADIOLOGY | Age: 85
DRG: 870 | End: 2020-03-20
Payer: MEDICARE

## 2020-03-20 LAB
ALBUMIN SERPL-MCNC: 2.4 G/DL (ref 3.5–5.2)
ALP BLD-CCNC: 77 U/L (ref 40–129)
ALT SERPL-CCNC: 48 U/L (ref 0–40)
ANION GAP SERPL CALCULATED.3IONS-SCNC: 11 MMOL/L (ref 7–16)
ANISOCYTOSIS: ABNORMAL
APTT: 71.4 SEC (ref 24.5–35.1)
AST SERPL-CCNC: 69 U/L (ref 0–39)
BASOPHILS ABSOLUTE: 0 E9/L (ref 0–0.2)
BASOPHILS RELATIVE PERCENT: 0.4 % (ref 0–2)
BILIRUB SERPL-MCNC: 0.2 MG/DL (ref 0–1.2)
BUN BLDV-MCNC: 29 MG/DL (ref 8–23)
CALCIUM SERPL-MCNC: 6.8 MG/DL (ref 8.6–10.2)
CHLORIDE BLD-SCNC: 110 MMOL/L (ref 98–107)
CO2: 27 MMOL/L (ref 22–29)
CREAT SERPL-MCNC: 1.3 MG/DL (ref 0.7–1.2)
EOSINOPHILS ABSOLUTE: 0.1 E9/L (ref 0.05–0.5)
EOSINOPHILS RELATIVE PERCENT: 0.9 % (ref 0–6)
GFR AFRICAN AMERICAN: >60
GFR NON-AFRICAN AMERICAN: 52 ML/MIN/1.73
GLUCOSE BLD-MCNC: 106 MG/DL (ref 74–99)
HCT VFR BLD CALC: 30.1 % (ref 37–54)
HEMOGLOBIN: 9.2 G/DL (ref 12.5–16.5)
LYMPHOCYTES ABSOLUTE: 0.56 E9/L (ref 1.5–4)
LYMPHOCYTES RELATIVE PERCENT: 5.2 % (ref 20–42)
MAGNESIUM: 2.3 MG/DL (ref 1.6–2.6)
MCH RBC QN AUTO: 24.6 PG (ref 26–35)
MCHC RBC AUTO-ENTMCNC: 30.6 % (ref 32–34.5)
MCV RBC AUTO: 80.5 FL (ref 80–99.9)
METAMYELOCYTES RELATIVE PERCENT: 2.6 % (ref 0–1)
METER GLUCOSE: 105 MG/DL (ref 74–99)
METER GLUCOSE: 94 MG/DL (ref 74–99)
MONOCYTES ABSOLUTE: 1.57 E9/L (ref 0.1–0.95)
MONOCYTES RELATIVE PERCENT: 13.9 % (ref 2–12)
MYELOCYTE PERCENT: 7.8 % (ref 0–0)
NEUTROPHILS ABSOLUTE: 8.96 E9/L (ref 1.8–7.3)
NEUTROPHILS RELATIVE PERCENT: 69.6 % (ref 43–80)
OVALOCYTES: ABNORMAL
PDW BLD-RTO: 16.8 FL (ref 11.5–15)
PHOSPHORUS: 1.8 MG/DL (ref 2.5–4.5)
PLATELET # BLD: 255 E9/L (ref 130–450)
PMV BLD AUTO: 11.3 FL (ref 7–12)
POIKILOCYTES: ABNORMAL
POLYCHROMASIA: ABNORMAL
POTASSIUM SERPL-SCNC: 3.3 MMOL/L (ref 3.5–5)
RBC # BLD: 3.74 E12/L (ref 3.8–5.8)
SODIUM BLD-SCNC: 148 MMOL/L (ref 132–146)
TOTAL CK: 92 U/L (ref 20–200)
TOTAL PROTEIN: 5.1 G/DL (ref 6.4–8.3)
WBC # BLD: 11.2 E9/L (ref 4.5–11.5)

## 2020-03-20 PROCEDURE — 92610 EVALUATE SWALLOWING FUNCTION: CPT

## 2020-03-20 PROCEDURE — 2580000003 HC RX 258: Performed by: INTERNAL MEDICINE

## 2020-03-20 PROCEDURE — 6360000002 HC RX W HCPCS: Performed by: INTERNAL MEDICINE

## 2020-03-20 PROCEDURE — 85025 COMPLETE CBC W/AUTO DIFF WBC: CPT

## 2020-03-20 PROCEDURE — 2580000003 HC RX 258

## 2020-03-20 PROCEDURE — 6370000000 HC RX 637 (ALT 250 FOR IP): Performed by: INTERNAL MEDICINE

## 2020-03-20 PROCEDURE — C9113 INJ PANTOPRAZOLE SODIUM, VIA: HCPCS | Performed by: INTERNAL MEDICINE

## 2020-03-20 PROCEDURE — 51702 INSERT TEMP BLADDER CATH: CPT | Performed by: NURSE PRACTITIONER

## 2020-03-20 PROCEDURE — 36415 COLL VENOUS BLD VENIPUNCTURE: CPT

## 2020-03-20 PROCEDURE — 71045 X-RAY EXAM CHEST 1 VIEW: CPT

## 2020-03-20 PROCEDURE — 2500000003 HC RX 250 WO HCPCS: Performed by: INTERNAL MEDICINE

## 2020-03-20 PROCEDURE — 99233 SBSQ HOSP IP/OBS HIGH 50: CPT | Performed by: INTERNAL MEDICINE

## 2020-03-20 PROCEDURE — 2580000003 HC RX 258: Performed by: SPECIALIST

## 2020-03-20 PROCEDURE — 80053 COMPREHEN METABOLIC PANEL: CPT

## 2020-03-20 PROCEDURE — 97530 THERAPEUTIC ACTIVITIES: CPT

## 2020-03-20 PROCEDURE — 82550 ASSAY OF CK (CPK): CPT

## 2020-03-20 PROCEDURE — 97166 OT EVAL MOD COMPLEX 45 MIN: CPT

## 2020-03-20 PROCEDURE — 84100 ASSAY OF PHOSPHORUS: CPT

## 2020-03-20 PROCEDURE — 2700000000 HC OXYGEN THERAPY PER DAY

## 2020-03-20 PROCEDURE — 83735 ASSAY OF MAGNESIUM: CPT

## 2020-03-20 PROCEDURE — 82962 GLUCOSE BLOOD TEST: CPT

## 2020-03-20 PROCEDURE — 2060000000 HC ICU INTERMEDIATE R&B

## 2020-03-20 PROCEDURE — 85730 THROMBOPLASTIN TIME PARTIAL: CPT

## 2020-03-20 PROCEDURE — 6370000000 HC RX 637 (ALT 250 FOR IP): Performed by: SPECIALIST

## 2020-03-20 PROCEDURE — 97535 SELF CARE MNGMENT TRAINING: CPT

## 2020-03-20 PROCEDURE — 99232 SBSQ HOSP IP/OBS MODERATE 35: CPT | Performed by: INTERNAL MEDICINE

## 2020-03-20 PROCEDURE — 94640 AIRWAY INHALATION TREATMENT: CPT

## 2020-03-20 PROCEDURE — 6360000002 HC RX W HCPCS: Performed by: SPECIALIST

## 2020-03-20 PROCEDURE — 97162 PT EVAL MOD COMPLEX 30 MIN: CPT

## 2020-03-20 RX ORDER — METOPROLOL SUCCINATE 50 MG/1
25 TABLET, EXTENDED RELEASE ORAL DAILY
Status: DISCONTINUED | OUTPATIENT
Start: 2020-03-20 | End: 2020-03-23 | Stop reason: HOSPADM

## 2020-03-20 RX ORDER — HYDROCORTISONE 5 MG/1
10 TABLET ORAL DAILY
Status: DISCONTINUED | OUTPATIENT
Start: 2020-03-21 | End: 2020-03-20

## 2020-03-20 RX ORDER — HYDROCORTISONE 5 MG/1
5 TABLET ORAL 2 TIMES DAILY
Status: DISCONTINUED | OUTPATIENT
Start: 2020-03-20 | End: 2020-03-20

## 2020-03-20 RX ORDER — POTASSIUM CHLORIDE 20 MEQ/1
40 TABLET, EXTENDED RELEASE ORAL ONCE
Status: DISCONTINUED | OUTPATIENT
Start: 2020-03-20 | End: 2020-03-20

## 2020-03-20 RX ORDER — HYDROCORTISONE 5 MG/1
10 TABLET ORAL
Status: DISCONTINUED | OUTPATIENT
Start: 2020-03-21 | End: 2020-03-23 | Stop reason: HOSPADM

## 2020-03-20 RX ORDER — FUROSEMIDE 10 MG/ML
40 INJECTION INTRAMUSCULAR; INTRAVENOUS 2 TIMES DAILY
Status: DISCONTINUED | OUTPATIENT
Start: 2020-03-20 | End: 2020-03-23 | Stop reason: HOSPADM

## 2020-03-20 RX ORDER — PANTOPRAZOLE SODIUM 40 MG/1
40 TABLET, DELAYED RELEASE ORAL
Status: DISCONTINUED | OUTPATIENT
Start: 2020-03-21 | End: 2020-03-23 | Stop reason: HOSPADM

## 2020-03-20 RX ORDER — HYDROCORTISONE 5 MG/1
5 TABLET ORAL
Status: DISCONTINUED | OUTPATIENT
Start: 2020-03-20 | End: 2020-03-23 | Stop reason: HOSPADM

## 2020-03-20 RX ORDER — POTASSIUM CHLORIDE 29.8 MG/ML
20 INJECTION INTRAVENOUS ONCE
Status: COMPLETED | OUTPATIENT
Start: 2020-03-20 | End: 2020-03-20

## 2020-03-20 RX ORDER — ALBUTEROL SULFATE 2.5 MG/3ML
2.5 SOLUTION RESPIRATORY (INHALATION) EVERY 6 HOURS PRN
Status: DISCONTINUED | OUTPATIENT
Start: 2020-03-20 | End: 2020-03-23 | Stop reason: HOSPADM

## 2020-03-20 RX ORDER — POTASSIUM CHLORIDE 29.8 MG/ML
20 INJECTION INTRAVENOUS
Status: DISCONTINUED | OUTPATIENT
Start: 2020-03-20 | End: 2020-03-20

## 2020-03-20 RX ADMIN — SULFACETAMIDE SODIUM 1 DROP: 100 SOLUTION OPHTHALMIC at 17:38

## 2020-03-20 RX ADMIN — AMPICILLIN SODIUM AND SULBACTAM SODIUM 3 G: 2; 1 INJECTION, POWDER, FOR SOLUTION INTRAMUSCULAR; INTRAVENOUS at 21:05

## 2020-03-20 RX ADMIN — HYDROCORTISONE SODIUM SUCCINATE 25 MG: 100 INJECTION, POWDER, FOR SOLUTION INTRAMUSCULAR; INTRAVENOUS at 07:07

## 2020-03-20 RX ADMIN — ASPIRIN 81 MG 81 MG: 81 TABLET ORAL at 09:54

## 2020-03-20 RX ADMIN — SULFACETAMIDE SODIUM 1 DROP: 100 SOLUTION OPHTHALMIC at 12:29

## 2020-03-20 RX ADMIN — AMPICILLIN SODIUM AND SULBACTAM SODIUM 3 G: 2; 1 INJECTION, POWDER, FOR SOLUTION INTRAMUSCULAR; INTRAVENOUS at 09:51

## 2020-03-20 RX ADMIN — METOPROLOL SUCCINATE 25 MG: 50 TABLET, EXTENDED RELEASE ORAL at 12:28

## 2020-03-20 RX ADMIN — FLUDROCORTISONE ACETATE 0.1 MG: 0.1 TABLET ORAL at 09:51

## 2020-03-20 RX ADMIN — SULFACETAMIDE SODIUM 1 DROP: 100 SOLUTION OPHTHALMIC at 21:04

## 2020-03-20 RX ADMIN — CHLORHEXIDINE GLUCONATE 0.12% ORAL RINSE 15 ML: 1.2 LIQUID ORAL at 09:53

## 2020-03-20 RX ADMIN — SULFACETAMIDE SODIUM 1 DROP: 100 SOLUTION OPHTHALMIC at 09:55

## 2020-03-20 RX ADMIN — HYDROCORTISONE SODIUM SUCCINATE 25 MG: 100 INJECTION, POWDER, FOR SOLUTION INTRAMUSCULAR; INTRAVENOUS at 01:03

## 2020-03-20 RX ADMIN — HYDROCORTISONE 5 MG: 5 TABLET ORAL at 12:28

## 2020-03-20 RX ADMIN — HYDROCORTISONE 5 MG: 5 TABLET ORAL at 17:38

## 2020-03-20 RX ADMIN — WATER 2 ML: 1 INJECTION INTRAMUSCULAR; INTRAVENOUS; SUBCUTANEOUS at 01:04

## 2020-03-20 RX ADMIN — FUROSEMIDE 40 MG: 10 INJECTION, SOLUTION INTRAMUSCULAR; INTRAVENOUS at 17:38

## 2020-03-20 RX ADMIN — Medication 45 MG: at 12:29

## 2020-03-20 RX ADMIN — SODIUM CHLORIDE, PRESERVATIVE FREE 10 ML: 5 INJECTION INTRAVENOUS at 09:53

## 2020-03-20 RX ADMIN — POTASSIUM CHLORIDE 20 MEQ: 400 INJECTION, SOLUTION INTRAVENOUS at 09:51

## 2020-03-20 RX ADMIN — Medication 10 ML: at 14:36

## 2020-03-20 RX ADMIN — ALBUTEROL SULFATE 2.5 MG: 2.5 SOLUTION RESPIRATORY (INHALATION) at 11:13

## 2020-03-20 RX ADMIN — LEVOTHYROXINE SODIUM 125 MCG: 0.12 TABLET ORAL at 09:51

## 2020-03-20 RX ADMIN — SODIUM CHLORIDE, PRESERVATIVE FREE 10 ML: 5 INJECTION INTRAVENOUS at 21:05

## 2020-03-20 RX ADMIN — FUROSEMIDE 40 MG: 10 INJECTION, SOLUTION INTRAMUSCULAR; INTRAVENOUS at 09:55

## 2020-03-20 RX ADMIN — POTASSIUM PHOSPHATE, MONOBASIC AND POTASSIUM PHOSPHATE, DIBASIC 15 MMOL: 224; 236 INJECTION, SOLUTION, CONCENTRATE INTRAVENOUS at 09:51

## 2020-03-20 RX ADMIN — DEXTROSE MONOHYDRATE: 50 INJECTION, SOLUTION INTRAVENOUS at 04:26

## 2020-03-20 RX ADMIN — SODIUM CHLORIDE, PRESERVATIVE FREE 10 ML: 5 INJECTION INTRAVENOUS at 09:56

## 2020-03-20 RX ADMIN — DEXTROSE MONOHYDRATE: 50 INJECTION, SOLUTION INTRAVENOUS at 17:38

## 2020-03-20 RX ADMIN — HEPARIN SODIUM 11 UNITS/KG/HR: 10000 INJECTION, SOLUTION INTRAVENOUS at 06:49

## 2020-03-20 RX ADMIN — PANTOPRAZOLE SODIUM 40 MG: 40 INJECTION, POWDER, FOR SOLUTION INTRAVENOUS at 09:53

## 2020-03-20 RX ADMIN — AMPICILLIN SODIUM AND SULBACTAM SODIUM 3 G: 2; 1 INJECTION, POWDER, FOR SOLUTION INTRAMUSCULAR; INTRAVENOUS at 13:01

## 2020-03-20 RX ADMIN — AMIODARONE HYDROCHLORIDE 400 MG: 200 TABLET ORAL at 09:54

## 2020-03-20 ASSESSMENT — PAIN SCALES - GENERAL
PAINLEVEL_OUTOF10: 0

## 2020-03-20 NOTE — PROGRESS NOTES
Physical Therapy    Physical Therapy Initial Assessment     Name: Armenta Buerger  : 1931  MRN: [id number]    Referring Provider:  Charisma Krueger MD    Date of Service: 3/20/2020    Evaluating PT:  Brandonricky Rizo, PT, DPT [id number]     Room #:  7388/8294-L  Diagnosis:  Acute respiratory failure with hypoxia   PMHx/PSHx:  Afib, hypopituitarism, hypothyroid, osteopenia, renal insufficiency, RBBB  Other:  Pt extubated 3/19/20   Precautions:  Falls, Droplet for Flu, O2, [place]  Equipment Needs:  TBD    SUBJECTIVE:    Pt lives alone in a 2.5 story home with 2 stairs to enter and 0 rail. Bedroom and bathroom are on the first level. Pt ambulated with no AD PTA. States he has no immediate upstairs needs. OBJECTIVE:   Initial Evaluation  Date: 3/20/20 Treatment Short Term/ Long Term   Goals   AM-PAC 6 Clicks      Was pt agreeable to Eval/treatment? Yes      Does pt have pain? None      Bed Mobility  Rolling: NT  Supine to sit: Max A  Sit to supine: Max A x2  Scooting: Max A  Rolling: Min A  Supine to sit: Min A  Sit to supine: Min A  Scooting: Min A   Transfers Sit to stand: Max A x2  Stand to sit: Max A  Stand pivot: NT  Sit to stand: Min A  Stand to sit: Min A  Stand pivot: Min A with AAD   Ambulation    3 side steps with [employer] Max A  >75 feet with AAD Min A   Stair negotiation: ascended and descended  NT  >2 steps with 2 rail Mod A   ROM BUE:  Per OT eval  BLE:  WFL     Strength BUE:  Per OT eval   BLE:  Grossly 4-/5      Balance Sitting EOB:  CGA<>MIn A  Dynamic Standing:   Max A with [employer]   Sitting EOB:  Independent   Dynamic Standing:  Min A with AAD     Pt is A & O x 3 -- not month   RASS:  0  CAM-ICU:  Negative   Sensation:  Pt denies numbness and tingling to extremities  Edema:  Unremarkable     Vitals:  Blood Pressure at rest 123/66 art line Blood Pressure post session 128/64 art line   Heart Rate at rest 77 bpm Heart Rate post session 65 bpm   SPO2 at rest 94% on 6 L SPO2 post session 96% on 6 L

## 2020-03-20 NOTE — PROGRESS NOTES
safety and efficiency of treatment session. Overall, patient demonstrates significant difficulties with completion of BADLs and IADLs. Factors contributing to these difficulties include, increased dependence on supplemental oxygen, decreased endurance, and generalized weakness. Required co-evaluation with PT due to medical acuity and level of physical assistance needed. As noted above, patient likely to benefit from further OT intervention to increase independence, safety, and overall quality of life. Treatment:  · Bed mobility: Facilitated bed mobility with cues for proper body mechanics and sequencing to prepare for ADL completion. · Functional transfers: Facilitated transfers from various surfaces with cues for body alignment, safety and hand placement. · ADL completion: Self-care retraining for the above-mentioned ADLs; training on proper hand placement, safety technique, sequencing, and energy conservation techniques. · Therapeutic Exercises: B UE AROM/AAROM completed at all levels to maintain strength/flexibility and to decrease edema/contractures to enhance ADL completion. Additional exercises completed (chin tucks and scapular retraction x10 reps each) to increase posture for functional activities. · Postural Balance: Sitting and/or standing balance retraining to improve righting reactions with postural changes during ADLS. · Cognitive/Perceptual training: retraining exercises to improve attention, mentation, and spatial awareness for ADLs & transfers. · Skilled positioning: Proper positioning to improve interaction with environment, overall functioning and decrease/prevent edema and contractures. · Delirium Prevention/Management: Implementation of non-pharmacologic interventions for delirium prevention incorporated throughout session to patient.  Including environmental and sensory modifications to decrease patient's internal distraction caused by delirium, and improve overall mentation. Eval Complexity:   · Medium Complexity  · History: Expanded review of medical records and additional review of physical, cognitive, or psychosocial history related to current functional performance  · Exam: 3+ performance deficits  · Assistance/Modification: Min/mod assistance or modifications required to perform tasks. May have comorbidities that affect occupational performance. Assessment of current deficits   Functional mobility [x]  ADLs [x] Strength [x]  Cognition []  Functional transfers  [x] IADLs [x] Safety Awareness []  Endurance [x]  Fine Motor Coordination [x] Balance [x] Vision/perception [] Sensation []   Gross Motor Coordination [x] ROM [x] Delirium []                  Motor Control []    Plan of Care: 1-5 tx/wk PRN  ADL retraining [x]   Equipment needs [x]   Neuromuscular re-education [] Energy Conservation Techniques [x]  Functional Transfer training [x] Patient and/or Family Education [x]  Functional Mobility training [x]  Environmental Modifications [x]  Cognitive re-training []   Compensatory techniques for ADLs [x]  Splinting Needs []   Positioning to improve overall function [x]   Therapeutic Activity [x]                       Therapeutic Exercise  [x]  Visual/Perceptual: []    Delirium prevention/treatment  [x]   Other:  []    Rehab Potential: Good for established goals    Patient / Family Goal: None stated     Patient and/or family were instructed/educated on diagnosis, prognosis/goals and plan of care. Demonstrated F understanding, further information may be needed. [] Malnutrition indicators have been identified and nursing has been notified to ensure a dietitian consult is ordered.         (Evaluation time includes thorough review of current medical information, gathering information on past medical history/social history and prior level of function, completion of standardized testing/informal observation of tasks, assessment of data and development of

## 2020-03-20 NOTE — PROGRESS NOTES
93 % --   03/19/20 1200 -- 98.9 °F (37.2 °C) Oral 74 16 93 % --   03/19/20 1100 -- -- -- 78 21 91 % --   03/19/20 1000 -- -- -- 72 18 95 % --   03/19/20 0950 -- -- -- -- 16 96 % --   @      Intake/Output Summary (Last 24 hours) at 3/20/2020 0946  Last data filed at 3/20/2020 0700  Gross per 24 hour   Intake 3998 ml   Output 2850 ml   Net 1148 ml         Wt Readings from Last 3 Encounters:   03/20/20 227 lb (103 kg)   10/08/19 207 lb 8 oz (94.1 kg)       Constitutional:  Pt is in nad  Head: normocephalic, atraumatic  Neck: no JVD  Cardiovascular: regular rate and rhythm, no murmurs, gallops, or rubs  Respiratory:  No rales, rhochi, or wheezes  Gastrointestinal:  Soft, nontender, nondistended, bowel sounds x 4  Ext: no edema  Skin: dry, no rash      MEDS (scheduled):    potassium phosphate IVPB  15 mmol Intravenous Once    potassium chloride  20 mEq Intravenous Once    furosemide  40 mg Intravenous Daily    insulin lispro  0-6 Units Subcutaneous Q4H    oseltamivir 6mg/ml  45 mg Oral BID    albuterol  2.5 mg Nebulization 4x daily    hydrocortisone sodium succinate PF  25 mg Intravenous Q8H    sulfacetamide  1 drop Left Eye 4x Daily    aspirin  81 mg Oral Daily    chlorhexidine  15 mL Mouth/Throat BID    ampicillin-sulbactam  3 g Intravenous 4x Daily    amiodarone  400 mg Oral BID    fludrocortisone  0.1 mg Oral Daily    levothyroxine  125 mcg Oral Daily    sodium chloride flush  10 mL Intravenous 2 times per day    pantoprazole  40 mg Intravenous Daily    And    sodium chloride (PF)  10 mL Intravenous Daily       MEDS (infusions):   dextrose 100 mL/hr at 03/20/20 0426    dextrose      dextrose      norepinephrine Stopped (03/19/20 2335)    heparin (porcine) 11 Units/kg/hr (03/20/20 0649)       MEDS (prn):  glucose, dextrose, glucagon (rDNA), dextrose, dextrose, heparin (porcine), heparin (porcine), sodium chloride flush, acetaminophen **OR** acetaminophen, polyethylene glycol, promethazine **OR** Vol overload  On cxr  Net in 3.5 L  Will dose lasix 40 mg iv q 12      Chaitanya Foss MD

## 2020-03-20 NOTE — PROGRESS NOTES
Humza Camilo per Dr Cordell Weinstein for patient to transfer to floor with right IJ TLC, IV team notified.

## 2020-03-20 NOTE — CARE COORDINATION
3/20 Transition of Care, Spoke with Patric's son Joy Walters need for HERMILO once ready for discharge. Talked about Acute. Also. Will have both PM&R and [facility] review. Spoke with [facility] they are not taking isolation patients at this time. Emily from [facility] will review. Also emailed his son Siomara Mullins the [email or web address] for [facility]. He will review with his siblings and get back with CM. Duy Fairbanks RN, cm    3/20 Update. Nahed from [facility] accepted [address], will follow and start Precert Monday. Placed on Weekend PT/OT Treatment list. Updated his son N[address]ie [address].  Duy Fairbanks RN cm

## 2020-03-20 NOTE — PROGRESS NOTES
[address] [place] [address] [facility]  [facility]  Progress Note      Chief Complaint   Patient presents with    Shortness of Breath     pt had welfare check and found unresponsive on floor with emesis noted= pulse ox 86% on room air per ems-pt being bagged  on arrival to ed. pt to be intubated. unresponsive on arrival to ed. .       SUBJECTIVE:  Patient is tolerating medications. No reported adverse drug reactions. No nausea, vomiting, diarrhea. -NG tube tolerating tube feedings  Extubated and doing well awake and alert  Temperature is down  Off pressors  Review of systems:  As stated above in the chief complaint, otherwise negative.     Medications:  Scheduled Meds:   potassium phosphate IVPB  15 mmol Intravenous Once    furosemide  40 mg Intravenous BID    oseltamivir 6mg/ml  45 mg Oral BID    albuterol  2.5 mg Nebulization 4x daily    hydrocortisone sodium succinate PF  25 mg Intravenous Q8H    sulfacetamide  1 drop Left Eye 4x Daily    aspirin  81 mg Oral Daily    ampicillin-sulbactam  3 g Intravenous 4x Daily    amiodarone  400 mg Oral BID    fludrocortisone  0.1 mg Oral Daily    levothyroxine  125 mcg Oral Daily    sodium chloride flush  10 mL Intravenous 2 times per day    pantoprazole  40 mg Intravenous Daily    And    sodium chloride (PF)  10 mL Intravenous Daily     Continuous Infusions:   dextrose 100 mL/hr at 20 0426    dextrose      dextrose      heparin (porcine) 11 Units/kg/hr (20 0649)     PRN Meds:glucose, dextrose, glucagon (rDNA), dextrose, dextrose, heparin (porcine), heparin (porcine), sodium chloride flush, acetaminophen **OR** acetaminophen, polyethylene glycol, promethazine **OR** [DISCONTINUED] ondansetron    OBJECTIVE:  BP (!) 118/48   Pulse 66   Temp 98.6 °F (37 °C) (Oral)   Resp 18   Ht 6' 0.84\" (1.85 m)   Wt 227 lb (103 kg)   SpO2 94%   BMI 30.08 kg/m²   Temp  Av.5 °F (36.9 °C)  Min: 98.2 °F (36.8 °C)  Max: 98.9 °F (37.2 °C)  Constitutional: growth 10/05/2019     Lab Results   Component Value Date    BLOODCULT2 5 Days- no growth 03/14/2020    BLOODCULT2 5 Days- no growth 10/05/2019     No results found for: WNDABS  Smear, Respiratory   Date Value Ref Range Status   03/15/2020   Final    Group 5: >25 PMN's/LPF and <10 Epithelial cells/LPF  Abundant Polymorphonuclear leukocytes  Rare Epithelial cells  Few Gram positive cocci in pairs       No results found for: MPNEUMO, CLAMYDCU, LABLEGI, AFBCX, FUNGSM, LABFUNG  CULTURE, RESPIRATORY   Date Value Ref Range Status   03/15/2020 Oral Pharyngeal Nora present  Final     No results found for: CXCATHTIP  No results found for: BFCS  No results found for: CXSURG  Urine Culture, Routine   Date Value Ref Range Status   10/05/2019 Growth not present  Final     MRSA Culture Only   Date Value Ref Range Status   03/14/2020 Methicillin resistant Staph aureus not isolated  Final       ASSESSMENT:  · Influenza A-on therapy-continue past 5 days  · Aspiration pneumonia-improving  · Acute kidney injury-improving slowly  · Leukocytosis resolving slowly    PLAN:    · tamiflu should be continued for total 10 days  · Unasyn with sputum growing normal oral nora  · Check final cultures  · Monitor labs    Lola Schmidt  11:05 AM  3/20/2020

## 2020-03-20 NOTE — PROGRESS NOTES
[address] [facility]  Department of Internal Medicine  Division of Pulmonary, Critical Care and Sleep Medicine  Progress Note      Patient:  Mariann Deluca 80 y.o. male   MRN: [id number]       Date of Service: 3/20/2020    Allergy: Codeine; Diazepam; Lorazepam; and Morphine    Subjective       3/19: pr seen in icu, extubated and no longer on tube feeds, ns running  Stable overnight. Continue  + tamiflu  ID changed Zosyn to Unasyn with sputum growing normal oral nora  D5 for high sodium  +1.4 liters  No acute complaints this AM.    He states that he is hard of hearing and is hungry. Patient passed bedside swallow and started on diet. For transfer out of ICU today. Objective     TEMPERATURE:  Current - Temp: 98.5 °F (36.9 °C); Max - Temp  Av.4 °F (36.9 °C)  Min: 98 °F (36.7 °C)  Max: 98.6 °F (37 °C)  RESPIRATIONS RANGE: Resp  Av.5  Min: 11  Max: 20  PULSE RANGE: Pulse  Av.4  Min: 53  Max: 75  BLOOD PRESSURE RANGE:  Systolic (21OYO), TFC:119 , Min:114 , RNW:206   ; Diastolic (94YGX), BLAKE:36, Min:47, Max:83    PULSE OXIMETRY RANGE: SpO2  Av.4 %  Min: 91 %  Max: 97 %    I & O - 24hr:    Intake/Output Summary (Last 24 hours) at 3/20/2020 1612  Last data filed at 3/20/2020 1500  Gross per 24 hour   Intake 5628 ml   Output 2875 ml   Net 2753 ml     I/O last 3 completed shifts: In: 6699 [P.O.:1950; I.V.:2678; IV Piggyback:1000]  Out: 1788 [Urine:3075] No intake/output data recorded. Weight change: -1 lb (-0.454 kg)    Physical Exam:  General Appearance:  Alert off ventilator   HEENT:    NC/AT, mucous membranes are moist   Neck:   Supple, no jugular venous distention. Resp:     Decreased breath sounds bilaterally   Heart:    RRR, S1 and S2 normal, no murmur, rub or gallop.     Abdomen:     Soft, non-tender, non-distended with normal bowel sounds   Extremities:   Atraumatic, no cyanosis or edema   Pulses:  Radial and pedal

## 2020-03-21 ENCOUNTER — APPOINTMENT (OUTPATIENT)
Dept: GENERAL RADIOLOGY | Age: 85
DRG: 870 | End: 2020-03-21
Payer: MEDICARE

## 2020-03-21 PROBLEM — J69.0 ASPIRATION PNEUMONIA (HCC): Status: ACTIVE | Noted: 2020-03-21

## 2020-03-21 PROBLEM — N17.9 ACUTE RENAL FAILURE SUPERIMPOSED ON STAGE 2 CHRONIC KIDNEY DISEASE (HCC): Status: ACTIVE | Noted: 2020-03-21

## 2020-03-21 PROBLEM — J10.1 INFLUENZA A: Status: ACTIVE | Noted: 2020-03-21

## 2020-03-21 PROBLEM — R00.0 WIDE-COMPLEX TACHYCARDIA: Status: ACTIVE | Noted: 2020-03-21

## 2020-03-21 PROBLEM — E27.1 ADRENAL INSUFFICIENCY (ADDISON'S DISEASE) (HCC): Status: ACTIVE | Noted: 2020-03-21

## 2020-03-21 PROBLEM — G93.41 METABOLIC ENCEPHALOPATHY: Status: ACTIVE | Noted: 2020-03-21

## 2020-03-21 PROBLEM — R79.89 ELEVATED TROPONIN: Status: ACTIVE | Noted: 2020-03-21

## 2020-03-21 PROBLEM — M62.82 NON-TRAUMATIC RHABDOMYOLYSIS: Status: ACTIVE | Noted: 2020-03-21

## 2020-03-21 PROBLEM — N18.2 ACUTE RENAL FAILURE SUPERIMPOSED ON STAGE 2 CHRONIC KIDNEY DISEASE (HCC): Status: ACTIVE | Noted: 2020-03-21

## 2020-03-21 PROBLEM — R77.8 ELEVATED TROPONIN: Status: ACTIVE | Noted: 2020-03-21

## 2020-03-21 LAB
ANION GAP SERPL CALCULATED.3IONS-SCNC: 11 MMOL/L (ref 7–16)
ANION GAP SERPL CALCULATED.3IONS-SCNC: 14 MMOL/L (ref 7–16)
ANISOCYTOSIS: ABNORMAL
APTT: 64.1 SEC (ref 24.5–35.1)
BASOPHILS ABSOLUTE: 0 E9/L (ref 0–0.2)
BASOPHILS RELATIVE PERCENT: 0 % (ref 0–2)
BUN BLDV-MCNC: 25 MG/DL (ref 8–23)
BUN BLDV-MCNC: 26 MG/DL (ref 8–23)
CALCIUM SERPL-MCNC: 6.6 MG/DL (ref 8.6–10.2)
CALCIUM SERPL-MCNC: 6.8 MG/DL (ref 8.6–10.2)
CHLORIDE BLD-SCNC: 103 MMOL/L (ref 98–107)
CHLORIDE BLD-SCNC: 105 MMOL/L (ref 98–107)
CO2: 29 MMOL/L (ref 22–29)
CO2: 30 MMOL/L (ref 22–29)
CREAT SERPL-MCNC: 1.4 MG/DL (ref 0.7–1.2)
CREAT SERPL-MCNC: 1.4 MG/DL (ref 0.7–1.2)
EOSINOPHILS ABSOLUTE: 0.32 E9/L (ref 0.05–0.5)
EOSINOPHILS RELATIVE PERCENT: 2 % (ref 0–6)
GFR AFRICAN AMERICAN: 58
GFR AFRICAN AMERICAN: 58
GFR NON-AFRICAN AMERICAN: 48 ML/MIN/1.73
GFR NON-AFRICAN AMERICAN: 48 ML/MIN/1.73
GLUCOSE BLD-MCNC: 71 MG/DL (ref 74–99)
GLUCOSE BLD-MCNC: 99 MG/DL (ref 74–99)
HCT VFR BLD CALC: 32.9 % (ref 37–54)
HEMOGLOBIN: 9.6 G/DL (ref 12.5–16.5)
LYMPHOCYTES ABSOLUTE: 0.47 E9/L (ref 1.5–4)
LYMPHOCYTES RELATIVE PERCENT: 3 % (ref 20–42)
MAGNESIUM: 2 MG/DL (ref 1.6–2.6)
MCH RBC QN AUTO: 24.1 PG (ref 26–35)
MCHC RBC AUTO-ENTMCNC: 29.2 % (ref 32–34.5)
MCV RBC AUTO: 82.5 FL (ref 80–99.9)
METAMYELOCYTES RELATIVE PERCENT: 4 % (ref 0–1)
METER GLUCOSE: 75 MG/DL (ref 74–99)
METER GLUCOSE: 82 MG/DL (ref 74–99)
METER GLUCOSE: 88 MG/DL (ref 74–99)
METER GLUCOSE: 88 MG/DL (ref 74–99)
MONOCYTES ABSOLUTE: 1.26 E9/L (ref 0.1–0.95)
MONOCYTES RELATIVE PERCENT: 8 % (ref 2–12)
MYELOCYTE PERCENT: 2 % (ref 0–0)
NEUTROPHILS ABSOLUTE: 13.75 E9/L (ref 1.8–7.3)
NEUTROPHILS RELATIVE PERCENT: 81 % (ref 43–80)
OVALOCYTES: ABNORMAL
PDW BLD-RTO: 16.4 FL (ref 11.5–15)
PLATELET # BLD: 296 E9/L (ref 130–450)
PLATELET # BLD: 301 E9/L (ref 130–450)
PMV BLD AUTO: 11.4 FL (ref 7–12)
POIKILOCYTES: ABNORMAL
POLYCHROMASIA: ABNORMAL
POTASSIUM REFLEX MAGNESIUM: 2.9 MMOL/L (ref 3.5–5)
POTASSIUM SERPL-SCNC: 2.9 MMOL/L (ref 3.5–5)
RBC # BLD: 3.99 E12/L (ref 3.8–5.8)
SODIUM BLD-SCNC: 146 MMOL/L (ref 132–146)
SODIUM BLD-SCNC: 146 MMOL/L (ref 132–146)
TOTAL CK: 97 U/L (ref 20–200)
WBC # BLD: 15.8 E9/L (ref 4.5–11.5)

## 2020-03-21 PROCEDURE — 99232 SBSQ HOSP IP/OBS MODERATE 35: CPT | Performed by: INTERNAL MEDICINE

## 2020-03-21 PROCEDURE — 2580000003 HC RX 258: Performed by: INTERNAL MEDICINE

## 2020-03-21 PROCEDURE — 2500000003 HC RX 250 WO HCPCS: Performed by: INTERNAL MEDICINE

## 2020-03-21 PROCEDURE — 6360000002 HC RX W HCPCS: Performed by: INTERNAL MEDICINE

## 2020-03-21 PROCEDURE — 71045 X-RAY EXAM CHEST 1 VIEW: CPT

## 2020-03-21 PROCEDURE — 6370000000 HC RX 637 (ALT 250 FOR IP): Performed by: INTERNAL MEDICINE

## 2020-03-21 PROCEDURE — 36415 COLL VENOUS BLD VENIPUNCTURE: CPT

## 2020-03-21 PROCEDURE — 2700000000 HC OXYGEN THERAPY PER DAY

## 2020-03-21 PROCEDURE — 82550 ASSAY OF CK (CPK): CPT

## 2020-03-21 PROCEDURE — 82962 GLUCOSE BLOOD TEST: CPT

## 2020-03-21 PROCEDURE — 80048 BASIC METABOLIC PNL TOTAL CA: CPT

## 2020-03-21 PROCEDURE — 6360000002 HC RX W HCPCS: Performed by: NURSE PRACTITIONER

## 2020-03-21 PROCEDURE — 2060000000 HC ICU INTERMEDIATE R&B

## 2020-03-21 PROCEDURE — 85730 THROMBOPLASTIN TIME PARTIAL: CPT

## 2020-03-21 PROCEDURE — 83735 ASSAY OF MAGNESIUM: CPT

## 2020-03-21 PROCEDURE — 85049 AUTOMATED PLATELET COUNT: CPT

## 2020-03-21 PROCEDURE — 85025 COMPLETE CBC W/AUTO DIFF WBC: CPT

## 2020-03-21 RX ORDER — POTASSIUM CHLORIDE 29.8 MG/ML
20 INJECTION INTRAVENOUS ONCE
Status: COMPLETED | OUTPATIENT
Start: 2020-03-21 | End: 2020-03-21

## 2020-03-21 RX ORDER — DEXTROSE MONOHYDRATE 50 MG/ML
INJECTION, SOLUTION INTRAVENOUS CONTINUOUS
Status: DISCONTINUED | OUTPATIENT
Start: 2020-03-21 | End: 2020-03-21

## 2020-03-21 RX ORDER — POTASSIUM CHLORIDE 29.8 MG/ML
20 INJECTION INTRAVENOUS
Status: COMPLETED | OUTPATIENT
Start: 2020-03-21 | End: 2020-03-21

## 2020-03-21 RX ADMIN — AMPICILLIN SODIUM AND SULBACTAM SODIUM 3 G: 2; 1 INJECTION, POWDER, FOR SOLUTION INTRAMUSCULAR; INTRAVENOUS at 13:15

## 2020-03-21 RX ADMIN — SODIUM CHLORIDE, PRESERVATIVE FREE 10 ML: 5 INJECTION INTRAVENOUS at 08:37

## 2020-03-21 RX ADMIN — POTASSIUM CHLORIDE 20 MEQ: 29.8 INJECTION, SOLUTION INTRAVENOUS at 21:34

## 2020-03-21 RX ADMIN — SULFACETAMIDE SODIUM 1 DROP: 100 SOLUTION OPHTHALMIC at 21:53

## 2020-03-21 RX ADMIN — AMPICILLIN SODIUM AND SULBACTAM SODIUM 3 G: 2; 1 INJECTION, POWDER, FOR SOLUTION INTRAMUSCULAR; INTRAVENOUS at 23:27

## 2020-03-21 RX ADMIN — SULFACETAMIDE SODIUM 1 DROP: 100 SOLUTION OPHTHALMIC at 17:16

## 2020-03-21 RX ADMIN — AMPICILLIN SODIUM AND SULBACTAM SODIUM 3 G: 2; 1 INJECTION, POWDER, FOR SOLUTION INTRAMUSCULAR; INTRAVENOUS at 08:25

## 2020-03-21 RX ADMIN — Medication 45 MG: at 00:50

## 2020-03-21 RX ADMIN — ACETAMINOPHEN 650 MG: 325 TABLET, FILM COATED ORAL at 17:25

## 2020-03-21 RX ADMIN — SODIUM CHLORIDE, PRESERVATIVE FREE 10 ML: 5 INJECTION INTRAVENOUS at 21:40

## 2020-03-21 RX ADMIN — POTASSIUM CHLORIDE 20 MEQ: 29.8 INJECTION, SOLUTION INTRAVENOUS at 22:34

## 2020-03-21 RX ADMIN — SULFACETAMIDE SODIUM 1 DROP: 100 SOLUTION OPHTHALMIC at 08:30

## 2020-03-21 RX ADMIN — HEPARIN SODIUM 11.02 UNITS/KG/HR: 10000 INJECTION, SOLUTION INTRAVENOUS at 04:37

## 2020-03-21 RX ADMIN — HYDROCORTISONE 10 MG: 5 TABLET ORAL at 08:29

## 2020-03-21 RX ADMIN — SULFACETAMIDE SODIUM 1 DROP: 100 SOLUTION OPHTHALMIC at 13:16

## 2020-03-21 RX ADMIN — METOPROLOL SUCCINATE 25 MG: 50 TABLET, EXTENDED RELEASE ORAL at 08:29

## 2020-03-21 RX ADMIN — LEVOTHYROXINE SODIUM 125 MCG: 0.12 TABLET ORAL at 06:28

## 2020-03-21 RX ADMIN — FUROSEMIDE 40 MG: 10 INJECTION, SOLUTION INTRAMUSCULAR; INTRAVENOUS at 17:27

## 2020-03-21 RX ADMIN — Medication 45 MG: at 21:39

## 2020-03-21 RX ADMIN — PANTOPRAZOLE SODIUM 40 MG: 40 TABLET, DELAYED RELEASE ORAL at 06:28

## 2020-03-21 RX ADMIN — Medication 45 MG: at 08:30

## 2020-03-21 RX ADMIN — HYDROCORTISONE 5 MG: 5 TABLET ORAL at 11:33

## 2020-03-21 RX ADMIN — FLUDROCORTISONE ACETATE 0.1 MG: 0.1 TABLET ORAL at 08:29

## 2020-03-21 RX ADMIN — ASPIRIN 81 MG 81 MG: 81 TABLET ORAL at 08:29

## 2020-03-21 RX ADMIN — POTASSIUM CHLORIDE 20 MEQ: 29.8 INJECTION, SOLUTION INTRAVENOUS at 09:42

## 2020-03-21 RX ADMIN — HYDROCORTISONE 5 MG: 5 TABLET ORAL at 17:16

## 2020-03-21 RX ADMIN — FUROSEMIDE 40 MG: 10 INJECTION, SOLUTION INTRAMUSCULAR; INTRAVENOUS at 08:27

## 2020-03-21 RX ADMIN — AMPICILLIN SODIUM AND SULBACTAM SODIUM 3 G: 2; 1 INJECTION, POWDER, FOR SOLUTION INTRAMUSCULAR; INTRAVENOUS at 17:27

## 2020-03-21 RX ADMIN — POTASSIUM PHOSPHATE, MONOBASIC AND POTASSIUM PHOSPHATE, DIBASIC 10 MMOL: 224; 236 INJECTION, SOLUTION, CONCENTRATE INTRAVENOUS at 02:00

## 2020-03-21 ASSESSMENT — PAIN DESCRIPTION - DESCRIPTORS: DESCRIPTORS: ACHING;CONSTANT;SORE

## 2020-03-21 ASSESSMENT — PAIN DESCRIPTION - ONSET: ONSET: GRADUAL

## 2020-03-21 ASSESSMENT — PAIN SCALES - GENERAL
PAINLEVEL_OUTOF10: 0
PAINLEVEL_OUTOF10: 5

## 2020-03-21 ASSESSMENT — PAIN DESCRIPTION - LOCATION: LOCATION: BACK

## 2020-03-21 ASSESSMENT — PAIN DESCRIPTION - PAIN TYPE: TYPE: ACUTE PAIN

## 2020-03-21 ASSESSMENT — PAIN DESCRIPTION - PROGRESSION: CLINICAL_PROGRESSION: GRADUALLY WORSENING

## 2020-03-21 ASSESSMENT — PAIN DESCRIPTION - FREQUENCY: FREQUENCY: CONTINUOUS

## 2020-03-21 NOTE — PROGRESS NOTES
[address] [place] [address] [facility]  [facility]  Progress Note      Chief Complaint   Patient presents with    Shortness of Breath     pt had welfare check and found unresponsive on floor with emesis noted= pulse ox 86% on room air per ems-pt being bagged  on arrival to ed. pt to be intubated. unresponsive on arrival to ed. .       SUBJECTIVE:  Transferred out of MICU  Patient is tolerating medications. No reported adverse drug reactions. No acute issues  Passed bedside swallow eval  Review of systems:  As stated above in the chief complaint, otherwise negative. Medications:  Scheduled Meds:   furosemide  40 mg Intravenous BID    metoprolol succinate  25 mg Oral Daily    hydrocortisone  10 mg Oral Daily with breakfast    hydrocortisone  5 mg Oral Lunch    hydrocortisone  5 mg Oral Dinner    pantoprazole  40 mg Oral QAM AC    oseltamivir 6mg/ml  45 mg Oral BID    sulfacetamide  1 drop Left Eye 4x Daily    aspirin  81 mg Oral Daily    ampicillin-sulbactam  3 g Intravenous 4x Daily    fludrocortisone  0.1 mg Oral Daily    levothyroxine  125 mcg Oral Daily    sodium chloride flush  10 mL Intravenous 2 times per day     Continuous Infusions:   dextrose      dextrose      heparin (porcine) 11 Units/kg/hr (20 0817)     PRN Meds:albuterol, glucose, dextrose, glucagon (rDNA), dextrose, dextrose, heparin (porcine), heparin (porcine), sodium chloride flush, acetaminophen **OR** acetaminophen, polyethylene glycol, promethazine **OR** [DISCONTINUED] ondansetron    OBJECTIVE:  BP (!) 106/57   Pulse 53   Temp 97.5 °F (36.4 °C) (Temporal)   Resp 18   Ht 6' 0.84\" (1.85 m)   Wt 228 lb 9 oz (103.7 kg)   SpO2 93%   BMI 30.29 kg/m²   Temp  Av.3 °F (36.3 °C)  Min: 96.6 °F (35.9 °C)  Max: 97.8 °F (36.6 °C)  Constitutional: The patient is awake, extubated  Skin: Warm and dry. No rashes were noted. HEENT: Round and reactive pupils. Moist mucous membranes. No ulcerations or thrush.   NG tube in

## 2020-03-21 NOTE — PROGRESS NOTES
Hospitalist Progress Note      SYNOPSIS: Patient admitted on 3/14/2020 for <principal problem not specified>      SUBJECTIVE:    Patient seen and examined  Records reviewed. Patient with no complaints at this time. Patient states that he does not remember what happened prior to him coming to the hospital.  Patient reports that the last thing he remembers was waking up in the hospital bed a few days ago. Denies fevers, chills, nausea, vomiting  Stable overnight. No other overnight issues reported. DIET: Diet Tube Feed Modular: Protein Modular  DIET DENTAL SOFT;  CODE: Full Code    Intake/Output Summary (Last 24 hours) at 3/20/2020 2247  Last data filed at 3/20/2020 1500  Gross per 24 hour   Intake 3947 ml   Output 2025 ml   Net 1922 ml       OBJECTIVE:    BP (!) 146/83   Pulse 53   Temp 98.5 °F (36.9 °C) (Temporal)   Resp 18   Ht 6' 0.84\" (1.85 m)   Wt 227 lb (103 kg)   SpO2 95%   BMI 30.08 kg/m²     General appearance: No apparent distress, appears stated age and cooperative. HEENT:  Conjunctivae/corneas clear. Neck: Supple. No jugular venous distention. Respiratory: Clear to auscultation bilaterally, normal respiratory effort  Cardiovascular: Regular rate rhythm, normal S1-S2  Abdomen: Soft, nontender, nondistended  Musculoskeletal: No clubbing, cyanosis, no bilateral lower extremity edema. Brisk capillary refill. Skin:  No rashes  on visible skin  Neurologic: awake, alert and following commands     ASSESSMENT:    Active Problems:    Shock ([address]    Acute respiratory failure with hypoxia (HCC)  Resolved Problems:    * No resolved hospital problems. *       PLAN:  1. Shortness of breath-patient noted to be short of breath by EMS on arrival to patient's home when he was found down. Patient does have underlying influenza A and pneumonia. Patient intubated as outlined below. Patient does have underlying asthma and COPD which would worsen patient's shortness of breath  2.  Acute respiratory failure with hypoxia-patient was found down at his home. Patient was intubated in the field secondary to need to secure patient's airway. Patient extubated on 3/19/2020.  3. Septic shock on admission-patient found to be tachycardic, hypotensive on admission. Patient most likely with septic shock secondary to influenza A. Patient was started on Unasyn. Patient was transferred to the ICU on admission and started on pressors. 4. Altered mental status- patient was found down at his home for an unknown amount of time. Family had trouble getting in contact with patient for the past couple of days prior to admission. Patient's altered mental status is most likely secondary to metabolic encephalopathy from influenza A. Patient did have a CT of the head which was negative. Patient's urine drug screen was negative. Patient's ammonia levels also negative. 5. Influenza A- patient tested positive for influenza A on admission. Patient was started on Prevnar and Tamiflu. Patient was also started on Rocephin and doxycycline in the ED. Patient later changed to Zosyn. 6. Aspiration pneumonia- patient had CT of the chest which was done on admission. The CT showed bilateral lower lobe infiltrate which is consistent with aspiration. Infectious disease consulted. Patient is empirically receiving antibiotics for possible bacterial pneumonia with Unasyn. Patient was also given 1 dose of Levaquin in order to cover for possible Legionella during early part of hospitalization. 7. Wide-complex tachycardia- on admission, patient was noted to have a wide-complex tachycardia. It was thought that patient may be in V. tach. Cardiology stated that patient probably has paroxysmal atrial fibrillation with complete right bundle branch block which can look like V. tach. Patient had a 2D echo  8. Elevated troponin-patient found to have an elevated troponin on admission.   Cardiology was consulted and believe that patient's elevated troponin is secondary to rhabdomyolysis, hypotension, tachycardia, hypoxia and acute kidney injury. 9. Rhabdomyolysis-patient with CK of 307 on admission. CK peaked at 1119 on 3/15/2020. Patient given IV fluid hydration. Patient CK within normal limits by 3/21/2020. 10. Acute on chronic renal failure-patient's baseline creatinine is 1.4 from 2013. Patient presented with a creatinine of 2.5 which increased to 3.1 on the same day. Patient's renal failure was most likely secondary to septic shock with hypotension. Patient given IV fluid hydration. Patient's kidneys responded to IV fluid hydration. Patient's creatinine currently at baseline. 11. Hypokalemia-patient potassium replaced as needed through hospitalization. 12. Asthma/COPD-patient does have a history of asthma and COPD. Patient found to be short of breath prior to admission. Patient intubated in the field. Patient extubated on 3/19/2020. 13. Adrenal insufficiency-patient has a history of adrenal insufficiency. Patient given large stress dose of steroids while in the ICU. 14. Hypothyroidism-patient with history of hypothyroidism. Patient continued on his home Synthroid. DISPOSITION: Patient has been hospitalized for 1 week and has been in the bed mostly. Patient will need rehab on discharge.  involved.     Medications:  REVIEWED DAILY    Infusion Medications    dextrose 100 mL/hr at 03/20/20 1738    dextrose      dextrose      heparin (porcine) 11 Units/kg/hr (03/20/20 0649)     Scheduled Medications    furosemide  40 mg Intravenous BID    metoprolol succinate  25 mg Oral Daily    [START ON 3/21/2020] hydrocortisone  10 mg Oral Daily with breakfast    hydrocortisone  5 mg Oral Lunch    hydrocortisone  5 mg Oral Dinner    [START ON 3/21/2020] pantoprazole  40 mg Oral QAM AC    potassium phosphate IVPB  10 mmol Intravenous Once    oseltamivir 6mg/ml  45 mg Oral BID    sulfacetamide  1 drop Left

## 2020-03-21 NOTE — PROGRESS NOTES
[facility]  Nephrology Attending Progress Note  Dorian Arellano. MD [place]        SUBJECTIVE:     3/18: pt seen in icu, intubated  3/19: pr seen in icu, extubated and no longer on tube feeds, ns running  3/20: pt seen in icu. Extubated yesterday  3/212/2020- awake and with cough. No acute distress.        PROBLEM LIST:    Patient Active Problem List   Diagnosis    Shock ([facility]    Hypopituitarism ([facility]    Weakness    Hypothyroidism    Lactic acidosis    Renal insufficiency    Atrial fibrillation with RVR (HCC)    QT prolongation    RBBB    Acute respiratory failure with hypoxia ([facility]        PAST MEDICAL HISTORY:    Past Medical History:   Diagnosis Date    [facility]fib ([facility]     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     Hypothyroid     Osteopenia     Renal insufficiency     Right bundle branch block        DIET:    Diet Tube Feed Modular: Protein Modular  DIET DENTAL SOFT;     PHYSICAL EXAM:     Patient Vitals for the past 24 hrs:   BP Temp Temp src Pulse Resp SpO2 Weight   03/21/20 0800 122/71 97.3 °F (36.3 °C) Temporal 61 18 93 % --   03/21/20 0524 -- -- -- -- -- -- 228 lb 9 oz (103.7 kg)   03/21/20 0130 118/66 97.8 °F (36.6 °C) Temporal 61 18 -- --   03/20/20 2001 109/65 96.6 °F (35.9 °C) Temporal 68 18 -- --   03/20/20 1553 (!) 146/83 98.5 °F (36.9 °C) Temporal 53 18 -- --   03/20/20 1500 -- -- -- 61 16 95 % --   03/20/20 1400 -- -- -- 65 18 94 % --   03/20/20 1300 -- -- -- 64 16 96 % --   03/20/20 1200 -- 98 °F (36.7 °C) Oral 61 17 97 % --   03/20/20 1113 -- -- -- -- 19 95 % --   03/20/20 1100 -- -- -- 62 18 93 % --   03/20/20 1000 -- -- -- 66 18 94 % --   @      Intake/Output Summary (Last 24 hours) at 3/21/2020 0957  Last data filed at 3/21/2020 0418  Gross per 24 hour   Intake 1630 ml   Output 3350 ml   Net -1720 ml         Wt Readings from Last 3 Encounters:   03/21/20 228 lb 9 oz (103.7 kg)   10/08/19 207 lb 8 oz (94.1 kg)       Constitutional:  Pt is in nad  Head: normocephalic,

## 2020-03-21 NOTE — PROGRESS NOTES
[facility]  Department of Internal Medicine  Division of Pulmonary, Critical Care and Sleep Medicine  Progress Note      Patient:  Isha Dasilva 80 y.o. male   MRN: [id number]       Date of Service: 3/21/2020    Allergy: Codeine; Diazepam; Lorazepam; and Morphine    Subjective       ID changed Zosyn to Unasyn with sputum growing normal oral nora  D5 for high sodium - now normal  +1.7 liters  No acute complaints this AM.    On bedpan   Patient passed bedside swallow and started on diet. Objective     TEMPERATURE:  Current - Temp: 97.3 °F (36.3 °C); Max - Temp  Av.6 °F (36.4 °C)  Min: 96.6 °F (35.9 °C)  Max: 98.5 °F (36.9 °C)  RESPIRATIONS RANGE: Resp  Av.6  Min: 16  Max: 19  PULSE RANGE: Pulse  Av.8  Min: 53  Max: 68  BLOOD PRESSURE RANGE:  Systolic (15PNR), EOQ:000 , Min:109 , QNV:214   ; Diastolic (21PND), ZZZ:96, Min:65, Max:83    PULSE OXIMETRY RANGE: SpO2  Av %  Min: 93 %  Max: 97 %    I & O - 24hr:    Intake/Output Summary (Last 24 hours) at 3/21/2020 1106  Last data filed at 3/21/2020 0418  Gross per 24 hour   Intake 1630 ml   Output 3050 ml   Net -1420 ml     I/O last 3 completed shifts: In: 36 [P.O.:240; I.V.:890; IV Piggyback:500]  Out: 3660 [Urine:3515] No intake/output data recorded. Weight change: 1 lb 9 oz (0.709 kg)    Physical Exam:  General Appearance:  Alert off ventilator   HEENT:    NC/AT, mucous membranes are moist   Neck:   Supple, no jugular venous distention. Resp:     Decreased breath sounds bilaterally   Heart:    RRR, S1 and S2 normal, no murmur, rub or gallop.     Abdomen:     Soft, non-tender, non-distended with normal bowel sounds   Extremities:  + edema   Pulses:  Diminished bilaterally   Neurologic:  Weak but non focal       Labs and Imaging Studies     CBC:   Lab Results   Component Value Date    WBC 15.8 2020    RBC 3.99 2020    HGB 9.6 2020    HCT 32.9 03/21/2020    MCV 82.5 03/21/2020    MCH 24.1 03/21/2020    MCHC 29.2 03/21/2020    RDW 16.4 03/21/2020     03/21/2020     03/21/2020    MPV 11.4 03/21/2020     CMP:    Lab Results   Component Value Date     03/21/2020    K 2.9 03/21/2020     03/21/2020    CO2 30 03/21/2020    BUN 26 03/21/2020    CREATININE 1.4 03/21/2020    GFRAA 58 03/21/2020    LABGLOM 48 03/21/2020    GLUCOSE 71 03/21/2020    PROT 5.1 03/20/2020    LABALBU 2.4 03/20/2020    CALCIUM 6.6 03/21/2020    BILITOT 0.2 03/20/2020    ALKPHOS 77 03/20/2020    AST 69 03/20/2020    ALT 48 03/20/2020     Imaging Studies:    EKG: Rhythm Strip: Sinus with ecotpy    Assessment and Plan     Patient is a 80-year-old male, with history of multiple cancers, 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 tach's/SVTs in the ER, started on amiodarone, consulted cardiology, was hypotensive started on levo after 30 cc/kg fluid bolus. He was also started on vaso-. And was transferred to ICU for further care. His chest CT did show bilateral infiltrates, CT head, CT spine, CT abdomen were unremarkable.               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

## 2020-03-21 NOTE — PROGRESS NOTES
Oral Dinner    pantoprazole  40 mg Oral QAM AC    oseltamivir 6mg/ml  45 mg Oral BID    sulfacetamide  1 drop Left Eye 4x Daily    aspirin  81 mg Oral Daily    ampicillin-sulbactam  3 g Intravenous 4x Daily    fludrocortisone  0.1 mg Oral Daily    levothyroxine  125 mcg Oral Daily    sodium chloride flush  10 mL Intravenous 2 times per day     PRN Meds: albuterol, glucose, dextrose, glucagon (rDNA), dextrose, dextrose, heparin (porcine), heparin (porcine), sodium chloride flush, acetaminophen **OR** acetaminophen, polyethylene glycol, promethazine **OR** [DISCONTINUED] ondansetron    Labs:     Recent Labs     03/19/20 0400 03/20/20  0400 03/21/20  0550   WBC 9.7 11.2 15.8*   HGB 8.9* 9.2* 9.6*   HCT 29.7* 30.1* 32.9*    255 301  296       Recent Labs     03/20/20 0400 03/21/20  0550 03/21/20  1330   * 146 146   K 3.3* 2.9* 2.9*   * 105 103   CO2 27 30* 29   BUN 29* 26* 25*   CREATININE 1.3* 1.4* 1.4*   CALCIUM 6.8* 6.6* 6.8*   PHOS 1.8*  --   --        Recent Labs     03/20/20  0400   PROT 5.1*   ALKPHOS 77   ALT 48*   AST 69*   BILITOT 0.2       No results for input(s): INR in the last 72 hours. Recent Labs     03/19/20 0400 03/20/20  0400 03/21/20  0550   CKTOTAL 98 92 97       Chronic labs:    Lab Results   Component Value Date    TSH 0.435 03/14/2020    INR 1.0 10/05/2019       Radiology: REVIEWED DAILY    +++++++++++++++++++++++++++++++++++++++++++++++++  Selma Oyster  Sound Physician - [address] [facility], [place]  +++++++++++++++++++++++++++++++++++++++++++++++++  NOTE: This report was transcribed using voice recognition software. Every effort was made to ensure accuracy; however, inadvertent computerized transcription errors may be present.

## 2020-03-22 LAB
ANION GAP SERPL CALCULATED.3IONS-SCNC: 12 MMOL/L (ref 7–16)
ANISOCYTOSIS: ABNORMAL
APTT: 54.6 SEC (ref 24.5–35.1)
BASOPHILS ABSOLUTE: 0 E9/L (ref 0–0.2)
BASOPHILS RELATIVE PERCENT: 0.3 % (ref 0–2)
BUN BLDV-MCNC: 23 MG/DL (ref 8–23)
CALCIUM SERPL-MCNC: 7.4 MG/DL (ref 8.6–10.2)
CHLORIDE BLD-SCNC: 105 MMOL/L (ref 98–107)
CO2: 29 MMOL/L (ref 22–29)
CREAT SERPL-MCNC: 1.3 MG/DL (ref 0.7–1.2)
EOSINOPHILS ABSOLUTE: 0.82 E9/L (ref 0.05–0.5)
EOSINOPHILS RELATIVE PERCENT: 4.4 % (ref 0–6)
GFR AFRICAN AMERICAN: >60
GFR NON-AFRICAN AMERICAN: 52 ML/MIN/1.73
GLUCOSE BLD-MCNC: 117 MG/DL (ref 74–99)
HCT VFR BLD CALC: 36.5 % (ref 37–54)
HEMOGLOBIN: 10.9 G/DL (ref 12.5–16.5)
LYMPHOCYTES ABSOLUTE: 1.12 E9/L (ref 1.5–4)
LYMPHOCYTES RELATIVE PERCENT: 6.1 % (ref 20–42)
MCH RBC QN AUTO: 24.2 PG (ref 26–35)
MCHC RBC AUTO-ENTMCNC: 29.9 % (ref 32–34.5)
MCV RBC AUTO: 81.1 FL (ref 80–99.9)
METAMYELOCYTES RELATIVE PERCENT: 1.8 % (ref 0–1)
METER GLUCOSE: 60 MG/DL (ref 74–99)
METER GLUCOSE: 73 MG/DL (ref 74–99)
METER GLUCOSE: 82 MG/DL (ref 74–99)
METER GLUCOSE: 86 MG/DL (ref 74–99)
MONOCYTES ABSOLUTE: 2.05 E9/L (ref 0.1–0.95)
MONOCYTES RELATIVE PERCENT: 11.4 % (ref 2–12)
MYELOCYTE PERCENT: 6.1 % (ref 0–0)
NEUTROPHILS ABSOLUTE: 14.51 E9/L (ref 1.8–7.3)
NEUTROPHILS RELATIVE PERCENT: 70.2 % (ref 43–80)
OVALOCYTES: ABNORMAL
PDW BLD-RTO: 16 FL (ref 11.5–15)
PLATELET # BLD: 333 E9/L (ref 130–450)
PMV BLD AUTO: 11.2 FL (ref 7–12)
POIKILOCYTES: ABNORMAL
POLYCHROMASIA: ABNORMAL
POTASSIUM SERPL-SCNC: 2.7 MMOL/L (ref 3.5–5)
RBC # BLD: 4.5 E12/L (ref 3.8–5.8)
SODIUM BLD-SCNC: 146 MMOL/L (ref 132–146)
WBC # BLD: 18.6 E9/L (ref 4.5–11.5)

## 2020-03-22 PROCEDURE — 6370000000 HC RX 637 (ALT 250 FOR IP): Performed by: INTERNAL MEDICINE

## 2020-03-22 PROCEDURE — 36415 COLL VENOUS BLD VENIPUNCTURE: CPT

## 2020-03-22 PROCEDURE — 97535 SELF CARE MNGMENT TRAINING: CPT

## 2020-03-22 PROCEDURE — 2580000003 HC RX 258: Performed by: INTERNAL MEDICINE

## 2020-03-22 PROCEDURE — 97530 THERAPEUTIC ACTIVITIES: CPT

## 2020-03-22 PROCEDURE — 2060000000 HC ICU INTERMEDIATE R&B

## 2020-03-22 PROCEDURE — 99233 SBSQ HOSP IP/OBS HIGH 50: CPT | Performed by: INTERNAL MEDICINE

## 2020-03-22 PROCEDURE — 85730 THROMBOPLASTIN TIME PARTIAL: CPT

## 2020-03-22 PROCEDURE — 6360000002 HC RX W HCPCS: Performed by: INTERNAL MEDICINE

## 2020-03-22 PROCEDURE — 2700000000 HC OXYGEN THERAPY PER DAY

## 2020-03-22 PROCEDURE — 80048 BASIC METABOLIC PNL TOTAL CA: CPT

## 2020-03-22 PROCEDURE — 82962 GLUCOSE BLOOD TEST: CPT

## 2020-03-22 PROCEDURE — 85025 COMPLETE CBC W/AUTO DIFF WBC: CPT

## 2020-03-22 RX ORDER — POTASSIUM CHLORIDE 29.8 MG/ML
20 INJECTION INTRAVENOUS
Status: COMPLETED | OUTPATIENT
Start: 2020-03-22 | End: 2020-03-22

## 2020-03-22 RX ORDER — POTASSIUM CHLORIDE 20 MEQ/1
20 TABLET, EXTENDED RELEASE ORAL DAILY
Status: DISCONTINUED | OUTPATIENT
Start: 2020-03-22 | End: 2020-03-23

## 2020-03-22 RX ORDER — AMOXICILLIN AND CLAVULANATE POTASSIUM 875; 125 MG/1; MG/1
1 TABLET, FILM COATED ORAL EVERY 12 HOURS SCHEDULED
Status: DISCONTINUED | OUTPATIENT
Start: 2020-03-22 | End: 2020-03-23 | Stop reason: HOSPADM

## 2020-03-22 RX ORDER — POTASSIUM CHLORIDE 20 MEQ/1
40 TABLET, EXTENDED RELEASE ORAL EVERY 4 HOURS
Status: COMPLETED | OUTPATIENT
Start: 2020-03-22 | End: 2020-03-22

## 2020-03-22 RX ADMIN — SULFACETAMIDE SODIUM 1 DROP: 100 SOLUTION OPHTHALMIC at 13:19

## 2020-03-22 RX ADMIN — FUROSEMIDE 40 MG: 10 INJECTION, SOLUTION INTRAMUSCULAR; INTRAVENOUS at 18:25

## 2020-03-22 RX ADMIN — FUROSEMIDE 40 MG: 10 INJECTION, SOLUTION INTRAMUSCULAR; INTRAVENOUS at 09:25

## 2020-03-22 RX ADMIN — PANTOPRAZOLE SODIUM 40 MG: 40 TABLET, DELAYED RELEASE ORAL at 05:17

## 2020-03-22 RX ADMIN — HYDROCORTISONE 10 MG: 5 TABLET ORAL at 09:26

## 2020-03-22 RX ADMIN — SULFACETAMIDE SODIUM 1 DROP: 100 SOLUTION OPHTHALMIC at 09:29

## 2020-03-22 RX ADMIN — POTASSIUM CHLORIDE 40 MEQ: 20 TABLET, EXTENDED RELEASE ORAL at 18:25

## 2020-03-22 RX ADMIN — AMOXICILLIN AND CLAVULANATE POTASSIUM 1 TABLET: 875; 125 TABLET, FILM COATED ORAL at 21:33

## 2020-03-22 RX ADMIN — HEPARIN SODIUM 11.02 UNITS/KG/HR: 10000 INJECTION, SOLUTION INTRAVENOUS at 06:31

## 2020-03-22 RX ADMIN — SODIUM CHLORIDE, PRESERVATIVE FREE 10 ML: 5 INJECTION INTRAVENOUS at 21:32

## 2020-03-22 RX ADMIN — POTASSIUM CHLORIDE 20 MEQ: 20 TABLET, EXTENDED RELEASE ORAL at 12:01

## 2020-03-22 RX ADMIN — POTASSIUM CHLORIDE 40 MEQ: 20 TABLET, EXTENDED RELEASE ORAL at 21:32

## 2020-03-22 RX ADMIN — SULFACETAMIDE SODIUM 1 DROP: 100 SOLUTION OPHTHALMIC at 17:31

## 2020-03-22 RX ADMIN — METOPROLOL SUCCINATE 25 MG: 50 TABLET, EXTENDED RELEASE ORAL at 09:26

## 2020-03-22 RX ADMIN — Medication 45 MG: at 21:29

## 2020-03-22 RX ADMIN — AMPICILLIN SODIUM AND SULBACTAM SODIUM 3 G: 2; 1 INJECTION, POWDER, FOR SOLUTION INTRAMUSCULAR; INTRAVENOUS at 11:19

## 2020-03-22 RX ADMIN — POTASSIUM CHLORIDE 20 MEQ: 29.8 INJECTION, SOLUTION INTRAVENOUS at 13:18

## 2020-03-22 RX ADMIN — Medication 45 MG: at 09:26

## 2020-03-22 RX ADMIN — HYDROCORTISONE 5 MG: 5 TABLET ORAL at 11:19

## 2020-03-22 RX ADMIN — LEVOTHYROXINE SODIUM 125 MCG: 0.12 TABLET ORAL at 05:17

## 2020-03-22 RX ADMIN — AMPICILLIN SODIUM AND SULBACTAM SODIUM 3 G: 2; 1 INJECTION, POWDER, FOR SOLUTION INTRAMUSCULAR; INTRAVENOUS at 05:17

## 2020-03-22 RX ADMIN — FLUDROCORTISONE ACETATE 0.1 MG: 0.1 TABLET ORAL at 09:26

## 2020-03-22 RX ADMIN — ASPIRIN 81 MG 81 MG: 81 TABLET ORAL at 09:26

## 2020-03-22 RX ADMIN — SULFACETAMIDE SODIUM 1 DROP: 100 SOLUTION OPHTHALMIC at 21:30

## 2020-03-22 RX ADMIN — SODIUM CHLORIDE, PRESERVATIVE FREE 10 ML: 5 INJECTION INTRAVENOUS at 09:25

## 2020-03-22 RX ADMIN — POTASSIUM CHLORIDE 20 MEQ: 29.8 INJECTION, SOLUTION INTRAVENOUS at 13:30

## 2020-03-22 RX ADMIN — HYDROCORTISONE 5 MG: 5 TABLET ORAL at 17:32

## 2020-03-22 ASSESSMENT — PAIN SCALES - GENERAL
PAINLEVEL_OUTOF10: 0

## 2020-03-22 NOTE — PROGRESS NOTES
Triple lumen removed. Patient tolerated well. Applied pressure for 20 minutes. Dry dressing applied.

## 2020-03-22 NOTE — PROGRESS NOTES
[facility]  Department of Internal Medicine  Division of Pulmonary, Critical Care and Sleep Medicine  Progress Note      Patient:  Carole Hurtado 80 y.o. male   MRN: [id number]       Date of Service: 3/22/2020    Allergy: Codeine; Diazepam; Lorazepam; and Morphine    Subjective       ID changed Zosyn to Unasyn with sputum growing normal oral nora  - 4.1  liters  No acute complaints this AM.    On bedpan   Patient passed bedside swallow and started on diet. CXR reviewed  Want to go home    Objective     TEMPERATURE:  Current - Temp: 96.6 °F (35.9 °C); Max - Temp  Av °F (36.1 °C)  Min: 96.6 °F (35.9 °C)  Max: 97.5 °F (36.4 °C)  RESPIRATIONS RANGE: Resp  Av.5  Min: 18  Max: 20  PULSE RANGE: Pulse  Av.3  Min: 53  Max: 77  BLOOD PRESSURE RANGE:  Systolic (83WQL), OMS:625 , Min:106 , TML:886   ; Diastolic (95HTR), YJK:48, Min:56, Max:69    PULSE OXIMETRY RANGE: SpO2  Av.5 %  Min: 93 %  Max: 96 %    I & O - 24hr:    Intake/Output Summary (Last 24 hours) at 3/22/2020 1228  Last data filed at 3/22/2020 1041  Gross per 24 hour   Intake 260.34 ml   Output 6850 ml   Net -6589.66 ml     I/O last 3 completed shifts: In: 260.3 [I.V.:260.3]  Out: 5150 [AFQQO:4532] I/O this shift:  In: -   Out: 1700 [Urine:1700]   Weight change:     Physical Exam:  General Appearance:  Alert off ventilator   HEENT:    NC/AT, mucous membranes are moist   Neck:   Supple, no jugular venous distention. Resp:     Decreased breath sounds bilaterally   Heart:    RRR, S1 and S2 normal, no murmur, rub or gallop.     Abdomen:     Soft, non-tender, non-distended with normal bowel sounds   Extremities:  + edema   Pulses:  Diminished bilaterally   Neurologic:  Weak but non focal       Labs and Imaging Studies     CBC:   Lab Results   Component Value Date    WBC 18.6 2020    RBC 4.50 2020    HGB 10.9 2020    HCT 36.5 2020    MCV 81.1 03/22/2020    MCH 24.2 03/22/2020    MCHC 29.9 03/22/2020    RDW 16.0 03/22/2020     03/22/2020    MPV 11.2 03/22/2020     CMP:    Lab Results   Component Value Date     03/22/2020    K 2.7 03/22/2020    K 2.9 03/21/2020     03/22/2020    CO2 29 03/22/2020    BUN 23 03/22/2020    CREATININE 1.3 03/22/2020    GFRAA >60 03/22/2020    LABGLOM 52 03/22/2020    GLUCOSE 117 03/22/2020    PROT 5.1 03/20/2020    LABALBU 2.4 03/20/2020    CALCIUM 7.4 03/22/2020    BILITOT 0.2 03/20/2020    ALKPHOS 77 03/20/2020    AST 69 03/20/2020    ALT 48 03/20/2020     Imaging Studies:    EKG: Rhythm Strip: Sinus with ecotpy  We reviewed the films during the inter-disciplinary rounds/conference, which showed     Assessment and Plan     Patient is a 42-year-old male, with history of multiple cancers, 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 tach's/SVTs in the ER, started on amiodarone, consulted cardiology, was hypotensive started on levo after 30 cc/kg fluid bolus. He was also started on vaso-. And was transferred to ICU for further care. His chest CT did show bilateral infiltrates, CT head, CT spine, CT abdomen were unremarkable.               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

## 2020-03-22 NOTE — PROGRESS NOTES
Physical Therapy    Physical Therapy Initial Assessment     Name: Nathaly Smart  : 1931  MRN: [id number]    Referring Provider:  Aisha Clements MD    Date of Service: 3/22/2020    Evaluating PT:  Solis Pandya, PT, DPT [id number]     Room #:  3245/3977-I  Diagnosis:  Acute respiratory failure with hypoxia   PMHx/PSHx:  Afib, hypopituitarism, hypothyroid, osteopenia, renal insufficiency, RBBB  Other:  Pt extubated 3/19/20   Precautions:  Falls, Droplet for Flu, O2, [place]  Equipment Needs:  TBD    SUBJECTIVE:    Pt lives alone in a 2.5 story home with 2 stairs to enter and 0 rail. Bedroom and bathroom are on the first level. Pt ambulated with no AD PTA. States he has no immediate upstairs needs. OBJECTIVE:   Initial Evaluation  Date: 3/20/20 Treatment  Date: 3/22/2020 Short Term/ Long Term   Goals   AM-PAC 6 Clicks 3/01 78/97    Was pt agreeable to Eval/treatment? Yes  yes    Does pt have pain? None  No c/o pain    Bed Mobility  Rolling: NT  Supine to sit: Max A  Sit to supine: Max A x2  Scooting: Max A Rolling: SBA  Supine to sit: NT  Sit to supine: SBA  Scooting: SBA Rolling: Min A  Supine to sit: Min A  Sit to supine: Min A  Scooting: Min A   Transfers Sit to stand: Max A x2  Stand to sit: Max A  Stand pivot: NT Sit to stand: maxA  Stand to sit: maxA  Stand pivot: modA front [employer] Sit to stand: Min A  Stand to sit: Min A  Stand pivot: Min A with AAD   Ambulation    3 side steps with [employer] Max A NT >75 feet with AAD Min A   Stair negotiation: ascended and descended  NT NT >2 steps with 2 rail Mod A   ROM BUE:  Per OT eval  BLE:  WFL     Strength BUE:  Per OT eval   BLE:  Grossly 4-/5      Balance Sitting EOB:  CGA<>MIn A  Dynamic Standing:   Max A with [employer]  Sitting EOB: SBA  Dynamic standing: modA front [employer] Sitting EOB:  Independent   Dynamic Standing:  Min A with AAD     Pt is A & O x 4  Sensation:  Pt denies numbness and tingling to extremities  Edema:  unremarkable    Patient education  Pt educated on role of PT intervention. Patient response to education:   Pt verbalized understanding Pt demonstrated skill Pt requires further education in this area   yes yes yes     ASSESSMENT:    Comments:  Pt received seated in chair and requesting to return to bed. Pt performed all functional mobility as noted above. Pt declined further intervention beyond functional mobility returning to bed stating he was too fatigued. Once in standing pt demonstrating improved dynamic standing balance requiring only Carlos to complete stand pivot transfer. Pt also able to perform sit>supine at SBA level. Pt positioned for comfort and left with all needs met and call light in reach. Pt would benefit from continued skilled PT intervention to maximize functional independence and mobility. Treatment:  Patient practiced and was instructed in the following treatment:     Therapeutic Activities:  o Functional mobility as noted above. Max VC required for proper hand placement during sit<>stand as well as stand pivot transfer with front [employer]. Min VC provided for bed mobility to be competed safely and during repositioning.    o Skilled repositioning in supine with HOB elevated to promote improved cardiorespiratory function. PLAN:    Patient is making fair progress towards established goals. Will continue with current POC.       Time in  1255  Time out  1310    Total Treatment Time  15 minutes     CPT codes:  [] Gait training 13444 0 minutes  [] Manual therapy 79008 0 minutes  [x] Therapeutic activities 34533 15 minutes  [] Therapeutic exercises 21012 0 minutes  [] Neuromuscular reeducation 53584 0 minutes    Najma Barr, PT, DPT  [id number]

## 2020-03-22 NOTE — PROGRESS NOTES
OCCUPATIONAL THERAPY Treatment Note    Date:3/22/2020  Patient Name: Pardeep Castaneda  MRN: [id number]  : 1931  Room: [address] [place] [address]    Evaluating OT: Deedee Casillas OTR/GABRIEL  Referring Provider: Mare Shah MD    AM-PAC Daily Activity Raw Score:   Recommended Adaptive Equipment: to be determined     Comments: Based on patient's functional performance as stated below and level of assistance needed prior to admission, this therapist believes that the patient would benefit from further skilled OT following hospital stay in an effort to increase safety, functional independence, and quality of life. Diagnosis: Acute respiratory failure with hypoxia (HCC) [J96.01]   Pertinent Medical History: a-fib, hypotuitarism, hypothyroid, osteopenia, renal insufficiency, R BBB    Precautions:  [place], [place], droplet/contact isolation (Flu A)     Home Living: Pt lives alone in a 2.5 story house with 2 step(s) to enter and no rail(s); bed on first floor, bath on first floor  Bathroom setup: walk-in shower  Equipment owned: none    Prior Level of Function: independent with ADLs and with IADLs; using no device for ambulation. Driving: yes  Occupation: retired     Pain Level: no pain  Cognition: A&O: self and place, and year- not month;  Follows 2-3 step directions   Memory: F   Sequencing: F   Problem solving: F   Judgement/safety: F   RASS: 0  CAM-ICU: negative     Functional Assessment:   Initial Eval Status  Date: 3/20/20 Treatment Status  Date:3/22/20 STG=LTG (~5-14  days)     Feeding Min A  simulated SUP    improve self feeding task to independent/mod I   Grooming Mod A  To wash face in supported sitting; decreased BUE strength to reach face, elbow support needed SUP (seated EOB to complete facial washing and oral hygiene)   improve grooming/hygiene tasks to min A while standing/seated  At sink    UB Dressing Mod A   Mod A   improve UB dressing to SBA   LB Dressing DEP   Dep (assist with B socks, deferred edu on AE due to fatigue) improve LB dressing to mod A with AE PRN   Bathing Max A   Mod A (simulated)   improve UB/LB bathing to mod A   Toileting DEP  Mild bowel incontinence upon standing, assistance for perineal hygiene   dep improve toileting task and all clothing mgmt to max A   Bed Mobility  Supine to sit: Max A with HOB elevated  Sit to supine: Max A x2 Supine-sit: Mod A (vc's for hand placement and body mechanics)  Sit-supine: NT improve bed mobility to min A in prep for EOB ADL tasks   Functional Transfers Sit to stand: Max A x2  Stand to sit: Max A x2 Sit-stand: Max A  Stand-sit: Max A  Commode: TBA  SPT: Max A (bed-chair) improve all functional transfers to mod A x1   Functional Mobility Max A with ww  For side steps at EOB NT improve functional mobility to mod A with AE PRN   Balance Sitting:     Static:  SBA    Dynamic: Min A  Standing: Max A ww Sitting:     Static:  SBA    Dynamic: Min A  Standing: Max A ww demo independent dynamic sitting balance EOB during ADL tasks   Endurance/Activity Tolerance F Fair (sat EOB ~10 mins) demo G activity tolerance/endurance during 15-20 min ADL task    Visual/  Perceptual Glasses: none in room              Hand dominance: R  UE ROM: RUE: WFL AAROM  LUE: WFL AAROM  Strength: RUE: grossly 2-/5 LUE: grossly 2-/5   Strength: WFL  Fine Motor Coordination: WFL    Treatment/education: Upon arrival to , pt supine in bed and agreeable to OT session. Completed ADLs seated EOB and functional transfers, see above for assessment. Pt sat EOB ~10 mins to increase sitting balance/core strength for ease with ADLs. Pt appeared to have tolerated session well. Pt appears motivated/cooperative/pleasant. At end of session, pt left sitting in chair, call light within reach.      -pt has made fair progress toward goals  -continue current POC    Treatment Time In: 9:50           Treatment Time Out: 10;15            Treatment Charges: Mins Units   Ther Ex  [id number]     Manual Therapy [id number]

## 2020-03-22 NOTE — PROGRESS NOTES
Hospitalist Progress Note      SYNOPSIS: Patient admitted on 3/14/2020 for <principal problem not specified>      SUBJECTIVE:    Patient seen and examined  Records reviewed. Patient states that he feels better today than he did yesterday. Patient ports that he has been out of bed to chair for a few hours. Patient also states that using a bedside commode works better than using a bedpan. Patient acknowledges that he is weak. Patient reports that he would like to go to SNF for rehab. Patient does have a specific place in mind that his daughter was once at. Denies fever, chills, nausea, vomiting  Stable overnight. No other overnight issues reported. DIET: Diet Tube Feed Modular: Protein Modular  DIET DENTAL SOFT;  CODE: Full Code    Intake/Output Summary (Last 24 hours) at 3/22/2020 1424  Last data filed at 3/22/2020 1041  Gross per 24 hour   Intake 260.34 ml   Output 3200 ml   Net -2939.66 ml       OBJECTIVE:    /64   Pulse 75   Temp 96.6 °F (35.9 °C) (Temporal)   Resp 20   Ht 6' 0.84\" (1.85 m)   Wt 228 lb 9 oz (103.7 kg)   SpO2 96%   BMI 30.29 kg/m²     General appearance: No apparent distress, appears stated age and cooperative. HEENT:  Conjunctivae/corneas clear. Neck: Supple. No jugular venous distention. Respiratory: Clear to auscultation bilaterally, normal respiratory effort  Cardiovascular: Regular rate rhythm, normal S1-S2  Abdomen: Soft, nontender, nondistended  Musculoskeletal: No clubbing, cyanosis, no bilateral lower extremity edema. Brisk capillary refill.    Skin:  No rashes  on visible skin  Neurologic: awake, alert and following commands     ASSESSMENT:    Active Problems:    Shock ([address]    Acute respiratory failure with hypoxia (HCC)    Influenza A    Non-traumatic rhabdomyolysis    Acute renal failure superimposed on stage 2 chronic kidney disease (HCC)    Elevated troponin    Metabolic encephalopathy    Aspiration pneumonia (HCC)    Wide-complex tachycardia [facility])    Adrenal insufficiency (Wade's disease) ([facility] [address]    Hypothyroidism  Resolved Problems:    * No resolved hospital problems. *       PLAN:  1. Shortness of breath-patient noted to be short of breath by EMS on arrival to patient's home when he was found down. Patient does have underlying influenza A and pneumonia. Patient intubated as outlined below. Patient does have underlying asthma and COPD which would worsen patient's shortness of breath  2. Acute respiratory failure with hypoxia-patient was found down at his home. Patient was intubated in the field secondary to need to secure patient's airway. Patient extubated on 3/19/2020.  3. Septic shock on admission-patient found to be tachycardic, hypotensive on admission. Patient most likely with septic shock secondary to influenza A. Patient was started on Unasyn. Patient was transferred to the ICU on admission and started on pressors. 4. Altered mental status- patient was found down at his home for an unknown amount of time. Family had trouble getting in contact with patient for the past couple of days prior to admission. Patient's altered mental status is most likely secondary to metabolic encephalopathy from influenza A. Patient did have a CT of the head which was negative. Patient's urine drug screen was negative. Patient's ammonia levels also negative. 5. Influenza A- patient tested positive for influenza A on admission. Patient was started on Prevnar and Tamiflu. Patient was also started on Rocephin and doxycycline in the ED. Patient later changed to Zosyn. 6. Aspiration pneumonia- patient had CT of the chest which was done on admission. The CT showed bilateral lower lobe infiltrate which is consistent with aspiration. Infectious disease consulted. Patient is empirically receiving antibiotics for possible bacterial pneumonia with Unasyn.   Patient was also given 1 dose of Levaquin in order to cover for possible Legionella during early 2020 [place], [place]  +++++++++++++++++++++++++++++++++++++++++++++++++  NOTE: This report was transcribed using voice recognition software. Every effort was made to ensure accuracy; however, inadvertent computerized transcription errors may be present.

## 2020-03-22 NOTE — PROGRESS NOTES
JVD  Cardiovascular: regular rate and rhythm, no murmurs, gallops, or rubs  Respiratory:  No rales, rhochi, or wheezes  Gastrointestinal:  Soft, nontender, nondistended, bowel sounds x 4  Ext: trace edema  Skin: dry, no rash      MEDS (scheduled):    ampicillin-sulbactam  3 g Intravenous Q6H    furosemide  40 mg Intravenous BID    metoprolol succinate  25 mg Oral Daily    hydrocortisone  10 mg Oral Daily with breakfast    hydrocortisone  5 mg Oral Lunch    hydrocortisone  5 mg Oral Dinner    pantoprazole  40 mg Oral QAM AC    oseltamivir 6mg/ml  45 mg Oral BID    sulfacetamide  1 drop Left Eye 4x Daily    aspirin  81 mg Oral Daily    fludrocortisone  0.1 mg Oral Daily    levothyroxine  125 mcg Oral Daily    sodium chloride flush  10 mL Intravenous 2 times per day       MEDS (infusions):   dextrose      heparin (porcine) 11.022 Units/kg/hr (03/22/20 0631)       MEDS (prn):  albuterol, glucose, dextrose, glucagon (rDNA), dextrose, heparin (porcine), heparin (porcine), sodium chloride flush, acetaminophen **OR** acetaminophen, polyethylene glycol, promethazine **OR** [DISCONTINUED] ondansetron    DATA:    Recent Labs     03/20/20  0400 03/21/20  0550 03/22/20  0520   WBC 11.2 15.8* 18.6*   HGB 9.2* 9.6* 10.9*   HCT 30.1* 32.9* 36.5*   MCV 80.5 82.5 81.1    301  296 333     Recent Labs     03/20/20  0400 03/21/20  0550 03/21/20  1330   * 146 146   K 3.3* 2.9* 2.9*   * 105 103   CO2 27 30* 29   BUN 29* 26* 25*   CREATININE 1.3* 1.4* 1.4*   LABGLOM 52 48 48   GLUCOSE 106* 71* 99   CALCIUM 6.8* 6.6* 6.8*   ALT 48*  --   --    AST 69*  --   --    BILITOT 0.2  --   --    ALKPHOS 77  --   --    MG 2.3 2.0  --    PHOS 1.8*  --   --        Lab Results   Component Value Date    LABALBU 2.4 (L) 03/20/2020    LABALBU 3.5 03/14/2020    LABALBU 3.1 (L) 03/14/2020     Lab Results   Component Value Date    TSH 0.435 03/14/2020       Iron Studies  No results found for: IRON, TIBC, FERRITIN  No results found for: Myar Aaron  No results found for: FOLATE    No results found for: VITD25  No results found for: PTH    No components found for: URIC    Lab Results   Component Value Date    COLORU Yellow 03/14/2020    NITRU Negative 03/14/2020    GLUCOSEU Negative 03/14/2020    KETUA Negative 03/14/2020    UROBILINOGEN 0.2 03/14/2020    BILIRUBINUR Negative 03/14/2020       No results found for: Joycelyn Stepan      IMPRESSION/RECOMMENDATIONS:      1 Acute renal failure  Baseline cr 1.1  in the setting of septic shock , with hypotensive episode   Renal function improving  Off hco3 drip     2 Influenza A  complicated with possible aspiration pneumonia  acute respiratory   Extubated and off pressors  Sp  Tamiflu    3 H/O chronic A fib  Per cardiology     4 Hypopituitarism  prior h/o pituitary gland surgery 1980   Steroid dependent, synthroid, florinef     5. Hypokalemia   replace K  prn     6. Hypernatremia  Off IVF    7. Hypocalcemia  Replace prn  Daily ionized    8.  Vol overload  On cxr  UOP 5150 cc last 24 hours  Continue on lasix 40 mg iv q 12      Yarely Salazar APRN - CNP     Pt seen and examined agree with above  Continue diuresis  Possibly lower dose in am  Cr stable  Replace k  Courtney Song

## 2020-03-22 NOTE — PROGRESS NOTES
[address] [place] [address] [facility]  [facility]  Progress Note      Chief Complaint   Patient presents with    Shortness of Breath     pt had welfare check and found unresponsive on floor with emesis noted= pulse ox 86% on room air per ems-pt being bagged  on arrival to ed. pt to be intubated. unresponsive on arrival to ed. .       SUBJECTIVE:  Transferred out of MICU  Patient is tolerating medications. No reported adverse drug reactions. No acute issues  Passed bedside swallow eval  CXR noted  Review of systems:  As stated above in the chief complaint, otherwise negative. Medications:  Scheduled Meds:   potassium chloride  20 mEq Oral Daily    potassium chloride  40 mEq Oral Q4H    ampicillin-sulbactam  3 g Intravenous Q6H    furosemide  40 mg Intravenous BID    metoprolol succinate  25 mg Oral Daily    hydrocortisone  10 mg Oral Daily with breakfast    hydrocortisone  5 mg Oral Lunch    hydrocortisone  5 mg Oral Dinner    pantoprazole  40 mg Oral QAM AC    oseltamivir 6mg/ml  45 mg Oral BID    sulfacetamide  1 drop Left Eye 4x Daily    aspirin  81 mg Oral Daily    fludrocortisone  0.1 mg Oral Daily    levothyroxine  125 mcg Oral Daily    sodium chloride flush  10 mL Intravenous 2 times per day     Continuous Infusions:   dextrose      heparin (porcine) 11.022 Units/kg/hr (20 0631)     PRN Meds:albuterol, glucose, dextrose, glucagon (rDNA), dextrose, heparin (porcine), heparin (porcine), sodium chloride flush, acetaminophen **OR** acetaminophen, polyethylene glycol, promethazine **OR** [DISCONTINUED] ondansetron    OBJECTIVE:  /67   Pulse 74   Temp 97.7 °F (36.5 °C) (Oral)   Resp 18   Ht 6' 0.84\" (1.85 m)   Wt 228 lb 9 oz (103.7 kg)   SpO2 96%   BMI 30.29 kg/m²   Temp  Av.1 °F (36.2 °C)  Min: 96.6 °F (35.9 °C)  Max: 97.7 °F (36.5 °C)  Constitutional: The patient is awake, extubated  Skin: Warm and dry. No rashes were noted. HEENT: Round and reactive pupils. Moist mucous membranes. No ulcerations or thrush. NG tube in nares; left eye conjunctival hemorrhage  Neck: Supple to movements. Chest: No use of accessory muscles to breathe. Symmetrical expansion. No wheezing, crackles or rhonchi. Decreased bilateral  Cardiovascular: S1 and S2 are rhythmic and regular. No murmurs appreciated. Abdomen: Positive bowel sounds to auscultation. Benign to palpation. No masses felt. No hepatosplenomegaly. Genitourinary: male baxter  Extremities: No clubbing, no cyanosis, no edema.   Lines: peripheral  Art line 3/14/2020 left radial  Triple-lumen catheter 3/15/2020 left IJ  Laboratory and Tests Review:  Lab Results   Component Value Date    WBC 18.6 (H) 03/22/2020    WBC 15.8 (H) 03/21/2020    WBC 11.2 03/20/2020    HGB 10.9 (L) 03/22/2020    HCT 36.5 (L) 03/22/2020    MCV 81.1 03/22/2020     03/22/2020     Lab Results   Component Value Date    NEUTROABS 14.51 (H) 03/22/2020    NEUTROABS 13.75 (H) 03/21/2020    NEUTROABS 8.96 (H) 03/20/2020     No results found for: CRPHS  Lab Results   Component Value Date    ALT 48 (H) 03/20/2020    AST 69 (H) 03/20/2020    ALKPHOS 77 03/20/2020    BILITOT 0.2 03/20/2020     Lab Results   Component Value Date     03/22/2020    K 2.7 03/22/2020    K 2.9 03/21/2020     03/22/2020    CO2 29 03/22/2020    BUN 23 03/22/2020    CREATININE 1.3 03/22/2020    CREATININE 1.4 03/21/2020    CREATININE 1.4 03/21/2020    GFRAA >60 03/22/2020    LABGLOM 52 03/22/2020    GLUCOSE 117 03/22/2020    PROT 5.1 03/20/2020    LABALBU 2.4 03/20/2020    CALCIUM 7.4 03/22/2020    BILITOT 0.2 03/20/2020    ALKPHOS 77 03/20/2020    AST 69 03/20/2020    ALT 48 03/20/2020     No results found for: CRP  No results found for: [place]  Radiology:      Microbiology:   Lab Results   Component Value Date    BC 5 Days- no growth 03/14/2020    BC 5 Days- no growth 10/05/2019     Lab Results   Component Value Date    BLOODCULT2 5 Days- no growth 03/14/2020 BLOODCULT2 5 Days- no growth 10/05/2019     No results found for: WNDABS  Smear, Respiratory   Date Value Ref Range Status   03/15/2020   Final    Group 5: >25 PMN's/LPF and <10 Epithelial cells/LPF  Abundant Polymorphonuclear leukocytes  Rare Epithelial cells  Few Gram positive cocci in pairs       No results found for: MPNEUMO, CLAMYDCU, LABLEGI, AFBCX, FUNGSM, LABFUNG  CULTURE, RESPIRATORY   Date Value Ref Range Status   03/15/2020 Oral Pharyngeal Nora present  Final     No results found for: CXCATHTIP  No results found for: BFCS  No results found for: CXSURG  Urine Culture, Routine   Date Value Ref Range Status   10/05/2019 Growth not present  Final     MRSA Culture Only   Date Value Ref Range Status   03/14/2020 Methicillin resistant Staph aureus not isolated  Final       ASSESSMENT:  · Influenza A-on therapy-continue past 5 days  · Aspiration pneumonia-improving  · Acute kidney injury-improving slowly  · Leukocytosis-15.8 -->18.6  PLAN:    · tamiflu should be continued for total 10 days  · Unasyn with sputum growing normal oral nora, closely follow the dose of Unasyn with worsening renal function  · Check final cultures- normal oral nora  · Monitor labs    PO augmentin 875/125mg BID   Pt has still several electrolytes abnormalities, once stable for D/C would recommend 7 more days of PO abx  Ana Mann  5:27 PM  3/22/2020

## 2020-03-23 ENCOUNTER — APPOINTMENT (OUTPATIENT)
Dept: GENERAL RADIOLOGY | Age: 85
DRG: 870 | End: 2020-03-23
Payer: MEDICARE

## 2020-03-23 VITALS
OXYGEN SATURATION: 96 % | SYSTOLIC BLOOD PRESSURE: 121 MMHG | DIASTOLIC BLOOD PRESSURE: 67 MMHG | RESPIRATION RATE: 18 BRPM | BODY MASS INDEX: 30.29 KG/M2 | HEART RATE: 75 BPM | WEIGHT: 228.56 LBS | TEMPERATURE: 97 F | HEIGHT: 73 IN

## 2020-03-23 LAB
ANION GAP SERPL CALCULATED.3IONS-SCNC: 15 MMOL/L (ref 7–16)
ANION GAP SERPL CALCULATED.3IONS-SCNC: 20 MMOL/L (ref 7–16)
ANISOCYTOSIS: ABNORMAL
APTT: 53.3 SEC (ref 24.5–35.1)
BASOPHILS ABSOLUTE: 0 E9/L (ref 0–0.2)
BASOPHILS RELATIVE PERCENT: 0 % (ref 0–2)
BLASTS RELATIVE PERCENT: 1 % (ref 0–0)
BUN BLDV-MCNC: 18 MG/DL (ref 8–23)
BUN BLDV-MCNC: 18 MG/DL (ref 8–23)
CALCIUM SERPL-MCNC: 7.5 MG/DL (ref 8.6–10.2)
CALCIUM SERPL-MCNC: 7.6 MG/DL (ref 8.6–10.2)
CHLORIDE BLD-SCNC: 100 MMOL/L (ref 98–107)
CHLORIDE BLD-SCNC: 98 MMOL/L (ref 98–107)
CO2: 22 MMOL/L (ref 22–29)
CO2: 27 MMOL/L (ref 22–29)
CREAT SERPL-MCNC: 1.4 MG/DL (ref 0.7–1.2)
CREAT SERPL-MCNC: 1.6 MG/DL (ref 0.7–1.2)
EOSINOPHILS ABSOLUTE: 0.23 E9/L (ref 0.05–0.5)
EOSINOPHILS RELATIVE PERCENT: 1 % (ref 0–6)
GFR AFRICAN AMERICAN: 50
GFR AFRICAN AMERICAN: 58
GFR NON-AFRICAN AMERICAN: 41 ML/MIN/1.73
GFR NON-AFRICAN AMERICAN: 48 ML/MIN/1.73
GLUCOSE BLD-MCNC: 105 MG/DL (ref 74–99)
GLUCOSE BLD-MCNC: 110 MG/DL (ref 74–99)
HCT VFR BLD CALC: 37.5 % (ref 37–54)
HEMOGLOBIN: 11 G/DL (ref 12.5–16.5)
LYMPHOCYTES ABSOLUTE: 1.62 E9/L (ref 1.5–4)
LYMPHOCYTES RELATIVE PERCENT: 7 % (ref 20–42)
MAGNESIUM: 1.7 MG/DL (ref 1.6–2.6)
MAGNESIUM: 1.7 MG/DL (ref 1.6–2.6)
MCH RBC QN AUTO: 23.5 PG (ref 26–35)
MCHC RBC AUTO-ENTMCNC: 29.3 % (ref 32–34.5)
MCV RBC AUTO: 80.1 FL (ref 80–99.9)
METAMYELOCYTES RELATIVE PERCENT: 7 % (ref 0–1)
METER GLUCOSE: 79 MG/DL (ref 74–99)
MONOCYTES ABSOLUTE: 3 E9/L (ref 0.1–0.95)
MONOCYTES RELATIVE PERCENT: 13 % (ref 2–12)
MYELOCYTE PERCENT: 7 % (ref 0–0)
NEUTROPHILS ABSOLUTE: 17.79 E9/L (ref 1.8–7.3)
NEUTROPHILS RELATIVE PERCENT: 63 % (ref 43–80)
NUCLEATED RED BLOOD CELLS: 1 /100 WBC
OVALOCYTES: ABNORMAL
PDW BLD-RTO: 16.1 FL (ref 11.5–15)
PHOSPHORUS: 3.7 MG/DL (ref 2.5–4.5)
PHOSPHORUS: 3.7 MG/DL (ref 2.5–4.5)
PLATELET # BLD: 384 E9/L (ref 130–450)
PMV BLD AUTO: 11.1 FL (ref 7–12)
POIKILOCYTES: ABNORMAL
POLYCHROMASIA: ABNORMAL
POTASSIUM SERPL-SCNC: 3.2 MMOL/L (ref 3.5–5)
POTASSIUM SERPL-SCNC: 3.3 MMOL/L (ref 3.5–5)
PROMYELOCYTES PERCENT: 1 % (ref 0–0)
RBC # BLD: 4.68 E12/L (ref 3.8–5.8)
SODIUM BLD-SCNC: 140 MMOL/L (ref 132–146)
SODIUM BLD-SCNC: 142 MMOL/L (ref 132–146)
URIC ACID, SERUM: 6.1 MG/DL (ref 3.4–7)
WBC # BLD: 23.1 E9/L (ref 4.5–11.5)

## 2020-03-23 PROCEDURE — 97530 THERAPEUTIC ACTIVITIES: CPT

## 2020-03-23 PROCEDURE — 2700000000 HC OXYGEN THERAPY PER DAY

## 2020-03-23 PROCEDURE — 36415 COLL VENOUS BLD VENIPUNCTURE: CPT

## 2020-03-23 PROCEDURE — 6370000000 HC RX 637 (ALT 250 FOR IP): Performed by: INTERNAL MEDICINE

## 2020-03-23 PROCEDURE — 85730 THROMBOPLASTIN TIME PARTIAL: CPT

## 2020-03-23 PROCEDURE — 99233 SBSQ HOSP IP/OBS HIGH 50: CPT | Performed by: INTERNAL MEDICINE

## 2020-03-23 PROCEDURE — 6370000000 HC RX 637 (ALT 250 FOR IP): Performed by: NURSE PRACTITIONER

## 2020-03-23 PROCEDURE — 6360000002 HC RX W HCPCS: Performed by: INTERNAL MEDICINE

## 2020-03-23 PROCEDURE — 85025 COMPLETE CBC W/AUTO DIFF WBC: CPT

## 2020-03-23 PROCEDURE — 80048 BASIC METABOLIC PNL TOTAL CA: CPT

## 2020-03-23 PROCEDURE — 97535 SELF CARE MNGMENT TRAINING: CPT

## 2020-03-23 PROCEDURE — 73120 X-RAY EXAM OF HAND: CPT

## 2020-03-23 PROCEDURE — 82962 GLUCOSE BLOOD TEST: CPT

## 2020-03-23 PROCEDURE — 84550 ASSAY OF BLOOD/URIC ACID: CPT

## 2020-03-23 PROCEDURE — 2580000003 HC RX 258: Performed by: INTERNAL MEDICINE

## 2020-03-23 PROCEDURE — 84100 ASSAY OF PHOSPHORUS: CPT

## 2020-03-23 PROCEDURE — 83735 ASSAY OF MAGNESIUM: CPT

## 2020-03-23 RX ORDER — KETOROLAC TROMETHAMINE 30 MG/ML
15 INJECTION, SOLUTION INTRAMUSCULAR; INTRAVENOUS ONCE
Status: COMPLETED | OUTPATIENT
Start: 2020-03-23 | End: 2020-03-23

## 2020-03-23 RX ORDER — PANTOPRAZOLE SODIUM 40 MG/1
40 TABLET, DELAYED RELEASE ORAL
Qty: 30 TABLET | Refills: 3 | DISCHARGE
Start: 2020-03-24 | End: 2021-08-17

## 2020-03-23 RX ORDER — AMOXICILLIN AND CLAVULANATE POTASSIUM 875; 125 MG/1; MG/1
1 TABLET, FILM COATED ORAL EVERY 12 HOURS SCHEDULED
Qty: 20 TABLET | Refills: 0 | DISCHARGE
Start: 2020-03-23 | End: 2020-03-30

## 2020-03-23 RX ORDER — METOPROLOL SUCCINATE 25 MG/1
25 TABLET, EXTENDED RELEASE ORAL DAILY
Qty: 30 TABLET | Refills: 3 | DISCHARGE
Start: 2020-03-24 | End: 2021-08-17

## 2020-03-23 RX ORDER — CALCIUM CARBONATE 200(500)MG
500 TABLET,CHEWABLE ORAL 3 TIMES DAILY
Qty: 90 TABLET | Refills: 0 | DISCHARGE
Start: 2020-03-23 | End: 2020-04-22

## 2020-03-23 RX ORDER — POTASSIUM CHLORIDE 20 MEQ/1
40 TABLET, EXTENDED RELEASE ORAL 2 TIMES DAILY WITH MEALS
Status: DISCONTINUED | OUTPATIENT
Start: 2020-03-23 | End: 2020-03-23 | Stop reason: HOSPADM

## 2020-03-23 RX ORDER — OSELTAMIVIR PHOSPHATE 6 MG/ML
FOR SUSPENSION ORAL
Qty: 125 ML | Refills: 0 | DISCHARGE
Start: 2020-03-23 | End: 2021-08-17

## 2020-03-23 RX ORDER — ASPIRIN 81 MG/1
81 TABLET, CHEWABLE ORAL DAILY
Qty: 30 TABLET | Refills: 3 | DISCHARGE
Start: 2020-03-24 | End: 2021-08-17

## 2020-03-23 RX ORDER — SULFACETAMIDE SODIUM 100 MG/ML
1 SOLUTION/ DROPS OPHTHALMIC 4 TIMES DAILY
DISCHARGE
Start: 2020-03-23 | End: 2020-04-02

## 2020-03-23 RX ORDER — CALCIUM CARBONATE 200(500)MG
500 TABLET,CHEWABLE ORAL 3 TIMES DAILY
Status: DISCONTINUED | OUTPATIENT
Start: 2020-03-23 | End: 2020-03-23 | Stop reason: HOSPADM

## 2020-03-23 RX ORDER — ALBUTEROL SULFATE 2.5 MG/3ML
2.5 SOLUTION RESPIRATORY (INHALATION) EVERY 6 HOURS PRN
Qty: 120 EACH | Refills: 3 | Status: ON HOLD | DISCHARGE
Start: 2020-03-23 | End: 2021-12-07

## 2020-03-23 RX ADMIN — CALCIUM CARBONATE (ANTACID) CHEW TAB 500 MG 500 MG: 500 CHEW TAB at 15:18

## 2020-03-23 RX ADMIN — HYDROCORTISONE 5 MG: 5 TABLET ORAL at 17:28

## 2020-03-23 RX ADMIN — FLUDROCORTISONE ACETATE 0.1 MG: 0.1 TABLET ORAL at 09:20

## 2020-03-23 RX ADMIN — SULFACETAMIDE SODIUM 1 DROP: 100 SOLUTION OPHTHALMIC at 09:20

## 2020-03-23 RX ADMIN — POTASSIUM CHLORIDE 20 MEQ: 20 TABLET, EXTENDED RELEASE ORAL at 09:20

## 2020-03-23 RX ADMIN — APIXABAN 5 MG: 5 TABLET, FILM COATED ORAL at 13:28

## 2020-03-23 RX ADMIN — HYDROCORTISONE 10 MG: 5 TABLET ORAL at 09:20

## 2020-03-23 RX ADMIN — HEPARIN SODIUM 11 UNITS/KG/HR: 10000 INJECTION, SOLUTION INTRAVENOUS at 09:24

## 2020-03-23 RX ADMIN — ASPIRIN 81 MG 81 MG: 81 TABLET ORAL at 09:21

## 2020-03-23 RX ADMIN — Medication 45 MG: at 09:37

## 2020-03-23 RX ADMIN — SULFACETAMIDE SODIUM 1 DROP: 100 SOLUTION OPHTHALMIC at 12:17

## 2020-03-23 RX ADMIN — SODIUM CHLORIDE, PRESERVATIVE FREE 10 ML: 5 INJECTION INTRAVENOUS at 09:21

## 2020-03-23 RX ADMIN — HYDROCORTISONE 5 MG: 5 TABLET ORAL at 12:16

## 2020-03-23 RX ADMIN — KETOROLAC TROMETHAMINE 15 MG: 30 INJECTION, SOLUTION INTRAMUSCULAR at 15:18

## 2020-03-23 RX ADMIN — AMOXICILLIN AND CLAVULANATE POTASSIUM 1 TABLET: 875; 125 TABLET, FILM COATED ORAL at 09:20

## 2020-03-23 RX ADMIN — METOPROLOL SUCCINATE 25 MG: 50 TABLET, EXTENDED RELEASE ORAL at 09:20

## 2020-03-23 RX ADMIN — LEVOTHYROXINE SODIUM 125 MCG: 0.12 TABLET ORAL at 05:28

## 2020-03-23 RX ADMIN — ACETAMINOPHEN 650 MG: 325 TABLET, FILM COATED ORAL at 11:34

## 2020-03-23 RX ADMIN — PANTOPRAZOLE SODIUM 40 MG: 40 TABLET, DELAYED RELEASE ORAL at 05:28

## 2020-03-23 RX ADMIN — FUROSEMIDE 40 MG: 10 INJECTION, SOLUTION INTRAMUSCULAR; INTRAVENOUS at 09:20

## 2020-03-23 RX ADMIN — SULFACETAMIDE SODIUM 1 DROP: 100 SOLUTION OPHTHALMIC at 17:28

## 2020-03-23 ASSESSMENT — PAIN DESCRIPTION - PROGRESSION: CLINICAL_PROGRESSION: GRADUALLY WORSENING

## 2020-03-23 ASSESSMENT — PAIN SCALES - GENERAL
PAINLEVEL_OUTOF10: 6
PAINLEVEL_OUTOF10: 0
PAINLEVEL_OUTOF10: 0
PAINLEVEL_OUTOF10: 6
PAINLEVEL_OUTOF10: 0
PAINLEVEL_OUTOF10: 0

## 2020-03-23 ASSESSMENT — PAIN DESCRIPTION - FREQUENCY: FREQUENCY: CONTINUOUS

## 2020-03-23 ASSESSMENT — PAIN DESCRIPTION - LOCATION: LOCATION: HAND

## 2020-03-23 ASSESSMENT — PAIN DESCRIPTION - ORIENTATION: ORIENTATION: RIGHT

## 2020-03-23 ASSESSMENT — PAIN DESCRIPTION - PAIN TYPE: TYPE: CHRONIC PAIN

## 2020-03-23 ASSESSMENT — PAIN DESCRIPTION - DIRECTION: RADIATING_TOWARDS: WRIST

## 2020-03-23 ASSESSMENT — PAIN - FUNCTIONAL ASSESSMENT: PAIN_FUNCTIONAL_ASSESSMENT: PREVENTS OR INTERFERES SOME ACTIVE ACTIVITIES AND ADLS

## 2020-03-23 ASSESSMENT — PAIN DESCRIPTION - DESCRIPTORS: DESCRIPTORS: ACHING;SORE

## 2020-03-23 ASSESSMENT — PAIN DESCRIPTION - ONSET: ONSET: ON-GOING

## 2020-03-23 NOTE — PROGRESS NOTES
[address] [facility]  Department of Internal Medicine  Division of Pulmonary, Critical Care and Sleep Medicine  Progress Note      Patient:  Naman Singh 80 y.o. male   MRN: [id number]       Date of Service: 3/23/2020    Allergy: Codeine; Diazepam; Lorazepam; and Morphine    Subjective       ID changed Zosyn to Unasyn with sputum growing normal oral nora  - 8  liters  Right pain very tender pain 10/10 this AM.    Wants to go home  Still on heparin defer to cardiology    Objective     TEMPERATURE:  Current - Temp: 97.3 °F (36.3 °C); Max - Temp  Av.7 °F (36.5 °C)  Min: 97.3 °F (36.3 °C)  Max: 98 °F (36.7 °C)  RESPIRATIONS RANGE: Resp  Av  Min: 16  Max: 20  PULSE RANGE: Pulse  Av.5  Min: 74  Max: 82  BLOOD PRESSURE RANGE:  Systolic (66UJE), TRQ:240 , Min:123 , CEC:464   ; Diastolic (99JTW), ZTT:03, Min:66, Max:80    PULSE OXIMETRY RANGE: SpO2  Av %  Min: 93 %  Max: 96 %    I & O - 24hr:    Intake/Output Summary (Last 24 hours) at 3/23/2020 1133  Last data filed at 3/23/2020 1125  Gross per 24 hour   Intake 360 ml   Output 4050 ml   Net -3690 ml     I/O last 3 completed shifts: In: 120 [P.O.:120]  Out: 2400 [Urine:2400] I/O this shift:  In: 240 [P.O.:240]  Out: 3350 [Urine:3350]   Weight change:     Physical Exam:  General Appearance:  Alert off ventilator   HEENT:    NC/AT, mucous membranes are moist   Neck:   Supple, no jugular venous distention. Resp:     Decreased breath sounds bilaterally   Heart:    RRR, S1 and S2 normal, no murmur, rub or gallop. Abdomen:     Soft, non-tender, non-distended with normal bowel sounds   Extremities:  + edema, ROMlimited by pain on the right, slightly swollen.  reduced greatly.  IV running   Pulses:  Diminished bilaterally   Neurologic:  Weak but non focal       Labs and Imaging Studies     CBC:   Lab Results   Component Value Date    WBC 18.6 2020    RBC 4.50 2020    HGB 10.9

## 2020-03-23 NOTE — PROGRESS NOTES
650 mg Rectal Q6H PRN Jenean Pew., DO        polyethylene glycol [facility]) packet 17 g  17 g Oral Daily PRN Jenean Pew., DO        promethazine (PHENERGAN) tablet 12.5 mg  12.5 mg Oral Q6H PRN Jenean Pew., DO           Review of systems:   Heart: as above   Lungs: as above   Eyes: denies changes in vision or discharge. Ears: denies changes in hearing or pain. Nose: denies epistaxis or masses   Throat: denies sore throat or trouble swallowing. Neuro: denies numbness, tingling, tremors. Skin: denies rashes or itching. : denies hematuria, dysuria   GI: denies vomiting, diarrhea   Psych: denies mood changed, anxiety, depression. Physical Exam   /66   Pulse 82   Temp 97.3 °F (36.3 °C) (Temporal)   Resp 18   Ht 6' 0.84\" (1.85 m)   Wt 228 lb 9 oz (103.7 kg)   SpO2 95%   BMI 30.29 kg/m²   Constitutional: Oriented to person, place, and time. No distress. Well developed. Head: Normocephalic and atraumatic. Neck: Neck supple. No hepatojugular reflux. No JVD present. Carotid bruit is not present. No tracheal deviation present. No thyromegaly present. Cardiovascular: Normal rate, regular rhythm, normal heart sounds. intact distal pulses. No gallop and no friction rub. No murmur heard. Pulmonary: Breath sounds normal. No respiratory distress. No wheezes. No rales. Chest: Effort normal. No tenderness. Abdominal: Soft. Bowel sounds are normal. No distension or mass. No tenderness, rebound or guarding. Musculoskeletal: . No tenderness. No clubbing or cyanosis. Extremitites: Intact distal pulses. No edema  Neurological: Alert and oriented to person, place, and time. Skin: Skin is warm and dry. No rash noted. Not diaphoretic. No erythema. Psychiatric: Normal mood and affect. Behavior is normal.   Lymphadenopathy: No cervical adenopathy. No groin adenopathy.       CBC:   Lab Results   Component Value Date    WBC 18.6 03/22/2020    RBC 4.50 03/22/2020    HGB troponin    Metabolic encephalopathy    Aspiration pneumonia (HCC)    Wide-complex tachycardia ([facility] [address]     1. Elevated troponin: Medically manage. ASA/BB. 2. PAF: BB. Stop IV heparin, start NOAC. 3. CKD: Renal following. 4. Pneumonia/Influenza: Per primary service. 5. Volume overload: On IV lasix. 6. Anemia: Follow labs. 7. Hypokalemia: Renal following. Jonathan Mancuso D.O.   Cardiologist  Cardiology, [facility]

## 2020-03-23 NOTE — PROGRESS NOTES
[facility]  Nephrology Attending Progress Note  Ella Alonzo MD        SUBJECTIVE:     3/18: pt seen in icu, intubated  3/19: pr seen in icu, extubated and no longer on tube feeds, ns running  3/20: pt seen in icu. Extubated yesterday  3/212/2020- awake and with cough. No acute distress. 3/22/2020- no distress. Labs this am are pending  3/23/2020:  Seen & examined-c/o pain in right hand and wrist-iv heparin infusing-notified nurse. Labs pending.       PROBLEM LIST:    Patient Active Problem List   Diagnosis    [facility]    Hypopituitarism ([facility]    Weakness    Hypothyroidism    Lactic acidosis    Renal insufficiency    Atrial fibrillation with RVR (HCC)    QT prolongation    RBBB    Acute respiratory failure with hypoxia (HCC)    Influenza A    Non-traumatic rhabdomyolysis    Acute renal failure superimposed on stage 2 chronic kidney disease (HCC)    Adrenal insufficiency (Wade's disease) (HCC)    Elevated troponin    Metabolic encephalopathy    Aspiration pneumonia ([facility]    Wide-complex tachycardia (HCC)        PAST MEDICAL HISTORY:    Past Medical History:   Diagnosis Date    A-fib ([facility]     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 ([facility]     Hypothyroid     Osteopenia     Renal insufficiency     Right bundle branch block        DIET:    Diet Tube Feed Modular: Protein Modular  DIET DENTAL SOFT;     PHYSICAL EXAM:     Patient Vitals for the past 24 hrs:   BP Temp Temp src Pulse Resp SpO2   03/23/20 0800 131/66 97.3 °F (36.3 °C) Temporal 82 18 95 %   03/23/20 0000 130/80 98 °F (36.7 °C) -- 80 16 95 %   03/22/20 1944 123/68 97.6 °F (36.4 °C) Temporal 74 20 93 %   03/22/20 1430 123/67 97.7 °F (36.5 °C) Oral 74 18 96 %   03/22/20 0815 117/64 96.6 °F (35.9 °C) Temporal 75 20 --   @      Intake/Output Summary (Last 24 hours) at 3/23/2020 0811  Last data filed at 3/22/2020 1900  Gross per 24 hour   Intake 120 ml   Output 2400 ml   Net -2280 ml         Wt Readings from Last 3 Encounters:   03/21/20 228 lb 9 oz (103.7 kg)   10/08/19 207 lb 8 oz (94.1 kg)       Constitutional:  Pt is in nad  Head: normocephalic, atraumatic  Neck: no JVD  Cardiovascular: regular rate and rhythm, no murmurs, gallops, or rubs  Respiratory:  Lungs clear, diminished in bases. Gastrointestinal:  Soft, nontender, nondistended, bowel sounds x 4  Ext: trace edema ble  Skin: dry, no rash.       MEDS (scheduled):    potassium chloride  20 mEq Oral Daily    amoxicillin-clavulanate  1 tablet Oral 2 times per day    furosemide  40 mg Intravenous BID    metoprolol succinate  25 mg Oral Daily    hydrocortisone  10 mg Oral Daily with breakfast    hydrocortisone  5 mg Oral Lunch    hydrocortisone  5 mg Oral Dinner    pantoprazole  40 mg Oral QAM AC    oseltamivir 6mg/ml  45 mg Oral BID    sulfacetamide  1 drop Left Eye 4x Daily    aspirin  81 mg Oral Daily    fludrocortisone  0.1 mg Oral Daily    levothyroxine  125 mcg Oral Daily    sodium chloride flush  10 mL Intravenous 2 times per day       MEDS (infusions):   dextrose      heparin (porcine) 11 Units/kg/hr (03/23/20 0026)       MEDS (prn):  albuterol, glucose, dextrose, glucagon (rDNA), dextrose, heparin (porcine), heparin (porcine), sodium chloride flush, acetaminophen **OR** acetaminophen, polyethylene glycol, promethazine **OR** [DISCONTINUED] ondansetron    DATA:    Recent Labs     03/21/20  0550 03/22/20  0520   WBC 15.8* 18.6*   HGB 9.6* 10.9*   HCT 32.9* 36.5*   MCV 82.5 81.1     296 333     Recent Labs     03/21/20  0550 03/21/20  1330 03/22/20  0940    146 146   K 2.9* 2.9* 2.7*    103 105   CO2 30* 29 29   BUN 26* 25* 23   CREATININE 1.4* 1.4* 1.3*   LABGLOM 48 48 52   GLUCOSE 71* 99 117*   CALCIUM 6.6* 6.8* 7.4*   MG 2.0  --   --        Lab Results   Component Value Date    LABALBU 2.4 (L) 03/20/2020    LABALBU 3.5 03/14/2020    LABALBU 3.1 (L) 03/14/2020     Lab Results   Component Value Date    TSH 0.435 03/14/2020

## 2020-03-23 NOTE — PLAN OF CARE
Problem: Gas Exchange - Impaired:  Goal: Levels of oxygenation will improve  Description: Levels of oxygenation will improve  Outcome: Met This Shift     Problem: Infection, Septic Shock:  Goal: Will show no infection signs and symptoms  Description: Will show no infection signs and symptoms  Outcome: Met This Shift     Problem: Infection - Ventilator-Associated Pneumonia:  Goal: Absence of pulmonary infection  Description: Absence of pulmonary infection  Outcome: Met This Shift

## 2020-03-23 NOTE — PROGRESS NOTES
Patient complaining of pain to right thumb and wrist. Patient stated it has bothered him for a while but it is feeling worse today. Patient requested Tyelnol.

## 2020-03-23 NOTE — PROGRESS NOTES
Coordination of Care    Nutrition Evaluation:   · Evaluation: Goals set(new goal)   · Goals: Consume >75% meals    · Monitoring: Meal Intake, Diet Tolerance, I&O, Mental Status/Confusion, Weight, Pertinent Labs, Chewing/Swallowing, Monitor Bowel Function      Electronically signed by Ebony Hayes MS, RD, LD on 3/23/20 at 1:30 PM EDT    Contact Number: [phone]

## 2020-03-23 NOTE — PROGRESS NOTES
Max A   Max A (simulated)   improve UB/LB bathing to mod A   Toileting DEP  Mild bowel incontinence upon standing, assistance for perineal hygiene Max A  Max A for tfr to/from [facility], assistance for perineal hygiene d/t impaired standing balance improve toileting task and all clothing mgmt to max A   Bed Mobility  Supine to sit: Max A with HOB elevated  Sit to supine: Max A x2 Supine-sit: NT, EOB upon arrival  Sit-supine: Max A improve bed mobility to min A in prep for EOB ADL tasks   Functional Transfers Sit to stand: Max A x2  Stand to sit: Max A x2 Sit-stand: Max A  Stand-sit: Max A  Commode: Max A  SPT: Mod A (bed<>BSC) improve all functional transfers to mod A x1   Functional Mobility Max A with ww  For side steps at EOB NT improve functional mobility to mod A with AE PRN   Balance Sitting:     Static:  SBA    Dynamic: Min A  Standing: Max A ww Sitting:     Static:  Supervision    Dynamic: Min A  Standing: Mod A ww demo independent dynamic sitting balance EOB during ADL tasks   Endurance/Activity Tolerance F Fair (sat EOB ~10 mins) demo G activity tolerance/endurance during 15-20 min ADL task    Visual/  Perceptual Glasses: none in room              Hand dominance: R  UE ROM: RUE: WFL AAROM  LUE: WFL AAROM  Strength: RUE: grossly 2-/5 LUE: grossly 2-/5   Strength: WFL  Fine Motor Coordination: WFL    Treatment:   · Bed mobility: Facilitated bed mobility with cues for proper body mechanics and sequencing to prepare for ADL completion. · Functional transfers: Facilitated transfers from various surfaces with cues for body alignment, safety and hand placement. · ADL completion: Self-care retraining for the above-mentioned ADLs; training on proper hand placement, safety technique, sequencing, and energy conservation techniques. · Postural Balance: Standing balance retraining to improve righting reactions with postural changes during ADLS.   · Cognitive/Perceptual training: retraining exercises to improve attention, mentation, and spatial awareness for ADLs & transfers. · Skilled positioning: Proper positioning to improve interaction with environment, overall functioning and decrease/prevent edema and contractures. · Delirium Prevention/Management: Implementation of non-pharmacologic interventions for delirium prevention incorporated throughout session to patient. Including environmental and sensory modifications to decrease patient's internal distraction caused by delirium, and improve overall mentation.     Treatment Time In: 1020           Treatment Time Out: 9829            Treatment Charges: Mins Units   Ther Ex  18276     Manual Therapy [address] [facility]     Thera Activities 22095     ADL/Home Mgt 89127 25 2   Neuro Re-ed 97036     Group Therapy      Orthotic manage/training  23987     Non-Billable Time     Total Timed Treatment 25 1 Phil Delgadillo, OTR/L  [id number]

## 2020-03-23 NOTE — DISCHARGE INSTR - COC
Continuity of Care Form    Patient Name: Naman Singh   :  1931  MRN:  [id number]    Admit date:  3/14/2020  Discharge date:  3/23/2020    Code Status Order: Full Code   Advance Directives:   [facility] Documentation     Date/Time Healthcare Directive Type of Healthcare Directive Copy in [facility] [address] Agent's Name Healthcare Agent's Phone Number    [phone]  No, patient does not have an advance directive for healthcare treatment -- -- -- -- --          Admitting Physician:  Florance Denver, MD  PCP: No primary care provider on file. Discharging Nurse: [facility]-MAIN Unit/Room#: 6564/8777-M  Discharging Unit Phone Number: [phone]    Emergency Contact:   Extended Emergency Contact Information  Primary Emergency Contact: Marisel Delgadillo [address] Phone: [phone]  Relation: Child  Secondary Emergency Contact: [facility] Phone: [phone]  Relation: Child  Preferred language: English   needed? No    Past Surgical History:  History reviewed. No pertinent surgical history. Immunization History: There is no immunization history on file for this patient.     Active Problems:  Patient Active Problem List   Diagnosis Code    Shock ([facility] R57.9    Hypopituitarism ([facility] E23.0    Weakness R53.1    Hypothyroidism E03.9    Lactic acidosis E87.2    Renal insufficiency N28.9    Atrial fibrillation with RVR ([facility]) I48.91    QT prolongation R94.31    RBBB I45.10    Acute respiratory failure with hypoxia ([facility]) J96.01    Influenza A J10.1    Non-traumatic rhabdomyolysis M62.82    Acute renal failure superimposed on stage 2 chronic kidney disease ([facility]) N17.9, N18.2    Adrenal insufficiency (Wade's disease) ([facility]) E27.1    Elevated troponin U73.88    Metabolic encephalopathy Y52.92    Aspiration pneumonia ([facility]) J69.0    Wide-complex tachycardia ([facility]) I47.2       Isolation/Infection:   Isolation          Droplet        Patient

## 2020-03-23 NOTE — PROGRESS NOTES
BLOODCULT2 5 Days- no growth 03/14/2020    BLOODCULT2 5 Days- no growth 10/05/2019     No results found for: WNDABS  Smear, Respiratory   Date Value Ref Range Status   03/15/2020   Final    Group 5: >25 PMN's/LPF and <10 Epithelial cells/LPF  Abundant Polymorphonuclear leukocytes  Rare Epithelial cells  Few Gram positive cocci in pairs       No results found for: MPNEUMO, CLAMYDCU, LABLEGI, AFBCX, FUNGSM, LABFUNG  CULTURE, RESPIRATORY   Date Value Ref Range Status   03/15/2020 Oral Pharyngeal Nora present  Final     No results found for: CXCATHTIP  No results found for: BFCS  No results found for: CXSURG  Urine Culture, Routine   Date Value Ref Range Status   10/05/2019 Growth not present  Final     MRSA Culture Only   Date Value Ref Range Status   03/14/2020 Methicillin resistant Staph aureus not isolated  Final       ASSESSMENT:  · Influenza A-on therapy-continue past 5 days  · Aspiration pneumonia-improving  · Acute kidney injury-improving slowly  · Leukocytosis-15.8 -->18.6      PLAN:    · Tamiflu should be continued for total 10 days  · PO augmentin 875/125mg BID   · Reviewed final cultures- normal oral nora  · Monitor labs-- CBC and BMP -- pending for today  · Pt has still several electrolytes abnormalities, once stable for D/C would recommend 7 more days of PO antibiotics  · Patient to go to SNF. Maureen Estrada  11:26 AM  3/23/2020   Pt seen and examined. Above discussed agree with advanced practice nurse. Labs, cultures, and radiographs reviewed. Face to Face encounter occurred. Changes made as necessary.      Irma Saucedo MD

## 2020-03-23 NOTE — CARE COORDINATION
Multiple calls received from the patient's son this morning re: transition of care plan. Explained that a referral was made to [facility] by Clemente Shay in the ICU and the plan is to initiate authorization for the patient to transition to rehab tomorrow. Patient's son is in agreement with [facility] for rehab. Brian Drake with multiple questions. Explained once a chart review could be completed and I spoke with a representative with [facility], I would get back to him. Paul Shahid expressed understanding. Chart review completed. Patient remains on the Heparin drip at this time and IV lasix BID. Nursing will speak with the physician re: changing IV meds to alternatives. Spoke with Asa Lofton, liaison with [facility]. Authorization initiated with patient's insurance for transition to rehab tomorrow. Per Asa Lofton, they could not do the Heparin drip, however they can do a Lovenox bridge if necessary. Charge nurse notified. Call placed to the patient's son Brian Drake to update. No answer. Detailed VM left with above information and transition of care plan to [facility] tomorrow once authorization has been completed. Will continue to follow for further transition of care planning needs. Mackenzie Hartman.    The Plan for Transition of Care is related to the following treatment goals: improve functional mobility for the patient to transition home. The Patient and/or patient representative, son Brian Drake, was provided with a choice of provider and agrees   with the discharge plan. [x] Yes [] No    Freedom of choice list was provided with basic dialogue that supports the patient's individualized plan of care/goals, treatment preferences and shares the quality data associated with the providers.  [x] Yes [] No

## 2020-03-23 NOTE — PROGRESS NOTES
Physical Therapy    Physical Therapy Daily Treatment Note      Name: Harpreet Canales  : 1931  MRN: [id number]    Referring Provider:  Morelia Randall MD    Date of Service: 3/23/2020    Evaluating PT:  Geratifne Overall, PT, DPT [id number]     Room #:  3132/3618-N  Diagnosis:  Acute respiratory failure with hypoxia   PMHx/PSHx:  Afib, hypopituitarism, hypothyroid, osteopenia, renal insufficiency, RBBB  Other:  Pt extubated 3/19/20   Precautions:  Falls, Droplet for Flu, O2, Crow  Equipment Needs:  TBD    SUBJECTIVE:    Pt lives alone in a 2.5 story home with 2 stairs to enter and 0 rail. Bedroom and bathroom are on the first level. Pt ambulated with no AD PTA. States he has no immediate upstairs needs. OBJECTIVE:   Initial Evaluation  Date: 3/20/20 Treatment  Date: 3/23/2020 Short Term/ Long Term   Goals   AM-PAC 6 Clicks  52/10    Was pt agreeable to Eval/treatment? Yes  yes    Does pt have pain? None  No c/o pain    Bed Mobility  Rolling: NT  Supine to sit: Max A  Sit to supine: Max A x2  Scooting: Max A Rolling: SBA  Supine to sit: NT  Sit to supine: SBA  Scooting: SBA Rolling: Min A  Supine to sit: Min A  Sit to supine: Min A  Scooting: Min A   Transfers Sit to stand: Max A x2  Stand to sit: Max A  Stand pivot: NT Sit to stand: Max A  Stand to sit: Max A  Stand pivot: Mod A with [facility] Sit to stand: Min A  Stand to sit: Min A  Stand pivot: Min A with AAD   Ambulation    3 side steps with [facility] Max A Few feet bed<>BSC Mod A >75 feet with AAD Min A   Stair negotiation: ascended and descended  NT NT >2 steps with 2 rail Mod A   ROM BUE:  Per OT eval  BLE:  WFL     Strength BUE:  Per OT eval   BLE:  Grossly 4-/5      Balance Sitting EOB:  CGA<>MIn A  Dynamic Standing: Max A with [facility]  Sitting EOB: SBA  Dynamic standing:  Mod A with [facility] Sitting EOB:  Independent   Dynamic Standing:  Min A with AAD     Pt is A & O x 4  Sensation:  Pt denies numbness and tingling to extremities  Edema:  unremarkable    Patient education  Pt educated on safety during mobility, importance of mobility, upright posture in standing     Patient response to education:   Pt verbalized understanding Pt demonstrated skill Pt requires further education in this area   yes yes yes     ASSESSMENT:    Comments:  Pt received seated EOB. States he needs to use BSC. Performed STS to [address] and pivot to bedside chair. Sat on BSC to void. Performed STS from [facility] and assisted with hygiene. Short distance ambulation BSC>bed was performed and pt sat EOB. Attempted to have pt performed STS and ambulation in room but he declined. Stating his R wrist was hurting too bad as he had IV placed last night and his hand/wrist has been sore ever since. Pt requested back to bed. Educated on benefits of sitting in chair. Pt still wanted back to bed. Left with call button in hand. Treatment:  Patient practiced and was instructed in the following treatment:    · Bed mobility training - pt given verbal and tactile cues to facilitate proper sequencing and safety during rolling and sit>supine as well as provided with physical assistance to complete task    · Sitting EOB for >15 minutes for upright tolerance, postural awareness and BLE ROM   · STS and pivot transfer training - pt educated on proper hand and foot placement, safety and sequencing, and use of an assistive device to safely complete sit<>stand and pivot transfers bed<>BSC with hands on assistance to complete task safely    · Lateral side stepping along EOB using [address] with cues for upright posture     PLAN:    Patient is making fair progress towards established goals. Will continue with current POC.       Time in  1025  Time out  1045    Total Treatment Time  25 minutes     CPT codes:  [] Gait training 42766 0 minutes  [] Manual therapy 46825 0 minutes  [x] Therapeutic activities 38923 25 minutes  [] Therapeutic exercises 57552 0 minutes  [] Neuromuscular reeducation 82071 0 minutes    Justa Hernadez, PT, DPT  [id number]

## 2020-03-25 ENCOUNTER — TELEPHONE (OUTPATIENT)
Dept: CARDIOLOGY CLINIC | Age: 85
End: 2020-03-25

## 2020-03-25 NOTE — DISCHARGE SUMMARY
Hospital Medicine Discharge Summary    Patient: Nathaly Smart     Gender: male  : 1931   Age: 80 y.o. MRN: [id number]    Code Status:  Full code    Primary Care Provider: No primary care provider on file. Admit Date: 3/14/2020   Discharge Date: 3/23/2020      Admitting Physician: Brad Russo MD  Discharge Physician: Gay Watkins DO     Discharge Diagnoses: Active Hospital Problems    Diagnosis Date Noted    Influenza A [J10.1] 2020    Non-traumatic rhabdomyolysis [M62.82] 2020    Acute renal failure superimposed on stage 2 chronic kidney disease ([address] [N17.9, N18.2] 2020    Adrenal insufficiency (Ashe's disease) ([address] [E27.1] 2020    Elevated troponin [R79.89]     Metabolic encephalopathy [N61.28] 2020    Aspiration pneumonia ([address] [J69.0] 2020    Wide-complex tachycardia ([address] [I47.2] 2020    Acute respiratory failure with hypoxia ([address] [J96.01] 2020    Hypothyroidism [E03.9] 10/06/2019    Shock ([address] [R57.9] 10/05/2019       Hospital Course:   *presented with change in mental statu* found to be *Influenza pos . He lives alone and is estranged from his family, and he was found on the floor. His CK was elevated, and he had aspirated. He was also given a stress does of streroids. Plan is to a HERMILO **    Disposition:  Long-Term Acute Care    Exam:     /67   Pulse 75   Temp 97 °F (36.1 °C) (Temporal)   Resp 18   Ht 6' 0.84\" (1.85 m)   Wt 228 lb 9 oz (103.7 kg)   SpO2 96%   BMI 30.29 kg/m²   Gen:  Well developed  HEENT: NC/AT, moist mucous membranes, no oropharyngeal erythema or exudate left lower eye lid injected   Neck: supple, trachea midline, no anterior cervical or SC LAD  Heart:  Normal s1/s2, RRR, no murmurs, gallops, or rubs.    Lungs:  CTA* bilaterally,   Abd: bowel sounds present, soft, nontender, nondistended, no masses  Extrem:  No clubbing, cyanosis,  no** edema  Skin: no rashes or lesions  Psych: alert to person  Neuro: grossly intact, moves all four extremities. Capillary Refill: Brisk,< 3 seconds   Peripheral Pulses: +2 palpable, equal bilaterally             Consults:     IP CONSULT TO PRIMARY CARE PROVIDER  IP CONSULT TO CRITICAL CARE  IP CONSULT TO CRITICAL CARE  IP CONSULT TO CRITICAL CARE  PHARMACY TO DOSE VANCOMYCIN  IP CONSULT TO CARDIOLOGY  IP CONSULT TO NEPHROLOGY  IP CONSULT TO INFECTIOUS DISEASES  IP CONSULT TO CASE MANAGEMENT    Labs: For convenience and continuity at follow-up the following most recent labs are provided:    Lab Results   Component Value Date    WBC 23.1 03/23/2020    HGB 11.0 03/23/2020    HCT 37.5 03/23/2020    MCV 80.1 03/23/2020     03/23/2020     03/23/2020     03/23/2020    K 3.3 03/23/2020    K 3.2 03/23/2020    K 2.9 03/21/2020     03/23/2020    CL 98 03/23/2020    CO2 22 03/23/2020    CO2 27 03/23/2020    BUN 18 03/23/2020    BUN 18 03/23/2020    CREATININE 1.4 03/23/2020    CREATININE 1.6 03/23/2020    CALCIUM 7.6 03/23/2020    CALCIUM 7.5 03/23/2020    PHOS 3.7 03/23/2020    PHOS 3.7 03/23/2020    ALKPHOS 77 03/20/2020    ALT 48 03/20/2020    AST 69 03/20/2020    BILITOT 0.2 03/20/2020    LABALBU 2.4 03/20/2020     Lab Results   Component Value Date    INR 1.0 10/05/2019       Radiology:  XR HAND RIGHT (2 VIEWS)   Final Result      1. No fracture or joint dislocation. 2. Degenerative changes. XR CHEST PORTABLE   Final Result      Partial resolution of bilateral pulmonary infiltrates. Left pleural effusion and contiguous atelectasis. Interval removal of endotracheal tube. XR CHEST PORTABLE   Final Result   Bilateral airspace disease with evidence of slight interval   progression on the left. XR CHEST PORTABLE   Final Result   Stable CHF. XR CHEST PORTABLE   Final Result   Stable CHF. XR CHEST PORTABLE   Final Result   CHF without change.             XR CHEST

## 2020-03-26 ENCOUNTER — TELEPHONE (OUTPATIENT)
Dept: CARDIOLOGY CLINIC | Age: 85
End: 2020-03-26

## 2020-04-20 PROBLEM — R77.8 ELEVATED TROPONIN: Status: RESOLVED | Noted: 2020-03-21 | Resolved: 2020-04-20

## 2020-04-20 PROBLEM — J10.1 INFLUENZA A: Status: RESOLVED | Noted: 2020-03-21 | Resolved: 2020-04-20

## 2020-04-20 PROBLEM — R79.89 ELEVATED TROPONIN: Status: RESOLVED | Noted: 2020-03-21 | Resolved: 2020-04-20

## 2020-10-30 ENCOUNTER — OFFICE VISIT (OUTPATIENT)
Dept: FAMILY MEDICINE CLINIC | Age: 85
End: 2020-10-30
Payer: MEDICARE

## 2020-10-30 VITALS
OXYGEN SATURATION: 95 % | DIASTOLIC BLOOD PRESSURE: 76 MMHG | HEIGHT: 73 IN | RESPIRATION RATE: 18 BRPM | HEART RATE: 76 BPM | WEIGHT: 223 LBS | SYSTOLIC BLOOD PRESSURE: 124 MMHG | TEMPERATURE: 97.2 F | BODY MASS INDEX: 29.55 KG/M2

## 2020-10-30 PROCEDURE — 99213 OFFICE O/P EST LOW 20 MIN: CPT | Performed by: NURSE PRACTITIONER

## 2020-10-30 ASSESSMENT — ENCOUNTER SYMPTOMS
STRIDOR: 0
COUGH: 0
CHEST TIGHTNESS: 0
WHEEZING: 0
COLOR CHANGE: 0
FACIAL SWELLING: 0
SHORTNESS OF BREATH: 0
APNEA: 0

## 2020-10-30 NOTE — PROGRESS NOTES
Chief Complaint:   Other (Requesting bandage change for Rt ear / patient states he had mole removal this pat WED with Dr. Matthias Khan.  / he needs help with bandage change and he feels something d\"different in ear\")      History of Present Illness   Source of history provided by:  patient. Alae Edouard is a 80 y.o. old male who presents to [facility], for evaluation of biopsy site located on the right pinna, which occured 2 day(s) prior to arrival with Dr. Helga Orona. The wound is /has clean, erythema, tender, healing and minimal, bloody drainage. He was instructed to apply Vaseline and a nonadherent dressing today, but the patient was unable to perform wound care at home. Prior Treatment:     []   Sutured      []   Stapled      []   Packing      []     ATB's: None     Review of Systems   Review of Systems   Constitutional: Negative for activity change, appetite change, chills, diaphoresis, fatigue and fever. HENT: Negative for congestion, ear discharge, ear pain, facial swelling, hearing loss and tinnitus. Respiratory: Negative for apnea, cough, chest tightness, shortness of breath, wheezing and stridor. Cardiovascular: Negative for chest pain, palpitations and leg swelling. Skin: Positive for wound. Negative for color change, pallor and rash. Neurological: Negative for dizziness, tremors, weakness, light-headedness, numbness and headaches. Psychiatric/Behavioral: Negative for decreased concentration and sleep disturbance. The patient is not nervous/anxious. 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 Past Surgical History:  has no past surgical history on file. Social History:  reports that he has never smoked. He has never used smokeless tobacco. He reports previous alcohol use. He reports that he does not use drugs.   Family History: family history is not on file. Allergies: Codeine; Diazepam; Lorazepam; and Morphine    Physical Exam   Vital Signs:   Vitals:    10/30/20 1502   BP: 124/76   Pulse: 76   Resp: 18   Temp: 97.2 °F (36.2 °C)   TempSrc: Temporal   SpO2: 95%   Weight: 223 lb (101.2 kg)   Height: 6' 1\" (1.854 m)     Oxygen Saturation Interpretation: Normal.    Physical Exam  Constitutional:       General: He is not in acute distress. Appearance: Normal appearance. He is not ill-appearing or toxic-appearing. HENT:      Head: Normocephalic and atraumatic. Right Ear: Tympanic membrane and ear canal normal. Drainage, swelling and tenderness present. Left Ear: Tympanic membrane and ear canal normal.      Nose: Nose normal.      Mouth/Throat:      Mouth: Mucous membranes are moist.      Pharynx: Oropharynx is clear. Eyes:      Pupils: Pupils are equal, round, and reactive to light. Neck:      Musculoskeletal: Normal range of motion and neck supple. Cardiovascular:      Rate and Rhythm: Normal rate and regular rhythm. Pulses: Normal pulses. Heart sounds: Normal heart sounds. Pulmonary:      Effort: Pulmonary effort is normal.      Breath sounds: Normal breath sounds. Skin:     General: Skin is warm and dry. Capillary Refill: Capillary refill takes less than 2 seconds. Findings: Wound present. No lesion. Comments: There is a biopsy site to the right pinna. Significant old bloody drainage is present with ecchymosis. No purulent drainage or lymphangitic streaking is present. Neurological:      Mental Status: He is alert and oriented to person, place, and time. Motor: No weakness. Gait: Gait normal.   Psychiatric:         Mood and Affect: Mood normal.         Behavior: Behavior normal.         Thought Content:  Thought content normal.         Judgment: Judgment normal.       Test Results Section   (All laboratory and radiology results have been personally reviewed by myself)  Labs:  No results found for this visit on 10/30/20. Imaging: All Radiology results interpreted by Radiologist unless otherwise noted. No results found. Assessment / Moses Taylor was seen today for other. Diagnoses and all orders for this visit:    Encounter for wound care        S/P skin biopsy         - The wound was cleansed with normal saline. There was no active bleeding noted. Vaseline and a nonadherent bandage was applied. The patient is alerted to watch for any signs of infection (redness, purulent drainage, pain, increased swelling or fever) and call if such occurs. Home wound care instructions are provided. - Red flag symptoms discussed. - F/U with dermatology. Counseled regarding above diagnosis, including possible risks and complications,especially if left uncontrolled. Counseled regarding the possible side effects, risks, benefits and alternatives to treatment; patient and/or guardian verbalizes understanding. All questions answered.     KRISTIN Gonzalez - NP

## 2021-07-31 ENCOUNTER — HOSPITAL ENCOUNTER (INPATIENT)
Age: 86
LOS: 2 days | Discharge: HOME | DRG: 644 | End: 2021-08-02
Attending: STUDENT IN AN ORGANIZED HEALTH CARE EDUCATION/TRAINING PROGRAM | Admitting: INTERNAL MEDICINE
Payer: MEDICARE

## 2021-07-31 ENCOUNTER — APPOINTMENT (OUTPATIENT)
Dept: GENERAL RADIOLOGY | Age: 86
DRG: 644 | End: 2021-07-31
Payer: MEDICARE

## 2021-07-31 DIAGNOSIS — E86.0 DEHYDRATION: ICD-10-CM

## 2021-07-31 DIAGNOSIS — R42 DIZZINESS: Primary | ICD-10-CM

## 2021-07-31 DIAGNOSIS — R42 VERTIGO: ICD-10-CM

## 2021-07-31 DIAGNOSIS — R77.8 ELEVATED TROPONIN: ICD-10-CM

## 2021-07-31 PROBLEM — I95.1 ORTHOSTATIC HYPOTENSION: Status: ACTIVE | Noted: 2021-07-31

## 2021-07-31 LAB
ANION GAP SERPL CALCULATED.3IONS-SCNC: 10 MMOL/L (ref 7–16)
BUN BLDV-MCNC: 54 MG/DL (ref 6–23)
CALCIUM SERPL-MCNC: 8.9 MG/DL (ref 8.6–10.2)
CHLORIDE BLD-SCNC: 105 MMOL/L (ref 98–107)
CO2: 26 MMOL/L (ref 22–29)
CREAT SERPL-MCNC: 1.3 MG/DL (ref 0.7–1.2)
GFR AFRICAN AMERICAN: >60
GFR NON-AFRICAN AMERICAN: 52 ML/MIN/1.73
GLUCOSE BLD-MCNC: 106 MG/DL (ref 74–99)
HCT VFR BLD CALC: 35.3 % (ref 37–54)
HEMOGLOBIN: 10.5 G/DL (ref 12.5–16.5)
MCH RBC QN AUTO: 24.5 PG (ref 26–35)
MCHC RBC AUTO-ENTMCNC: 29.7 % (ref 32–34.5)
MCV RBC AUTO: 82.5 FL (ref 80–99.9)
PDW BLD-RTO: 14.6 FL (ref 11.5–15)
PLATELET # BLD: 320 E9/L (ref 130–450)
PMV BLD AUTO: 11.3 FL (ref 7–12)
POTASSIUM REFLEX MAGNESIUM: 4.1 MMOL/L (ref 3.5–5)
RBC # BLD: 4.28 E12/L (ref 3.8–5.8)
SODIUM BLD-SCNC: 141 MMOL/L (ref 132–146)
TROPONIN, HIGH SENSITIVITY: 26 NG/L (ref 0–11)
TROPONIN, HIGH SENSITIVITY: 30 NG/L (ref 0–11)
WBC # BLD: 12.8 E9/L (ref 4.5–11.5)

## 2021-07-31 PROCEDURE — 71045 X-RAY EXAM CHEST 1 VIEW: CPT

## 2021-07-31 PROCEDURE — 80048 BASIC METABOLIC PNL TOTAL CA: CPT

## 2021-07-31 PROCEDURE — 6370000000 HC RX 637 (ALT 250 FOR IP): Performed by: STUDENT IN AN ORGANIZED HEALTH CARE EDUCATION/TRAINING PROGRAM

## 2021-07-31 PROCEDURE — 96361 HYDRATE IV INFUSION ADD-ON: CPT

## 2021-07-31 PROCEDURE — 93005 ELECTROCARDIOGRAM TRACING: CPT | Performed by: STUDENT IN AN ORGANIZED HEALTH CARE EDUCATION/TRAINING PROGRAM

## 2021-07-31 PROCEDURE — 6360000002 HC RX W HCPCS: Performed by: STUDENT IN AN ORGANIZED HEALTH CARE EDUCATION/TRAINING PROGRAM

## 2021-07-31 PROCEDURE — 2060000000 HC ICU INTERMEDIATE R&B

## 2021-07-31 PROCEDURE — 84484 ASSAY OF TROPONIN QUANT: CPT

## 2021-07-31 PROCEDURE — 96374 THER/PROPH/DIAG INJ IV PUSH: CPT

## 2021-07-31 PROCEDURE — 99285 EMERGENCY DEPT VISIT HI MDM: CPT

## 2021-07-31 PROCEDURE — 2580000003 HC RX 258: Performed by: STUDENT IN AN ORGANIZED HEALTH CARE EDUCATION/TRAINING PROGRAM

## 2021-07-31 PROCEDURE — 85027 COMPLETE CBC AUTOMATED: CPT

## 2021-07-31 PROCEDURE — G0378 HOSPITAL OBSERVATION PER HR: HCPCS

## 2021-07-31 PROCEDURE — 6370000000 HC RX 637 (ALT 250 FOR IP): Performed by: NURSE PRACTITIONER

## 2021-07-31 PROCEDURE — 2580000003 HC RX 258: Performed by: NURSE PRACTITIONER

## 2021-07-31 RX ORDER — TAMSULOSIN HYDROCHLORIDE 0.4 MG/1
0.4 CAPSULE ORAL DAILY
COMMUNITY
End: 2021-08-17

## 2021-07-31 RX ORDER — HYDROCORTISONE 5 MG/1
5 TABLET ORAL
Status: DISCONTINUED | OUTPATIENT
Start: 2021-08-01 | End: 2021-08-02 | Stop reason: HOSPADM

## 2021-07-31 RX ORDER — ASPIRIN 81 MG/1
81 TABLET, CHEWABLE ORAL DAILY
Status: DISCONTINUED | OUTPATIENT
Start: 2021-08-01 | End: 2021-08-02 | Stop reason: HOSPADM

## 2021-07-31 RX ORDER — HYDROCORTISONE 5 MG/1
5 TABLET ORAL
Status: DISCONTINUED | OUTPATIENT
Start: 2021-07-31 | End: 2021-08-02 | Stop reason: HOSPADM

## 2021-07-31 RX ORDER — ALBUTEROL SULFATE 2.5 MG/3ML
2.5 SOLUTION RESPIRATORY (INHALATION) EVERY 6 HOURS PRN
Status: DISCONTINUED | OUTPATIENT
Start: 2021-07-31 | End: 2021-08-02 | Stop reason: HOSPADM

## 2021-07-31 RX ORDER — DIPHENHYDRAMINE HYDROCHLORIDE 50 MG/ML
25 INJECTION INTRAMUSCULAR; INTRAVENOUS ONCE
Status: COMPLETED | OUTPATIENT
Start: 2021-07-31 | End: 2021-07-31

## 2021-07-31 RX ORDER — HYDROCORTISONE 5 MG/1
10 TABLET ORAL
Status: DISCONTINUED | OUTPATIENT
Start: 2021-08-01 | End: 2021-08-02 | Stop reason: HOSPADM

## 2021-07-31 RX ORDER — ACETAMINOPHEN 325 MG/1
650 TABLET ORAL EVERY 6 HOURS PRN
Status: DISCONTINUED | OUTPATIENT
Start: 2021-07-31 | End: 2021-08-02 | Stop reason: HOSPADM

## 2021-07-31 RX ORDER — MAGNESIUM SULFATE IN WATER 40 MG/ML
2000 INJECTION, SOLUTION INTRAVENOUS PRN
Status: DISCONTINUED | OUTPATIENT
Start: 2021-07-31 | End: 2021-08-02 | Stop reason: HOSPADM

## 2021-07-31 RX ORDER — METOPROLOL SUCCINATE 25 MG/1
25 TABLET, EXTENDED RELEASE ORAL DAILY
Status: DISCONTINUED | OUTPATIENT
Start: 2021-08-01 | End: 2021-08-02 | Stop reason: HOSPADM

## 2021-07-31 RX ORDER — ONDANSETRON 4 MG/1
4 TABLET, ORALLY DISINTEGRATING ORAL EVERY 8 HOURS PRN
Status: DISCONTINUED | OUTPATIENT
Start: 2021-07-31 | End: 2021-08-02 | Stop reason: HOSPADM

## 2021-07-31 RX ORDER — ASPIRIN 325 MG
325 TABLET ORAL ONCE
Status: COMPLETED | OUTPATIENT
Start: 2021-07-31 | End: 2021-07-31

## 2021-07-31 RX ORDER — PROCHLORPERAZINE EDISYLATE 5 MG/ML
10 INJECTION INTRAMUSCULAR; INTRAVENOUS EVERY 6 HOURS PRN
Status: DISCONTINUED | OUTPATIENT
Start: 2021-07-31 | End: 2021-08-02 | Stop reason: HOSPADM

## 2021-07-31 RX ORDER — SODIUM CHLORIDE 0.9 % (FLUSH) 0.9 %
5-40 SYRINGE (ML) INJECTION EVERY 12 HOURS SCHEDULED
Status: DISCONTINUED | OUTPATIENT
Start: 2021-07-31 | End: 2021-08-02 | Stop reason: HOSPADM

## 2021-07-31 RX ORDER — ONDANSETRON 2 MG/ML
4 INJECTION INTRAMUSCULAR; INTRAVENOUS EVERY 6 HOURS PRN
Status: DISCONTINUED | OUTPATIENT
Start: 2021-07-31 | End: 2021-08-02 | Stop reason: HOSPADM

## 2021-07-31 RX ORDER — PANTOPRAZOLE SODIUM 40 MG/1
40 TABLET, DELAYED RELEASE ORAL
Status: DISCONTINUED | OUTPATIENT
Start: 2021-08-01 | End: 2021-08-02 | Stop reason: HOSPADM

## 2021-07-31 RX ORDER — POTASSIUM CHLORIDE 7.45 MG/ML
10 INJECTION INTRAVENOUS PRN
Status: DISCONTINUED | OUTPATIENT
Start: 2021-07-31 | End: 2021-08-02 | Stop reason: HOSPADM

## 2021-07-31 RX ORDER — SODIUM CHLORIDE 9 MG/ML
25 INJECTION, SOLUTION INTRAVENOUS PRN
Status: DISCONTINUED | OUTPATIENT
Start: 2021-07-31 | End: 2021-08-02 | Stop reason: HOSPADM

## 2021-07-31 RX ORDER — SODIUM CHLORIDE 0.9 % (FLUSH) 0.9 %
5-40 SYRINGE (ML) INJECTION PRN
Status: DISCONTINUED | OUTPATIENT
Start: 2021-07-31 | End: 2021-08-02 | Stop reason: HOSPADM

## 2021-07-31 RX ORDER — POTASSIUM CHLORIDE 20 MEQ/1
40 TABLET, EXTENDED RELEASE ORAL PRN
Status: DISCONTINUED | OUTPATIENT
Start: 2021-07-31 | End: 2021-08-02 | Stop reason: HOSPADM

## 2021-07-31 RX ORDER — 0.9 % SODIUM CHLORIDE 0.9 %
500 INTRAVENOUS SOLUTION INTRAVENOUS ONCE
Status: COMPLETED | OUTPATIENT
Start: 2021-07-31 | End: 2021-07-31

## 2021-07-31 RX ORDER — ACETAMINOPHEN 650 MG/1
650 SUPPOSITORY RECTAL EVERY 6 HOURS PRN
Status: DISCONTINUED | OUTPATIENT
Start: 2021-07-31 | End: 2021-08-02 | Stop reason: HOSPADM

## 2021-07-31 RX ORDER — LEVOTHYROXINE SODIUM 0.12 MG/1
125 TABLET ORAL DAILY
Status: DISCONTINUED | OUTPATIENT
Start: 2021-08-01 | End: 2021-08-02 | Stop reason: HOSPADM

## 2021-07-31 RX ORDER — SODIUM CHLORIDE 9 MG/ML
INJECTION, SOLUTION INTRAVENOUS CONTINUOUS
Status: DISCONTINUED | OUTPATIENT
Start: 2021-07-31 | End: 2021-08-02 | Stop reason: HOSPADM

## 2021-07-31 RX ORDER — 0.9 % SODIUM CHLORIDE 0.9 %
1000 INTRAVENOUS SOLUTION INTRAVENOUS ONCE
Status: COMPLETED | OUTPATIENT
Start: 2021-07-31 | End: 2021-07-31

## 2021-07-31 RX ORDER — MECLIZINE HCL 12.5 MG/1
50 TABLET ORAL ONCE
Status: COMPLETED | OUTPATIENT
Start: 2021-07-31 | End: 2021-07-31

## 2021-07-31 RX ADMIN — ASPIRIN 325 MG: 325 TABLET ORAL at 20:22

## 2021-07-31 RX ADMIN — SODIUM CHLORIDE 500 ML: 9 INJECTION, SOLUTION INTRAVENOUS at 18:45

## 2021-07-31 RX ADMIN — MECLIZINE 50 MG: 12.5 TABLET ORAL at 18:45

## 2021-07-31 RX ADMIN — SODIUM CHLORIDE: 9 INJECTION, SOLUTION INTRAVENOUS at 23:01

## 2021-07-31 RX ADMIN — DIPHENHYDRAMINE HYDROCHLORIDE 25 MG: 50 INJECTION, SOLUTION INTRAMUSCULAR; INTRAVENOUS at 18:45

## 2021-07-31 RX ADMIN — SODIUM CHLORIDE, PRESERVATIVE FREE 10 ML: 5 INJECTION INTRAVENOUS at 23:01

## 2021-07-31 RX ADMIN — SODIUM CHLORIDE 1000 ML: 9 INJECTION, SOLUTION INTRAVENOUS at 20:22

## 2021-07-31 RX ADMIN — APIXABAN 5 MG: 5 TABLET, FILM COATED ORAL at 23:16

## 2021-07-31 NOTE — ED NOTES
Bed: 11  Expected date:   Expected time:   Means of arrival:   Comments:  Michael Carvajal, KORINA  07/31/21 3403

## 2021-08-01 LAB
ANION GAP SERPL CALCULATED.3IONS-SCNC: -2 MMOL/L (ref 7–16)
BASOPHILS ABSOLUTE: 0.04 E9/L (ref 0–0.2)
BASOPHILS RELATIVE PERCENT: 0.3 % (ref 0–2)
BUN BLDV-MCNC: 63 MG/DL (ref 6–23)
CALCIUM SERPL-MCNC: 7.8 MG/DL (ref 8.6–10.2)
CHLORIDE BLD-SCNC: 112 MMOL/L (ref 98–107)
CO2: 29 MMOL/L (ref 22–29)
CORTISOL TOTAL: 2.73 MCG/DL (ref 2.68–18.4)
CREAT SERPL-MCNC: 1.1 MG/DL (ref 0.7–1.2)
EKG ATRIAL RATE: 87 BPM
EKG P AXIS: 47 DEGREES
EKG P-R INTERVAL: 182 MS
EKG Q-T INTERVAL: 404 MS
EKG QRS DURATION: 142 MS
EKG QTC CALCULATION (BAZETT): 486 MS
EKG R AXIS: -93 DEGREES
EKG T AXIS: 36 DEGREES
EKG VENTRICULAR RATE: 87 BPM
EOSINOPHILS ABSOLUTE: 0.14 E9/L (ref 0.05–0.5)
EOSINOPHILS RELATIVE PERCENT: 1.2 % (ref 0–6)
GFR AFRICAN AMERICAN: >60
GFR NON-AFRICAN AMERICAN: >60 ML/MIN/1.73
GLUCOSE BLD-MCNC: 94 MG/DL (ref 74–99)
HCT VFR BLD CALC: 27.5 % (ref 37–54)
HEMOGLOBIN: 8.2 G/DL (ref 12.5–16.5)
IMMATURE GRANULOCYTES #: 0.21 E9/L
IMMATURE GRANULOCYTES %: 1.8 % (ref 0–5)
LYMPHOCYTES ABSOLUTE: 2.14 E9/L (ref 1.5–4)
LYMPHOCYTES RELATIVE PERCENT: 18.4 % (ref 20–42)
MAGNESIUM: 2.1 MG/DL (ref 1.6–2.6)
MCH RBC QN AUTO: 24.6 PG (ref 26–35)
MCHC RBC AUTO-ENTMCNC: 29.8 % (ref 32–34.5)
MCV RBC AUTO: 82.6 FL (ref 80–99.9)
MONOCYTES ABSOLUTE: 1.22 E9/L (ref 0.1–0.95)
MONOCYTES RELATIVE PERCENT: 10.5 % (ref 2–12)
NEUTROPHILS ABSOLUTE: 7.87 E9/L (ref 1.8–7.3)
NEUTROPHILS RELATIVE PERCENT: 67.8 % (ref 43–80)
PDW BLD-RTO: 14.6 FL (ref 11.5–15)
PHOSPHORUS: 2.2 MG/DL (ref 2.5–4.5)
PLATELET # BLD: 269 E9/L (ref 130–450)
PMV BLD AUTO: 11.3 FL (ref 7–12)
POTASSIUM REFLEX MAGNESIUM: 4.3 MMOL/L (ref 3.5–5)
RBC # BLD: 3.33 E12/L (ref 3.8–5.8)
SODIUM BLD-SCNC: 139 MMOL/L (ref 132–146)
TROPONIN, HIGH SENSITIVITY: 20 NG/L (ref 0–11)
WBC # BLD: 11.6 E9/L (ref 4.5–11.5)

## 2021-08-01 PROCEDURE — 2060000000 HC ICU INTERMEDIATE R&B

## 2021-08-01 PROCEDURE — 93010 ELECTROCARDIOGRAM REPORT: CPT | Performed by: INTERNAL MEDICINE

## 2021-08-01 PROCEDURE — 87040 BLOOD CULTURE FOR BACTERIA: CPT

## 2021-08-01 PROCEDURE — 80048 BASIC METABOLIC PNL TOTAL CA: CPT

## 2021-08-01 PROCEDURE — 83735 ASSAY OF MAGNESIUM: CPT

## 2021-08-01 PROCEDURE — 84100 ASSAY OF PHOSPHORUS: CPT

## 2021-08-01 PROCEDURE — 6370000000 HC RX 637 (ALT 250 FOR IP): Performed by: NURSE PRACTITIONER

## 2021-08-01 PROCEDURE — 82533 TOTAL CORTISOL: CPT

## 2021-08-01 PROCEDURE — 84484 ASSAY OF TROPONIN QUANT: CPT

## 2021-08-01 PROCEDURE — 81001 URINALYSIS AUTO W/SCOPE: CPT

## 2021-08-01 PROCEDURE — 2580000003 HC RX 258: Performed by: NURSE PRACTITIONER

## 2021-08-01 PROCEDURE — G0378 HOSPITAL OBSERVATION PER HR: HCPCS

## 2021-08-01 PROCEDURE — 36415 COLL VENOUS BLD VENIPUNCTURE: CPT

## 2021-08-01 PROCEDURE — 87088 URINE BACTERIA CULTURE: CPT

## 2021-08-01 PROCEDURE — 85025 COMPLETE CBC W/AUTO DIFF WBC: CPT

## 2021-08-01 RX ADMIN — HYDROCORTISONE 5 MG: 5 TABLET ORAL at 17:06

## 2021-08-01 RX ADMIN — APIXABAN 5 MG: 5 TABLET, FILM COATED ORAL at 20:34

## 2021-08-01 RX ADMIN — APIXABAN 5 MG: 5 TABLET, FILM COATED ORAL at 09:26

## 2021-08-01 RX ADMIN — ASPIRIN 81 MG: 81 TABLET, CHEWABLE ORAL at 09:26

## 2021-08-01 RX ADMIN — HYDROCORTISONE 10 MG: 5 TABLET ORAL at 09:26

## 2021-08-01 RX ADMIN — HYDROCORTISONE 5 MG: 5 TABLET ORAL at 12:21

## 2021-08-01 RX ADMIN — PANTOPRAZOLE SODIUM 40 MG: 40 TABLET, DELAYED RELEASE ORAL at 06:34

## 2021-08-01 RX ADMIN — SODIUM CHLORIDE, PRESERVATIVE FREE 10 ML: 5 INJECTION INTRAVENOUS at 09:26

## 2021-08-01 RX ADMIN — LEVOTHYROXINE SODIUM 125 MCG: 0.12 TABLET ORAL at 06:34

## 2021-08-01 RX ADMIN — SODIUM CHLORIDE: 9 INJECTION, SOLUTION INTRAVENOUS at 20:34

## 2021-08-01 NOTE — H&P
daily  oseltamivir 6mg/ml (TAMIFLU) 6 MG/ML SUSR suspension, Give one more dose, it was started on the 19th of March, today is the last day  pantoprazole (PROTONIX) 40 MG tablet, Take 1 tablet by mouth every morning (before breakfast)  metoprolol succinate (TOPROL XL) 25 MG extended release tablet, Take 1 tablet by mouth daily  fludrocortisone (FLORINEF) 0.1 MG tablet, Take 0.1 mg by mouth daily  hydrocortisone (CORTEF) 5 MG tablet, Take 1 tablet by mouth Daily with lunch  hydrocortisone (CORTEF) 5 MG tablet, Take 1 tablet by mouth Daily with supper  levothyroxine (SYNTHROID) 125 MCG tablet, Take 1 tablet by mouth Daily  hydrocortisone (CORTEF) 10 MG tablet, Take 1 tablet by mouth daily (with breakfast)    Allergies:  Codeine, Diazepam, Lorazepam, and Morphine    Social History:   TOBACCO:   reports that he has never smoked. He has never used smokeless tobacco.  ETOH:   reports previous alcohol use. MARITAL STATUS:    OCCUPATION:      Family History:   No family history on file. REVIEW OF SYSTEMS:    General ROS dizziness and weakness at home  Hematological and Lymphatic ROS: negative  Endocrine ROS: negative  Respiratory ROS: no cough, shortness of breath, or wheezing  Cardiovascular ROS: no chest pain or dyspnea on exertion  Gastrointestinal ROS: no abdominal pain, change in bowel habits, or black or bloody stools  Genito-Urinary ROS: no dysuria, trouble voiding, or hematuria  Neurological ROS: no TIA or stroke symptoms  negative    Vitals:  BP 98/62   Pulse 120   Temp 96.3 °F (35.7 °C) (Temporal)   Resp 22   SpO2 98%     PHYSICAL EXAM:  General:  Awake, alert, oriented X 3. Well developed, well nourished, well groomed. No apparent distress. HEENT:  Normocephalic, atraumatic. Pupils equal, round, reactive to light. No scleral icterus. No conjunctival injection. Normal lips, teeth, and gums. No nasal discharge.   Neck:  Supple, FROM  Heart:  RRR, no murmurs, gallops, rubs, carotid upstroke normal, no carotid bruits  Lungs:  CTA bilaterally, bilat symmetrical expansion, no wheeze, rales, or rhonchi  Abdomen: Bowel sounds present, soft, nontender, no masses, no organomegaly, no peritoneal signs  Extremities:  No clubbing, cyanosis, or edema  Skin:  Warm and dry, no open lesions or rash  Neuro:  Cranial nerves 2-12 intact, no focal deficits  Vascular: Radial and pedal Pulses 2+  Breast: deferred  Rectal: deferred  Genitalia:  deferred      DATA:     Recent Labs     07/31/21 1838 08/01/21  0534   WBC 12.8* 11.6*   HGB 10.5* 8.2*    269     Recent Labs     07/31/21 1838 08/01/21  0534    139   K 4.1 4.3   BUN 54* 63*   CREATININE 1.3* 1.1     No results for input(s): PROT, INR in the last 72 hours. No results for input(s): AST, ALT, ALKPHOS, BILIDIR, BILITOT in the last 72 hours. No results for input(s): BNP in the last 72 hours. No results for input(s): CKTOTAL, CKMB, CKMBINDEX, TROPONINI in the last 72 hours. ASSESSMENT:      Principal Problem:    Orthostatic hypotension  Active Problems:    Hypothyroidism    Renal insufficiency    A-fib (HCC)    Adrenal insufficiency ([place]ham's disease) ([facility] [address]  Resolved Problems:    * No resolved hospital problems.  *        PLAN:    Admitted for evaluation of dizziness in a patient with known history of hypopituitary (adrenal insufficiency and hypothyroidism), atrial fibrillation on oral anticoagulation    Leukocytosis likely from his chronic Cortef therapy  Pancultures including blood and urine along with UA  IV fluid hydration at 75 cc an hour  Monitor blood pressure-likely remains on the low side secondary to AI  Antivert scheduled  Obtain MRI rule out stroke symptoms  Doing home medications for history of hypopituitary is him including AI and hypothyroid    Ambulate with caution  Check orthostatics  Discharge plans once symptom resolution is achieved        DVT prophylaxis  PT OT  Discharge plan    Juan Contreras MD  8/1/2021  9:38 AM

## 2021-08-01 NOTE — ED PROVIDER NOTES
reviewed the past medical history, past surgical history, social history, and family history    ---------------------------------------------------PHYSICAL EXAM--------------------------------------    Constitutional: Appears in no distress  Head: Normocephalic, atraumatic  Eyes: Non-icteric slcera, no conjunctival injection  ENT: Moist mucous membranes,  Neck: Trachea midline, no JVD  Respiratory: Nonlabored respirations. Lungs clear to auscultation bilaterally, no wheezes, rales, or rhonchi. Cardiovascular: Regular rate. Regular rhythm. No murmurs, no gallops, no rubs. Gastrointestinal: Abdomen Soft, Non tender, Non distended. No rebound tenderness, guarding, or rigidity. Extremities: No lower extremity edema  Genitourinary: No CVA tenderness, no suprapubic tenderness  Musculoskeletal: Moves all extremities, no deformity  Skin: Pink, warm, dry without rash. Neurologic: Neurologic: Pupils are equal and reactive to light. Extraocular eye movements intact. , Dizziness is reproducible with a right gaze deviation there is some mild nystagmus noted towards the right sensation intact bilaterally. Symmetric facies. Tongue protrudes midline. No meningismus. 5 out of 5 symmetric strength of the upper and lower extremities. Finger to nose testing is within normal limits. Alert and oriented. -------------------------------------------------- RESULTS -------------------------------------------------  I have personally reviewed all laboratory and imaging results for this patient. Results are listed below.      LABS: (Lab results interpreted by me)  Results for orders placed or performed during the hospital encounter of 78/97/83   Basic Metabolic Panel w/ Reflex to MG   Result Value Ref Range    Sodium 141 132 - 146 mmol/L    Potassium reflex Magnesium 4.1 3.5 - 5.0 mmol/L    Chloride 105 98 - 107 mmol/L    CO2 26 22 - 29 mmol/L    Anion Gap 10 7 - 16 mmol/L    Glucose 106 (H) 74 - 99 mg/dL    BUN 54 (H) 6 - 23 mg/dL CREATININE 1.3 (H) 0.7 - 1.2 mg/dL    GFR Non-African American 52 >=60 mL/min/1.73    GFR African American >60     Calcium 8.9 8.6 - 10.2 mg/dL   Troponin   Result Value Ref Range    Troponin, High Sensitivity 30 (H) 0 - 11 ng/L   CBC   Result Value Ref Range    WBC 12.8 (H) 4.5 - 11.5 E9/L    RBC 4.28 3.80 - 5.80 E12/L    Hemoglobin 10.5 (L) 12.5 - 16.5 g/dL    Hematocrit 35.3 (L) 37.0 - 54.0 %    MCV 82.5 80.0 - 99.9 fL    MCH 24.5 (L) 26.0 - 35.0 pg    MCHC 29.7 (L) 32.0 - 34.5 %    RDW 14.6 11.5 - 15.0 fL    Platelets 465 631 - 813 E9/L    MPV 11.3 7.0 - 12.0 fL   ,       RADIOLOGY:  Interpreted by Radiologist unless otherwise specified  No orders to display         ------------------------- NURSING NOTES AND VITALS REVIEWED ---------------------------   The nursing notes within the ED encounter and vital signs as below have been reviewed by myself  BP (!) 91/59   Pulse 107   Temp 96.9 °F (36.1 °C)   Resp 16   SpO2 93%     Oxygen Saturation Interpretation: Initially abnormal but improved on repeat    The patients available past medical records and past encounters were reviewed.         ------------------------------ ED COURSE/MEDICAL DECISION MAKING----------------------  Medications   prochlorperazine (COMPAZINE) injection 10 mg (has no administration in time range)   0.9 % sodium chloride bolus (1,000 mLs Intravenous New Bag 7/31/21 2022)   meclizine (ANTIVERT) tablet 50 mg (50 mg Oral Given 7/31/21 1845)   0.9 % sodium chloride bolus (500 mLs Intravenous New Bag 7/31/21 1845)   diphenhydrAMINE (BENADRYL) injection 25 mg (25 mg Intravenous Given 7/31/21 1845)   aspirin tablet 325 mg (325 mg Oral Given 7/31/21 2022)          This patient's ED course included:a personal history and physicial examination, re-evaluation prior to disposition, multiple bedside re-evaluations, IV medications, cardiac monitoring and continuous pulse oximetry    This patient has remained hemodynamically stable and been closely monitored during their ED course. Consultations:  Internal medicine      Medical Decision Making:   Patient presents with dizziness. Vital signs interpreted by me as mildly hypotensive at 94/52 otherwise normal vital signs. Repeat vital signs revealed patient is tachycardic and hypotensive at 88/54 on repeat vital signs. History and physical examination findings consistent. Concern for ACS, BPPV, orthostatic hypotension, dehydration with multiple differential diagnoses that are considered. Work-up is initiated subsequent to this. He is given IV fluids as well as meclizine Compazine and Benadryl for symptomatic management. Date of EKG: July 31, 2021  Time: 18: 36    Rhythm: Sinus  Rate: 87 beats per  Axis: Left axis deviation  Conduction: QRS is prolonged at 142 ms, RSR prime pattern noted consistent with right bundle branch block in V1 V2  ST Segments: Downsloping ST segments from the baseline no depression or elevation however is noted  T Waves: T wave inversions    Clinical Impression: Normal sinus rhythm with right bundle branch block pattern  Comparison to prior EKG: Unchanged from prior EKG    Labs: Patient's troponin is elevated at 30. He has mild leukocytosis at 12.8. He has a chronic normocytic anemia at 10.5 which is largely unchanged from his prior laboratory study about a year ago. His elevated BUN and creatinine are 54 and 1.3 respectively. I did discussion with the patient and I feel that admission would be advisable as he is asymptomatic but has had some borderline low blood pressures and appears clinically dry to me on exam and I feel his presentation could be due to dehydration. Patient is agreeable with this. Patient is otherwise asymptomatic at this time. Counseling: The emergency provider has spoken with the patient and discussed todays results, in addition to providing specific details for the plan of care and counseling regarding the diagnosis and prognosis.   Questions are answered at this time and they are agreeable with the plan.       --------------------------------- IMPRESSION AND DISPOSITION ---------------------------------    IMPRESSION  1. Dizziness    2. Vertigo    3. Dehydration    4. Elevated troponin        DISPOSITION  Disposition: Admission  Patient condition is guarded    IDr. Cynthia, ojanna the primary provider of record    Cynthia Brunner DO  Emergency Medicine    NOTE: This report was transcribed using voice recognition software.  Every effort was made to ensure accuracy; however, inadvertent computerized transcription errors may be present         Scott Severino DO  07/31/21 2026

## 2021-08-02 VITALS
DIASTOLIC BLOOD PRESSURE: 71 MMHG | OXYGEN SATURATION: 97 % | SYSTOLIC BLOOD PRESSURE: 133 MMHG | TEMPERATURE: 97 F | RESPIRATION RATE: 20 BRPM | HEART RATE: 116 BPM

## 2021-08-02 LAB
ANION GAP SERPL CALCULATED.3IONS-SCNC: 8 MMOL/L (ref 7–16)
BACTERIA: NORMAL /HPF
BILIRUBIN URINE: NEGATIVE
BLOOD, URINE: NEGATIVE
BUN BLDV-MCNC: 44 MG/DL (ref 6–23)
CALCIUM SERPL-MCNC: 8.3 MG/DL (ref 8.6–10.2)
CHLORIDE BLD-SCNC: 111 MMOL/L (ref 98–107)
CLARITY: CLEAR
CO2: 20 MMOL/L (ref 22–29)
COLOR: YELLOW
CREAT SERPL-MCNC: 1.2 MG/DL (ref 0.7–1.2)
GFR AFRICAN AMERICAN: >60
GFR NON-AFRICAN AMERICAN: 57 ML/MIN/1.73
GLUCOSE BLD-MCNC: 108 MG/DL (ref 74–99)
GLUCOSE URINE: NEGATIVE MG/DL
KETONES, URINE: NEGATIVE MG/DL
LEUKOCYTE ESTERASE, URINE: NEGATIVE
NITRITE, URINE: NEGATIVE
PH UA: 5 (ref 5–9)
POTASSIUM SERPL-SCNC: 3.6 MMOL/L (ref 3.5–5)
PROTEIN UA: NEGATIVE MG/DL
RBC UA: NORMAL /HPF (ref 0–2)
SODIUM BLD-SCNC: 139 MMOL/L (ref 132–146)
SPECIFIC GRAVITY UA: 1.01 (ref 1–1.03)
UROBILINOGEN, URINE: 0.2 E.U./DL
WBC UA: NORMAL /HPF (ref 0–5)

## 2021-08-02 PROCEDURE — G0378 HOSPITAL OBSERVATION PER HR: HCPCS

## 2021-08-02 PROCEDURE — 97530 THERAPEUTIC ACTIVITIES: CPT

## 2021-08-02 PROCEDURE — 2580000003 HC RX 258: Performed by: NURSE PRACTITIONER

## 2021-08-02 PROCEDURE — 97165 OT EVAL LOW COMPLEX 30 MIN: CPT

## 2021-08-02 PROCEDURE — 36415 COLL VENOUS BLD VENIPUNCTURE: CPT

## 2021-08-02 PROCEDURE — 6370000000 HC RX 637 (ALT 250 FOR IP): Performed by: NURSE PRACTITIONER

## 2021-08-02 PROCEDURE — 97161 PT EVAL LOW COMPLEX 20 MIN: CPT

## 2021-08-02 PROCEDURE — 80048 BASIC METABOLIC PNL TOTAL CA: CPT

## 2021-08-02 RX ORDER — MECLIZINE HYDROCHLORIDE 25 MG/1
25 TABLET ORAL 3 TIMES DAILY
Qty: 15 TABLET | Refills: 0 | Status: SHIPPED | OUTPATIENT
Start: 2021-08-02 | End: 2021-08-07

## 2021-08-02 RX ADMIN — PANTOPRAZOLE SODIUM 40 MG: 40 TABLET, DELAYED RELEASE ORAL at 06:22

## 2021-08-02 RX ADMIN — HYDROCORTISONE 5 MG: 5 TABLET ORAL at 12:05

## 2021-08-02 RX ADMIN — ASPIRIN 81 MG: 81 TABLET, CHEWABLE ORAL at 08:08

## 2021-08-02 RX ADMIN — LEVOTHYROXINE SODIUM 125 MCG: 0.12 TABLET ORAL at 06:22

## 2021-08-02 RX ADMIN — HYDROCORTISONE 10 MG: 5 TABLET ORAL at 08:08

## 2021-08-02 RX ADMIN — METOPROLOL SUCCINATE 25 MG: 25 TABLET, EXTENDED RELEASE ORAL at 08:08

## 2021-08-02 RX ADMIN — APIXABAN 5 MG: 5 TABLET, FILM COATED ORAL at 08:08

## 2021-08-02 RX ADMIN — SODIUM CHLORIDE, PRESERVATIVE FREE 10 ML: 5 INJECTION INTRAVENOUS at 08:09

## 2021-08-02 NOTE — PLAN OF CARE
Problem: Skin Integrity:  Goal: Will show no infection signs and symptoms  Description: Will show no infection signs and symptoms  8/2/2021 0508 by Khloe Medeiros RN  Outcome: Met This Shift     Problem: Falls - Risk of:  Goal: Will remain free from falls  Description: Will remain free from falls  8/2/2021 0508 by Khloe Medeiros RN  Outcome: Met This Shift

## 2021-08-02 NOTE — DISCHARGE SUMMARY
Physician Discharge Summary     Patient ID:  Sandra Reynolds  [id number]  2 y.o.  4/24/1931    Admit date: 7/31/2021    Discharge date and time: 8/2/2021    Admission Diagnoses:   Patient Active Problem List   Diagnosis    Shock ([facility] [facility]    [facility]    Weakness    Hypothyroidism    Lactic acidosis    Renal insufficiency    A-fib (HCC)    QT prolongation    RBBB    Acute respiratory failure with hypoxia (HCC)    Non-traumatic rhabdomyolysis    Acute renal failure superimposed on stage 2 chronic kidney disease (HCC)    Adrenal insufficiency ([place]'s disease) ([facility]    Metabolic encephalopathy    [facility] pneumonia ([facility]    Wide-complex tachycardia ([facility])    Orthostatic hypotension       Discharge Diagnoses: Dizziness/vertigo/dehydration/and as above    Consults: None    Procedures: None    Hospital Course:   Admitted for evaluation of dizziness in a patient with known history of hypopituitary (adrenal insufficiency and hypothyroidism), atrial fibrillation on oral anticoagulation     Leukocytosis likely from his chronic Cortef therapy-has trended down during inpatient stay  Pancultures including blood and urine along with UA-unremarkable  IV fluid hydration at 75 cc an hour-tolerated well  Monitor blood pressure-likely remains on the low side secondary to AI-adequate at discharge  Antivert scheduled x5 days after discharge 3 times daily  Continue home medications for history of hypopituitary is him including AI and hypothyroid     Ambulate with caution  Check orthostatics  Discharge plans once symptom resolution is achieved-symptoms resolved patient okay for discharge-advised to return to ER if further dizziness or syncopal episode  Recent Labs     07/31/21 1838 08/01/21  0534   WBC 12.8* 11.6*   HGB 10.5* 8.2*   HCT 35.3* 27.5*    269       Recent Labs     07/31/21 1838 08/01/21  0534 08/02/21  0918    139 139   K 4.1 4.3 3.6    112* 111*   CO2 26 29 20*   BUN 54* 63* 44* CREATININE 1.3* 1.1 1.2   CALCIUM 8.9 7.8* 8.3*       XR CHEST PORTABLE    Result Date: 7/31/2021  EXAMINATION: ONE XRAY VIEW OF THE CHEST 7/31/2021 9:21 pm COMPARISON: March 21, 2020 HISTORY: ORDERING SYSTEM PROVIDED HISTORY: concern for underlying infectious process TECHNOLOGIST PROVIDED HISTORY: Reason for exam:->concern for underlying infectious process What reading provider will be dictating this exam?->CRC FINDINGS: Heart size is unable to be accurately assessed on this single portable view of the chest, but appears to be stable. There is mild bibasilar atelectasis. Heart size is unable to be accurately assessed on this single portable view of the chest, but appears to be stable. Paucity of interstitial markings throughout the lungs is in keeping with emphysema. Bones are osteopenic. No acute osseous abnormality. No acute cardiopulmonary process. Discharge Exam:    HEENT: NCAT,  PERRLA, No JVD  Heart:  RRR, no murmurs, gallops, or rubs.   Lungs:  CTA bilaterally, no wheeze, rales or rhonchi  Abd: bowel sounds present, nontender, nondistended, no masses  Extrem:  No clubbing, cyanosis, or edema    Disposition: home     Patient Condition at Discharge: Stable    Patient Instructions:      Medication List      START taking these medications    meclizine 25 MG tablet  Commonly known as: ANTIVERT  Take 1 tablet by mouth 3 times daily for 5 days        CONTINUE taking these medications    albuterol (2.5 MG/3ML) 0.083% nebulizer solution  Commonly known as: PROVENTIL  Take 3 mLs by nebulization every 6 hours as needed for Wheezing     apixaban 5 MG Tabs tablet  Commonly known as: ELIQUIS  Take 1 tablet by mouth 2 times daily     aspirin 81 MG chewable tablet  Take 1 tablet by mouth daily     fludrocortisone 0.1 MG tablet  Commonly known as: FLORINEF     * hydrocortisone 5 MG tablet  Commonly known as: CORTEF  Take 1 tablet by mouth Daily with lunch     * hydrocortisone 5 MG tablet  Commonly known as: CORTEF  Take 1 tablet by mouth Daily with supper     * hydrocortisone 10 MG tablet  Commonly known as: CORTEF  Take 1 tablet by mouth daily (with breakfast)     levothyroxine 125 MCG tablet  Commonly known as: SYNTHROID  Take 1 tablet by mouth Daily     metoprolol succinate 25 MG extended release tablet  Commonly known as: TOPROL XL  Take 1 tablet by mouth daily     oseltamivir 6mg/ml 6 MG/ML Susr suspension  Commonly known as: TAMIFLU  Give one more dose, it was started on the 19th of March, today is the last day     pantoprazole 40 MG tablet  Commonly known as: PROTONIX  Take 1 tablet by mouth every morning (before breakfast)     tamsulosin 0.4 MG capsule  Commonly known as: FLOMAX         * This list has 3 medication(s) that are the same as other medications prescribed for you. Read the directions carefully, and ask your doctor or other care provider to review them with you. Where to Get Your Medications      These medications were sent to [facility] [address] [address]    Phone: [phone]   · meclizine 25 MG tablet       Activity: activity as tolerated  Diet: regular diet    Pt has been advised to: Follow-up with Ila Kaba MD in 1 week.   Follow-up with consultants as recommended by them    Note that over 30 minutes was spent in preparing discharge papers, discussing discharge with patient, medication review, etc.    Signed:  Argentina Chavez MD  8/2/2021  11:55 AM

## 2021-08-02 NOTE — CARE COORDINATION
Met with pt to discuss discharge planning/transition of care. Pt lives alone in a 2 story home, stays on first floor where bathroom and bedroom are located. Pt has walker, wheelchair, bedside commode, and shower chair, but uses none of these. Pt does have a nebulizer that he uses. Dr. Carrie Newman is PCP and [facility] for his prescriptions. Plan is home, son to transport home. Anticipate discharge home today with no needs. Lazarus Mater, MSW, LSW

## 2021-08-02 NOTE — PROGRESS NOTES
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, hypothyroid, osteopenia, renal insufficiency, R bundle branch block, Acute respiratory failure with hypoxia (3/2020)       Precautions:  Fall Risk, tele      Assessment of current deficits   [x] Functional mobility   [x]ADLs  [x] Strength               []Cognition   [x] Functional transfers   [x] IADLs         [x] Safety Awareness   [x]Endurance   [x] Fine Coordination              [x] Balance      [] Vision/perception   []Sensation    []Gross Motor Coordination  [] ROM  [] Delirium                   [] Motor Control     OT PLAN OF CARE   OT POC based on physician orders, patient diagnosis and results of clinical assessment    Frequency/Duration: 1-3 days/wk for 2 weeks PRN   Specific OT Treatment Interventions to include:   * Instruction/training on adapted ADL techniques and AE recommendations to increase functional independence within precautions       * Training on energy conservation strategies, correct breathing pattern and techniques to improve independence/tolerance for self-care routine  * Functional transfer/mobility training/DME recommendations for increased independence, safety, and fall prevention  * Patient/Family education to increase follow through with safety techniques and functional independence  * Recommendation of environmental modifications for increased safety with functional transfers/mobility and ADLs  * Cognitive retraining/development of therapeutic activities to improve problem solving, judgement, memory, and attention for increased safety/participation in ADL/IADL tasks  * Therapeutic exercise to improve motor endurance, ROM, and functional strength for ADLs/functional transfers  * Therapeutic activities to facilitate/challenge dynamic balance, stand tolerance for increased safety and independence with ADLs  * Therapeutic activities to facilitate gross/fine motor skills for increased independence with ADLs  * Positioning to improve skin integrity, interaction with environment and functional independence  * Delirium prevention/treatment  * Manual techniques for edema management      Recommended Adaptive Equipment: Shower chair  Home Living: Ave lives alone in a 2 story house with 2 steps to enter. Bedroom/bathroom are located on the 1st floor. Laundry is located in the basement  Bathroom setup: Walk-in shower with grab bars, elevated commode   Equipment owned: fww, reacher, sock-aid    Prior Level of Function: I/mod I with ADLs. I/mod I with IADLs. Patient completed functional mobility using no device.   Driving: yes  Occupation: Retired       Pain Level: No pain reported  Cognition: A&O: 4/4; Follows 2-3 step directions   Memory:  Good   Sequencing:  Fair+   Problem solving:  Fair+  Judgement/safety:  Fair+     Functional Assessment:  AM-PAC Daily Activity Raw Score: 17/24   Initial Eval Status  Date: 8/2/21 Treatment Status  Date: STGs = LTGs  Time frame: 10-14 days   Feeding Modified [place]       Grooming Stand by Assist   Washed hands and combed hair standing at sink  Modified [place]    UB Dressing Minimal Assist   Modified [place]    LB Dressing Moderate Assist   Simulated to doff/ don socks  Modified [place]   Using AE as needed   Bathing Moderate Assist  Modified [place]    Toileting Stand by Assist Modified [place]    Bed Mobility  Supine to sit: Stand by Assist   Sit to supine: Stand by Assist   Supine to sit: Independent   Sit to supine: Independent    Functional Transfers Stand by Assist   Modified [place]    Functional Mobility Stand by Assist   Completed bed<>bathroom without device  Independent  Functional household distance    Balance Sitting:     Static: Mod I    Dynamic: SBA  Standing: SBA     Activity Tolerance Fair  Good   Visual/  Perceptual Glasses: reading PRN                Hand Dominance: R handed   AROM (PROM) Strength Additional Info:    RUE  WFL  - slight limitations at end range flexion secondary to patient report of arthritis/pain Proximally: NT (for pain management)  Distally: 4/5   Fair+ FMC/dexterity noted during ADL tasks       LUE WFL  - slight limitations at end range flexion secondary to patient report of arthritis Proximally: NT (for pain management)  Distally: 4/5 Fair+ FMC/dexterity noted during ADL tasks       Hearing: hearing aides donnned  Sensation:  No acute c/o numbness or tingling, reports L carpal tunnel syndrome at baseline  Tone: WFL   Edema: none observed B UE    Comments:  Upon arrival patient semi-supine in bed, agreeable to session. At end of session, patient semi-supine in bed with call light and phone within reach, all lines and tubes intact. OT evaluation performed with education provided regarding the purpose and benefits of OT session, along with mobility and I/ADL completion. Overall patient demonstrated decreased activity tolerance and weakness limiting independence and safety during completion of ADL/functional transfer/mobility tasks. Patient would benefit from continued skilled OT to increase safety and independence with completion of ADL/IADL tasks for functional independence and improved quality of life.         Rehab Potential: Good  for established goals     Patient / Family Goal: To return home      Patient and/or family were instructed

## 2021-08-02 NOTE — PROGRESS NOTES
CLINICAL PHARMACY NOTE: MEDS TO BEDS    Total # of Prescriptions Filled: 1   The following medications were delivered to the patient:  · Meclizine 25mg    Additional Documentation:

## 2021-08-02 NOTE — PROGRESS NOTES
Physical Therapy Initial Assessment     Name: Shamika Urena  : 1931  MRN: [id number]      Date of Service: 2021    Evaluating PT:  Vero Morales PT, DPT [id number]      Room #:  6359/7927-C  Diagnosis:  Orthostatic hypotension [I95.1]  PMHx/PSHx:  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, A-fib, Hypothyroid, Right BBB, Renal insufficiency, Osteopenia  Procedure/Surgery:  NA  Precautions:  Falls  Equipment Needs:  Has [employer]    SUBJECTIVE:    Pt lives alone in a 2 story home with 2 stairs to enter and no rail. Bed is on 1st floor and bath is on 1st floor. Pt ambulated with no AD PTA. Son lives near and able to assist as needed. Pt reported actively working and driving. OBJECTIVE:   Initial Evaluation  Date: 2021 Treatment Date:  NA Short Term/ Long Term   Goals   AM-PAC 6 Clicks 96/63     Was pt agreeable to Eval/treatment? Yes      Does pt have pain? No c/o pain     Bed Mobility  Rolling: Independent  Supine to sit: Independent  Sit to supine: Independent  Scooting: Independent  Independent   Transfers Sit to stand: SBA  Stand to sit: SBA  Stand pivot: SBA  Sit to stand: Independent  Stand to sit: Independent  Stand pivot: Independent   Ambulation    150 feet with no AD with SBA  >200 feet with no AD Independent   Stair negotiation: ascended and descended  NT  4 steps with 1 rail with Mod I   ROM BUE:  WFL  BLE:  WFL     Strength BUE:  4/5  BLE:  4/5  Increase strength 1/3 grade.    Balance Sitting EOB:  Supervision  Dynamic Standing:  SBA  Sitting EOB:  Independent  Dynamic Standing:  Independent     Pt is A & O x 3  Sensation:  Pt reported numbness and tingling in left hand from carpal tunnel  Edema:  None noted    Vitals:  Blood Pressure at rest - Blood Pressure post session -   Heart Rate at rest - Heart Rate post session -   SPO2 at rest - SPO2 post session -     Therapeutic Exercises:  NT    Patient education  Pt educated on purpose of PT assessment, importance of mobility, safety with mobility, transfers, gait, use of AD for safety    Patient response to education:   Pt verbalized understanding Pt demonstrated skill Pt requires further education in this area   Yes  Yes Reinforcement as needed     ASSESSMENT:    Conditions Requiring Skilled Therapeutic Intervention:     []Decreased strength     []Decreased ROM  [x]Decreased functional mobility  []Decreased balance   [x]Decreased endurance   []Decreased posture  []Decreased sensation  []Decreased coordination   []Decreased vision  []Decreased safety awareness   []Increased pain       Comments:  Patient cleared by RN and agreeable to treatment. Patient found in mid Zimmerman's and able to get self to seated EOB independently. Patient denied dizziness with positional change. Patient assisted to standing and demonstrated good static balance. Patient with slower gait, equal stride length, and mild unsteadiness noted. Patient reported being fatigued from being sedentary for several days. Patient assisted back to seated EOB and then to supine with call light and tray table in reach. HOB elevated to comfort. Son in room and reported being able to stay with patient if needed. Treatment:  Patient practiced and was instructed in the following treatment:    · Bed mobility: Verbal/tactile cues for sequencing BUEs/BLEs for safe technique with rolling/supine<>sit task. · Transfer training: Verbal/tactile cues to facilitate proper hand placement, technique and safety during sit to stand task. · Gait training: Verbal and tactile cues to facilitate upright posture and safety to complete task. · Therapeutic exercises: As noted above  · Neuromuscular education: NT    Pt's/ family goals   1. To feel better. To go home. Prognosis is good for reaching above PT goals. Patient and or family understand(s) diagnosis, prognosis, and plan of care.   Yes     PHYSICAL THERAPY PLAN OF CARE:    PT POC is established based on physician order and patient diagnosis     Referring provider/PT Order:    07/31/21 2245  PT evaluation and treat Start: 07/31/21 2245, End: 07/31/21 2245, ONE TIME, Standing Count: 1 Occurrences, R     Order went unreviewed at transfer on Sun Aug 1, 2021  5:52 PM    Lauren Alfonso APRN - CNP       Diagnosis:  Orthostatic hypotension [I95.1]  Specific instructions for next treatment:  Subsequent PT sessions with focus on improved ambulation distance, stairs    Current Treatment Recommendations:     [x] Strengthening to improve independence with functional mobility   [] ROM to improve independence with functional mobility   [x] Balance Training to improve static/dynamic balance and to reduce fall risk  [x] Endurance Training to improve activity tolerance during functional mobility   [x] Transfer Training to improve safety and independence with all functional transfers   [x] Gait Training to improve gait mechanics, endurance and asses need for appropriate assistive device  [x] Stair Training in preparation for safe discharge home and/or into the community   [x] Positioning to prevent skin breakdown and contractures  [x] Safety and Education Training   [x] Patient/Caregiver Education   [] HEP  [] Other     PT long term treatment goals are located in above grid    Frequency of treatments: 2-5x/week x 1-2 weeks. Time in  1010  Time out  1037    Total Treatment Time  17 minutes     Evaluation Time includes thorough review of current medical information, gathering information on past medical history/social history and prior level of function, completion of standardized testing/informal observation of tasks, assessment of data and education on plan of care and goals.     CPT codes:  [x] Low Complexity PT evaluation 59875  [] Moderate Complexity PT evaluation 47998  [] High Complexity PT evaluation 57583  [] PT Re-evaluation 91253  [] Gait training 30529 - minutes  [] Manual therapy 61378 - minutes  [x] Therapeutic activities 88031 17 minutes  [] Therapeutic exercises 33463 - minutes  [] Neuromuscular reeducation 59177 - minutes     Vero Morales, PT, DPT  License [id number]

## 2021-08-03 ENCOUNTER — TELEPHONE (OUTPATIENT)
Dept: FAMILY MEDICINE CLINIC | Age: 86
End: 2021-08-03

## 2021-08-03 ENCOUNTER — CARE COORDINATION (OUTPATIENT)
Dept: CASE MANAGEMENT | Age: 86
End: 2021-08-03

## 2021-08-03 NOTE — TELEPHONE ENCOUNTER
[facility] Transitions Initial Follow Up Call    Outreach made within 2 business days of discharge: Yes    Patient: Leoncio Gale Patient : 1931   MRN: <[id number]>  Reason for Admission: There are no discharge diagnoses documented for the most recent discharge. Discharge Date: 21       Spoke with: spoke with Jennifer Massey. Discharge department/facility: [facility] Patient Contact:  Was patient able to fill all prescriptions: Yes  Was patient instructed to bring all medications to the follow-up visit: Yes  Is patient taking all medications as directed in the discharge summary? Yes  Does patient understand their discharge instructions: Yes  Does patient have questions or concerns that need addressed prior to 7-14 day follow up office visit: no    Scheduled appointment with PCP within 7-14 days    Follow Up  No future appointments.     Benny Elizabeth

## 2021-08-03 NOTE — CARE COORDINATION
[facility] Transitions Initial Follow Up Call    Call within 2 business days of discharge: Yes    Patient: Melquiades Awan Patient : 1931   MRN: [id number]  Reason for Admission: orthostatic hypotension  Discharge Date: 21 RARS: Readmission Risk Score: 24      Last Discharge [facility]       Complaint Diagnosis Description Type Department Provider    21 Dizziness Dizziness . .. ED to Hosp-Admission (Discharged) (ADMITTED) Dillan Catherine MD; Miryam Mart. ..        -First attempt to reach the patient for initial Care Transition call post hospital discharge. Message left with CTN's contact information requesting return phone call.           Follow Up  Future Appointments   Date Time Provider Martín Zhou   2021 10:00 AM Jackson Dillard MD [address], RN

## 2021-08-04 ENCOUNTER — CARE COORDINATION (OUTPATIENT)
Dept: CASE MANAGEMENT | Age: 86
End: 2021-08-04

## 2021-08-04 LAB — URINE CULTURE, ROUTINE: NORMAL

## 2021-08-04 NOTE — CARE COORDINATION
[facility] Transitions Initial Follow Up Call    Call within 2 business days of discharge: Yes    Patient: Zita Mike Patient : 1931   MRN: [id number]  Reason for Admission: orthostatic hypotension  Discharge Date: 21 RARS: Readmission Risk Score: 24      Last Discharge [facility]       Complaint Diagnosis Description Type Department Provider    21 Dizziness Dizziness . .. ED to Hosp-Admission (Discharged) (ADMITTED) Katey Arauz MD; Reid Ruelas. ..        -Second attempt to reach the patient for initial Care Transition call post hospital discharge. Message left with CTN's contact information requesting return phone call. -CTN will route to PCP office need for hospital follow up/TCM appointment. -CTN to sign off        Follow Up  No future appointments.     Kathryn John RN

## 2021-08-06 LAB
BLOOD CULTURE, ROUTINE: NORMAL
CULTURE, BLOOD 2: NORMAL

## 2021-08-17 ENCOUNTER — APPOINTMENT (OUTPATIENT)
Dept: GENERAL RADIOLOGY | Age: 86
DRG: 381 | End: 2021-08-17
Payer: MEDICARE

## 2021-08-17 ENCOUNTER — TELEPHONE (OUTPATIENT)
Dept: PHARMACY | Facility: CLINIC | Age: 86
End: 2021-08-17

## 2021-08-17 ENCOUNTER — HOSPITAL ENCOUNTER (INPATIENT)
Age: 86
LOS: 1 days | Discharge: HOME | DRG: 381 | End: 2021-08-21
Attending: EMERGENCY MEDICINE | Admitting: INTERNAL MEDICINE
Payer: MEDICARE

## 2021-08-17 DIAGNOSIS — D64.9 ANEMIA, UNSPECIFIED TYPE: Primary | ICD-10-CM

## 2021-08-17 LAB
ABO/RH: NORMAL
ALBUMIN SERPL-MCNC: 3.9 G/DL (ref 3.5–5.2)
ALP BLD-CCNC: 64 U/L (ref 40–129)
ALT SERPL-CCNC: 9 U/L (ref 0–40)
ANION GAP SERPL CALCULATED.3IONS-SCNC: 9 MMOL/L (ref 7–16)
ANISOCYTOSIS: ABNORMAL
ANTIBODY SCREEN: NORMAL
AST SERPL-CCNC: 12 U/L (ref 0–39)
BASOPHILIC STIPPLING: ABNORMAL
BASOPHILS ABSOLUTE: 0 E9/L (ref 0–0.2)
BASOPHILS RELATIVE PERCENT: 0.4 % (ref 0–2)
BILIRUB SERPL-MCNC: <0.2 MG/DL (ref 0–1.2)
BUN BLDV-MCNC: 17 MG/DL (ref 6–23)
CALCIUM SERPL-MCNC: 8.5 MG/DL (ref 8.6–10.2)
CHLORIDE BLD-SCNC: 104 MMOL/L (ref 98–107)
CO2: 24 MMOL/L (ref 22–29)
CREAT SERPL-MCNC: 1.5 MG/DL (ref 0.7–1.2)
EKG ATRIAL RATE: 78 BPM
EKG P AXIS: 51 DEGREES
EKG P-R INTERVAL: 198 MS
EKG Q-T INTERVAL: 430 MS
EKG QRS DURATION: 146 MS
EKG QTC CALCULATION (BAZETT): 490 MS
EKG R AXIS: -31 DEGREES
EKG T AXIS: 9 DEGREES
EKG VENTRICULAR RATE: 78 BPM
EOSINOPHILS ABSOLUTE: 0.12 E9/L (ref 0.05–0.5)
EOSINOPHILS RELATIVE PERCENT: 0.9 % (ref 0–6)
GFR AFRICAN AMERICAN: 53
GFR NON-AFRICAN AMERICAN: 44 ML/MIN/1.73
GLUCOSE BLD-MCNC: 107 MG/DL (ref 74–99)
HCT VFR BLD CALC: 20.9 % (ref 37–54)
HCT VFR BLD CALC: 25.7 % (ref 37–54)
HEMOGLOBIN: 6 G/DL (ref 12.5–16.5)
HEMOGLOBIN: 7.7 G/DL (ref 12.5–16.5)
HYPOCHROMIA: ABNORMAL
LYMPHOCYTES ABSOLUTE: 1.09 E9/L (ref 1.5–4)
LYMPHOCYTES RELATIVE PERCENT: 7.9 % (ref 20–42)
MCH RBC QN AUTO: 22.8 PG (ref 26–35)
MCHC RBC AUTO-ENTMCNC: 28.7 % (ref 32–34.5)
MCV RBC AUTO: 79.5 FL (ref 80–99.9)
METAMYELOCYTES RELATIVE PERCENT: 1.8 % (ref 0–1)
MONOCYTES ABSOLUTE: 1.5 E9/L (ref 0.1–0.95)
MONOCYTES RELATIVE PERCENT: 10.5 % (ref 2–12)
NEUTROPHILS ABSOLUTE: 11.02 E9/L (ref 1.8–7.3)
NEUTROPHILS RELATIVE PERCENT: 78.9 % (ref 43–80)
NUCLEATED RED BLOOD CELLS: 0.9 /100 WBC
OVALOCYTES: ABNORMAL
PDW BLD-RTO: 17.5 FL (ref 11.5–15)
PLATELET # BLD: 414 E9/L (ref 130–450)
PMV BLD AUTO: 9.8 FL (ref 7–12)
POLYCHROMASIA: ABNORMAL
POTASSIUM SERPL-SCNC: 4.4 MMOL/L (ref 3.5–5)
RBC # BLD: 2.63 E12/L (ref 3.8–5.8)
SCHISTOCYTES: ABNORMAL
SODIUM BLD-SCNC: 137 MMOL/L (ref 132–146)
STOMATOCYTES: ABNORMAL
TEAR DROP CELLS: ABNORMAL
TOTAL PROTEIN: 6.3 G/DL (ref 6.4–8.3)
TROPONIN, HIGH SENSITIVITY: 26 NG/L (ref 0–11)
TROPONIN, HIGH SENSITIVITY: 31 NG/L (ref 0–11)
WBC # BLD: 13.6 E9/L (ref 4.5–11.5)

## 2021-08-17 PROCEDURE — 2580000003 HC RX 258: Performed by: PHYSICIAN ASSISTANT

## 2021-08-17 PROCEDURE — P9016 RBC LEUKOCYTES REDUCED: HCPCS

## 2021-08-17 PROCEDURE — 86850 RBC ANTIBODY SCREEN: CPT

## 2021-08-17 PROCEDURE — 71045 X-RAY EXAM CHEST 1 VIEW: CPT

## 2021-08-17 PROCEDURE — 6370000000 HC RX 637 (ALT 250 FOR IP): Performed by: PHYSICIAN ASSISTANT

## 2021-08-17 PROCEDURE — 93005 ELECTROCARDIOGRAM TRACING: CPT | Performed by: PHYSICIAN ASSISTANT

## 2021-08-17 PROCEDURE — 86920 COMPATIBILITY TEST SPIN: CPT

## 2021-08-17 PROCEDURE — 36415 COLL VENOUS BLD VENIPUNCTURE: CPT

## 2021-08-17 PROCEDURE — 86923 COMPATIBILITY TEST ELECTRIC: CPT

## 2021-08-17 PROCEDURE — 86900 BLOOD TYPING SEROLOGIC ABO: CPT

## 2021-08-17 PROCEDURE — G0378 HOSPITAL OBSERVATION PER HR: HCPCS

## 2021-08-17 PROCEDURE — 93010 ELECTROCARDIOGRAM REPORT: CPT | Performed by: INTERNAL MEDICINE

## 2021-08-17 PROCEDURE — 99284 EMERGENCY DEPT VISIT MOD MDM: CPT

## 2021-08-17 PROCEDURE — 86901 BLOOD TYPING SEROLOGIC RH(D): CPT

## 2021-08-17 PROCEDURE — 2580000003 HC RX 258: Performed by: EMERGENCY MEDICINE

## 2021-08-17 PROCEDURE — 84484 ASSAY OF TROPONIN QUANT: CPT

## 2021-08-17 PROCEDURE — 85025 COMPLETE CBC W/AUTO DIFF WBC: CPT

## 2021-08-17 PROCEDURE — 80053 COMPREHEN METABOLIC PANEL: CPT

## 2021-08-17 PROCEDURE — 85014 HEMATOCRIT: CPT

## 2021-08-17 PROCEDURE — 85018 HEMOGLOBIN: CPT

## 2021-08-17 PROCEDURE — 96361 HYDRATE IV INFUSION ADD-ON: CPT

## 2021-08-17 PROCEDURE — 36430 TRANSFUSION BLD/BLD COMPNT: CPT

## 2021-08-17 RX ORDER — POTASSIUM CHLORIDE 20 MEQ/1
20 TABLET, EXTENDED RELEASE ORAL 2 TIMES DAILY
Status: ON HOLD | COMMUNITY
End: 2021-08-21 | Stop reason: HOSPADM

## 2021-08-17 RX ORDER — PANTOPRAZOLE SODIUM 40 MG/1
40 TABLET, DELAYED RELEASE ORAL 2 TIMES DAILY
COMMUNITY

## 2021-08-17 RX ORDER — POLYETHYLENE GLYCOL 3350 17 G/17G
17 POWDER, FOR SOLUTION ORAL DAILY PRN
Status: DISCONTINUED | OUTPATIENT
Start: 2021-08-17 | End: 2021-08-21 | Stop reason: HOSPADM

## 2021-08-17 RX ORDER — HYDROCORTISONE 5 MG/1
5 TABLET ORAL
Status: DISCONTINUED | OUTPATIENT
Start: 2021-08-17 | End: 2021-08-21 | Stop reason: HOSPADM

## 2021-08-17 RX ORDER — SODIUM CHLORIDE 0.9 % (FLUSH) 0.9 %
5-40 SYRINGE (ML) INJECTION PRN
Status: DISCONTINUED | OUTPATIENT
Start: 2021-08-17 | End: 2021-08-21 | Stop reason: HOSPADM

## 2021-08-17 RX ORDER — LEVOTHYROXINE SODIUM 0.12 MG/1
125 TABLET ORAL DAILY
Status: DISCONTINUED | OUTPATIENT
Start: 2021-08-18 | End: 2021-08-21 | Stop reason: HOSPADM

## 2021-08-17 RX ORDER — ACETAMINOPHEN 325 MG/1
650 TABLET ORAL EVERY 6 HOURS PRN
Status: DISCONTINUED | OUTPATIENT
Start: 2021-08-17 | End: 2021-08-21 | Stop reason: HOSPADM

## 2021-08-17 RX ORDER — ACETAMINOPHEN 650 MG/1
650 SUPPOSITORY RECTAL EVERY 6 HOURS PRN
Status: DISCONTINUED | OUTPATIENT
Start: 2021-08-17 | End: 2021-08-21 | Stop reason: HOSPADM

## 2021-08-17 RX ORDER — ASPIRIN 81 MG/1
81 TABLET ORAL DAILY
Status: ON HOLD | COMMUNITY
End: 2021-08-21 | Stop reason: HOSPADM

## 2021-08-17 RX ORDER — HYDROCORTISONE 5 MG/1
10 TABLET ORAL
Status: DISCONTINUED | OUTPATIENT
Start: 2021-08-18 | End: 2021-08-21 | Stop reason: HOSPADM

## 2021-08-17 RX ORDER — M-VIT,TX,IRON,MINS/CALC/FOLIC 27MG-0.4MG
1 TABLET ORAL DAILY
COMMUNITY

## 2021-08-17 RX ORDER — SODIUM CHLORIDE 9 MG/ML
INJECTION, SOLUTION INTRAVENOUS PRN
Status: DISCONTINUED | OUTPATIENT
Start: 2021-08-17 | End: 2021-08-21 | Stop reason: HOSPADM

## 2021-08-17 RX ORDER — SODIUM CHLORIDE 0.9 % (FLUSH) 0.9 %
5-40 SYRINGE (ML) INJECTION EVERY 12 HOURS SCHEDULED
Status: DISCONTINUED | OUTPATIENT
Start: 2021-08-17 | End: 2021-08-21 | Stop reason: HOSPADM

## 2021-08-17 RX ORDER — 0.9 % SODIUM CHLORIDE 0.9 %
500 INTRAVENOUS SOLUTION INTRAVENOUS ONCE
Status: COMPLETED | OUTPATIENT
Start: 2021-08-17 | End: 2021-08-17

## 2021-08-17 RX ORDER — ONDANSETRON 2 MG/ML
4 INJECTION INTRAMUSCULAR; INTRAVENOUS EVERY 6 HOURS PRN
Status: DISCONTINUED | OUTPATIENT
Start: 2021-08-17 | End: 2021-08-21 | Stop reason: HOSPADM

## 2021-08-17 RX ORDER — FAMOTIDINE 20 MG/1
10 TABLET, FILM COATED ORAL DAILY
Status: DISCONTINUED | OUTPATIENT
Start: 2021-08-18 | End: 2021-08-21 | Stop reason: HOSPADM

## 2021-08-17 RX ORDER — HYDROCORTISONE 5 MG/1
5 TABLET ORAL
Status: DISCONTINUED | OUTPATIENT
Start: 2021-08-18 | End: 2021-08-21 | Stop reason: HOSPADM

## 2021-08-17 RX ORDER — ONDANSETRON 4 MG/1
4 TABLET, ORALLY DISINTEGRATING ORAL EVERY 8 HOURS PRN
Status: DISCONTINUED | OUTPATIENT
Start: 2021-08-17 | End: 2021-08-21 | Stop reason: HOSPADM

## 2021-08-17 RX ORDER — ATORVASTATIN CALCIUM 10 MG/1
10 TABLET, FILM COATED ORAL DAILY
Status: DISCONTINUED | OUTPATIENT
Start: 2021-08-18 | End: 2021-08-21 | Stop reason: HOSPADM

## 2021-08-17 RX ORDER — ACETAMINOPHEN 325 MG/1
650 TABLET ORAL EVERY 6 HOURS PRN
Status: ON HOLD | COMMUNITY
End: 2021-12-08 | Stop reason: HOSPADM

## 2021-08-17 RX ORDER — ALBUTEROL SULFATE 2.5 MG/3ML
2.5 SOLUTION RESPIRATORY (INHALATION) EVERY 6 HOURS PRN
Status: DISCONTINUED | OUTPATIENT
Start: 2021-08-17 | End: 2021-08-21 | Stop reason: HOSPADM

## 2021-08-17 RX ORDER — SIMVASTATIN 20 MG
TABLET ORAL
COMMUNITY

## 2021-08-17 RX ORDER — SODIUM CHLORIDE 9 MG/ML
25 INJECTION, SOLUTION INTRAVENOUS PRN
Status: DISCONTINUED | OUTPATIENT
Start: 2021-08-17 | End: 2021-08-21 | Stop reason: HOSPADM

## 2021-08-17 RX ADMIN — HYDROCORTISONE 5 MG: 5 TABLET ORAL at 23:00

## 2021-08-17 RX ADMIN — Medication 10 ML: at 23:23

## 2021-08-17 RX ADMIN — SODIUM CHLORIDE 500 ML: 9 INJECTION, SOLUTION INTRAVENOUS at 18:58

## 2021-08-17 NOTE — LETTER
[place]  [address], [address]        [address] [facility] [address]           08/23/21     Dear Reva Dowd,    We tried to reach you recently, after your hospital stay, to review your medications. I was unable to reach you on the telephone. We understand that medications can be confusing, and it is important to discuss your medications after a hospital stay. Please call us at [phone], option 1 to discuss your medications.        Sincerely,   Josette Cano, PharmD, [facility], [address] [place] [address], toll free: [phone], option 1

## 2021-08-17 NOTE — TELEPHONE ENCOUNTER
CLINICAL PHARMACY NOTE  Post-Discharge Transitions of Care (CRISTOBAL)    Patient appears to have been discharged from [facility]   on 8/2/21. Patient not found in Outcomes MTM. Please follow-up with patient to review medications.       30 days since discharge = 9/1/2021     4 [facility], toll free [phone], option 1

## 2021-08-17 NOTE — ED PROVIDER NOTES
HPI:  8/17/21, Time: 5:58 PM EDT         Brenda Limon is a 80 y.o. male presenting to the ED for hypotension, beginning prior to arrival.  The complaint has been intermittent, mild in severity, and worsened by nothing. Patient was hospitalized last week for dizziness. Today he had a follow up appointment with the [place]. When he arrived, he was noticed to be hypotensive and dizzy. Sent to the ED for evaluation. When patient arrived in the ED, vitals were stable. Labs were obtained. Hemoglobin was 6. Patient says he had blood in his stool last week during his admission. He was started on iron and now his stool is black likely from the iron. He has no complaints. Denies chest pain, shortness of breath, lightheadedness, nausea, vomiting, abdominal pain. ROS:   Pertinent positives and negatives are stated within HPI, all other systems reviewed and are negative.  --------------------------------------------- PAST HISTORY ---------------------------------------------  Past Medical History:  has a past medical history of A-fib ([facility] [address] 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 ([facility] [address] Hypothyroid, Osteopenia, Renal insufficiency, and Right bundle branch block. Past Surgical History:  has no past surgical history on file. Social History:  reports that he has never smoked. He has never used smokeless tobacco. He reports previous alcohol use. He reports that he does not use drugs. Family History: family history is not on file. The patients home medications have been reviewed.     Allergies: Codeine, Diazepam, Lorazepam, and Morphine    ---------------------------------------------------PHYSICAL EXAM--------------------------------------    Constitutional/General: Alert and oriented x3, well appearing, non toxic in NAD  Head: Normocephalic and atraumatic  Eyes: PERRL, EOMI  Mouth: Oropharynx clear, handling secretions, no trismus  Neck: Supple, full ROM, non tender to palpation in the midline, no stridor, no crepitus, no meningeal signs  Pulmonary: Lungs clear to auscultation bilaterally, no wheezes, rales, or rhonchi. Not in respiratory distress  Cardiovascular:  Regular rate. Regular rhythm. No murmurs, gallops, or rubs. 2+ distal pulses  Chest: no chest wall tenderness  Abdomen: Soft. Non tender. Non distended. +BS. No rebound, guarding, or rigidity. No pulsatile masses appreciated. Hemeoccult negative stool. Musculoskeletal: Moves all extremities x 4. Warm and well perfused, no clubbing, cyanosis, or edema. Capillary refill <3 seconds  Skin: warm and dry. No rashes. Neurologic: GCS 15, CN 2-12 grossly intact, no focal deficits, symmetric strength 5/5 in the upper and lower extremities bilaterally  Psych: Normal Affect    -------------------------------------------------- RESULTS -------------------------------------------------  I have personally reviewed all laboratory and imaging results for this patient. Results are listed below.      LABS:  Results for orders placed or performed during the hospital encounter of 08/17/21   CBC Auto Differential   Result Value Ref Range    WBC 13.6 (H) 4.5 - 11.5 E9/L    RBC 2.63 (L) 3.80 - 5.80 E12/L    Hemoglobin 6.0 (LL) 12.5 - 16.5 g/dL    Hematocrit 20.9 (L) 37.0 - 54.0 %    MCV 79.5 (L) 80.0 - 99.9 fL    MCH 22.8 (L) 26.0 - 35.0 pg    MCHC 28.7 (L) 32.0 - 34.5 %    RDW 17.5 (H) 11.5 - 15.0 fL    Platelets 838 274 - 290 E9/L    MPV 9.8 7.0 - 12.0 fL    Neutrophils % 78.9 43.0 - 80.0 %    Lymphocytes % 7.9 (L) 20.0 - 42.0 %    Monocytes % 10.5 2.0 - 12.0 %    Eosinophils % 0.9 0.0 - 6.0 %    Basophils % 0.4 0.0 - 2.0 %    Neutrophils Absolute 11.02 (H) 1.80 - 7.30 E9/L    Lymphocytes Absolute 1.09 (L) 1.50 - 4.00 E9/L    Monocytes Absolute 1.50 (H) 0.10 - 0.95 E9/L    Eosinophils Absolute 0.12 0.05 - 0.50 E9/L    Basophils Absolute 0.00 0.00 - 0.20 E9/L    Metamyelocytes Relative 1.8 (H) 0.0 - 1.0 %    nRBC 0.9 /100 WBC    Anisocytosis 1+     Polychromasia 2+     Hypochromia 1+ Schistocytes 1+     Ovalocytes 1+     Stomatocytes 1+     Tear Drop Cells 1+     Basophilic Stippling 1+    Comprehensive Metabolic Panel   Result Value Ref Range    Sodium 137 132 - 146 mmol/L    Potassium 4.4 3.5 - 5.0 mmol/L    Chloride 104 98 - 107 mmol/L    CO2 24 22 - 29 mmol/L    Anion Gap 9 7 - 16 mmol/L    Glucose 107 (H) 74 - 99 mg/dL    BUN 17 6 - 23 mg/dL    CREATININE 1.5 (H) 0.7 - 1.2 mg/dL    GFR Non-African American 44 >=60 mL/min/1.73    GFR African American 53     Calcium 8.5 (L) 8.6 - 10.2 mg/dL    Total Protein 6.3 (L) 6.4 - 8.3 g/dL    Albumin 3.9 3.5 - 5.2 g/dL    Total Bilirubin <0.2 0.0 - 1.2 mg/dL    Alkaline Phosphatase 64 40 - 129 U/L    ALT 9 0 - 40 U/L    AST 12 0 - 39 U/L   Troponin   Result Value Ref Range    Troponin, High Sensitivity 31 (H) 0 - 11 ng/L   Troponin   Result Value Ref Range    Troponin, High Sensitivity 26 (H) 0 - 11 ng/L   EKG 12 Lead   Result Value Ref Range    Ventricular Rate 78 BPM    Atrial Rate 78 BPM    P-R Interval 198 ms    QRS Duration 146 ms    Q-T Interval 430 ms    QTc Calculation (Bazett) 490 ms    P Axis 51 degrees    R Axis -31 degrees    T Axis 9 degrees   TYPE AND SCREEN   Result Value Ref Range    ABO/Rh O POS     Antibody Screen NEG    PREPARE RBC (CROSSMATCH), 1 Units   Result Value Ref Range    Product Code Blood Bank [id number]     Description Blood Bank Red Blood Cells, Leuko-reduced     Unit Number [id number]     Dispense Status Blood Bank issued        RADIOLOGY:  Interpreted by Radiologist.  XR CHEST PORTABLE   Final Result   Atherosclerotic disease and mild cardiomegaly. Coarse interstitial markings. No acute cardiopulmonary pathology.                EKG Interpretation  Interpreted by emergency department physician    Rhythm: normal sinus   Rate: normal  Axis: left  Conduction: normal  ST Segments: no acute change  T Waves: no acute change    Clinical Impression: right bundle branch block  Comparison to prior EKG: stable as compared to patient's most recent EKG      ------------------------- NURSING NOTES AND VITALS REVIEWED ---------------------------   The nursing notes within the ED encounter and vital signs as below have been reviewed by myself. BP (!) 155/83   Pulse 66   Temp 98.5 °F (36.9 °C) (Oral)   Resp 18   Ht 6' 1\" (1.854 m)   Wt 210 lb (95.3 kg)   SpO2 98%   BMI 27.71 kg/m²   Oxygen Saturation Interpretation: Normal    The patients available past medical records and past encounters were reviewed. ------------------------------ ED COURSE/MEDICAL DECISION MAKING----------------------  Medications   0.9 % sodium chloride infusion (has no administration in time range)   0.9 % sodium chloride bolus (500 mLs Intravenous New Bag 8/17/21 5872)             Medical Decision Making:    Vitals are stable. Labs obtained. Hemoglobin is 6.0. Rectal exam done. Neg for occult blood. Patient currently has no complaints. 1 unit PRBC ordered and transfusion started. I spoke with the [place], [place]. He accepts admission for Dr. Jefe Cam. Re-Evaluations:             Re-evaluation. Patients symptoms are improving      This patient's ED course included: a personal history and physicial examination, re-evaluation prior to disposition, multiple bedside re-evaluations, IV medications, cardiac monitoring, continuous pulse oximetry and a personal history and physicial eaxmination    This patient has remained hemodynamically stable during their ED course. Counseling: The emergency provider has spoken with the patient and discussed todays results, in addition to providing specific details for the plan of care and counseling regarding the diagnosis and prognosis. Questions are answered at this time and they are agreeable with the plan.       --------------------------------- IMPRESSION AND DISPOSITION ---------------------------------    IMPRESSION  1.  Anemia, unspecified type        DISPOSITION  Disposition: Admit to telemetry  Patient condition is stable        NOTE: This report was transcribed using voice recognition software.  Every effort was made to ensure accuracy; however, inadvertent computerized transcription errors may be present          Sara Luna MD  08/17/21 4085

## 2021-08-18 LAB
ALBUMIN SERPL-MCNC: 3.6 G/DL (ref 3.5–5.2)
ALP BLD-CCNC: 58 U/L (ref 40–129)
ALT SERPL-CCNC: 9 U/L (ref 0–40)
ANION GAP SERPL CALCULATED.3IONS-SCNC: 7 MMOL/L (ref 7–16)
ANISOCYTOSIS: ABNORMAL
APTT: 27.1 SEC (ref 24.5–35.1)
AST SERPL-CCNC: 15 U/L (ref 0–39)
BACTERIA: ABNORMAL /HPF
BASOPHILIC STIPPLING: ABNORMAL
BASOPHILS ABSOLUTE: 0 E9/L (ref 0–0.2)
BASOPHILS RELATIVE PERCENT: 0.5 % (ref 0–2)
BILIRUB SERPL-MCNC: 0.3 MG/DL (ref 0–1.2)
BILIRUBIN URINE: NEGATIVE
BLOOD BANK DISPENSE STATUS: NORMAL
BLOOD BANK DISPENSE STATUS: NORMAL
BLOOD BANK PRODUCT CODE: NORMAL
BLOOD BANK PRODUCT CODE: NORMAL
BLOOD, URINE: NEGATIVE
BPU ID: NORMAL
BPU ID: NORMAL
BUN BLDV-MCNC: 15 MG/DL (ref 6–23)
CALCIUM IONIZED: 1.23 MMOL/L (ref 1.15–1.33)
CALCIUM SERPL-MCNC: 8.5 MG/DL (ref 8.6–10.2)
CHLORIDE BLD-SCNC: 105 MMOL/L (ref 98–107)
CLARITY: CLEAR
CO2: 26 MMOL/L (ref 22–29)
COLOR: YELLOW
CREAT SERPL-MCNC: 1.5 MG/DL (ref 0.7–1.2)
DESCRIPTION BLOOD BANK: NORMAL
DESCRIPTION BLOOD BANK: NORMAL
EOSINOPHILS ABSOLUTE: 0.15 E9/L (ref 0.05–0.5)
EOSINOPHILS RELATIVE PERCENT: 0.9 % (ref 0–6)
FERRITIN: 16 NG/ML
FIBRINOGEN: 390 MG/DL (ref 225–540)
FOLATE: 12.2 NG/ML (ref 4.8–24.2)
GFR AFRICAN AMERICAN: 53
GFR NON-AFRICAN AMERICAN: 44 ML/MIN/1.73
GLUCOSE BLD-MCNC: 89 MG/DL (ref 74–99)
GLUCOSE URINE: NEGATIVE MG/DL
HCT VFR BLD CALC: 23.9 % (ref 37–54)
HCT VFR BLD CALC: 24.3 % (ref 37–54)
HCT VFR BLD CALC: 24.4 % (ref 37–54)
HCT VFR BLD CALC: 25 % (ref 37–54)
HCT VFR BLD CALC: 25.4 % (ref 37–54)
HEMOGLOBIN: 7.2 G/DL (ref 12.5–16.5)
HEMOGLOBIN: 7.2 G/DL (ref 12.5–16.5)
HEMOGLOBIN: 7.4 G/DL (ref 12.5–16.5)
HEMOGLOBIN: 7.5 G/DL (ref 12.5–16.5)
HEMOGLOBIN: 7.6 G/DL (ref 12.5–16.5)
HYPOCHROMIA: ABNORMAL
IMMATURE RETIC FRACT: 38.2 % (ref 2.3–13.4)
INR BLD: 1.2
IRON SATURATION: 2 % (ref 20–55)
IRON: 6 MCG/DL (ref 59–158)
KETONES, URINE: NEGATIVE MG/DL
LACTIC ACID: 1.9 MMOL/L (ref 0.5–2.2)
LEUKOCYTE ESTERASE, URINE: NEGATIVE
LYMPHOCYTES ABSOLUTE: 1.67 E9/L (ref 1.5–4)
LYMPHOCYTES RELATIVE PERCENT: 9.6 % (ref 20–42)
MCH RBC QN AUTO: 23.6 PG (ref 26–35)
MCHC RBC AUTO-ENTMCNC: 30.5 % (ref 32–34.5)
MCV RBC AUTO: 77.6 FL (ref 80–99.9)
METAMYELOCYTES RELATIVE PERCENT: 0.9 % (ref 0–1)
MONOCYTES ABSOLUTE: 0.33 E9/L (ref 0.1–0.95)
MONOCYTES RELATIVE PERCENT: 1.7 % (ref 2–12)
NEUTROPHILS ABSOLUTE: 14.7 E9/L (ref 1.8–7.3)
NEUTROPHILS RELATIVE PERCENT: 87 % (ref 43–80)
NITRITE, URINE: NEGATIVE
NUCLEATED RED BLOOD CELLS: 1.7 /100 WBC
OVALOCYTES: ABNORMAL
PDW BLD-RTO: 18 FL (ref 11.5–15)
PH UA: 5.5 (ref 5–9)
PLATELET # BLD: 386 E9/L (ref 130–450)
PMV BLD AUTO: 9.7 FL (ref 7–12)
POIKILOCYTES: ABNORMAL
POLYCHROMASIA: ABNORMAL
POTASSIUM REFLEX MAGNESIUM: 3.8 MMOL/L (ref 3.5–5)
PROTEIN UA: NEGATIVE MG/DL
PROTHROMBIN TIME: 12.8 SEC (ref 9.3–12.4)
RBC # BLD: 3.13 E12/L (ref 3.8–5.8)
RBC UA: ABNORMAL /HPF (ref 0–2)
RETIC HGB EQUIVALENT: 18.3 PG (ref 28.2–36.6)
RETICULOCYTE ABSOLUTE COUNT: 0.12 E12/L
RETICULOCYTE COUNT PCT: 4.1 % (ref 0.4–1.9)
SODIUM BLD-SCNC: 138 MMOL/L (ref 132–146)
SPECIFIC GRAVITY UA: 1.02 (ref 1–1.03)
TARGET CELLS: ABNORMAL
TOTAL IRON BINDING CAPACITY: 310 MCG/DL (ref 250–450)
TOTAL PROTEIN: 6 G/DL (ref 6.4–8.3)
TROPONIN, HIGH SENSITIVITY: 37 NG/L (ref 0–11)
TSH SERPL DL<=0.05 MIU/L-ACNC: 1.33 UIU/ML (ref 0.27–4.2)
UROBILINOGEN, URINE: 0.2 E.U./DL
VITAMIN B-12: 419 PG/ML (ref 211–946)
WBC # BLD: 16.7 E9/L (ref 4.5–11.5)
WBC UA: ABNORMAL /HPF (ref 0–5)

## 2021-08-18 PROCEDURE — 85025 COMPLETE CBC W/AUTO DIFF WBC: CPT

## 2021-08-18 PROCEDURE — 81001 URINALYSIS AUTO W/SCOPE: CPT

## 2021-08-18 PROCEDURE — G0378 HOSPITAL OBSERVATION PER HR: HCPCS

## 2021-08-18 PROCEDURE — 83540 ASSAY OF IRON: CPT

## 2021-08-18 PROCEDURE — 83605 ASSAY OF LACTIC ACID: CPT

## 2021-08-18 PROCEDURE — 82728 ASSAY OF FERRITIN: CPT

## 2021-08-18 PROCEDURE — 6370000000 HC RX 637 (ALT 250 FOR IP): Performed by: PHYSICIAN ASSISTANT

## 2021-08-18 PROCEDURE — 80053 COMPREHEN METABOLIC PANEL: CPT

## 2021-08-18 PROCEDURE — 85384 FIBRINOGEN ACTIVITY: CPT

## 2021-08-18 PROCEDURE — 36430 TRANSFUSION BLD/BLD COMPNT: CPT

## 2021-08-18 PROCEDURE — 36415 COLL VENOUS BLD VENIPUNCTURE: CPT

## 2021-08-18 PROCEDURE — 97165 OT EVAL LOW COMPLEX 30 MIN: CPT

## 2021-08-18 PROCEDURE — 82746 ASSAY OF FOLIC ACID SERUM: CPT

## 2021-08-18 PROCEDURE — 6360000002 HC RX W HCPCS: Performed by: PHYSICIAN ASSISTANT

## 2021-08-18 PROCEDURE — 82607 VITAMIN B-12: CPT

## 2021-08-18 PROCEDURE — 2580000003 HC RX 258: Performed by: PHYSICIAN ASSISTANT

## 2021-08-18 PROCEDURE — 84443 ASSAY THYROID STIM HORMONE: CPT

## 2021-08-18 PROCEDURE — 82330 ASSAY OF CALCIUM: CPT

## 2021-08-18 PROCEDURE — 85014 HEMATOCRIT: CPT

## 2021-08-18 PROCEDURE — 96374 THER/PROPH/DIAG INJ IV PUSH: CPT

## 2021-08-18 PROCEDURE — 85610 PROTHROMBIN TIME: CPT

## 2021-08-18 PROCEDURE — 85018 HEMOGLOBIN: CPT

## 2021-08-18 PROCEDURE — 85045 AUTOMATED RETICULOCYTE COUNT: CPT

## 2021-08-18 PROCEDURE — 83550 IRON BINDING TEST: CPT

## 2021-08-18 PROCEDURE — 84484 ASSAY OF TROPONIN QUANT: CPT

## 2021-08-18 PROCEDURE — 85730 THROMBOPLASTIN TIME PARTIAL: CPT

## 2021-08-18 RX ORDER — SODIUM CHLORIDE 9 MG/ML
INJECTION, SOLUTION INTRAVENOUS PRN
Status: DISCONTINUED | OUTPATIENT
Start: 2021-08-18 | End: 2021-08-21 | Stop reason: HOSPADM

## 2021-08-18 RX ADMIN — Medication 10 ML: at 21:39

## 2021-08-18 RX ADMIN — HYDROCORTISONE 10 MG: 5 TABLET ORAL at 06:39

## 2021-08-18 RX ADMIN — HYDROCORTISONE 5 MG: 5 TABLET ORAL at 17:07

## 2021-08-18 RX ADMIN — HYDROCORTISONE 5 MG: 5 TABLET ORAL at 11:12

## 2021-08-18 RX ADMIN — ATORVASTATIN CALCIUM 10 MG: 10 TABLET, FILM COATED ORAL at 21:02

## 2021-08-18 RX ADMIN — Medication 10 ML: at 07:52

## 2021-08-18 RX ADMIN — LEVOTHYROXINE SODIUM 125 MCG: 0.12 TABLET ORAL at 06:39

## 2021-08-18 RX ADMIN — ONDANSETRON HYDROCHLORIDE 4 MG: 2 INJECTION, SOLUTION INTRAMUSCULAR; INTRAVENOUS at 04:00

## 2021-08-18 RX ADMIN — FAMOTIDINE 10 MG: 20 TABLET, FILM COATED ORAL at 07:51

## 2021-08-18 ASSESSMENT — PAIN SCALES - GENERAL
PAINLEVEL_OUTOF10: 0

## 2021-08-18 NOTE — ED NOTES
Patient without complaints at this time and is tolerating blood transfusion without complications. Will continue to monitor.       Chan Thornton RN  08/17/21 2023

## 2021-08-18 NOTE — CONSULTS
Department of General Surgery - Adult  Surgical Service   Attending Consult Note      Reason for Consult:  GI bleed  Requesting Physician:  Kim Kenyon MD    CHIEF COMPLAINT:  dizziness    History Obtained From:  patient, electronic medical record    HISTORY OF PRESENT ILLNESS:                The patient is a 80 y.o. male who presents with evidence of active bleed and low HGB. 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 No pertinent surgical history.   Current Medications:   Current Facility-Administered Medications: 0.9 % sodium chloride infusion, , Intravenous, PRN  0.9 % sodium chloride infusion, , Intravenous, PRN  albuterol (PROVENTIL) nebulizer solution 2.5 mg, 2.5 mg, Nebulization, Q6H PRN  [Held by provider] apixaban (ELIQUIS) tablet 5 mg, 5 mg, Oral, BID  hydrocortisone (CORTEF) tablet 10 mg, 10 mg, Oral, Daily with breakfast  hydrocortisone (CORTEF) tablet 5 mg, 5 mg, Oral, Lunch  hydrocortisone (CORTEF) tablet 5 mg, 5 mg, Oral, Dinner  levothyroxine (SYNTHROID) tablet 125 mcg, 125 mcg, Oral, Daily  atorvastatin (LIPITOR) tablet 10 mg, 10 mg, Oral, Daily  sodium chloride flush 0.9 % injection 5-40 mL, 5-40 mL, Intravenous, 2 times per day  sodium chloride flush 0.9 % injection 5-40 mL, 5-40 mL, Intravenous, PRN  0.9 % sodium chloride infusion, 25 mL, Intravenous, PRN  ondansetron (ZOFRAN-ODT) disintegrating tablet 4 mg, 4 mg, Oral, Q8H PRN **OR** ondansetron (ZOFRAN) injection 4 mg, 4 mg, Intravenous, Q6H PRN  polyethylene glycol (GLYCOLAX) packet 17 g, 17 g, Oral, Daily PRN  acetaminophen (TYLENOL) tablet 650 mg, 650 mg, Oral, Q6H PRN **OR** acetaminophen (TYLENOL) suppository 650 mg, 650 mg, Rectal, Q6H PRN  famotidine (PEPCID) tablet 10 mg, 10 mg, Oral, Daily  Allergies:  Codeine, Diazepam, Lorazepam, and Morphine    Social History:   Elderly independent  Family History:   History reviewed. No pertinent family history. REVIEW OF SYSTEMS:    H&P    PHYSICAL EXAM:    VITALS:  BP (!) 96/50   Pulse 85   Temp 99.6 °F (37.6 °C) (Temporal)   Resp 18   Ht 6' 1\" (1.854 m)   Wt 214 lb 3.2 oz (97.2 kg)   SpO2 93%   BMI 28.26 kg/m²   24HR INTAKE/OUTPUT:    Intake/Output Summary (Last 24 hours) at 2021 1116  Last data filed at 2021 0754  Gross per 24 hour   Intake 200 ml   Output --   Net 200 ml     TEMPERATURE:  Current - Temp: 99.6 °F (37.6 °C);  Max - Temp  Av.1 °F (36.7 °C)  Min: 96.5 °F (35.8 °C)  Max: 99.6 °F (37.6 °C)  RESPIRATIONS RANGE: Resp  Av  Min: 13  Max: 20  PULSE RANGE: Pulse  Av.4  Min: 66  Max: 105  BLOOD PRESSURE RANGE:  Systolic (26ZXK), IKW:239 , Min:96 , EXN:187   ; Diastolic (31XWJ), HFR:07, Min:50, Max:100    PULSE OXIMETRY RANGE: SpO2  Av.1 %  Min: 93 %  Max: 99 %  CONSTITUTIONAL:  fatigued, alert, cooperative, no apparent distress, appears stated age and normal weight  EYES:  lids and lashes normal, pupils equal, round and reactive to light and extra-ocular muscles intact  NECK:  supple, symmetrical, trachea midline, skin normal and no stridor  LUNGS:  no increased work of breathing, good air exchange, no retractions and clear to auscultation  CARDIOVASCULAR:  normal apical pulses, regularly irregular rhythm and normal S1 and S2  ABDOMEN:  normal bowel sounds, soft, non-distended, non-tender, voluntary guarding absent and no masses palpated  DATA:    CBC with Differential:    Lab Results   Component Value Date    WBC 16.7 2021    RBC 3.13 2021    HGB 7.4 2021    HCT 24.3 2021     2021    MCV 77.6 2021    MCH 23.6 2021    MCHC 30.5 2021    RDW 18.0 2021    NRBC 1.7 2021    BLASTSPCT 1.0 2020    METASPCT 0.9 2021    LYMPHOPCT 9.6 2021    PROMYELOPCT 1.0 2020    MONOPCT 1.7 08/18/2021    MYELOPCT 7.0 03/23/2020    BASOPCT 0.5 08/18/2021    MONOSABS 0.33 08/18/2021    LYMPHSABS 1.67 08/18/2021    EOSABS 0.15 08/18/2021    BASOSABS 0.00 08/18/2021     CMP:    Lab Results   Component Value Date     08/18/2021    K 3.8 08/18/2021     08/18/2021    CO2 26 08/18/2021    BUN 15 08/18/2021    CREATININE 1.5 08/18/2021    GFRAA 53 08/18/2021    LABGLOM 44 08/18/2021    GLUCOSE 89 08/18/2021    PROT 6.0 08/18/2021    LABALBU 3.6 08/18/2021    CALCIUM 8.5 08/18/2021    BILITOT 0.3 08/18/2021    ALKPHOS 58 08/18/2021    AST 15 08/18/2021    ALT 9 08/18/2021       IMPRESSION/RECOMMENDATIONS:  Dizziness  Hypopituitary since 1980  Active GI bleed  Lives at home independent  Drives  Scheduled for upper endo tomorrow

## 2021-08-18 NOTE — CARE COORDINATION
8/18/21 Transition of Care: Patient is observation due to being sent in from the [place] for anemia and dizziness. He is alert and oriented. He is independent and drives. He resides with family and does not use any devices. His pcp is Dr Fina Costello and his pharmacy is [facility]. He is currently being seen by surgery. He is NPO. He will be having an upper endoscopy. He is receiving a 3rd unit of blood. His plan is to return home at discharge. Will follow for results of egd and h/h post transfusion.  Gabriela Mayes RN CM

## 2021-08-18 NOTE — PROGRESS NOTES
OCCUPATIONAL THERAPY INITIAL EVALUATION    [address] [address] [address]      Date:2021                                                  Patient Name: Claire Lara  MRN: [id number]  : 1931  Room: [address] [place] [address]    Evaluating OT: Queenie Khan OTR/L #[id number]    Referring Provider and Specific Provider Orders/Date:      21  OT eval and treat Start: 21, End: 21, ONE TIME, Standing Count: 1 Occurrences, R      MICHELLE Coleman     Diagnosis: Anemia  Surgery: None   Pertinent Medical History: Afib, CKD, orthostatic hypotension. Precautions:  Fall Risk, bed alarm, 3L supplemental O2 via NC.     Assessment of current deficits   [x] Functional mobility  [x]ADLs  [x] Strength               []Cognition   [x] Functional transfers   [x] IADLs         [x] Safety Awareness   [x]Endurance   [] Fine Coordination              [x] Balance      [] Vision/perception   [x]Sensation    []Gross Motor Coordination  [] ROM  [] Delirium                   [] Motor Control     OT PLAN OF CARE   OT POC based on physician orders, patient diagnosis and results of clinical assessment    Frequency/Duration: 1-3 days/wk for 2 weeks PRN   Specific OT Treatment to include:   * Instruction/training on adapted ADL techniques and AE recommendations to increase functional independence within precautions       * Training on energy conservation strategies, correct breathing pattern and techniques to improve independence/tolerance for self-care routine  * Functional transfer/mobility training/DME recommendations for increased independence, safety, and fall prevention  * Patient/Family education to increase follow through with safety techniques and functional independence  * Recommendation of environmental modifications for increased safety with functional transfers/mobility and ADLs  * Therapeutic exercise to improve motor endurance, ROM, and functional strength for ADLs/functional transfers  * Therapeutic activities to facilitate/challenge dynamic balance, stand tolerance for increased safety and independence with ADLs  * Positioning to improve skin integrity, interaction with environment and functional independence    Recommended Adaptive Equipment: shower chair     Home Living: alone; single family home, 2 story, 1st floor set-up, 2-3 steps to enter no rail. Bathroom set-up: walk-in shower with grab bar        Equipment owned: wheeled walker,cane, elevated commode. Prior Level of Function: Independent with ADLs , Independent with IADLs; ambulated without AD. Pt told this therapist that he had no supports at baseline, however, RN stated pt has a son that is supportive. Pain Level: pt denied pain this date. Cognition: A&O: 3/4; Follows 2 step directions   Memory: fair    Sequencing: fair    Problem solving: fair    Judgement/safety: fair     Functional Assessment:  AM-PAC Daily Activity Raw Score: 16/24   Initial Eval Status  Date: 8/18/21 Treatment Status  Date: STGs = LTGs  Time frame: 10-14 days   Feeding Supervision   Independent    Grooming Supervison  Independent    UB Dressing Minimal Assist   Independent    LB Dressing Moderate Assist to doff/don sock seated at EOB. Independent    Bathing Moderate Assist   Independent    Toileting Minimal Assist for balance while standing for urinal use. Independent    Bed Mobility  Supine to sit: Stand by Assist   Sit to supine: Stand by Assist   Supine to sit: Independent   Sit to supine: Independent    Functional Transfers Minimal Assist from EOB initially, CGA for second stand from EOB. Transfer training with verbal cues for hand placement/technique throughout session to improve safety. Independent    Functional Mobility Contact Guard Assist for forward/retro/lateral stepping at bedside without AD. No episodes of LOB.    Independent    Balance Sitting:     Static: fair     Dynamic: fair   Standing: fair Sitting:     Static: good     Dynamic: good   Standing: good     Activity Tolerance fair  ; sitting tolerance at EOB up to x10 minutes. Pt stood for up to x2-3 minutes for urinal use. Increase standing tolerance >5 minutes for improved engagement with functional transfers and indep in ADLs   Visual/  Perceptual Glasses: Yes    Reports changes in vision since admission: No     NA      Hand Dominance: right      AROM (PROM) Strength Additional Info:    RUE  WFL 4-/5 good  and wfl FMC/dexterity noted during ADL tasks     LUE WFL 4-/5 good  and wfl FMC/dexterity noted during ADL tasks       Hearing: Wears hearing aides. Sensation:  No c/o numbness or tingling  Tone: WFL   Edema: None    Comments: RN cleared patient for OT. Upon arrival patient in supine in bed. Therapist facilitated and instructed pt on adapted  techniques & compensatory strategies to improve safety and independence with basic ADLs, bed mobility, and transfers to allow pt to achieve highest level of independence and safely. Pt demonstrated fair plus understanding of education & follow through. At end of session, patient was supine in bed with call light and phone within reach, all lines and tubes intact. Overall, patient demonstrated  decreased independence and safety during completion of ADL tasks. Pt would benefit from continued skilled OT to increase safety and independence with completion of ADL tasks and functional mobility for improved quality of life. Rehab Potential: Good for established goals. LTG: maximize independence with ADLs to return to PLOF     Patient / Family Goal: Return home     Patient and/or family were instructed on functional diagnosis, prognosis/goals and OT plan of care. Demonstrated good understanding.      Eval Complexity: Low    Time In: 8:40 AM   Time Out: 8:58 AM    Total Treatment Time: 0      Min Units   OT Eval Low 97165  X  1    OT Eval Medium 49688      OT Eval High 57445      OT Re-Eval 77916            ADL/Self Care 32493     Therapeutic Activities 98193       Therapeutic Ex 30521       Orthotic Management 67218       Manual [id number]     Neuro Re-Ed 29999       Non-Billable Time        Evaluation Time additionally includes thorough review of current medical information, gathering information on past medical history/social history and prior level of function, interpretation of standardized testing/informal observation of tasks, assessment of data and development of plan of care and goals.         Evaluating OT: Carlos Tate OTR/L #[id number]

## 2021-08-18 NOTE — H&P
Admission History and Physical                                                                                    Guadalupe Lane MD, FACP                   Patient Name: Ileana Bui                   Age:  80 y.o. Gender:   male    CC: Weakness dizziness hematochezia    HPI: This patient's history is obtained from chart review patient interview. This patient was discharged recently on 8/2. At that time the patient was admitted for dizziness with a known history of adrenal insufficiency and hypothyroidism. He also has been treated for atrial fibrillation and was on oral anticoagulation. The patient had leukocytosis at the time and was noted to be on Cortef therapy. He was given IV fluid hydration, and was considered to have dizziness due to his adrenal insufficiency. He presented to the [place] yesterday for routine follow-up examination. He stated he had evidence of blood in his stool during the week. [facility] In the emergency room his hemoglobin was 6 vitals were stable. He had been started on iron so he had melena. He was hypotensive and dizzy. He was transfused with 1 unit of blood and brought to the floor this morning the patient feels okay but he remains weak. He denies any chest pain or pressure sensation and he denies any shortness of breath. 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 No pertinent surgical history. No family status information on file. Prior to Admission medications    Medication Sig Start Date End Date Taking?  Authorizing Provider   aspirin 81 MG EC tablet Take 81 mg by mouth daily   Yes Historical Provider, MD   pantoprazole (PROTONIX) 40 MG tablet Take 40 mg by mouth 2 times daily   Yes Historical Provider, MD   acetaminophen (TYLENOL) 325 MG tablet Take 650 mg by mouth every 6 hours as needed for Pain   Yes Historical Provider, MD   Multiple Vitamins-Minerals (THERAPEUTIC MULTIVITAMIN-MINERALS) tablet Take 1 tablet by mouth daily   Yes Historical Provider, MD   simvastatin (ZOCOR) 20 MG tablet Take 1/2 tablet by mouth Mon - Fri and take 1 tablet by mouth Sat and Sun   Yes Historical Provider, MD   potassium chloride (KLOR-CON M) 20 MEQ extended release tablet Take 20 mEq by mouth 2 times daily   Yes Historical Provider, MD   vitamin D (CHOLECALCIFEROL) 25 MCG (1000 UT) TABS tablet Take 1,000 Units by mouth 2 times daily   Yes Historical Provider, MD   albuterol (PROVENTIL) (2.5 MG/3ML) 0.083% nebulizer solution Take 3 mLs by nebulization every 6 hours as needed for Wheezing 3/23/20  Yes Theo Hook DO   apixaban (ELIQUIS) 5 MG TABS tablet Take 1 tablet by mouth 2 times daily 3/23/20  Yes Theo Hook DO   fludrocortisone (FLORINEF) 0.1 MG tablet Take 0.1 mg by mouth daily   Yes Historical Provider, MD   hydrocortisone (CORTEF) 5 MG tablet Take 1 tablet by mouth Daily with lunch 10/9/19  Yes Theo Hook DO   hydrocortisone (CORTEF) 5 MG tablet Take 1 tablet by mouth Daily with supper 10/9/19  Yes Theo Hook DO   levothyroxine (SYNTHROID) 125 MCG tablet Take 1 tablet by mouth Daily 10/10/19  Yes Theo Hook DO   hydrocortisone (CORTEF) 10 MG tablet Take 1 tablet by mouth daily (with breakfast) 10/9/19  Yes Theo Hook DO        Social History     Socioeconomic History    Marital status:       Spouse name: None    Number of children: None    Years of education: None    Highest education level: None   Occupational History    None   Tobacco Use    Smoking status: Never Smoker    Smokeless tobacco: Never Used   Substance and Sexual Activity    Alcohol use: Not Currently    Drug use: Never    Sexual activity: None paresthesias. No change in gait, balance, coordination, memory, mentation or behavior. · Psychiatric: No anxiety, or depression. Mood and affect reported as normal  · Endocrine: No temperature intolerance. No polydipsia or polyuria. · Hematologic: No abnormal bruising or bleeding, blood clots or swollen lymph nodes. no anemia, no fever,chills, night sweats, swollen glands. · Allergic/Immunologic: No nasal congestion or hives. Physical Examination:      Wt Readings from Last 3 Encounters:   08/18/21 214 lb 3.2 oz (97.2 kg)   10/30/20 223 lb (101.2 kg)   03/21/20 228 lb 9 oz (103.7 kg)     Temp Readings from Last 3 Encounters:   08/18/21 98.7 °F (37.1 °C) (Temporal)   08/02/21 97 °F (36.1 °C) (Temporal)   10/30/20 97.2 °F (36.2 °C) (Temporal)     BP Readings from Last 3 Encounters:   08/18/21 112/66   08/02/21 133/71   10/30/20 124/76     Pulse Readings from Last 3 Encounters:   08/18/21 95   08/02/21 116   10/30/20 76       General appearance: Normal, awake, alert no distress. He is somewhat slow to respond but is not confused  Skin: Color, texture, turgor normal. No rashes or lesions. Head: Normocephalic. No masses, lesions, tenderness or abnormalities   Face: Symmetric no visible lesions  Eyes: Conjunctivae/cornea clear. Wilhelmena Doles. Sclera non icteric. He has ectropion on the left side with erythema of the eyelids  Ears: External appearance normal.  Hearing grossly normal  Nose/Sinuses: Nares normal. No paranasal sinus tenderness. Mouth: Lips and tongue appear normal. Dentition noted  Neck:  Symmetric. No adenopathy. Thyroid symmetric, normal size, without nodules. Trachea is midline. Chest: Even excursion   Lungs: Clear to auscultation. No rhonchi, crackles or rales. Heart: S1 > S2. Rhythm is regular and rate is normal. No gallop rub or murmur. Abdomen: Soft, mildly protuberant, non-tender. BS normal. No masses, organomegaly.  Anatomic contours appear normal.  Extremities: No deformities, edema, or skin discoloration. Peripheral perfusion assessed in all exremities. No cyanosis he is wearing compression hose and has a history of edema but there is no edema currently  Musculoskeletal: No unusual pain or swelling. Muscular strength intact. Neuro:   · Cranial nerves grossly intact. · Motor: Strength grossly normal. No focal weakness. · Sensory: grossly normal to touch. Mental status: Awake, alert, cognizant of person, place, time. Patient appears capable of directing self care   Mood: Normal and appropriate affect somewhat flat  Gait & balance: He is independently ambulatory     Labs     CBC:   Lab Results   Component Value Date    WBC 16.7 08/18/2021    RBC 3.13 08/18/2021    HGB 7.4 08/18/2021    HCT 24.3 08/18/2021     08/18/2021    MCV 77.6 08/18/2021     BMP:    Lab Results   Component Value Date     08/18/2021    K 3.8 08/18/2021     08/18/2021    CO2 26 08/18/2021    BUN 15 08/18/2021    CREATININE 1.5 08/18/2021    GLUCOSE 89 08/18/2021    CALCIUM 8.5 08/18/2021     Hepatic Function Panel:    Lab Results   Component Value Date    ALKPHOS 58 08/18/2021    AST 15 08/18/2021    ALT 9 08/18/2021    PROT 6.0 08/18/2021    LABALBU 3.6 08/18/2021    BILITOT 0.3 08/18/2021     Magnesium:    Lab Results   Component Value Date    MG 2.1 08/01/2021     Cardiac Enzymes:   Lab Results   Component Value Date    CKTOTAL 97 03/21/2020    CKTOTAL 92 03/20/2020    CKTOTAL 98 03/19/2020    CKMB 12.9 (H) 03/14/2020    TROPONINI 0.18 (H) 03/15/2020    TROPONINI 0.16 (H) 03/14/2020    TROPONINI 0.12 (H) 03/14/2020     LDH:  No results found for: LDH  PT/INR:    Lab Results   Component Value Date    PROTIME 12.8 08/18/2021    INR 1.2 08/18/2021     BNP: No results for input(s): BNP in the last 72 hours.    TSH:   Lab Results   Component Value Date    TSH 1.330 08/18/2021      Cardiac Injury Profile: No results for input(s): CKTOTAL, CKMB, CKMBINDEX, TROPONINI in the last 72 hours.    Lipid Profile: No results found for: TRIG, HDL, LDLCALC, CHOL   Hemoglobin A1C: No components found for: HGBA1C   U/A:   Lab Results   Component Value Date    LEUKOCYTESUR Negative 08/01/2021    PHUR 5.0 08/01/2021    WBCUA NONE 08/01/2021    RBCUA NONE 08/01/2021    BACTERIA NONE SEEN 08/01/2021    SPECGRAV 1.015 08/01/2021    BLOODU Negative 08/01/2021    GLUCOSEU Negative 08/01/2021         ADMISSION SCHEDULED MEDS:   Current Facility-Administered Medications   Medication Dose Route Frequency Provider Last Rate Last Admin    0.9 % sodium chloride infusion   Intravenous PRN Evelyn Bliss MD        albuterol (PROVENTIL) nebulizer solution 2.5 mg  2.5 mg Nebulization Q6H PRN MICHELLE Quiles        [Held by provider] apixaban (ELIQUIS) tablet 5 mg  5 mg Oral BID MICHELLE Quiles        hydrocortisone (CORTEF) tablet 10 mg  10 mg Oral Daily with breakfast MICHELLE Quiles   10 mg at 08/18/21 0639    hydrocortisone (CORTEF) tablet 5 mg  5 mg Oral Lunch Vega Quiles        hydrocortisone (CORTEF) tablet 5 mg  5 mg Oral Dinner Vega Quiles   5 mg at 08/17/21 2300    levothyroxine (SYNTHROID) tablet 125 mcg  125 mcg Oral Daily MICHELLE Quiles   125 mcg at 08/18/21 7211    atorvastatin (LIPITOR) tablet 10 mg  10 mg Oral Daily MICHELLE Quiles        sodium chloride flush 0.9 % injection 5-40 mL  5-40 mL Intravenous 2 times per day MICHELLE Quiles   10 mL at 08/18/21 0752    sodium chloride flush 0.9 % injection 5-40 mL  5-40 mL Intravenous PRN MICHELLE Quiles        0.9 % sodium chloride infusion  25 mL Intravenous PRN MICHELLE Quiles        ondansetron (ZOFRAN-ODT) disintegrating tablet 4 mg  4 mg Oral Q8H PRN MICHELLE Quiles        Or    ondansetron (ZOFRAN) injection 4 mg  4 mg Intravenous Q6H PRN MICHELLE Quiles   4 mg at 08/18/21 0400    polyethylene glycol (GLYCOLAX) packet 17 g  17 g Oral Daily PRN MICHELLE Quiles  acetaminophen (TYLENOL) tablet 650 mg  650 mg Oral Q6H PRN MICHELLE Camacho        Or    acetaminophen (TYLENOL) suppository 650 mg  650 mg Rectal Q6H PRN MICHELLE Camacho        famotidine (PEPCID) tablet 10 mg  10 mg Oral Daily MICHELLE Camacho   10 mg at 08/18/21 0683       Current  Infusions   sodium chloride      sodium chloride         Prn Meds  sodium chloride, albuterol, sodium chloride flush, sodium chloride, ondansetron **OR** ondansetron, polyethylene glycol, acetaminophen **OR** acetaminophen    Radiology Review:  XR CHEST PORTABLE   Final Result   Atherosclerotic disease and mild cardiomegaly. Coarse interstitial markings. No acute cardiopulmonary pathology.                ASSESSMENT:  Active diagnoses treated at this admission:  · Acute anemia due to to gastrointestinal blood loss  · Hematochezia   · History of atrial fibrillation      Problem list:  Patient Active Problem List   Diagnosis    [facility]    [facility]opit[facility]ism ([facility]    Weakness    Hypothyroidism    Lactic acidosis    Renal insufficiency    A-fib (HCC)    QT prolongation    RBBB    Acute respiratory failure with hypoxia (HCC)    Non-traumatic rhabdomyolysis    Acute renal failure superimposed on stage 2 chronic kidney disease (HCC)    Adrenal insufficiency (Wade's disease) ([facility]    Metabolic encephalopathy    Aspiration pneumonia ([facility]    Wide-complex tachycardia (HCC)    Orthostatic hypotension    Anemia       PLAN:  Note at the time of admission the patient was on Eliquis and aspirin  Both have been held  PUD prophylaxis/treatment  Monitor hemoglobin hematocrit  Maintain his other home medications  Assure adequate volume in the context of adrenal insufficiency  Maintain mineralocorticoid  Consult to general surgery for gastrointestinal bleeding    See  Orders  Guadalupe Lane MD, FACP  Diplomate, American Board of Internal Medicine  Diplomate, Geriatric Medicine, American Board of Internal Medicine  8:55 AM  8/18/2021

## 2021-08-18 NOTE — TELEPHONE ENCOUNTER
[facility] HEALTH CLINICAL PHARMACY REVIEW: Post-Discharge Transitions of Care ([facility])    Attempted to reach patient to review medications. Left message asking for return call. Will continue to attempt to contact as appropriate.       Ulises TimmonsD, BCACP, [facility], toll free: [phone], option 1

## 2021-08-18 NOTE — PROGRESS NOTES
Patient arrived to bed 22 [id number] accompanied by ER nurse with one unit of PRBCs infusing. Assumed care of patient. Transfusion completed shortly after arrival to unit. Post transfusion vitals stable, patient denies any pain/discomfort. Patient has had two post-transfusion h&h drawn. Patient's hemoglobin noted to be 6 before transfusion, 7.7 post-transfusion at 2217 and 7.2 at 0145. Attempted to contact physician Dr. Lolita Martines several times via perfect serve, each time this nurse was forwarded to a full voicemail, unable to leave message. 0400: Patient's telemetry alarm going off, in to assess patient and he is complaining of nausea. Obtained a set of vitals and patient noted to be in the lower 80s on room air. Placed patient on 3L nasal cannula, saturations then came up to mid 90s.

## 2021-08-18 NOTE — ED NOTES
Called floor and spoke with [facility] to notify her this RN was bringing patient up at this time on blood transfusion.       Levon Kowalski RN  08/17/21 6675

## 2021-08-19 LAB
HCT VFR BLD CALC: 23.8 % (ref 37–54)
HCT VFR BLD CALC: 25.2 % (ref 37–54)
HCT VFR BLD CALC: 25.8 % (ref 37–54)
HCT VFR BLD CALC: 26.6 % (ref 37–54)
HEMOGLOBIN: 7.2 G/DL (ref 12.5–16.5)
HEMOGLOBIN: 7.6 G/DL (ref 12.5–16.5)
HEMOGLOBIN: 7.6 G/DL (ref 12.5–16.5)
HEMOGLOBIN: 7.9 G/DL (ref 12.5–16.5)
MAGNESIUM: 2.4 MG/DL (ref 1.6–2.6)

## 2021-08-19 PROCEDURE — 3609017100 HC EGD: Performed by: SURGERY

## 2021-08-19 PROCEDURE — 83735 ASSAY OF MAGNESIUM: CPT

## 2021-08-19 PROCEDURE — 2580000003 HC RX 258: Performed by: PHYSICIAN ASSISTANT

## 2021-08-19 PROCEDURE — 7100000001 HC PACU RECOVERY - ADDTL 15 MIN: Performed by: SURGERY

## 2021-08-19 PROCEDURE — 6360000002 HC RX W HCPCS: Performed by: SURGERY

## 2021-08-19 PROCEDURE — 99152 MOD SED SAME PHYS/QHP 5/>YRS: CPT | Performed by: SURGERY

## 2021-08-19 PROCEDURE — 85014 HEMATOCRIT: CPT

## 2021-08-19 PROCEDURE — 36415 COLL VENOUS BLD VENIPUNCTURE: CPT

## 2021-08-19 PROCEDURE — 6370000000 HC RX 637 (ALT 250 FOR IP): Performed by: SURGERY

## 2021-08-19 PROCEDURE — 6370000000 HC RX 637 (ALT 250 FOR IP): Performed by: PHYSICIAN ASSISTANT

## 2021-08-19 PROCEDURE — 85018 HEMOGLOBIN: CPT

## 2021-08-19 PROCEDURE — 0DJ08ZZ INSPECTION OF UPPER INTESTINAL TRACT, VIA NATURAL OR ARTIFICIAL OPENING ENDOSCOPIC: ICD-10-PCS | Performed by: SURGERY

## 2021-08-19 PROCEDURE — G0378 HOSPITAL OBSERVATION PER HR: HCPCS

## 2021-08-19 PROCEDURE — 2709999900 HC NON-CHARGEABLE SUPPLY: Performed by: SURGERY

## 2021-08-19 PROCEDURE — 7100000000 HC PACU RECOVERY - FIRST 15 MIN: Performed by: SURGERY

## 2021-08-19 PROCEDURE — 2700000000 HC OXYGEN THERAPY PER DAY

## 2021-08-19 RX ORDER — SUCRALFATE 1 G/1
1 TABLET ORAL 3 TIMES DAILY
Status: DISCONTINUED | OUTPATIENT
Start: 2021-08-19 | End: 2021-08-21 | Stop reason: HOSPADM

## 2021-08-19 RX ORDER — MIDAZOLAM HYDROCHLORIDE 1 MG/ML
INJECTION INTRAMUSCULAR; INTRAVENOUS PRN
Status: DISCONTINUED | OUTPATIENT
Start: 2021-08-19 | End: 2021-08-19 | Stop reason: ALTCHOICE

## 2021-08-19 RX ORDER — MEPERIDINE HYDROCHLORIDE 50 MG/ML
INJECTION INTRAMUSCULAR; INTRAVENOUS; SUBCUTANEOUS PRN
Status: DISCONTINUED | OUTPATIENT
Start: 2021-08-19 | End: 2021-08-19 | Stop reason: ALTCHOICE

## 2021-08-19 RX ADMIN — HYDROCORTISONE 10 MG: 5 TABLET ORAL at 08:10

## 2021-08-19 RX ADMIN — Medication 10 ML: at 08:10

## 2021-08-19 RX ADMIN — HYDROCORTISONE 5 MG: 5 TABLET ORAL at 18:22

## 2021-08-19 RX ADMIN — SUCRALFATE 1 G: 1 TABLET ORAL at 20:09

## 2021-08-19 RX ADMIN — HYDROCORTISONE 5 MG: 5 TABLET ORAL at 14:12

## 2021-08-19 RX ADMIN — Medication 10 ML: at 20:11

## 2021-08-19 RX ADMIN — ATORVASTATIN CALCIUM 10 MG: 10 TABLET, FILM COATED ORAL at 20:09

## 2021-08-19 ASSESSMENT — PAIN SCALES - GENERAL
PAINLEVEL_OUTOF10: 0

## 2021-08-19 ASSESSMENT — PAIN SCALES - WONG BAKER: WONGBAKER_NUMERICALRESPONSE: 0

## 2021-08-19 NOTE — PRE SEDATION
Sedation Pre-Procedure Note    Patient Name: Barbara Mart   YOB: 1931  Room/Bed: [facility] ROOM/NONE  Medical Record Number: [id number]  Date: 8/19/2021   Time: 11:27 AM       Indication:  GI bleed    Consent: I have discussed with the patient and/or the patient representative the indication, alternatives, and the possible risks and/or complications of the planned procedure and the anesthesia methods. The patient and/or patient representative appear to understand and agree to proceed. 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 Past Surgical History:   has no past surgical history on file. Medications:   Scheduled Meds:    [Held by provider] apixaban  5 mg Oral BID    hydrocortisone  10 mg Oral Daily with breakfast    hydrocortisone  5 mg Oral Lunch    hydrocortisone  5 mg Oral Dinner    levothyroxine  125 mcg Oral Daily    atorvastatin  10 mg Oral Daily    sodium chloride flush  5-40 mL Intravenous 2 times per day    famotidine  10 mg Oral Daily     Continuous Infusions:    sodium chloride      sodium chloride      sodium chloride       PRN Meds: meperidine, midazolam, sodium chloride, sodium chloride, albuterol, sodium chloride flush, sodium chloride, ondansetron **OR** ondansetron, polyethylene glycol, acetaminophen **OR** acetaminophen  Home Meds:   Prior to Admission medications    Medication Sig Start Date End Date Taking?  Authorizing Provider   aspirin 81 MG EC tablet Take 81 mg by mouth daily   Yes Historical Provider, MD   pantoprazole (PROTONIX) 40 MG tablet Take 40 mg by mouth 2 times daily   Yes Historical Provider, MD   acetaminophen (TYLENOL) 325 MG tablet Take 650 mg by mouth every 6 hours as needed for Pain   Yes Historical Provider, MD   Multiple Vitamins-Minerals (THERAPEUTIC MULTIVITAMIN-MINERALS) tablet Take 1 tablet by mouth daily   Yes Historical Provider, MD   simvastatin (ZOCOR) 20 MG tablet Take 1/2 tablet by mouth Mon - Fri and take 1 tablet by mouth Sat and Sun   Yes Historical Provider, MD   potassium chloride (KLOR-CON M) 20 MEQ extended release tablet Take 20 mEq by mouth 2 times daily   Yes Historical Provider, MD   vitamin D (CHOLECALCIFEROL) 25 MCG (1000 UT) TABS tablet Take 1,000 Units by mouth 2 times daily   Yes Historical Provider, MD   albuterol (PROVENTIL) (2.5 MG/3ML) 0.083% nebulizer solution Take 3 mLs by nebulization every 6 hours as needed for Wheezing 3/23/20  Yes Mauro Garcia DO   apixaban (ELIQUIS) 5 MG TABS tablet Take 1 tablet by mouth 2 times daily 3/23/20  Yes Mauro Garcia DO   fludrocortisone (FLORINEF) 0.1 MG tablet Take 0.1 mg by mouth daily   Yes Historical Provider, MD   hydrocortisone (CORTEF) 5 MG tablet Take 1 tablet by mouth Daily with lunch 10/9/19  Yes Mauro Garcia DO   hydrocortisone (CORTEF) 5 MG tablet Take 1 tablet by mouth Daily with supper 10/9/19  Yes Mauro Garcia DO   levothyroxine (SYNTHROID) 125 MCG tablet Take 1 tablet by mouth Daily 10/10/19  Yes Mauro Garcia DO   hydrocortisone (CORTEF) 10 MG tablet Take 1 tablet by mouth daily (with breakfast) 10/9/19  Yes Mauro Garcia DO     Coumadin Use Last 7 Days:  no  Antiplatelet drug therapy use last 7 days: yes  Other anticoagulant use last 7 days: yes   Additional Medication Information:  none      Pre-Sedation Documentation and Exam:   I have personally completed a history, physical exam & review of systems for this patient (see notes).     Mallampati Airway Assessment:  Mallampati Class II - (soft palate, fauces & uvula are visible)    Prior History of Anesthesia Complications:   none    ASA Classification:  Class 3 - A patient with severe systemic disease that limits activity but is not incapacitating    Sedation/ Anesthesia Plan:   intravenous sedation    Medications Planned:   midazolam (Versed)  intravenously    Patient is an appropriate candidate for plan of sedation: yes    Electronically signed by Matt Alcala MD on 8/19/2021 at 11:27 AM

## 2021-08-19 NOTE — DISCHARGE INSTR - DIET

## 2021-08-19 NOTE — DISCHARGE INSTR - COC
Continuity of Care Form    Patient Name: Brenda Limon   :  1931  MRN:  [id number]    Admit date:  2021  Discharge date:  ***    Code Status Order: Full Code   Advance Directives:      Admitting Physician:  Mandy Nieto MD  PCP: Jasiel William MD    Discharging Nurse: [facility] Unit/Room#: 2893/7818-X  Discharging Unit Phone Number: ***    Emergency Contact:   Extended Emergency Contact Information  Primary Emergency Contact: Lali Castillo., [address] Phone: [phone]  Relation: Child  Secondary Emergency Contact: Elijah Ly  Home Phone: [phone]  Relation: Child  Preferred language: English   needed? No    Past Surgical History:  History reviewed. No pertinent surgical history. Immunization History: There is no immunization history on file for this patient.     Active Problems:  Patient Active Problem List   Diagnosis Code    Shock ([address] R57.9    Hypopituitarism ([facility] [address] E23.0    Weakness R53.1    Hypothyroidism E03.9    Lactic acidosis E87.2    Renal insufficiency N28.9    A-fib ([facility]) I48.91    QT prolongation R94.31    RBBB I45.10    Acute respiratory failure with hypoxia ([facility]) J96.01    Non-traumatic rhabdomyolysis M62.82    Acute renal failure superimposed on stage 2 chronic kidney disease (HCC) N17.9, N18.2    Adrenal insufficiency (Wade's disease) ([address]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    Metabolic encephalopathy D01.72    Aspiration pneumonia ([facility] [address] J69.0    Wide-complex tachycardia (HCC) I47.2    Orthostatic hypotension I95.1    Anemia D64.9       Isolation/Infection:   Isolation            No Isolation          Patient Infection Status       Infection Onset Added Last Indicated Last Indicated By Review Planned Expiration Resolved Resolved By    None active    Resolved    INFLUENZA 03/14/20 03/15/20 03/14/20 Respiratory Panel, Molecular   20             Nurse Assessment:  Last Vital Signs: /68   Pulse 81   Temp 98.9 °F (37.2 °C) (Temporal)   Resp 18   Ht 6' 1\" (1.854 m)   Wt 214 lb (97.1 kg)   SpO2 96%   BMI 28.23 kg/m²     Last documented pain score (0-10 scale): Pain Level: 0  Last Weight:   Wt Readings from Last 1 Encounters:   08/19/21 214 lb (97.1 kg)     Mental Status:  {IP PT MENTAL STATUS:20030:::0}    IV Access:  { BOUCHRA IV ACCESS:735591174:::0}    Nursing Mobility/ADLs:  Walking   {CHP DME ADLs:771860808:::0}  Transfer  {CHP DME ADLs:221540981:::0}  Bathing  {CHP DME ADLs:686599545:::0}  Dressing  {CHP DME ADLs:283832392:::0}  Toileting  {CHP DME ADLs:231899861:::0}  Feeding  {CHP DME ADLs:448626984:::0}  Med Admin  {CHP DME ADLs:312335807:::0}  Med Delivery   { BOUCHRA MED Delivery:569520700:::0}    Wound Care Documentation and Therapy:        Elimination:  Continence: Bowel: {YES / UM:19810}  Bladder: {YES / SE:73722}  Urinary Catheter: {Urinary Catheter:190566932:::0}   Colostomy/Ileostomy/Ileal Conduit: {YES / ZD:84844}       Date of Last BM: ***    Intake/Output Summary (Last 24 hours) at 8/19/2021 1029  Last data filed at 8/19/2021 0642  Gross per 24 hour   Intake 392.5 ml   Output 800 ml   Net -407.5 ml     I/O last 3 completed shifts:   In: 592.5 [P.O.:200; Blood:392.5]  Out: 800 [Urine:800]    Safety Concerns:     [employer] Safety Concerns:071371438:::0}    Impairments/Disabilities:      [employer] Impairments/Disabilities:746163307:::0}    Nutrition Therapy:  Current Nutrition Therapy:   [employer] Diet List:204651157:::0}    Routes of Feeding: {CHP DME Other Feedings:581860815:::0}  Liquids: {Slp liquid thickness:03258}  Daily Fluid Restriction: {CHP DME Yes amt example:440387332:::0}  Last Modified Barium Swallow with Video (Video Swallowing Test): {Done Not Done APKN:075039345:::3}    Treatments at the Time of Hospital Discharge:   Respiratory Treatments: ***  Oxygen Therapy:  {Therapy; copd oxygen:66640:::0}  Ventilator:    { CC Vent List:801167504:::0}    Rehab Therapies: {THERAPEUTIC INTERVENTION:8244719881}  Weight Bearing Status/Restrictions: { CC Weight Bearin:::0}  Other Medical Equipment (for information only, NOT a DME order):  {EQUIPMENT:309194129}  Other Treatments: ***    Patient's personal belongings (please select all that are sent with patient):  {CHP DME Belongings:268184096:::0}    RN SIGNATURE:  {Esignature:551234995:::0}    CASE MANAGEMENT/SOCIAL WORK SECTION    Inpatient Status Date: ***    Readmission Risk Assessment Score:  Readmission Risk              Risk of Unplanned Readmission:  0           Discharging to Facility/ Agency   Name:   Address:  Phone:  Fax:    Dialysis Facility (if applicable)   Name:  Address:  Dialysis Schedule:  Phone:  Fax:    / signature: {Esignature:900834818:::0}    PHYSICIAN SECTION    Prognosis: {Prognosis:9127077657:::0}    Condition at Discharge: [address] [place] [address] Patient Condition:255227075:::0}    Rehab Potential (if transferring to Rehab): {Prognosis:8714761490:::0}    Recommended Labs or Other Treatments After Discharge: ***    Physician Certification: I certify the above information and transfer of Ileana Bui  is necessary for the continuing treatment of the diagnosis listed and that he requires {Admit to Appropriate Level of Care:38870:::0} for {GREATER/LESS:371043887} 30 days.      Update Admission H&P: {CHP DME Changes in HandP:267598683:::0}    PHYSICIAN SIGNATURE:  {Esignature:686514163:::0}

## 2021-08-19 NOTE — PRE SEDATION
incapacitating    Sedation/ Anesthesia Plan:   intravenous sedation    Medications Planned:   midazolam (Versed)  intravenously    Patient is an appropriate candidate for plan of sedation: yes    Electronically signed by Fausto Quiles MD on 8/19/2021 at 10:24 AM

## 2021-08-19 NOTE — PROGRESS NOTES
Carolyn Bennett MD, FACP                   Patient Name: Leah Briceño                   Age:  80 y.o. Gender:   male    CC: Weakness dizziness hematochezia    HPI: This patient's history is obtained from chart review patient interview. This patient was discharged recently on 8/2. At that time the patient was admitted for dizziness with a known history of adrenal insufficiency and hypothyroidism. He also has been treated for atrial fibrillation and was on oral anticoagulation. The patient had leukocytosis at the time and was noted to be on Cortef therapy. He was given IV fluid hydration, and was considered to have dizziness due to his adrenal insufficiency. He presented to the [place] yesterday for routine follow-up examination. He stated he had evidence of blood in his stool during the week. [facility] In the emergency room his hemoglobin was 6 vitals were stable. He had been started on iron so he had melena. He was hypotensive and dizzy. He was transfused with 1 unit of blood and brought to the floor this morning the patient feels okay but he remains weak. He denies any chest pain or pressure sensation and he denies any shortness of breath. 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   · Cardiovascular: No chest pain, dyspnea on exertion, palpitations, syncope. · Respiratory: No cough or wheezing, hemoptysis, sob, pleuritic pain. · Gastrointestinal: No abdominal pain, anorexia,  hematemesis or change in bowels. · Genitourinary: No dysuria, trouble voiding, or hematuria. No change in urination. mination:      Wt Readings from Last 3 Encounters:   08/19/21 214 lb (97.1 kg)   10/30/20 223 lb (101.2 kg)   03/21/20 228 lb 9 oz (103.7 kg)     Temp Readings from Last 3 Encounters:   08/19/21 98.9 °F (37.2 °C) (Temporal)   08/02/21 97 °F (36.1 °C) (Temporal)   10/30/20 97.2 °F (36.2 °C) (Temporal)     BP Readings from Last 3 Encounters:   08/19/21 128/68   08/02/21 133/71   10/30/20 124/76     Pulse Readings from Last 3 Encounters:   08/19/21 81   08/02/21 116   10/30/20 76       General appearance: Normal, awake, alert no distress. He is somewhat slow to respond but is not confused  Skin: Color, texture, turgor normal. No rashes or lesions. Eyes: Conjunctivae/cornea clear. Sarah Mass. Sclera non icteric. He has ectropion on the left side with erythema of the eyelids  Lungs: Clear to auscultation. No rhonchi, crackles or rales. Heart: S1 > S2. Rhythm is regular and rate is normal. No gallop rub or murmur. Abdomen: Soft, mildly protuberant, non-tender. BS normal. No masses, organomegaly. Anatomic contours appear normal.  Extremities: No deformities, edema, or skin discoloration. Peripheral perfusion assessed in all exremities. No cyanosis he is wearing compression hose and has a history of edema but there is no edema currently  Musculoskeletal: No unusual pain or swelling. Muscular strength intact. Neuro:   · Cranial nerves grossly intact. · Motor: Strength grossly normal. No focal weakness. · Sensory: grossly normal to touch. Mental status: Awake, alert, cognizant of person, place, time.     Patient appears capable of directing self care   Mood: Normal and appropriate affect somewhat flat  Gait & balance: He is independently ambulatory Labs     CBC:   Lab Results   Component Value Date    WBC 16.7 08/18/2021    RBC 3.13 08/18/2021    HGB 7.2 08/19/2021    HCT 23.8 08/19/2021     08/18/2021    MCV 77.6 08/18/2021     BMP:    Lab Results   Component Value Date     08/18/2021    K 3.8 08/18/2021     08/18/2021    CO2 26 08/18/2021    BUN 15 08/18/2021    CREATININE 1.5 08/18/2021    GLUCOSE 89 08/18/2021    CALCIUM 8.5 08/18/2021     Hepatic Function Panel:    Lab Results   Component Value Date    ALKPHOS 58 08/18/2021    AST 15 08/18/2021    ALT 9 08/18/2021    PROT 6.0 08/18/2021    LABALBU 3.6 08/18/2021    BILITOT 0.3 08/18/2021     Magnesium:    Lab Results   Component Value Date    MG 2.1 08/01/2021     Cardiac Enzymes:   Lab Results   Component Value Date    CKTOTAL 97 03/21/2020    CKTOTAL 92 03/20/2020    CKTOTAL 98 03/19/2020    CKMB 12.9 (H) 03/14/2020    TROPONINI 0.18 (H) 03/15/2020    TROPONINI 0.16 (H) 03/14/2020    TROPONINI 0.12 (H) 03/14/2020     LDH:  No results found for: LDH  PT/INR:    Lab Results   Component Value Date    PROTIME 12.8 08/18/2021    INR 1.2 08/18/2021     BNP: No results for input(s): BNP in the last 72 hours. TSH:   Lab Results   Component Value Date    TSH 1.330 08/18/2021      Cardiac Injury Profile: No results for input(s): CKTOTAL, CKMB, CKMBINDEX, TROPONINI in the last 72 hours.    Lipid Profile: No results found for: TRIG, HDL, LDLCALC, CHOL   Hemoglobin A1C: No components found for: HGBA1C   U/A:   Lab Results   Component Value Date    LEUKOCYTESUR Negative 08/18/2021    PHUR 5.5 08/18/2021    WBCUA 0-1 08/18/2021    RBCUA 0-1 08/18/2021    BACTERIA RARE 08/18/2021    SPECGRAV 1.020 08/18/2021    BLOODU Negative 08/18/2021    GLUCOSEU Negative 08/18/2021         ADMISSION SCHEDULED MEDS:   Current Facility-Administered Medications   Medication Dose Route Frequency Provider Last Rate Last Admin    0.9 % sodium chloride infusion   Intravenous PRN Leonardo Thacker MD        0.9 % sodium chloride infusion   Intravenous PRN Iker Andrea MD        albuterol (PROVENTIL) nebulizer solution 2.5 mg  2.5 mg Nebulization Q6H PRN MICHELLE Tejada        [Held by provider] apixaban (ELIQUIS) tablet 5 mg  5 mg Oral BID MICHELLE Tejada        hydrocortisone (CORTEF) tablet 10 mg  10 mg Oral Daily with breakfast MICHELLE Tejada   10 mg at 08/18/21 0639    hydrocortisone (CORTEF) tablet 5 mg  5 mg Oral Lunch MICHELLE Tejada   5 mg at 08/18/21 1112    hydrocortisone (CORTEF) tablet 5 mg  5 mg Oral Dinner Lam Rose [address]   5 mg at 08/18/21 1707    levothyroxine (SYNTHROID) tablet 125 mcg  125 mcg Oral Daily MICHELLE Tejada   125 mcg at 08/18/21 2561    atorvastatin (LIPITOR) tablet 10 mg  10 mg Oral Daily MICHELLE Tejada   10 mg at 08/18/21 2102    sodium chloride flush 0.9 % injection 5-40 mL  5-40 mL Intravenous 2 times per day MICHELLE Tejada   10 mL at 08/19/21 0810    sodium chloride flush 0.9 % injection 5-40 mL  5-40 mL Intravenous PRN MICHELLE Tejada        0.9 % sodium chloride infusion  25 mL Intravenous PRN MICHELLE Tjeada        ondansetron (ZOFRAN-ODT) disintegrating tablet 4 mg  4 mg Oral Q8H PRN MICHELLE Tejada        Or    ondansetron (ZOFRAN) injection 4 mg  4 mg Intravenous Q6H PRN MICHELLE Tejada   4 mg at 08/18/21 0400    polyethylene glycol (GLYCOLAX) packet 17 g  17 g Oral Daily PRN MICHELLE Tejada        acetaminophen (TYLENOL) tablet 650 mg  650 mg Oral Q6H PRN MICHELLE Tejada        Or    acetaminophen (TYLENOL) suppository 650 mg  650 mg Rectal Q6H PRN MICHELLE Tejada        famotidine (PEPCID) tablet 10 mg  10 mg Oral Daily MICHELLE Tejada   10 mg at 08/18/21 0751       Current  Infusions   sodium chloride      sodium chloride      sodium chloride         Prn Meds  sodium chloride, sodium chloride, albuterol, sodium chloride flush, sodium chloride, ondansetron **OR** ondansetron, polyethylene glycol,

## 2021-08-19 NOTE — BRIEF OP NOTE
Brief Postoperative Note      Patient: Zhen Contreras  YOB: 1931  MRN: [id number]    Date of Procedure: 8/19/2021    Pre-Op Diagnosis: GI bleed    Post-Op Diagnosis: Same       Procedure(s):  EGD ESOPHAGOGASTRODUODENOSCOPY    Surgeon(s):  Phillip Turner MD    Assistant:  * No surgical staff found *    Anesthesia: IV Sedation    Estimated Blood Loss (mL): Minimal    Complications: None    Specimens:   * No specimens in log *    Implants:  * No implants in log *      Drains:   [REMOVED] Urethral Catheter Temperature probe 16 fr (Removed)       Findings: GE junction ulcer    Electronically signed by Phillip Turner MD on 8/19/2021 at 11:28 AM

## 2021-08-19 NOTE — OP NOTE
[address]                  [address] [facility],  [facility]                                OPERATIVE REPORT    PATIENT NAME: Lam Gamino                       :        1931  MED REC NO:   [id number]                            ROOM:       8208  ACCOUNT NO:   [de-identified]                           ADMIT DATE: 2021  PROVIDER:     Sari Thompson MD    DATE OF PROCEDURE:  2021    PREPROCEDURE DIAGNOSIS:  Acute GI bleed with ensuing anemia, profound  hemoglobin was down to 6. POSTPROCEDURE DIAGNOSIS:  Distal esophageal ulcer. No other mucosal  pathology. PROCEDURE:  Consisted of upper endoscopy. SURGEON:  Sari Thompson MD    DESCRIPTION OF PROCEDURE:  Procedure was carried out under awake  sedation. The patient left in supine position. Oropharyngeal area was  anesthetized with Cetacaine spray. The endoscope was introduced and  progressed without difficulty through the three portions of the  esophagus. Distal GE junction revealed one single ulcer, source of  bleeding most likely. No active bleeding at the present. The endoscope  was advanced into the stomach; first, second and third portions of the  duodenum. There was no mucosal pathology noted within the antral or  duodenal area. Bile was bermeo. Endoscope was pulled back and  retroflexed. There was superficial gastritis like areas in the upper  pole of the stomach, also possible source of bleeding in view of the  dual therapy for anticoagulation. Endoscope was pulled back into the  oropharyngeal area revealing good function of vocal cords. The patient  tolerated the procedure well. Appropriate recommendations were given. The patient was discharged in stable condition and the blood loss was  minimal/none.         Vicki Melgar MD    D: 2021 13:05:49       T: 2021 13:08:40     [email or web address]  Job#: [id number]     Doc#: [id number]    CC:

## 2021-08-20 LAB
ANISOCYTOSIS: ABNORMAL
BASOPHILIC STIPPLING: ABNORMAL
BASOPHILS ABSOLUTE: 0.02 E9/L (ref 0–0.2)
BASOPHILS RELATIVE PERCENT: 0.2 % (ref 0–2)
EOSINOPHILS ABSOLUTE: 0.3 E9/L (ref 0.05–0.5)
EOSINOPHILS RELATIVE PERCENT: 3.6 % (ref 0–6)
HCT VFR BLD CALC: 23.6 % (ref 37–54)
HCT VFR BLD CALC: 24 % (ref 37–54)
HCT VFR BLD CALC: 24.1 % (ref 37–54)
HCT VFR BLD CALC: 25.1 % (ref 37–54)
HCT VFR BLD CALC: 26.3 % (ref 37–54)
HEMOGLOBIN: 7.2 G/DL (ref 12.5–16.5)
HEMOGLOBIN: 7.2 G/DL (ref 12.5–16.5)
HEMOGLOBIN: 7.3 G/DL (ref 12.5–16.5)
HEMOGLOBIN: 7.4 G/DL (ref 12.5–16.5)
HEMOGLOBIN: 8 G/DL (ref 12.5–16.5)
HYPOCHROMIA: ABNORMAL
IMMATURE GRANULOCYTES #: 0.19 E9/L
IMMATURE GRANULOCYTES %: 2.3 % (ref 0–5)
LYMPHOCYTES ABSOLUTE: 1.38 E9/L (ref 1.5–4)
LYMPHOCYTES RELATIVE PERCENT: 16.4 % (ref 20–42)
MCH RBC QN AUTO: 23.8 PG (ref 26–35)
MCHC RBC AUTO-ENTMCNC: 30.5 % (ref 32–34.5)
MCV RBC AUTO: 78.1 FL (ref 80–99.9)
MONOCYTES ABSOLUTE: 1.63 E9/L (ref 0.1–0.95)
MONOCYTES RELATIVE PERCENT: 19.3 % (ref 2–12)
NEUTROPHILS ABSOLUTE: 4.92 E9/L (ref 1.8–7.3)
NEUTROPHILS RELATIVE PERCENT: 58.2 % (ref 43–80)
PDW BLD-RTO: 18.3 FL (ref 11.5–15)
PLATELET # BLD: 233 E9/L (ref 130–450)
PMV BLD AUTO: 9.8 FL (ref 7–12)
POLYCHROMASIA: ABNORMAL
RBC # BLD: 3.02 E12/L (ref 3.8–5.8)
WBC # BLD: 8.4 E9/L (ref 4.5–11.5)

## 2021-08-20 PROCEDURE — 36415 COLL VENOUS BLD VENIPUNCTURE: CPT

## 2021-08-20 PROCEDURE — 85014 HEMATOCRIT: CPT

## 2021-08-20 PROCEDURE — 85018 HEMOGLOBIN: CPT

## 2021-08-20 PROCEDURE — 2700000000 HC OXYGEN THERAPY PER DAY

## 2021-08-20 PROCEDURE — 85025 COMPLETE CBC W/AUTO DIFF WBC: CPT

## 2021-08-20 PROCEDURE — 6370000000 HC RX 637 (ALT 250 FOR IP): Performed by: SURGERY

## 2021-08-20 PROCEDURE — 6370000000 HC RX 637 (ALT 250 FOR IP): Performed by: PHYSICIAN ASSISTANT

## 2021-08-20 PROCEDURE — 2140000000 HC CCU INTERMEDIATE R&B

## 2021-08-20 PROCEDURE — 2580000003 HC RX 258: Performed by: PHYSICIAN ASSISTANT

## 2021-08-20 RX ADMIN — SUCRALFATE 1 G: 1 TABLET ORAL at 14:14

## 2021-08-20 RX ADMIN — FAMOTIDINE 10 MG: 20 TABLET, FILM COATED ORAL at 07:41

## 2021-08-20 RX ADMIN — SUCRALFATE 1 G: 1 TABLET ORAL at 07:40

## 2021-08-20 RX ADMIN — ATORVASTATIN CALCIUM 10 MG: 10 TABLET, FILM COATED ORAL at 20:09

## 2021-08-20 RX ADMIN — LEVOTHYROXINE SODIUM 125 MCG: 0.12 TABLET ORAL at 05:49

## 2021-08-20 RX ADMIN — HYDROCORTISONE 5 MG: 5 TABLET ORAL at 18:33

## 2021-08-20 RX ADMIN — Medication 10 ML: at 07:40

## 2021-08-20 RX ADMIN — HYDROCORTISONE 10 MG: 5 TABLET ORAL at 07:41

## 2021-08-20 RX ADMIN — SUCRALFATE 1 G: 1 TABLET ORAL at 20:10

## 2021-08-20 RX ADMIN — HYDROCORTISONE 5 MG: 5 TABLET ORAL at 12:28

## 2021-08-20 RX ADMIN — Medication 10 ML: at 20:10

## 2021-08-20 ASSESSMENT — PAIN SCALES - GENERAL
PAINLEVEL_OUTOF10: 0

## 2021-08-20 NOTE — PROGRESS NOTES
Guadalupe Lane MD, FACP                   Patient Name: Ileana Bui                   Age:  80 y.o. Gender:   male    CC: Weakness dizziness hematochezia    HPI: This patient's history is obtained from chart review patient interview. This patient was discharged recently on 8/2. At that time the patient was admitted for dizziness with a known history of adrenal insufficiency and hypothyroidism. He also has been treated for atrial fibrillation and was on oral anticoagulation. The patient had leukocytosis at the time and was noted to be on Cortef therapy. He was given IV fluid hydration, and was considered to have dizziness due to his adrenal insufficiency. He presented to the [place] yesterday for routine follow-up examination. He stated he had evidence of blood in his stool during the week. [facility] In the emergency room his hemoglobin was 6 vitals were stable. He had been started on iron so he had melena. He was hypotensive and dizzy. He was transfused with 1 unit of blood and brought to the floor this morning the patient feels okay but he remains weak. He denies any chest pain or pressure sensation and he denies any shortness of breath.     8/19  Patient remains comfortable  Evaluation from yesterday stool is Hemoccult positive  Hemoglobin subsequently remained stable  He denies any further melena or hematochezia  Surgical consult reviewed for upper endoscopy today    8/20  Patient was seen today with son at bedside  Reviewed the upper endoscopy and noted distal esophageal ulcer defined  Discussed with the patient and son that we would likely have to hold the aspirin until that is healed  Eliquis possibly may be restarted  Reviewed telemetry strips and that was artifact  Patient had normal colored bowel movements this morning  Has no abdominal pain  Hemoglobin is still in the sevens  Vitals are stable          Past Medical History:   Diagnosis Date    [facility]fib [facility] [address]     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 · Cardiovascular: No chest pain, dyspnea on exertion, palpitations, syncope. · Respiratory: No cough or wheezing, hemoptysis, sob, pleuritic pain. · Gastrointestinal: No abdominal pain, anorexia,  hematemesis or change in bowels. · Genitourinary: No dysuria, trouble voiding, or hematuria. No change in urination. mination:      Wt Readings from Last 3 Encounters:   08/20/21 220 lb (99.8 kg)   10/30/20 223 lb (101.2 kg)   03/21/20 228 lb 9 oz (103.7 kg)     Temp Readings from Last 3 Encounters:   08/20/21 97.8 °F (36.6 °C) (Temporal)   08/02/21 97 °F (36.1 °C) (Temporal)   10/30/20 97.2 °F (36.2 °C) (Temporal)     BP Readings from Last 3 Encounters:   08/20/21 119/71   08/02/21 133/71   10/30/20 124/76     Pulse Readings from Last 3 Encounters:   08/20/21 68   08/02/21 116   10/30/20 76       General appearance: Normal, awake, alert no distress. He is somewhat slow to respond but is not confused  Skin: Color, texture, turgor normal. No rashes or lesions. Eyes: Conjunctivae/cornea clear. Wilhelmena Doles. Sclera non icteric. He has ectropion on the left side with erythema of the eyelids  Lungs: Clear to auscultation. No rhonchi, crackles or rales. Heart: S1 > S2. Rhythm is regular and rate is normal. No gallop rub or murmur. Abdomen: Soft, mildly protuberant, non-tender. BS normal. No masses, organomegaly. Anatomic contours appear normal.  Extremities: No deformities, edema, or skin discoloration. Peripheral perfusion assessed in all exremities.  No cyanosis he is wearing compression hose and has a history of edema but there is no edema currently  Musculoskeletal: No unusual pain or swelling. Muscular strength intact. Neuro:   · Cranial nerves grossly intact. · Motor: Strength grossly normal. No focal weakness. · Sensory: grossly normal to touch. Mental status: Awake, alert, cognizant of person, place, time. Patient appears capable of directing self care   Mood: Normal and appropriate affect somewhat flat  Gait & balance: He is independently ambulatory     Labs     CBC:   Lab Results   Component Value Date    WBC 8.4 08/20/2021    RBC 3.02 08/20/2021    HGB 7.2 08/20/2021    HCT 23.6 08/20/2021     08/20/2021    MCV 78.1 08/20/2021     BMP:    Lab Results   Component Value Date     08/18/2021    K 3.8 08/18/2021     08/18/2021    CO2 26 08/18/2021    BUN 15 08/18/2021    CREATININE 1.5 08/18/2021    GLUCOSE 89 08/18/2021    CALCIUM 8.5 08/18/2021     Hepatic Function Panel:    Lab Results   Component Value Date    ALKPHOS 58 08/18/2021    AST 15 08/18/2021    ALT 9 08/18/2021    PROT 6.0 08/18/2021    LABALBU 3.6 08/18/2021    BILITOT 0.3 08/18/2021     Magnesium:    Lab Results   Component Value Date    MG 2.4 08/19/2021     Cardiac Enzymes:   Lab Results   Component Value Date    CKTOTAL 97 03/21/2020    CKTOTAL 92 03/20/2020    CKTOTAL 98 03/19/2020    CKMB 12.9 (H) 03/14/2020    TROPONINI 0.18 (H) 03/15/2020    TROPONINI 0.16 (H) 03/14/2020    TROPONINI 0.12 (H) 03/14/2020     LDH:  No results found for: LDH  PT/INR:    Lab Results   Component Value Date    PROTIME 12.8 08/18/2021    INR 1.2 08/18/2021     BNP: No results for input(s): BNP in the last 72 hours. TSH:   Lab Results   Component Value Date    TSH 1.330 08/18/2021      Cardiac Injury Profile: No results for input(s): CKTOTAL, CKMB, CKMBINDEX, TROPONINI in the last 72 hours.    Lipid Profile: No results found for: TRIG, HDL, LDLCALC, CHOL   Hemoglobin A1C: No components found for: HGBA1C   U/A:   Lab Results   Component Value Date    LEUKOCYTESUR Negative 08/18/2021    PHUR 5.5 08/18/2021    WBCUA 0-1 08/18/2021    RBCUA 0-1 08/18/2021    BACTERIA RARE 08/18/2021    SPECGRAV 1.020 08/18/2021    BLOODU Negative 08/18/2021    GLUCOSEU Negative 08/18/2021         ADMISSION SCHEDULED MEDS:   Current Facility-Administered Medications   Medication Dose Route Frequency Provider Last Rate Last Admin    sucralfate (CARAFATE) tablet 1 g  1 g Oral TID Jamison Casey MD   1 g at 08/20/21 0740    0.9 % sodium chloride infusion   Intravenous PRN Jamison Casey MD        0.9 % sodium chloride infusion   Intravenous PRN Rosaura Fontenot MD        albuterol (PROVENTIL) nebulizer solution 2.5 mg  2.5 mg Nebulization Q6H PRN MICHELLE Govea        [Held by provider] apixaban (ELIQUIS) tablet 5 mg  5 mg Oral BID MICHELLE Govea        hydrocortisone (CORTEF) tablet 10 mg  10 mg Oral Daily with breakfast MICHELLE Govea   10 mg at 08/20/21 0741    hydrocortisone (CORTEF) tablet 5 mg  5 mg Oral Lunch MICHELLE Govea   5 mg at 08/19/21 1412    hydrocortisone (CORTEF) tablet 5 mg  5 mg Oral Dinner Vega Govea   5 mg at 08/19/21 1822    levothyroxine (SYNTHROID) tablet 125 mcg  125 mcg Oral Daily MICHELLE Govea   125 mcg at 08/20/21 0549    atorvastatin (LIPITOR) tablet 10 mg  10 mg Oral Daily MICHELLE Govea   10 mg at 08/19/21 2009    sodium chloride flush 0.9 % injection 5-40 mL  5-40 mL Intravenous 2 times per day MICHELLE Govea   10 mL at 08/20/21 0740    sodium chloride flush 0.9 % injection 5-40 mL  5-40 mL Intravenous PRN MICHELLE Govea        0.9 % sodium chloride infusion  25 mL Intravenous PRN MICHELLE Govea        ondansetron (ZOFRAN-ODT) disintegrating tablet 4 mg  4 mg Oral Q8H PRN MICHELLE Govea        Or    ondansetron (ZOFRAN) injection 4 mg  4 mg Intravenous Q6H PRN MICHELLE Govea   4 mg at 08/18/21 0400    polyethylene glycol (GLYCOLAX) packet 17 g  17 g Oral Daily PRN MICHELLE Govea  acetaminophen (TYLENOL) tablet 650 mg  650 mg Oral Q6H PRN Tarri Grange, PA        Or    acetaminophen (TYLENOL) suppository 650 mg  650 mg Rectal Q6H PRN Tarri Grange, PA        famotidine (PEPCID) tablet 10 mg  10 mg Oral Daily Tarri Grange, PA   10 mg at 08/20/21 7003       Current  Infusions   sodium chloride      sodium chloride      sodium chloride         Prn Meds  sodium chloride, sodium chloride, albuterol, sodium chloride flush, sodium chloride, ondansetron **OR** ondansetron, polyethylene glycol, acetaminophen **OR** acetaminophen    Radiology Review:  XR CHEST PORTABLE   Final Result   Atherosclerotic disease and mild cardiomegaly. Coarse interstitial markings. No acute cardiopulmonary pathology.                ASSESSMENT:  Active diagnoses treated at this admission:  · Acute anemia due to to gastrointestinal blood loss  · Hematochezia   · Distal esophageal ulcer defined at endoscopy  · History of atrial fibrillation      Problem list:  Patient Active Problem List   Diagnosis    Shock ([address]    [facility] [address]    Weakness    Hypothyroidism    Lactic acidosis    Renal insufficiency    A-fib (HCC)    QT prolongation    RBBB    Acute respiratory failure with hypoxia (HCC)    Non-traumatic rhabdomyolysis    Acute renal failure superimposed on stage 2 chronic kidney disease (HCC)    Adrenal insufficiency (Wade's disease) ([address]    Metabolic encephalopathy    Aspiration pneumonia ([address]    Wide-complex tachycardia (HCC)    Orthostatic hypotension    Anemia       PLAN:  Note at the time of admission the patient was on Eliquis and aspirin  Both have been held-I have advised the patient of the associated risk  PUD prophylaxis/treatment adequate for the esophageal ulcer consider possible sucralfate as well  Monitor hemoglobin hematocrit  Maintain his other home medications  Assure adequate volume in the context of adrenal insufficiency  Maintain mineralocorticoid  We will discuss with the surgeon likely we will have to hold the aspirin until healed and possibly continue the Eliquis    All of this was discussed with the patient and his son at bedside    See  Orders  Adore Sheth MD, Bonnie Bob, [address] [place] [address] Rd[address] of Internal Medicine  Diplomate, [id number] of Internal Medicine  9:29 AM  8/20/2021

## 2021-08-20 NOTE — PROGRESS NOTES
08/18/2021    ALKPHOS 58 08/18/2021    AST 15 08/18/2021    ALT 9 08/18/2021       Current Inpatient Medications    Current Facility-Administered Medications: sucralfate (CARAFATE) tablet 1 g, 1 g, Oral, TID  0.9 % sodium chloride infusion, , Intravenous, PRN  0.9 % sodium chloride infusion, , Intravenous, PRN  albuterol (PROVENTIL) nebulizer solution 2.5 mg, 2.5 mg, Nebulization, Q6H PRN  [Held by provider] apixaban (ELIQUIS) tablet 5 mg, 5 mg, Oral, BID  hydrocortisone (CORTEF) tablet 10 mg, 10 mg, Oral, Daily with breakfast  hydrocortisone (CORTEF) tablet 5 mg, 5 mg, Oral, Lunch  hydrocortisone (CORTEF) tablet 5 mg, 5 mg, Oral, Dinner  levothyroxine (SYNTHROID) tablet 125 mcg, 125 mcg, Oral, Daily  atorvastatin (LIPITOR) tablet 10 mg, 10 mg, Oral, Daily  sodium chloride flush 0.9 % injection 5-40 mL, 5-40 mL, Intravenous, 2 times per day  sodium chloride flush 0.9 % injection 5-40 mL, 5-40 mL, Intravenous, PRN  0.9 % sodium chloride infusion, 25 mL, Intravenous, PRN  ondansetron (ZOFRAN-ODT) disintegrating tablet 4 mg, 4 mg, Oral, Q8H PRN **OR** ondansetron (ZOFRAN) injection 4 mg, 4 mg, Intravenous, Q6H PRN  polyethylene glycol (GLYCOLAX) packet 17 g, 17 g, Oral, Daily PRN  acetaminophen (TYLENOL) tablet 650 mg, 650 mg, Oral, Q6H PRN **OR** acetaminophen (TYLENOL) suppository 650 mg, 650 mg, Rectal, Q6H PRN  famotidine (PEPCID) tablet 10 mg, 10 mg, Oral, Daily    ASSESSMENT AND PLAN hgb holding  Blood count suggestive of no active bleed  Diet as ordered  May discharge in 24 hours on present regimen  Not to resume anti coag before hgb recovery  Use one agent at a lower dose considering hepatic function

## 2021-08-20 NOTE — PROGRESS NOTES
Called patient. Two patient identifiers verified. Discussed lab results per provider's note. She Verbalized understanding.  Letter placed in the mail PROGRESS NOTE     CARDIOLOGY    Chief complaint: Seen today for follow up, management & recommendations for elevated troponin, paroxysmal atrial fibrillation    He was just extubated. He was sitting up in bed. He was nauseated. He denied chest pain or shortness of breath. Wt Readings from Last 3 Encounters:   03/20/20 227 lb (103 kg)   10/08/19 207 lb 8 oz (94.1 kg)     Temp Readings from Last 3 Encounters:   03/20/20 98.6 °F (37 °C) (Oral)   10/09/19 96.9 °F (36.1 °C) (Temporal)     BP Readings from Last 3 Encounters:   03/19/20 (!) 118/48   10/09/19 132/87     Pulse Readings from Last 3 Encounters:   03/20/20 62   10/09/19 77         Intake/Output Summary (Last 24 hours) at 3/20/2020 1130  Last data filed at 3/20/2020 0700  Gross per 24 hour   Intake 3998 ml   Output 2710 ml   Net 1288 ml       Recent Labs     03/18/20  0426 03/19/20  0400 03/20/20  0400   WBC 18.5* 9.7 11.2   HGB 9.9* 8.9* 9.2*   HCT 32.2* 29.7* 30.1*   MCV 78.7* 79.8* 80.5    226 255     Recent Labs     03/18/20  1350 03/18/20  2310 03/19/20  0400 03/20/20  0400   * 154* 153* 148*   K 3.5 3.2* 3.8 3.3*   * 117* 116* 110*   CO2 27 27 26 27   PHOS  --   --   --  1.8*   BUN 27* 29* 30* 29*   CREATININE 1.5* 1.4* 1.4* 1.3*   MG 2.4 2.4  --  2.3     No results for input(s): PROTIME, INR in the last 72 hours. Recent Labs     03/18/20  0426 03/19/20  0400 03/20/20  0400   CKTOTAL 147 98 92     No results for input(s): BNP in the last 72 hours. No results for input(s): CHOL, HDL, TRIG in the last 72 hours.     Invalid input(s): CHOLHDLR, LDLCALCU      potassium phosphate 15 mmol in dextrose 5 % 250 mL IVPB, Once  furosemide (LASIX) injection 40 mg, BID  hydrocortisone (CORTEF) tablet 5 mg, BID  [START ON 3/21/2020] hydrocortisone (CORTEF) tablet 10 mg, Daily  oseltamivir 6mg/ml (TAMIFLU) suspension 45 mg, BID  dextrose 5 % solution, Continuous  albuterol (PROVENTIL) nebulizer solution 2.5 mg, 4x daily  sulfacetamide (BLEPH-10) 10 % ophthalmic solution 1 drop, 4x Daily  aspirin chewable tablet 81 mg, Daily  glucose (GLUTOSE) 40 % oral gel 15 g, PRN  dextrose 50 % IV solution, PRN  glucagon (rDNA) injection 1 mg, PRN  dextrose 5 % solution, PRN  dextrose 5 % solution, PRN  ampicillin-sulbactam (UNASYN) 3 g ivpb minibag, 4x Daily  amiodarone (CORDARONE) tablet 400 mg, BID  fludrocortisone (FLORINEF) tablet 0.1 mg, Daily  heparin (porcine) injection 6,000 Units, PRN  heparin (porcine) injection 3,000 Units, PRN  heparin 25,000 units in dextrose 5% 250 mL infusion, Continuous  levothyroxine (SYNTHROID) tablet 125 mcg, Daily  sodium chloride flush 0.9 % injection 10 mL, 2 times per day  sodium chloride flush 0.9 % injection 10 mL, PRN  acetaminophen (TYLENOL) tablet 650 mg, Q6H PRN    Or  acetaminophen (TYLENOL) suppository 650 mg, Q6H PRN  polyethylene glycol (GLYCOLAX) packet 17 g, Daily PRN  promethazine (PHENERGAN) tablet 12.5 mg, Q6H PRN  pantoprazole (PROTONIX) injection 40 mg, Daily    And  sodium chloride (PF) 0.9 % injection 10 mL, Daily        Review of systems:     Unobtainable         Physical exam:    Constitutional: As above  Eyes: Closed  ENT: clear, no bleeding. No external masses. Lips normal formation. Debated. Neck: supple, no JVD, no bruits, no lymphadenopathy. No masses. trachea midline. Heart: Regular rate & rhythm, normal S1 & S2. No heave. Lungs: Diffuse bilateral rhonchi and wheezing. Poor air movement. No accessory muscles. Abd: soft, non-tender. Normal bowel sounds. Neuro: Full ROM X 4, EOMI, no tremors. EXT: No bilateral lower extremity edema  Skin: warm, dry, intact. Good turgor. Psych: A&O x 3, normal behavior, not anxious. Assessment/Recommendations  1. Abnormal troponin in the setting of acute on chronic renal insufficiency, pneumonia, rhabdo, hypoxemia, influenza respiratory infection. Possible type II non-ST elevation myocardial infarction.   However, the echo shows normal LV size and systolic function with no wall motion abnormalities. Therefore the abnormal troponin is more likely due to the acute renal insufficiency, rhabdo, etc. stress test next week when he is recovered from his comorbidities. 2. Paroxysmal atrial fibrillation during acute illness. Maintaining sinus. Normal LV systolic function on his echo. I will discontinue the amio today. 3. Influenza respiratory infection/pneumonia  4. Sepsis. 5. Anemia. I will defer his comorbidities to others. 6. I will replace the potassium again today. I will defer further hypokalemia to others.

## 2021-08-20 NOTE — CARE COORDINATION
8/20/21 Update CM Note;  Patient is now inpatient. He is alert and oriented. His son is at the bedside. His egd did show an esophageal ulcer. His Eliquis remains on hold. The cardiac strips were artifact per Dr Janak Franklin. Hgb 7.2/23. 6. Started on carafate and pepcid. Plan is to follow h/h for improvement. Patient will return home as previous when discharged.  Sonia Recinos Rn CM Banner Transposition Flap Text: The defect edges were debeveled with a #15 scalpel blade.  Given the location of the defect and the proximity to free margins a Banner transposition flap was deemed most appropriate.  Using a sterile surgical marker, an appropriate flap drawn around the defect. The area thus outlined was incised deep to adipose tissue with a #15 scalpel blade.  The skin margins were undermined to an appropriate distance in all directions utilizing iris scissors.

## 2021-08-20 NOTE — PROGRESS NOTES
Dr. Pillo Dobbins paged for patient HGB trending as follows [phone] @ 7.9 - 2140 @ 7.6 0132 and 0518 @ 7.2

## 2021-08-21 VITALS
OXYGEN SATURATION: 93 % | WEIGHT: 214 LBS | HEIGHT: 73 IN | TEMPERATURE: 97.6 F | DIASTOLIC BLOOD PRESSURE: 88 MMHG | SYSTOLIC BLOOD PRESSURE: 138 MMHG | RESPIRATION RATE: 16 BRPM | BODY MASS INDEX: 28.36 KG/M2 | HEART RATE: 80 BPM

## 2021-08-21 PROBLEM — K92.2 GASTROINTESTINAL BLEEDING: Status: ACTIVE | Noted: 2021-08-21

## 2021-08-21 LAB
ALBUMIN SERPL-MCNC: 3.4 G/DL (ref 3.5–5.2)
ALP BLD-CCNC: 52 U/L (ref 40–129)
ALT SERPL-CCNC: 9 U/L (ref 0–40)
ANION GAP SERPL CALCULATED.3IONS-SCNC: 7 MMOL/L (ref 7–16)
AST SERPL-CCNC: 13 U/L (ref 0–39)
BASOPHILS ABSOLUTE: 0.04 E9/L (ref 0–0.2)
BASOPHILS RELATIVE PERCENT: 0.6 % (ref 0–2)
BILIRUB SERPL-MCNC: 0.3 MG/DL (ref 0–1.2)
BUN BLDV-MCNC: 13 MG/DL (ref 6–23)
CALCIUM SERPL-MCNC: 8.6 MG/DL (ref 8.6–10.2)
CHLORIDE BLD-SCNC: 104 MMOL/L (ref 98–107)
CO2: 28 MMOL/L (ref 22–29)
CREAT SERPL-MCNC: 1.3 MG/DL (ref 0.7–1.2)
EOSINOPHILS ABSOLUTE: 0.28 E9/L (ref 0.05–0.5)
EOSINOPHILS RELATIVE PERCENT: 4.3 % (ref 0–6)
GFR AFRICAN AMERICAN: >60
GFR NON-AFRICAN AMERICAN: 52 ML/MIN/1.73
GLUCOSE BLD-MCNC: 88 MG/DL (ref 74–99)
HCT VFR BLD CALC: 27.4 % (ref 37–54)
HEMOGLOBIN: 8.2 G/DL (ref 12.5–16.5)
IMMATURE GRANULOCYTES #: 0.11 E9/L
IMMATURE GRANULOCYTES %: 1.7 % (ref 0–5)
LYMPHOCYTES ABSOLUTE: 1.39 E9/L (ref 1.5–4)
LYMPHOCYTES RELATIVE PERCENT: 21.2 % (ref 20–42)
MCH RBC QN AUTO: 23.4 PG (ref 26–35)
MCHC RBC AUTO-ENTMCNC: 29.9 % (ref 32–34.5)
MCV RBC AUTO: 78.1 FL (ref 80–99.9)
MONOCYTES ABSOLUTE: 1.08 E9/L (ref 0.1–0.95)
MONOCYTES RELATIVE PERCENT: 16.5 % (ref 2–12)
NEUTROPHILS ABSOLUTE: 3.66 E9/L (ref 1.8–7.3)
NEUTROPHILS RELATIVE PERCENT: 55.7 % (ref 43–80)
PDW BLD-RTO: 18 FL (ref 11.5–15)
PLATELET # BLD: 249 E9/L (ref 130–450)
PMV BLD AUTO: 9.6 FL (ref 7–12)
POTASSIUM SERPL-SCNC: 4 MMOL/L (ref 3.5–5)
RBC # BLD: 3.51 E12/L (ref 3.8–5.8)
SODIUM BLD-SCNC: 139 MMOL/L (ref 132–146)
TOTAL PROTEIN: 6 G/DL (ref 6.4–8.3)
WBC # BLD: 6.6 E9/L (ref 4.5–11.5)

## 2021-08-21 PROCEDURE — 36415 COLL VENOUS BLD VENIPUNCTURE: CPT

## 2021-08-21 PROCEDURE — 80053 COMPREHEN METABOLIC PANEL: CPT

## 2021-08-21 PROCEDURE — 2580000003 HC RX 258: Performed by: PHYSICIAN ASSISTANT

## 2021-08-21 PROCEDURE — 6370000000 HC RX 637 (ALT 250 FOR IP): Performed by: SURGERY

## 2021-08-21 PROCEDURE — 85025 COMPLETE CBC W/AUTO DIFF WBC: CPT

## 2021-08-21 PROCEDURE — 6370000000 HC RX 637 (ALT 250 FOR IP): Performed by: PHYSICIAN ASSISTANT

## 2021-08-21 RX ORDER — SUCRALFATE 1 G/1
1 TABLET ORAL 3 TIMES DAILY
Qty: 120 TABLET | Refills: 3 | Status: ON HOLD | OUTPATIENT
Start: 2021-08-21 | End: 2021-12-07

## 2021-08-21 RX ORDER — FERROUS SULFATE 325(65) MG
325 TABLET ORAL 2 TIMES DAILY
Qty: 180 TABLET | Refills: 1 | Status: SHIPPED | OUTPATIENT
Start: 2021-08-21

## 2021-08-21 RX ADMIN — Medication 10 ML: at 07:14

## 2021-08-21 RX ADMIN — FAMOTIDINE 10 MG: 20 TABLET, FILM COATED ORAL at 07:13

## 2021-08-21 RX ADMIN — HYDROCORTISONE 5 MG: 5 TABLET ORAL at 12:59

## 2021-08-21 RX ADMIN — LEVOTHYROXINE SODIUM 125 MCG: 0.12 TABLET ORAL at 06:36

## 2021-08-21 RX ADMIN — SUCRALFATE 1 G: 1 TABLET ORAL at 07:14

## 2021-08-21 RX ADMIN — HYDROCORTISONE 10 MG: 5 TABLET ORAL at 07:14

## 2021-08-21 ASSESSMENT — PAIN SCALES - GENERAL: PAINLEVEL_OUTOF10: 0

## 2021-08-21 NOTE — PROGRESS NOTES
Department of General Surgery - Adult  Surgical Service   Attending Progress Note      SUBJECTIVE:  Stable improved    OBJECTIVE  HGB holding    Physical    VITALS:  /88   Pulse 80   Temp 97.6 °F (36.4 °C) (Temporal)   Resp 16   Ht 6' 1\" (1.854 m)   Wt 214 lb (97.1 kg)   SpO2 93%   BMI 28.23 kg/m²   INTAKE/OUTPUT:    Intake/Output Summary (Last 24 hours) at 2021 1134  Last data filed at 2021 1504  Gross per 24 hour   Intake 360 ml   Output 250 ml   Net 110 ml     TEMPERATURE:  Current - Temp: 97.6 °F (36.4 °C);  Max - Temp  Av °F (36.7 °C)  Min: 97.6 °F (36.4 °C)  Max: 98.5 °F (36.9 °C)  RESPIRATIONS RANGE: Resp  Av  Min: 16  Max: 16  PULSE RANGE: Pulse  Av.7  Min: 79  Max: 80  BLOOD PRESSURE RANGE:  Systolic (71DHJ), EB , Min:121 , VNJ:759   ; Diastolic (95HPX), PQ, Min:70, Max:88    PULSE OXIMETRY RANGE: SpO2  Av.7 %  Min: 92 %  Max: 93 %  CONSTITUTIONAL:  awake, alert, cooperative, no apparent distress, appears stated age and normal weight  EYES:  lids and lashes normal, pupils equal, round and reactive to light and extra-ocular muscles intact  NECK:  supple, symmetrical, trachea midline, skin normal and no stridor  LUNGS:  no increased work of breathing, good air exchange, no retractions and clear to auscultation  CARDIOVASCULAR:  normal apical pulses, regularly irregular rhythm and normal S1 and S2  ABDOMEN:  normal bowel sounds, soft, non-distended, non-tender, voluntary guarding absent and no masses palpated  Data  CBC with Differential:    Lab Results   Component Value Date    WBC 6.6 2021    RBC 3.51 2021    HGB 8.2 2021    HCT 27.4 2021     2021    MCV 78.1 2021    MCH 23.4 2021    MCHC 29.9 2021    RDW 18.0 2021    NRBC 1.7 2021    BLASTSPCT 1.0 2020    METASPCT 0.9 2021    LYMPHOPCT 21.2 2021    PROMYELOPCT 1.0 2020    MONOPCT 16.5 2021    MYELOPCT 7.0 03/23/2020    BASOPCT 0.6 08/21/2021    MONOSABS 1.08 08/21/2021    LYMPHSABS 1.39 08/21/2021    EOSABS 0.28 08/21/2021    BASOSABS 0.04 08/21/2021     CMP:    Lab Results   Component Value Date     08/21/2021    K 4.0 08/21/2021    K 3.8 08/18/2021     08/21/2021    CO2 28 08/21/2021    BUN 13 08/21/2021    CREATININE 1.3 08/21/2021    GFRAA >60 08/21/2021    LABGLOM 52 08/21/2021    GLUCOSE 88 08/21/2021    PROT 6.0 08/21/2021    LABALBU 3.4 08/21/2021    CALCIUM 8.6 08/21/2021    BILITOT 0.3 08/21/2021    ALKPHOS 52 08/21/2021    AST 13 08/21/2021    ALT 9 08/21/2021       Current Inpatient Medications    Current Facility-Administered Medications: sucralfate (CARAFATE) tablet 1 g, 1 g, Oral, TID  0.9 % sodium chloride infusion, , Intravenous, PRN  0.9 % sodium chloride infusion, , Intravenous, PRN  albuterol (PROVENTIL) nebulizer solution 2.5 mg, 2.5 mg, Nebulization, Q6H PRN  [Held by provider] apixaban (ELIQUIS) tablet 5 mg, 5 mg, Oral, BID  hydrocortisone (CORTEF) tablet 10 mg, 10 mg, Oral, Daily with breakfast  hydrocortisone (CORTEF) tablet 5 mg, 5 mg, Oral, Lunch  hydrocortisone (CORTEF) tablet 5 mg, 5 mg, Oral, Dinner  levothyroxine (SYNTHROID) tablet 125 mcg, 125 mcg, Oral, Daily  atorvastatin (LIPITOR) tablet 10 mg, 10 mg, Oral, Daily  sodium chloride flush 0.9 % injection 5-40 mL, 5-40 mL, Intravenous, 2 times per day  sodium chloride flush 0.9 % injection 5-40 mL, 5-40 mL, Intravenous, PRN  0.9 % sodium chloride infusion, 25 mL, Intravenous, PRN  ondansetron (ZOFRAN-ODT) disintegrating tablet 4 mg, 4 mg, Oral, Q8H PRN **OR** ondansetron (ZOFRAN) injection 4 mg, 4 mg, Intravenous, Q6H PRN  polyethylene glycol (GLYCOLAX) packet 17 g, 17 g, Oral, Daily PRN  acetaminophen (TYLENOL) tablet 650 mg, 650 mg, Oral, Q6H PRN **OR** acetaminophen (TYLENOL) suppository 650 mg, 650 mg, Rectal, Q6H PRN  famotidine (PEPCID) tablet 10 mg, 10 mg, Oral, Daily    ASSESSMENT AND PLAN stable improved  Ready for out patient care

## 2021-08-21 NOTE — FLOWSHEET NOTE
Patient pulling at lines, new order for soft restraints BL arms. Security at bedside to assist. Attempt made to call POA x 2, phone busy.

## 2021-08-21 NOTE — PROGRESS NOTES
CLINICAL PHARMACY NOTE: MEDS TO BEDS    Total # of Prescriptions Filled: 1   The following medications were delivered to the patient:  · Sucralfate 1mg    Additional Documentation:

## 2021-08-21 NOTE — PROGRESS NOTES
Son notified of discharge. Stated he will call back with who is able to pick pt up.      Crystal Whitley RN

## 2021-08-21 NOTE — DISCHARGE SUMMARY
Discharge Summary    Leanne Hansen  :  1931  MRN:  [id number]    Admit date:  2021  Discharge date:  2021 9:36 AM    Admitting Physician:  Bharath Abernathy MD    Discharge Diagnoses:     · Acute anemia due to gastrointestinal blood loss  · Distal esophageal ulcer defined at endoscopy  · Hematochezia  · History of atrial fibrillation    Patient Active Problem List    Diagnosis Date Noted    Gastrointestinal bleeding 2021    Anemia 2021    Orthostatic hypotension 2021    Non-traumatic rhabdomyolysis 2020    Acute renal failure superimposed on stage 2 chronic kidney disease ([address] 2020    Adrenal insufficiency ([place]'s disease) ([address]     Metabolic encephalopathy     Aspiration pneumonia ([address] 2020    Wide-complex tachycardia ([address] 2020    Acute respiratory failure with hypoxia ([address] 2020    Hypopituitarism ([address] 10/06/2019    Weakness 10/06/2019    Hypothyroidism 10/06/2019    Lactic acidosis 10/06/2019    Renal insufficiency 10/06/2019    A-fib ([address] 10/06/2019    QT prolongation 10/06/2019    RBBB 10/06/2019    Shock ([address] 10/05/2019       Past Medical Hx :   Past Medical History:   Diagnosis Date    A-fib ([address]     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 ([address]     Hypothyroid     Osteopenia     Renal insufficiency     Right bundle branch block        Past Surgical Hx :   Past Surgical History:   Procedure Laterality Date    UPPER GASTROINTESTINAL ENDOSCOPY N/A 2021    EGD ESOPHAGOGASTRODUODENOSCOPY performed by Joey Coffman MD at [address] [place] [address]       Admission Condition:  poor    Discharged Condition:  good    Labs:  CBC:   Lab Results   Component Value Date    WBC 6.6 2021    RBC 3.51 2021    HGB 8.2 2021    HCT 27.4 2021    MCV 78.1 2021    MCH 23.4 2021    MCHC 29.9 2021    RDW 18.0 2021     2021    MPV 9.6 2021     CMP:    Lab Results   Component Value Date     08/21/2021    K 4.0 08/21/2021    K 3.8 08/18/2021     08/21/2021    CO2 28 08/21/2021    BUN 13 08/21/2021    CREATININE 1.3 08/21/2021    GFRAA >60 08/21/2021    LABGLOM 52 08/21/2021    GLUCOSE 88 08/21/2021    PROT 6.0 08/21/2021    LABALBU 3.4 08/21/2021    CALCIUM 8.6 08/21/2021    BILITOT 0.3 08/21/2021    ALKPHOS 52 08/21/2021    AST 13 08/21/2021    ALT 9 08/21/2021        Radiology Results: XR CHEST PORTABLE    Result Date: 8/17/2021  EXAMINATION: ONE XRAY VIEW OF THE CHEST 8/17/2021 7:08 pm COMPARISON: Chest series from July 31, 2021 HISTORY: ORDERING SYSTEM PROVIDED HISTORY: hypotension, anemia TECHNOLOGIST PROVIDED HISTORY: Reason for exam:->hypotension, anemia What reading provider will be dictating this exam?->CRC FINDINGS: Lung hyperinflation with coarse interstitial markings. No formed consolidations, pleural effusions, or pneumothoraces. Stable scarring in the lingula. Atherosclerotic disease in the aortic arch. Cardiac silhouette is enlarged. Normal pulmonary vascularity. Osseous mineralization is decreased. Atherosclerotic disease and mild cardiomegaly. Coarse interstitial markings. No acute cardiopulmonary pathology. Hospital Course: This patient's history is obtained from chart review patient interview. This patient was discharged recently on 8/2. At that time the patient was admitted for dizziness with a known history of adrenal insufficiency and hypothyroidism. He also has been treated for atrial fibrillation and was on oral anticoagulation. The patient had leukocytosis at the time and was noted to be on Cortef therapy. He was given IV fluid hydration, and was considered to have dizziness due to his adrenal insufficiency.     He presented to the [place] yesterday for routine follow-up examination. He stated he had evidence of blood in his stool during the week. [facility]   In the emergency room his hemoglobin was 6 vitals were stable. He had been started on iron so he had melena. He was hypotensive and dizzy.     He was transfused with 1 unit of blood and brought to the floor this morning the patient feels okay but he remains weak.   He denies any chest pain or pressure sensation and he denies any shortness of breath.     8/19  Patient remains comfortable  Evaluation from yesterday stool is Hemoccult positive  Hemoglobin subsequently remained stable  He denies any further melena or hematochezia  Surgical consult reviewed for upper endoscopy today     8/20  Patient was seen today with son at bedside  Reviewed the upper endoscopy and noted distal esophageal ulcer defined  Discussed with the patient and son that we would likely have to hold the aspirin until that is healed  Eliquis possibly may be restarted  Reviewed telemetry strips and that was artifact  Patient had normal colored bowel movements this morning  Has no abdominal pain  Hemoglobin is still in the sevens  Vitals are stable    Discharge Medications:    Linda Lezama   Home Medication Instructions EKB:[id number]    Printed on:08/21/21 7359   Medication Information                      acetaminophen (TYLENOL) 325 MG tablet  Take 650 mg by mouth every 6 hours as needed for Pain             albuterol (PROVENTIL) (2.5 MG/3ML) 0.083% nebulizer solution  Take 3 mLs by nebulization every 6 hours as needed for Wheezing             ferrous sulfate (IRON 325) 325 (65 Fe) MG tablet  Take 1 tablet by mouth 2 times daily             fludrocortisone (FLORINEF) 0.1 MG tablet  Take 0.1 mg by mouth daily             hydrocortisone (CORTEF) 10 MG tablet  Take 1 tablet by mouth daily (with breakfast)             hydrocortisone (CORTEF) 5 MG tablet  Take 1 tablet by mouth Daily with lunch             hydrocortisone (CORTEF) 5 MG tablet  Take 1 tablet by mouth Daily with supper             levothyroxine (SYNTHROID) 125 MCG tablet  Take 1 tablet by mouth Daily             Multiple Vitamins-Minerals (THERAPEUTIC MULTIVITAMIN-MINERALS) tablet  Take 1 tablet by mouth daily             pantoprazole (PROTONIX) 40 MG tablet  Take 40 mg by mouth 2 times daily             simvastatin (ZOCOR) 20 MG tablet  Take 1/2 tablet by mouth Mon - Fri and take 1 tablet by mouth Sat and Sun             sucralfate (CARAFATE) 1 GM tablet  Take 1 tablet by mouth 3 times daily             vitamin D (CHOLECALCIFEROL) 25 MCG (1000 UT) TABS tablet  Take 1,000 Units by mouth 2 times daily                 Discharge Exam:  General appearance: Normal, awake, alert no distress. He is somewhat slow to respond but is not confused  Skin: Color, texture, turgor normal. No rashes or lesions. Eyes: Conjunctivae/cornea clear. [place] Dirk. Sclera non icteric. He has ectropion on the left side with erythema of the eyelids  Lungs: Clear to auscultation. No rhonchi, crackles or rales. Heart: S1 > S2. Rhythm is regular and rate is normal. No gallop rub or murmur. Abdomen: Soft, mildly protuberant, non-tender. BS normal. No masses, organomegaly. Anatomic contours appear normal.  Extremities: No deformities, edema, or skin discoloration. Peripheral perfusion assessed in all exremities. No cyanosis he is wearing compression hose and has a history of edema but there is no edema currently  Musculoskeletal: No unusual pain or swelling. Muscular strength intact. Neuro:   · Cranial nerves grossly intact. · Motor: Strength grossly normal. No focal weakness. · Sensory: grossly normal to touch. Mental status: Awake, alert, cognizant of person, place, time.     Patient appears capable of directing self care   Mood: Normal and appropriate affect somewhat flat  Gait & balance: He is independently ambulatory      Disposition: home    Patient Instructions:     Patient is to remain off anticoagulants until reevaluated for healing  He is aware of the risk and the family has been made aware of the risk  [facility]  Follow-up with general surgery  The patient has been independent at home  He has been ambulated here without any difficulty    REFER TO AVR or BOUCHRA document    Signed:  Nia Blue of Internal Medicine  [employer]  8/21/2021, 9:36 AM

## 2021-08-23 ENCOUNTER — TELEPHONE (OUTPATIENT)
Dept: FAMILY MEDICINE CLINIC | Age: 86
End: 2021-08-23

## 2021-08-23 ENCOUNTER — CARE COORDINATION (OUTPATIENT)
Dept: CASE MANAGEMENT | Age: 86
End: 2021-08-23

## 2021-08-23 NOTE — CARE COORDINATION
[facility] Transitions Initial Follow Up Call    Call within 2 business days of discharge: Yes    Patient: Claire Lara Patient : 1931   MRN: [id number]  Reason for Admission: anemia, s/p EGD 21  Discharge Date: 21 RARS: Readmission Risk Score: 19      Last Discharge [facility]       Complaint Diagnosis Description Type Department Provider    21 Hypotension Anemia, unspecified type ED to Hosp-Admission (Discharged) (ADMITTED) [facility] Yong Nichols MD; Nitin Humphrey. ..        -First attempt to reach the patient for initial Care Transition call post hospital discharge. Message left with CTN's contact information requesting return phone call. Follow Up  No future appointments.     Ramón Syed RN

## 2021-08-23 NOTE — TELEPHONE ENCOUNTER
[facility] Transitions Initial Follow Up Call    Outreach made within 2 business days of discharge: Yes    Patient: Nakul Lazo Patient : 1931   MRN: <[id number]>  Reason for Admission: There are no discharge diagnoses documented for the most recent discharge. Discharge Date: 21       Spoke with: left message to call for F/U     Discharge department/facility: [facility]      Scheduled appointment with PCP within 7-14 days    Follow Up  No future appointments.     Arina Lock

## 2021-08-24 ENCOUNTER — CARE COORDINATION (OUTPATIENT)
Dept: CASE MANAGEMENT | Age: 86
End: 2021-08-24

## 2021-08-24 NOTE — CARE COORDINATION
[facility] Transitions Initial Follow Up Call    Call within 2 business days of discharge: Yes    Patient: Serina Kirkpatrick Patient : 1931   MRN: [id number]  Reason for Admission: anemia, s/p EGD 21  Discharge Date: 21 RARS: Readmission Risk Score: 19      Last Discharge [facility]       Complaint Diagnosis Description Type Department Provider    21 Hypotension Anemia, unspecified type ED to Hosp-Admission (Discharged) (ADMITTED) [facility] Laith Ayala MD; Stephania Humphrey. ..        -Second attempt to reach the patient for initial Care Transition call post hospital discharge. Message left with CTN's contact information requesting return phone call.    -Patient returned CTN call. Transitions of Care Initial Call        Challenges to be reviewed by the provider   Additional needs identified to be addressed with provider: No  none             Method of communication with provider : none      Advance Care Planning:   Does patient have an Advance Directive: decision maker updated. Was this a readmission? Yes  Patients top risk factors for readmission: medical condition-anemia, COPD, CKD, immunocompromised    Care Transition Nurse (CTN) spoke with  the patient by telephone to perform post hospital discharge assessment. Verified name and  with patient as identifiers. Provided introduction to self, and explanation of the CTN role. CTN reviewed discharge instructions, medical action plan and red flags with patient who verbalized understanding. Patient given an opportunity to ask questions and does not have any further questions or concerns at this time. Were discharge instructions available to patient? Yes. Reviewed appropriate site of care based on symptoms and resources available to patient including: PCP and Specialist. The patient agrees to contact the PCP office for questions related to their healthcare.      Medication reconciliation was performed with patient, who verbalizes understanding of administration of home medications. 1111F not entered as non [facility] PCP. CTN also confirmed with patient stopped medications of eliquis, baby aspirin, and potassium as per AVS    Reviewed and educated patient on any new and changed medications related to discharge diagnosis. Non-face-to-face services provided:  Scheduled appointment with PCP-CTN explained to patient recommendation to have contact with PCP 1-2 weeks post hospital discharge. [facility]and check on appointment even though he saw PCP recently before hospital admission. Patient declined assistance from CTN in scheduling any appointments. Scheduled appointment with Specialist-CTN provided contact information to patient as per AVS for general surgery(JUAN Edwards)  Patient states he will call for appointment  Obtained and reviewed discharge summary and/or continuity of care documents    Care Transitions 24 Hour Call    Schedule Follow Up Appointment with PCP: Declined  Do you have any ongoing symptoms?: No  Do you have a copy of your discharge instructions?: Yes  Do you have all of your prescriptions and are they filled?: Yes  Have you been contacted by a [id number] [place] Pharmacist?: No  Have you scheduled your follow up appointment?: No  Were you discharged with any Home Care or Post Acute Services: No  Do you feel like you have everything you need to keep you well at home?: Yes  Care Transitions Interventions  No Identified Needs       -Spoke with patient for non [facility] PCP care transition call post hospital discharge. Patient states he sees Dr Franciso Severe through [place] and not at [facility].  -Patient admitted to [facility] (1-) on 8/17/21 for anemia with symptoms of blood in stool at PCP visit, hypotension, and dizziness.    -Patient reports he is \"doing good,\" no further bleeding, has occasional dizziness if he changes positions too quickly so he avoids doing so.   -Patient states he drives himself to any appointments.  -Patient denies any further needs, questions, or concerns at this time. -CTN phoned [facility] and confirmed with Slava Hansen that patient sees Dr Candice Flower at [facility] and not at [facility].  -CTN updated information in EMR. -CTN to sign off. Follow Up  No future appointments.     Suad Leung RN

## 2021-09-09 ENCOUNTER — TELEPHONE (OUTPATIENT)
Dept: PHARMACY | Facility: CLINIC | Age: 86
End: 2021-09-09

## 2021-09-09 NOTE — LETTER
[place]  [address], [address]        [address] [facility] [address]           09/09/21     Dear oBgdan Landers,    We tried to reach you recently, after your hospital stay, to review your medications. I was unable to reach you on the telephone. We understand that medications can be confusing, and it is important to discuss your medications after a hospital stay. Please call us at [phone], option 1 to discuss your medications.        Sincerely,   Rebeca Rivas, PharmD, [facility], [facility]  Department, toll free: [phone], option 1

## 2021-09-09 NOTE — TELEPHONE ENCOUNTER
POPULATION HEALTH CLINICAL PHARMACY REVIEW: Post-Discharge Transitions of Care (CRISTOBAL)    Attempted to reach patient to review medications. Left message asking for return call on patient and son's phones. Will prepare letter and mail to patient.       Ulises CaseD, BCACP, [address] [place] [address], toll free: [phone], option 1    =======================================================    For Pharmacy Admin Tracking Only   Gap Closed?: No   Time Spent (min): 10

## 2021-09-09 NOTE — TELEPHONE ENCOUNTER
CLINICAL PHARMACY NOTE  Post-Discharge Transitions of Care (CRISTOBAL)    Patient appears to have been discharged from [address] [place] [address] on 8/21/21. Patient not found in Outcomes MTM. Please follow-up with patient to review medications. 30 days since discharge = 9/20/21    (See previous encounter 8/17/21)    Rosaura Way CPhT.    [address] [facility] free: [phone]

## 2021-12-06 ENCOUNTER — APPOINTMENT (OUTPATIENT)
Dept: CT IMAGING | Age: 86
DRG: 947 | End: 2021-12-06
Payer: MEDICARE

## 2021-12-06 ENCOUNTER — APPOINTMENT (OUTPATIENT)
Dept: GENERAL RADIOLOGY | Age: 86
DRG: 947 | End: 2021-12-06
Payer: MEDICARE

## 2021-12-06 ENCOUNTER — HOSPITAL ENCOUNTER (INPATIENT)
Age: 86
LOS: 2 days | Discharge: HOME | DRG: 947 | End: 2021-12-08
Attending: STUDENT IN AN ORGANIZED HEALTH CARE EDUCATION/TRAINING PROGRAM | Admitting: INTERNAL MEDICINE
Payer: MEDICARE

## 2021-12-06 DIAGNOSIS — A41.9 SEPSIS, DUE TO UNSPECIFIED ORGANISM, UNSPECIFIED WHETHER ACUTE ORGAN DYSFUNCTION PRESENT (HCC): ICD-10-CM

## 2021-12-06 DIAGNOSIS — R41.82 ACUTE ALTERATION IN MENTAL STATUS: ICD-10-CM

## 2021-12-06 DIAGNOSIS — H10.32 ACUTE BACTERIAL CONJUNCTIVITIS OF LEFT EYE: ICD-10-CM

## 2021-12-06 DIAGNOSIS — G93.41 ACUTE METABOLIC ENCEPHALOPATHY: Primary | ICD-10-CM

## 2021-12-06 LAB
ACETAMINOPHEN LEVEL: <5 MCG/ML (ref 10–30)
ALBUMIN SERPL-MCNC: 4 G/DL (ref 3.5–5.2)
ALP BLD-CCNC: 108 U/L (ref 40–129)
ALT SERPL-CCNC: 15 U/L (ref 0–40)
AMMONIA: 18 UMOL/L (ref 16–60)
AMPHETAMINE SCREEN, URINE: NOT DETECTED
ANION GAP SERPL CALCULATED.3IONS-SCNC: 16 MMOL/L (ref 7–16)
ANISOCYTOSIS: ABNORMAL
APTT: 29.8 SEC (ref 24.5–35.1)
AST SERPL-CCNC: 31 U/L (ref 0–39)
B.E.: -1.8 MMOL/L (ref -3–3)
BACTERIA: ABNORMAL /HPF
BARBITURATE SCREEN URINE: NOT DETECTED
BASOPHILS ABSOLUTE: 0 E9/L (ref 0–0.2)
BASOPHILS RELATIVE PERCENT: 0.4 % (ref 0–2)
BENZODIAZEPINE SCREEN, URINE: NOT DETECTED
BILIRUB SERPL-MCNC: 0.6 MG/DL (ref 0–1.2)
BILIRUBIN URINE: NEGATIVE
BLOOD, URINE: ABNORMAL
BUN BLDV-MCNC: 17 MG/DL (ref 6–23)
BURR CELLS: ABNORMAL
CALCIUM SERPL-MCNC: 9.8 MG/DL (ref 8.6–10.2)
CANNABINOID SCREEN URINE: NOT DETECTED
CHLORIDE BLD-SCNC: 100 MMOL/L (ref 98–107)
CLARITY: CLEAR
CO2: 23 MMOL/L (ref 22–29)
COCAINE METABOLITE SCREEN URINE: NOT DETECTED
COHB: 0.2 % (ref 0–1.5)
COLOR: YELLOW
CREAT SERPL-MCNC: 1.2 MG/DL (ref 0.7–1.2)
CRITICAL: ABNORMAL
CRYPTOCOCCUS NEOFORMANS/GATTII CSF BY PCR: NOT DETECTED
CYTOMEGALOVIRUS CSF BY PCR: NOT DETECTED
DATE ANALYZED: ABNORMAL
DATE OF COLLECTION: ABNORMAL
EKG ATRIAL RATE: 100 BPM
EKG P AXIS: 35 DEGREES
EKG P-R INTERVAL: 198 MS
EKG Q-T INTERVAL: 398 MS
EKG QRS DURATION: 134 MS
EKG QTC CALCULATION (BAZETT): 513 MS
EKG R AXIS: -100 DEGREES
EKG T AXIS: 16 DEGREES
EKG VENTRICULAR RATE: 100 BPM
ENTEROVIRUS CSF BY PCR: NOT DETECTED
EOSINOPHILS ABSOLUTE: 0.42 E9/L (ref 0.05–0.5)
EOSINOPHILS RELATIVE PERCENT: 2.6 % (ref 0–6)
EPITHELIAL CELLS, UA: ABNORMAL /HPF
ESCHERICHIA COLI K1 CSF BY PCR: NOT DETECTED
ETHANOL: <10 MG/DL (ref 0–0.08)
FENTANYL SCREEN, URINE: NOT DETECTED
GFR AFRICAN AMERICAN: >60
GFR NON-AFRICAN AMERICAN: 57 ML/MIN/1.73
GLUCOSE BLD-MCNC: 103 MG/DL (ref 74–99)
GLUCOSE URINE: NEGATIVE MG/DL
GLUCOSE, CSF: 60 MG/DL (ref 40–70)
GRAM STAIN ORDERABLE: NORMAL
HAEMOPHILUS INFLUENZAE CSF BY PCR: NOT DETECTED
HCO3: 19.8 MMOL/L (ref 22–26)
HCT VFR BLD CALC: 49.9 % (ref 37–54)
HEMOGLOBIN: 16 G/DL (ref 12.5–16.5)
HERPES SIMPLEX VIRUS 1 CSF BY PCR: NOT DETECTED
HERPES SIMPLEX VIRUS 2 CSF BY PCR: NOT DETECTED
HHB: 4.6 % (ref 0–5)
HUMAN HERPESVIRUS 6 CSF BY PCR: NOT DETECTED
HUMAN PARECHOVIRUS CSF BY PCR: NOT DETECTED
INR BLD: 1.1
KETONES, URINE: ABNORMAL MG/DL
LAB: ABNORMAL
LACTIC ACID, SEPSIS: 2 MMOL/L (ref 0.5–1.9)
LACTIC ACID, SEPSIS: 2.9 MMOL/L (ref 0.5–1.9)
LEUKOCYTE ESTERASE, URINE: NEGATIVE
LIPASE: 19 U/L (ref 13–60)
LISTERIA MONOCYTOGENES CSF BY PCR: NOT DETECTED
LYMPHOCYTES ABSOLUTE: 1.78 E9/L (ref 1.5–4)
LYMPHOCYTES RELATIVE PERCENT: 11.3 % (ref 20–42)
Lab: ABNORMAL
Lab: NORMAL
MAGNESIUM: 2 MG/DL (ref 1.6–2.6)
MCH RBC QN AUTO: 25.6 PG (ref 26–35)
MCHC RBC AUTO-ENTMCNC: 32.1 % (ref 32–34.5)
MCV RBC AUTO: 79.7 FL (ref 80–99.9)
METHADONE SCREEN, URINE: NOT DETECTED
METHB: 0.3 % (ref 0–1.5)
MONOCYTES ABSOLUTE: 2.59 E9/L (ref 0.1–0.95)
MONOCYTES RELATIVE PERCENT: 15.7 % (ref 2–12)
NEISSERIA MENINGITIDIS CSF BY PCR: NOT DETECTED
NEUTROPHILS ABSOLUTE: 11.34 E9/L (ref 1.8–7.3)
NEUTROPHILS RELATIVE PERCENT: 70.4 % (ref 43–80)
NITRITE, URINE: NEGATIVE
O2 CONTENT: 21.9 ML/DL
O2 SATURATION: 95.4 % (ref 92–98.5)
O2HB: 94.9 % (ref 94–97)
OPERATOR ID: 1741
OPIATE SCREEN URINE: NOT DETECTED
OVALOCYTES: ABNORMAL
OXYCODONE URINE: NOT DETECTED
PATIENT TEMP: 37 C
PCO2: 27 MMHG (ref 35–45)
PDW BLD-RTO: 15.8 FL (ref 11.5–15)
PH BLOOD GAS: 7.48 (ref 7.35–7.45)
PH UA: 6 (ref 5–9)
PHENCYCLIDINE SCREEN URINE: NOT DETECTED
PLATELET # BLD: 359 E9/L (ref 130–450)
PMV BLD AUTO: 10 FL (ref 7–12)
PO2: 70.5 MMHG (ref 75–100)
POIKILOCYTES: ABNORMAL
POTASSIUM SERPL-SCNC: 4.1 MMOL/L (ref 3.5–5)
PRO-BNP: 521 PG/ML (ref 0–450)
PROCALCITONIN: 0.12 NG/ML (ref 0–0.08)
PROTEIN CSF: 39 MG/DL (ref 15–40)
PROTEIN UA: NEGATIVE MG/DL
PROTHROMBIN TIME: 11.5 SEC (ref 9.3–12.4)
RBC # BLD: 6.26 E12/L (ref 3.8–5.8)
RBC UA: ABNORMAL /HPF (ref 0–2)
SALICYLATE, SERUM: <0.3 MG/DL (ref 0–30)
SARS-COV-2, NAAT: NOT DETECTED
SODIUM BLD-SCNC: 139 MMOL/L (ref 132–146)
SOURCE, BLOOD GAS: ABNORMAL
SPECIFIC GRAVITY UA: 1.02 (ref 1–1.03)
STREPTOCOCCUS AGALACTIAE CSF BY PCR: NOT DETECTED
STREPTOCOCCUS PNEUMONIAE CSF BY PCR: NOT DETECTED
THB: 16.4 G/DL (ref 11.5–16.5)
TIME ANALYZED: 1148
TOTAL CK: 333 U/L (ref 20–200)
TOTAL PROTEIN: 7.8 G/DL (ref 6.4–8.3)
TRICYCLIC ANTIDEPRESSANTS SCREEN SERUM: NEGATIVE NG/ML
TROPONIN, HIGH SENSITIVITY: 22 NG/L (ref 0–11)
TROPONIN, HIGH SENSITIVITY: 24 NG/L (ref 0–11)
TSH SERPL DL<=0.05 MIU/L-ACNC: 1.04 UIU/ML (ref 0.27–4.2)
UROBILINOGEN, URINE: 0.2 E.U./DL
VARICELLA ZOSTER VIRUS CSF BY PCR: NOT DETECTED
WBC # BLD: 16.2 E9/L (ref 4.5–11.5)
WBC UA: ABNORMAL /HPF (ref 0–5)

## 2021-12-06 PROCEDURE — 80179 DRUG ASSAY SALICYLATE: CPT

## 2021-12-06 PROCEDURE — 82533 TOTAL CORTISOL: CPT

## 2021-12-06 PROCEDURE — 2700000000 HC OXYGEN THERAPY PER DAY

## 2021-12-06 PROCEDURE — 72125 CT NECK SPINE W/O DYE: CPT

## 2021-12-06 PROCEDURE — 84443 ASSAY THYROID STIM HORMONE: CPT

## 2021-12-06 PROCEDURE — 82077 ASSAY SPEC XCP UR&BREATH IA: CPT

## 2021-12-06 PROCEDURE — 82140 ASSAY OF AMMONIA: CPT

## 2021-12-06 PROCEDURE — 87483 CNS DNA AMP PROBE TYPE 12-25: CPT

## 2021-12-06 PROCEDURE — 62270 DX LMBR SPI PNXR: CPT

## 2021-12-06 PROCEDURE — 6360000002 HC RX W HCPCS: Performed by: STUDENT IN AN ORGANIZED HEALTH CARE EDUCATION/TRAINING PROGRAM

## 2021-12-06 PROCEDURE — 009U3ZX DRAINAGE OF SPINAL CANAL, PERCUTANEOUS APPROACH, DIAGNOSTIC: ICD-10-PCS | Performed by: STUDENT IN AN ORGANIZED HEALTH CARE EDUCATION/TRAINING PROGRAM

## 2021-12-06 PROCEDURE — 80307 DRUG TEST PRSMV CHEM ANLYZR: CPT

## 2021-12-06 PROCEDURE — 87040 BLOOD CULTURE FOR BACTERIA: CPT

## 2021-12-06 PROCEDURE — 73030 X-RAY EXAM OF SHOULDER: CPT

## 2021-12-06 PROCEDURE — 85025 COMPLETE CBC W/AUTO DIFF WBC: CPT

## 2021-12-06 PROCEDURE — 83735 ASSAY OF MAGNESIUM: CPT

## 2021-12-06 PROCEDURE — 89051 BODY FLUID CELL COUNT: CPT

## 2021-12-06 PROCEDURE — 51702 INSERT TEMP BLADDER CATH: CPT

## 2021-12-06 PROCEDURE — 83690 ASSAY OF LIPASE: CPT

## 2021-12-06 PROCEDURE — 82805 BLOOD GASES W/O2 SATURATION: CPT

## 2021-12-06 PROCEDURE — 93010 ELECTROCARDIOGRAM REPORT: CPT | Performed by: INTERNAL MEDICINE

## 2021-12-06 PROCEDURE — 93005 ELECTROCARDIOGRAM TRACING: CPT | Performed by: STUDENT IN AN ORGANIZED HEALTH CARE EDUCATION/TRAINING PROGRAM

## 2021-12-06 PROCEDURE — 6370000000 HC RX 637 (ALT 250 FOR IP): Performed by: STUDENT IN AN ORGANIZED HEALTH CARE EDUCATION/TRAINING PROGRAM

## 2021-12-06 PROCEDURE — 2580000003 HC RX 258: Performed by: STUDENT IN AN ORGANIZED HEALTH CARE EDUCATION/TRAINING PROGRAM

## 2021-12-06 PROCEDURE — 99285 EMERGENCY DEPT VISIT HI MDM: CPT

## 2021-12-06 PROCEDURE — 87205 SMEAR GRAM STAIN: CPT

## 2021-12-06 PROCEDURE — 87070 CULTURE OTHR SPECIMN AEROBIC: CPT

## 2021-12-06 PROCEDURE — 82945 GLUCOSE OTHER FLUID: CPT

## 2021-12-06 PROCEDURE — 73521 X-RAY EXAM HIPS BI 2 VIEWS: CPT

## 2021-12-06 PROCEDURE — 84157 ASSAY OF PROTEIN OTHER: CPT

## 2021-12-06 PROCEDURE — 83605 ASSAY OF LACTIC ACID: CPT

## 2021-12-06 PROCEDURE — 6360000002 HC RX W HCPCS: Performed by: INTERNAL MEDICINE

## 2021-12-06 PROCEDURE — 80053 COMPREHEN METABOLIC PANEL: CPT

## 2021-12-06 PROCEDURE — 85610 PROTHROMBIN TIME: CPT

## 2021-12-06 PROCEDURE — 80143 DRUG ASSAY ACETAMINOPHEN: CPT

## 2021-12-06 PROCEDURE — 84484 ASSAY OF TROPONIN QUANT: CPT

## 2021-12-06 PROCEDURE — 71045 X-RAY EXAM CHEST 1 VIEW: CPT

## 2021-12-06 PROCEDURE — 82550 ASSAY OF CK (CPK): CPT

## 2021-12-06 PROCEDURE — 87635 SARS-COV-2 COVID-19 AMP PRB: CPT

## 2021-12-06 PROCEDURE — 2060000000 HC ICU INTERMEDIATE R&B

## 2021-12-06 PROCEDURE — 2580000003 HC RX 258

## 2021-12-06 PROCEDURE — 81001 URINALYSIS AUTO W/SCOPE: CPT

## 2021-12-06 PROCEDURE — 36415 COLL VENOUS BLD VENIPUNCTURE: CPT

## 2021-12-06 PROCEDURE — 83880 ASSAY OF NATRIURETIC PEPTIDE: CPT

## 2021-12-06 PROCEDURE — 84145 PROCALCITONIN (PCT): CPT

## 2021-12-06 PROCEDURE — 70450 CT HEAD/BRAIN W/O DYE: CPT

## 2021-12-06 PROCEDURE — 85730 THROMBOPLASTIN TIME PARTIAL: CPT

## 2021-12-06 RX ORDER — SUCRALFATE 1 G/1
1 TABLET ORAL 3 TIMES DAILY
Status: DISCONTINUED | OUTPATIENT
Start: 2021-12-06 | End: 2021-12-07

## 2021-12-06 RX ORDER — HYDROCORTISONE 5 MG/1
10 TABLET ORAL
Status: DISCONTINUED | OUTPATIENT
Start: 2021-12-07 | End: 2021-12-07

## 2021-12-06 RX ORDER — FLUDROCORTISONE ACETATE 0.1 MG/1
0.1 TABLET ORAL DAILY
Status: DISCONTINUED | OUTPATIENT
Start: 2021-12-06 | End: 2021-12-08

## 2021-12-06 RX ORDER — VITAMIN B COMPLEX
1000 TABLET ORAL 2 TIMES DAILY
Status: DISCONTINUED | OUTPATIENT
Start: 2021-12-06 | End: 2021-12-08 | Stop reason: HOSPADM

## 2021-12-06 RX ORDER — HYDROCORTISONE 5 MG/1
5 TABLET ORAL
Status: DISCONTINUED | OUTPATIENT
Start: 2021-12-06 | End: 2021-12-07

## 2021-12-06 RX ORDER — POLYMYXIN B SULFATE AND TRIMETHOPRIM 1; 10000 MG/ML; [USP'U]/ML
1 SOLUTION OPHTHALMIC ONCE
Status: COMPLETED | OUTPATIENT
Start: 2021-12-06 | End: 2021-12-06

## 2021-12-06 RX ORDER — SODIUM CHLORIDE 0.9 % (FLUSH) 0.9 %
5-40 SYRINGE (ML) INJECTION PRN
Status: DISCONTINUED | OUTPATIENT
Start: 2021-12-06 | End: 2021-12-08 | Stop reason: HOSPADM

## 2021-12-06 RX ORDER — ACETAMINOPHEN 500 MG
1000 TABLET ORAL ONCE
Status: DISCONTINUED | OUTPATIENT
Start: 2021-12-06 | End: 2021-12-08 | Stop reason: HOSPADM

## 2021-12-06 RX ORDER — ACETAMINOPHEN 325 MG/1
650 TABLET ORAL EVERY 6 HOURS PRN
Status: DISCONTINUED | OUTPATIENT
Start: 2021-12-06 | End: 2021-12-08 | Stop reason: HOSPADM

## 2021-12-06 RX ORDER — SODIUM CHLORIDE 0.9 % (FLUSH) 0.9 %
5-40 SYRINGE (ML) INJECTION EVERY 12 HOURS SCHEDULED
Status: DISCONTINUED | OUTPATIENT
Start: 2021-12-06 | End: 2021-12-08 | Stop reason: HOSPADM

## 2021-12-06 RX ORDER — ONDANSETRON 2 MG/ML
4 INJECTION INTRAMUSCULAR; INTRAVENOUS EVERY 6 HOURS PRN
Status: DISCONTINUED | OUTPATIENT
Start: 2021-12-06 | End: 2021-12-08 | Stop reason: HOSPADM

## 2021-12-06 RX ORDER — SODIUM CHLORIDE 9 MG/ML
INJECTION, SOLUTION INTRAVENOUS CONTINUOUS
Status: DISCONTINUED | OUTPATIENT
Start: 2021-12-06 | End: 2021-12-08 | Stop reason: HOSPADM

## 2021-12-06 RX ORDER — ALBUTEROL SULFATE 2.5 MG/3ML
2.5 SOLUTION RESPIRATORY (INHALATION) EVERY 4 HOURS PRN
Status: DISCONTINUED | OUTPATIENT
Start: 2021-12-06 | End: 2021-12-07

## 2021-12-06 RX ORDER — SODIUM CHLORIDE 9 MG/ML
25 INJECTION, SOLUTION INTRAVENOUS PRN
Status: DISCONTINUED | OUTPATIENT
Start: 2021-12-06 | End: 2021-12-08 | Stop reason: HOSPADM

## 2021-12-06 RX ORDER — ACETAMINOPHEN 650 MG/1
650 SUPPOSITORY RECTAL EVERY 6 HOURS PRN
Status: DISCONTINUED | OUTPATIENT
Start: 2021-12-06 | End: 2021-12-08 | Stop reason: HOSPADM

## 2021-12-06 RX ORDER — DEXAMETHASONE SODIUM PHOSPHATE 10 MG/ML
10 INJECTION INTRAMUSCULAR; INTRAVENOUS ONCE
Status: COMPLETED | OUTPATIENT
Start: 2021-12-06 | End: 2021-12-06

## 2021-12-06 RX ORDER — PANTOPRAZOLE SODIUM 40 MG/1
40 TABLET, DELAYED RELEASE ORAL 2 TIMES DAILY
Status: DISCONTINUED | OUTPATIENT
Start: 2021-12-06 | End: 2021-12-08 | Stop reason: HOSPADM

## 2021-12-06 RX ORDER — LEVOTHYROXINE SODIUM 137 UG/1
137 TABLET ORAL DAILY
Status: DISCONTINUED | OUTPATIENT
Start: 2021-12-06 | End: 2021-12-08 | Stop reason: HOSPADM

## 2021-12-06 RX ORDER — 0.9 % SODIUM CHLORIDE 0.9 %
500 INTRAVENOUS SOLUTION INTRAVENOUS ONCE
Status: COMPLETED | OUTPATIENT
Start: 2021-12-06 | End: 2021-12-06

## 2021-12-06 RX ORDER — POLYETHYLENE GLYCOL 3350 17 G/17G
17 POWDER, FOR SOLUTION ORAL DAILY PRN
Status: DISCONTINUED | OUTPATIENT
Start: 2021-12-06 | End: 2021-12-08 | Stop reason: HOSPADM

## 2021-12-06 RX ORDER — BUDESONIDE 0.5 MG/2ML
1 INHALANT ORAL 2 TIMES DAILY
Status: DISCONTINUED | OUTPATIENT
Start: 2021-12-06 | End: 2021-12-08 | Stop reason: HOSPADM

## 2021-12-06 RX ADMIN — ACYCLOVIR SODIUM 850 MG: 50 INJECTION, SOLUTION INTRAVENOUS at 21:27

## 2021-12-06 RX ADMIN — CEFTRIAXONE SODIUM 2000 MG: 2 INJECTION, POWDER, FOR SOLUTION INTRAMUSCULAR; INTRAVENOUS at 16:43

## 2021-12-06 RX ADMIN — DEXAMETHASONE SODIUM PHOSPHATE 10 MG: 10 INJECTION INTRAMUSCULAR; INTRAVENOUS at 18:47

## 2021-12-06 RX ADMIN — HYDROCORTISONE SODIUM SUCCINATE 100 MG: 100 INJECTION, POWDER, FOR SOLUTION INTRAMUSCULAR; INTRAVENOUS at 20:36

## 2021-12-06 RX ADMIN — POLYMYXIN B SULFATE AND TRIMETHOPRIM 1 DROP: 10000; 1 SOLUTION OPHTHALMIC at 16:43

## 2021-12-06 RX ADMIN — SODIUM CHLORIDE 500 ML: 9 INJECTION, SOLUTION INTRAVENOUS at 13:18

## 2021-12-06 RX ADMIN — AMPICILLIN SODIUM 2000 MG: 2 INJECTION, POWDER, FOR SOLUTION INTRAMUSCULAR; INTRAVENOUS at 16:44

## 2021-12-06 RX ADMIN — WATER: 1 INJECTION INTRAMUSCULAR; INTRAVENOUS; SUBCUTANEOUS at 23:16

## 2021-12-06 RX ADMIN — SODIUM CHLORIDE: 9 INJECTION, SOLUTION INTRAVENOUS at 16:44

## 2021-12-06 RX ADMIN — ENOXAPARIN SODIUM 40 MG: 100 INJECTION SUBCUTANEOUS at 18:48

## 2021-12-06 RX ADMIN — VANCOMYCIN HYDROCHLORIDE 2000 MG: 10 INJECTION, POWDER, LYOPHILIZED, FOR SOLUTION INTRAVENOUS at 17:50

## 2021-12-06 ASSESSMENT — PAIN SCALES - GENERAL: PAINLEVEL_OUTOF10: 0

## 2021-12-06 NOTE — H&P
Hospitalist History & Physical      PCP: Amy Tirado MD    Date of Admission: 12/6/2021    Date of Service: Pt seen/examined on 12/6/2021 and is admitted to Inpatient with expected LOS greater than two midnights due to medical therapy. Placed inpatient    Chief Complaint:  had concerns including Other (PD did wellness check after 2 days of not hearing from patient. Patient found on floor, unsure how long. Pt. takes Eliquis and is confused). History Of Present Illness:    Mr. Edward De Jesus, a 80y.o. 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 Presenting into emergency department via EMS with altered mental status. Patient was not answering call for about 2 days and when EMS found him he was on the floor to become more confused and stopped answering questions when he came to ER. He was found with ill appearance elderly male opening eyes intermediately and moving all 4 extremities. Work-up in ER suggest for sepsis on admission patient was pancultured started on IV antibiotics. 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  Authorizing Provider   mometasone (ASMANEX) 200 MCG/ACT AERO inhaler Inhale 1 puff into the lungs 2 times daily    Historical Provider, MD   sucralfate (CARAFATE) 1 GM tablet Take 1 tablet by mouth 3 times daily 8/21/21   Neha Jackson MD   ferrous sulfate (IRON 325) 325 (65 Fe) MG tablet Take 1 tablet by mouth 2 times daily 8/21/21   Neha Jackson MD   pantoprazole (PROTONIX) 40 MG tablet Take 40 mg by mouth 2 times daily    Historical Provider, MD   acetaminophen (TYLENOL) 325 MG tablet Take 650 mg by mouth every 6 hours as needed for Pain    Historical Provider, MD   Multiple Vitamins-Minerals (THERAPEUTIC MULTIVITAMIN-MINERALS) tablet Take 1 tablet by mouth daily    Historical Provider, MD   simvastatin (ZOCOR) 20 MG tablet Take 1/2 tablet by mouth Mon - Fri and take 1 tablet by mouth Sat and Sun    Historical Provider, MD   vitamin D (CHOLECALCIFEROL) 25 MCG (1000 UT) TABS tablet Take 1,000 Units by mouth 2 times daily    Historical Provider, MD   albuterol (PROVENTIL) (2.5 MG/3ML) 0.083% nebulizer solution Take 3 mLs by nebulization every 6 hours as needed for Wheezing  Patient not taking: Reported on 8/24/2021 3/23/20   Antonietta Gary DO   fludrocortisone (FLORINEF) 0.1 MG tablet Take 0.1 mg by mouth daily    Historical Provider, MD   hydrocortisone (CORTEF) 5 MG tablet Take 1 tablet by mouth Daily with lunch 10/9/19   Antonietta Gary DO   hydrocortisone (CORTEF) 5 MG tablet Take 1 tablet by mouth Daily with supper 10/9/19   Antonietta Gary DO   levothyroxine (SYNTHROID) 125 MCG tablet Take 1 tablet by mouth Daily 10/10/19   Antonietta Gary DO   hydrocortisone (CORTEF) 10 MG tablet Take 1 tablet by mouth daily (with breakfast) 10/9/19   Laurent Akers DO       Allergies:  Codeine, Diazepam, Lorazepam, and Morphine    Social History:    TOBACCO:   reports that he has never smoked. He has never used smokeless tobacco.  ETOH:   reports previous alcohol use. Family History:    Reviewed in detail and negative for DM, CAD, Cancer, CVA. Positive as follows\"  No family history on file.     REVIEW OF SYSTEMS:   Pertinent positives as noted in the HPI  Able to perform review of systems secondary to altered mental status    PHYSICAL EXAM:  BP (!) 159/91   Pulse 104   Temp 98 °F (36.7 °C)   Resp 18   Wt 190 lb (86.2 kg)   SpO2 92%   BMI 25.07 kg/m²   General appearance: Ill appearance elderly male   HEENT: Normal cephalic, atraumatic without obvious deformity. Pupils equal, round, and reactive to light. Extra ocular muscles intact. Conjunctivae/corneas clear. Neck: Supple, with full range of motion. No jugular venous distention. Trachea midline. Respiratory: Decreased breath sound bilaterally  Cardiovascular: S1-S2 present rhythm regular  Abdomen: Soft nontender nondistended  Musculoskeletal: No clubbing, cyanosis, Brisk capillary refill. Skin: Normal skin color. No rashes or lesions. Neurologic:  Neurovascularly intact without any focal sensory/motor deficits. Cranial nerves: II-XII intact, grossly non-focal.  Psychiatric: Disoriented confused    Reviewed EKG and CXR personally    CBC:   Recent Labs     12/06/21  1120   WBC 16.2*   RBC 6.26*   HGB 16.0   HCT 49.9   MCV 79.7*   RDW 15.8*        BMP:   Recent Labs     12/06/21  1120      K 4.1      CO2 23   BUN 17   CREATININE 1.2   MG 2.0     LFT:  Recent Labs     12/06/21  1120   PROT 7.8   ALKPHOS 108   ALT 15   AST 31   BILITOT 0.6   LIPASE 19     CE:  Recent Labs     12/06/21  1120   CKTOTAL 333*     PT/INR:   Recent Labs     12/06/21  1120   INR 1.1   APTT 29.8     BNP: No results for input(s): BNP in the last 72 hours.   ESR: No results found for: SEDRATE  CRP: No results found for: CRP  D Dimer:   Lab Results   Component Value Date    DDIMER 407 10/06/2019      Folate and B12:   Lab Results   Component Value Date    LAELAGQE67 454 08/18/2021   ,   Lab Results   Component Value Date    FOLATE 12.2 08/18/2021     Lactic Acid:   Lab Results   Component Value Date    LACTA 1.9 08/18/2021     Thyroid Studies:   Lab Results   Component Value Date    TSH 1.040 12/06/2021    C9HQNGK 84.80 10/06/2019    X5TZDAW 8.4 03/14/2020       Oupatient labs:  Lab Results   Component Value Date    TSH 1.040 12/06/2021    INR 1.1 12/06/2021       Urinalysis:    Lab Results   Component Value Date    NITRU Negative 12/06/2021    WBCUA NONE 12/06/2021    BACTERIA RARE 12/06/2021    RBCUA 0-1 12/06/2021    BLOODU TRACE-INTACT 12/06/2021    SPECGRAV 1.020 12/06/2021    GLUCOSEU Negative 12/06/2021       Imaging:  XR SHOULDER RIGHT (MIN 2 VIEWS)    Result Date: 12/6/2021  EXAMINATION: THREE XRAY VIEWS OF THE RIGHT SHOULDER 12/6/2021 12:53 pm COMPARISON: None. HISTORY: ORDERING SYSTEM PROVIDED HISTORY: fall TECHNOLOGIST PROVIDED HISTORY: Reason for exam:->fall What reading provider will be dictating this exam?->CRC FINDINGS: Evaluation mildly limited due to nonstandard projections of the right shoulder. Glenohumeral joint is normally aligned. No evidence of acute fracture or dislocation. No abnormal periarticular calcifications. The [facility] joint is unremarkable in appearance. Visualized lung is unremarkable. No acute fractures or dislocations in the right shoulder. XR HIP BILATERAL W AP PELVIS (2 VIEWS)    Result Date: 12/6/2021  EXAMINATION: ONE XRAY VIEW OF THE PELVIS AND TWO XRAY VIEWS OF EACH OF THE BILATERAL HIPS 12/6/2021 12:53 pm COMPARISON: None. HISTORY: ORDERING SYSTEM PROVIDED HISTORY: Fall, altered mental status, concern for fracture TECHNOLOGIST PROVIDED HISTORY: Reason for exam:->Fall, altered mental status, concern for fracture What reading provider will be dictating this exam?->CRC FINDINGS: There is mild-to-moderate narrowing femoroacetabular joints suggesting degenerative change. Associated spurring at the acetabulum noted. However, there is no evidence for acute fracture or dislocation. Moderate degenerative change femoroacetabular joints without evidence of acute fracture or dislocation.      CT Head WO Contrast    Result Date: 12/6/2021  EXAMINATION: CT OF THE HEAD WITHOUT CONTRAST  12/6/2021 11:07 am TECHNIQUE: CT of the head was performed without the administration of intravenous contrast. Dose modulation, iterative reconstruction, and/or weight based adjustment of the mA/kV was utilized to reduce the radiation dose to as low as reasonably achievable. COMPARISON: CT head 03/14/2020 HISTORY: ORDERING SYSTEM PROVIDED HISTORY: Altered mental status, possible intracranial hemorrhage on anticoagulation TECHNOLOGIST PROVIDED HISTORY: Reason for exam:->Altered mental status, possible intracranial hemorrhage on anticoagulation Has a \"code stroke\" or \"stroke alert\" been called? ->No Decision Support Exception - unselect if not a suspected or confirmed emergency medical condition->Emergency Medical Condition ([place]) What reading provider will be dictating this exam?->CRC FINDINGS: There is patchy hypoattenuation within the white matter of the bilateral cerebral hemispheres. There is no evidence of mass, mass effect, or midline shift. The ventricles and sulci are of normal size and configuration for patient's age of 80 years. No extra-axial fluid collections or acute hemorrhage. The gray-white differentiation appears preserved without evidence of acute cortical ischemia. 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 The calvarium is intact. There is scattered mucosal thickening within the paranasal sinuses, most pronounced within the maxillary sinuses. The mastoid air cells are clear. 1. No acute intracranial abnormality. 2. Chronic small vessel ischemic disease. CT Cervical Spine WO Contrast    Result Date: 12/6/2021  EXAMINATION: CT OF THE CERVICAL SPINE WITHOUT CONTRAST 12/6/2021 11:07 am TECHNIQUE: CT of the cervical spine was performed without the administration of intravenous contrast. Multiplanar reformatted images are provided for review. Dose modulation, iterative reconstruction, and/or weight based adjustment of the mA/kV was utilized to reduce the radiation dose to as low as reasonably achievable.  COMPARISON: CT cervical spine 03/14/2020 HISTORY: ORDERING SYSTEM PROVIDED HISTORY: Possible fall TECHNOLOGIST PROVIDED HISTORY: Reason for exam:->Possible fall Decision Support Exception - unselect if not a suspected or confirmed emergency medical condition->Emergency Medical Condition ([place]) What reading provider will be dictating this exam?->CRC FINDINGS: The bones are demineralized. There is no evidence of acute fracture. There is mild wedge deformity of the C7 vertebral body which is stable compared to the prior examination. Vertebral alignment is anatomic. There is narrowing of the intervertebral disc space height at C5-6. The facet joints are intact at all levels with multilevel facet degeneration. The prevertebral soft tissue structures are unremarkable. There is probable multilevel central canal stenosis and neural foraminal narrowing. There are calcifications within the carotid arteries. 1. No acute fracture or subluxation. 2. Degenerative changes in the cervical spine with possible multilevel central canal stenosis and neural foraminal narrowing. XR CHEST PORTABLE    Result Date: 12/6/2021  EXAMINATION: ONE XRAY VIEW OF THE CHEST 12/6/2021 12:52 pm COMPARISON: None. HISTORY: ORDERING SYSTEM PROVIDED HISTORY: Altered mental status, concern for pneumonia TECHNOLOGIST PROVIDED HISTORY: Reason for exam:->Altered mental status, concern for pneumonia What reading provider will be dictating this exam?->CRC FINDINGS: The cardiac silhouette is at the upper limits of normal in size. There are no findings of failure. There are no gross findings of pneumonia. There is no pleural thickening or pleural effusion. 1. There are no findings of pneumonia or failure 2. The cardiac silhouette is at upper limits of normal in size. ASSESSMENT:    Active Problems:    * No active hospital problems. *  Resolved Problems:    * No resolved hospital problems.  *      PLAN:    Sepsis on admission  Altered mental status elevated lactic acid and leukocytosis  Source of infection is unclear at this time  Patient is pancultured started on IV antibiotics  We will follow culture results  Consult ID    Acute metabolic encephalopathy  Secondary to sepsis  Continue treat underlying illness    Left eye discharge  Probably conjunctivitis and blepharitis  Continue on eyedrops with antibiotics    Mild rhabdomyolysis  With elevation of CPK level  Patient was found on the floor  Continue IV fluid to prevent acute kidney injury      Diet: No diet orders on file  Code Status: Prior    Surrogate decision maker confirmed with patient:  Primary Emergency Contact: Melisa Rae, Home Phone: [phone]    DVT Prophylaxis: []Lovenox [x]Heparin []PCD [] [address] []Encouraged ambulation    Disposition: []Med/Surg [x] Intermediate [] ICU/CCU  Admit status: [] Observation [x] Inpatient     +++++++++++++++++++++++++++++++++++++++++++++++++  Anton Woods MD  [address]  +++++++++++++++++++++++++++++++++++++++++++++++++  NOTE: This report was transcribed using voice recognition software. Every effort was made to ensure accuracy; however, inadvertent computerized transcription errors may be present.

## 2021-12-06 NOTE — ED NOTES
Bed: 08  Expected date: 12/6/21  Expected time:   Means of arrival:   Comments:     Karon Pacheco RN  12/06/21 [id number]

## 2021-12-06 NOTE — ED PROVIDER NOTES
This is a 72-year-old male with history of hypopituitarism, atrial fibrillation (on anticoagulation with Eliquis), presenting to the emergency department for altered mental status. EMS were called to patient's house for a welfare check, was not answering phone for 2 days. He was initially answering questions for EMS, was found on the floor. Throughout transportation to emergency department patient became more confused, stopped answering questions. Additional information gathered from patient's family, he normally is fully functional at baseline, only gets confused when he has adrenal crises. He is full code per patient's son who is power of . The history is provided by the EMS personnel, a relative and medical records. The history is limited by the condition of the patient. Review of Systems   Unable to perform ROS: Patient nonverbal        Physical Exam  Vitals and nursing note reviewed. Constitutional:       Appearance: He is ill-appearing. Comments: Awake, not answering questions, intermittently opening eyes, moving all 4 extremities. Very ill-appearing elderly male   HENT:      Head: Normocephalic and atraumatic. Right Ear: External ear normal.      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 Mouth/Throat:      Comments: Mouth dry, very poor dentition  Eyes:      General: No scleral icterus. Left eye: Discharge present. Pupils: Pupils are equal, round, and reactive to light. Comments: Left eye with purulent discharge, severe conjunctival erythema. Pupils 2 mm bilaterally, reactive to light. Eyes moving, not tracking. Cardiovascular:      Rate and Rhythm: Normal rate and regular rhythm. Pulses: Normal pulses. Heart sounds: Normal heart sounds. Pulmonary:      Effort: Pulmonary effort is normal.      Breath sounds: Normal breath sounds. Abdominal:      General: Abdomen is flat. There is no distension. Palpations: Abdomen is soft. There is no mass. Tenderness: There is no abdominal tenderness. There is no guarding or rebound. Hernia: No hernia is present. Musculoskeletal:      Cervical back: Normal range of motion and neck supple. No rigidity or tenderness. Right lower leg: No edema. Left lower leg: No edema. Comments: Patient with medical bracelets on right wrist   Skin:     Capillary Refill: Capillary refill takes 2 to 3 seconds. Comments: Bilateral feet cool to the touch, but with easily palpable 2+ bilateral dorsalis pedis and posterior tibial pulses. Erythema across back, sacral/buttocks region   Neurological:      Comments: Patient moving all 4 extremities. Patient thrashing, moving nonpurposefully. Patient not speaking or answering questions, not following commands. He is keeping his eyes closed. He does localize pain. Procedures   Lumbar Puncture Procedure Note    Indication: Suspected meningitis    Consent: The family members were counseled regarding the procedure, it's indications, risks, potential complications and alternatives and any questions were answered. Consent was obtained. Procedure: The patient was placed in the right lateral decubitus position and the appropriate landmarks were identified. The area was prepped and draped in the usual sterile fashion. Anesthesia was obtained using 3 cc of 1% Lidocaine without epinephrine. A spinal needle was inserted at the L4- L5 level with the stylet in place until spinal fluid was returned. Opening pressure was not measured. At this point 4.5 cc of clear cerebral spinal fluid was obtained and sent for appropriate testing. The stylet was then replaced and the needle was withdrawn. A sterile dressing was placed over the site and the patient was placed in the supine position. The patient tolerated the procedure well.     Complications: None      MDM  Number of Diagnoses or Management Options  Acute alteration in mental status  Acute bacterial conjunctivitis of left eye  Acute metabolic encephalopathy  Sepsis, due to unspecified organism, unspecified whether acute organ dysfunction present [facility])  Diagnosis management comments: Is a 59-year-old male with history of hypopituitarism presenting to the emergency department for altered mental status. Patient was found down on the ground, severely altered after a welfare check was called. Extensive work-up was obtained. Patient initially afebrile, but rectal temperature is 100.8 Fahrenheit, patient also mildly tachycardic with mildly elevated lactate, consistent with sepsis. Blood cultures were obtained. Urinalysis with no evidence of UTI, no evidence of pneumonia on chest x-ray. CT head unremarkable. No hyperammonemia, negative urine and serum drug screen. Patient with mild leukocytosis, consistent with possible infection. Given unclear source of infection, severe altered mental status, lumbar puncture was performed, sent for Gram stain, culture, molecular panel. Patient given broad-spectrum antibiotics for empiric coverage of possible meningitis/encephalitis. Patient's EKG with no acute ischemic changes, troponin mildly elevated but stable on repeat. Given concern for acute metabolic encephalopathy secondary to sepsis of unknown etiology, patient will be admitted to the hospital for further work-up, treatment, monitoring. ED Course as of 12/06/21 1718   Mon Dec 06, 2021   [address], patient's sister, who states she was texting with the patient last night at 9:30 PM and was acting like himself. He fell about a week ago and injured his right shoulder. He has two sons, one in [facility] and one in [place]. He also has a daughter in [facility] [employer]. He lives by himself. [KG]   1118 Patient's son, Fiona Corrigan, [phone] or [phone]. [KG]   1120 Patient's daughter, Clare Jones, [phone].   [KG]   1770 EKG:  This EKG is signed and interpreted by me. Rate: 100  Rhythm: Sinus and with Right BBB  Interpretation: non-specific EKG  Comparison: stable as compared to patient's most recent EKG, except T wave inversions in V2, otherwise stable from previous EKG on 8/17/2021 [RH]   1151 Spoke to son and power of  Aliza Mcintosh. At [phone]. He states patient is full code. [RH]   1436 Patient lying in bed comfortably. He says yes when asked if he can tell me his name but does not tell me his name. He is easily arousable to voice. [RH]   3358 Discussed case with Dr. Rebeca Bronson, she agreed to admit patient. [RH]      ED Course User Index  [KG] Lacy Mendes DO  [RH] [address], DO     ED Course as of 12/06/21 1718   Mon Dec 06, 2021   [address], patient's sister, who states she was texting with the patient last night at 9:30 PM and was acting like himself. He fell about a week ago and injured his right shoulder. He has two sons, one in [facility] and one in [place]. He also has a daughter in [facility] [employer]. He lives by himself. [KG]   1118 Patient's son, Aliza Mcintosh, [phone] or [phone]. [KG]   1120 Patient's daughter, Eduardo Solorzano, [phone]. [KG]   1150 EKG: This EKG is signed and interpreted by me. Rate: 100  Rhythm: Sinus and with Right BBB  Interpretation: non-specific EKG  Comparison: stable as compared to patient's most recent EKG, except T wave inversions in V2, otherwise stable from previous EKG on 8/17/2021 [RH]   1151 Spoke to son and power of  Aliza Mcintosh. At [phone]. He states patient is full code. [RH]   1436 Patient lying in bed comfortably. He says yes when asked if he can tell me his name but does not tell me his name. He is easily arousable to voice. [RH]   6165 Discussed case with Dr. Rebeca Bronson, she agreed to admit patient.   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 Past Surgical History:  has a past surgical history that includes Upper gastrointestinal endoscopy (N/A, 8/19/2021). Social History:  reports that he has never smoked. He has never used smokeless tobacco. He reports previous alcohol use. He reports that he does not use drugs. Family History: family history is not on file. The patients home medications have been reviewed.     Allergies: Codeine, Diazepam, Lorazepam, and Morphine    -------------------------------------------------- RESULTS -------------------------------------------------    LABS:  Results for orders placed or performed during the hospital encounter of 12/06/21   COVID-19, Rapid    Specimen: Nasopharyngeal Swab   Result Value Ref Range    SARS-CoV-2, NAAT Not Detected Not Detected   CBC Auto Differential   Result Value Ref Range    WBC 16.2 (H) 4.5 - 11.5 E9/L    RBC 6.26 (H) 3.80 - 5.80 E12/L    Hemoglobin 16.0 12.5 - 16.5 g/dL    Hematocrit 49.9 37.0 - 54.0 %    MCV 79.7 (L) 80.0 - 99.9 fL    MCH 25.6 (L) 26.0 - 35.0 pg    MCHC 32.1 32.0 - 34.5 %    RDW 15.8 (H) 11.5 - 15.0 fL    Platelets 425 806 - 046 E9/L    MPV 10.0 7.0 - 12.0 fL    Neutrophils % 70.4 43.0 - 80.0 %    Lymphocytes % 11.3 (L) 20.0 - 42.0 %    Monocytes % 15.7 (H) 2.0 - 12.0 %    Eosinophils % 2.6 0.0 - 6.0 %    Basophils % 0.4 0.0 - 2.0 %    Neutrophils Absolute 11.34 (H) 1.80 - 7.30 E9/L    Lymphocytes Absolute 1.78 1.50 - 4.00 E9/L    Monocytes Absolute 2.59 (H) 0.10 - 0.95 E9/L    Eosinophils Absolute 0.42 0.05 - 0.50 E9/L    Basophils Absolute 0.00 0.00 - 0.20 E9/L    Anisocytosis 1+     Poikilocytes 1+     Tamara Cells 1+     Ovalocytes 1+    Comprehensive Metabolic Panel   Result Value Ref Range    Sodium 139 132 - 146 mmol/L    Potassium 4.1 3.5 - 5.0 mmol/L    Chloride 100 98 - 107 mmol/L    CO2 23 22 - 29 mmol/L    Anion Gap 16 7 - 16 mmol/L    Glucose 103 (H) 74 - 99 mg/dL    BUN 17 6 - 23 mg/dL    CREATININE 1.2 0.7 - 1.2 mg/dL    GFR Non-African American 57 >=60 mL/min/1.73    GFR African American >60     Calcium 9.8 8.6 - 10.2 mg/dL    Total Protein 7.8 6.4 - 8.3 g/dL    Albumin 4.0 3.5 - 5.2 g/dL    Total Bilirubin 0.6 0.0 - 1.2 mg/dL    Alkaline Phosphatase 108 40 - 129 U/L    ALT 15 0 - 40 U/L    AST 31 0 - 39 U/L   Magnesium   Result Value Ref Range    Magnesium 2.0 1.6 - 2.6 mg/dL   Troponin   Result Value Ref Range    Troponin, High Sensitivity 24 (H) 0 - 11 ng/L   Brain Natriuretic Peptide   Result Value Ref Range    Pro- (H) 0 - 450 pg/mL   Protime-INR   Result Value Ref Range    Protime 11.5 9.3 - 12.4 sec    INR 1.1    APTT   Result Value Ref Range    aPTT 29.8 24.5 - 35.1 sec   Urinalysis, reflex to microscopic   Result Value Ref Range    Color, UA Yellow Straw/Yellow    Clarity, UA Clear Clear    Glucose, Ur Negative Negative mg/dL    Bilirubin Urine Negative Negative    Ketones, Urine TRACE (A) Negative mg/dL    Specific Gravity, UA 1.020 1.005 - 1.030    Blood, Urine TRACE-INTACT Negative    pH, UA 6.0 5.0 - 9.0    Protein, UA Negative Negative mg/dL    Urobilinogen, Urine 0.2 <2.0 E.U./dL    Nitrite, Urine Negative Negative    Leukocyte Esterase, Urine Negative Negative   Lactate, Sepsis   Result Value Ref Range    Lactic Acid, Sepsis 2.9 (H) 0.5 - 1.9 mmol/L   Lactate, Sepsis   Result Value Ref Range    Lactic Acid, Sepsis 2.0 (H) 0.5 - 1.9 mmol/L   Ammonia   Result Value Ref Range    Ammonia 18.0 16.0 - 60.0 umol/L   Serum Drug Screen   Result Value Ref Range    Ethanol Lvl <10 mg/dL    Acetaminophen Level <5.0 (L) 10.0 - 04.3 mcg/mL    Salicylate, Serum <2.9 0.0 - 30.0 mg/dL    TCA Scrn NEGATIVE Cutoff:300 ng/mL   URINE DRUG SCREEN   Result Value Ref Range    Amphetamine Screen, Urine NOT DETECTED Negative <1000 ng/mL    Barbiturate Screen, Ur NOT DETECTED Negative < 200 ng/mL    Benzodiazepine Screen, Urine NOT DETECTED Negative < 200 ng/mL    Cannabinoid Scrn, Ur NOT DETECTED Negative < 50ng/mL    Cocaine Metabolite Screen, Urine NOT DETECTED Negative < 300 ng/mL    Opiate Scrn, Ur NOT DETECTED Negative < 300ng/mL    PCP Screen, Urine NOT DETECTED Negative < 25 ng/mL    Methadone Screen, Urine NOT DETECTED Negative <300 ng/mL    Oxycodone Urine NOT DETECTED Negative <100 ng/mL    FENTANYL SCREEN, URINE NOT DETECTED Negative <1 ng/mL    Drug Screen Comment: see below    Lipase   Result Value Ref Range    Lipase 19 13 - 60 U/L   TSH WITHOUT REFLEX   Result Value Ref Range    TSH 1.040 0.270 - 4.200 uIU/mL   CK   Result Value Ref Range    Total  (H) 20 - 200 U/L   Blood Gas, Arterial   Result Value Ref Range    Date Analyzed 20211206     Time Analyzed 1148     Source: Blood Arterial     pH, Blood Gas 7.484 (H) 7.350 - 7.450    PCO2 27.0 (L) 35.0 - 45.0 mmHg    PO2 70.5 (L) 75.0 - 100.0 mmHg    HCO3 19.8 (L) 22.0 - 26.0 mmol/L    B.E. -1.8 -3.0 - 3.0 mmol/L    O2 Sat 95.4 92.0 - 98.5 %    O2Hb 94.9 94.0 - 97.0 %    COHb 0.2 0.0 - 1.5 %    MetHb 0.3 0.0 - 1.5 %    O2 Content 21.9 mL/dL    HHb 4.6 0.0 - 5.0 %    tHb (est) 16.4 11.5 - 16.5 g/dL    Date Of Collection      Time Collected      Pt Temp 37.0 C     ID [id number]     Lab [id number]     Critical(s) Notified .  No Critical Values    Troponin   Result Value Ref Range    Troponin, High Sensitivity 22 (H) 0 - 11 ng/L   Microscopic Urinalysis   Result Value Ref Range    WBC, UA NONE 0 - 5 /HPF    RBC, UA 0-1 0 - 2 /HPF    Epithelial Cells, UA NONE SEEN /HPF    Bacteria, UA RARE (A) None Seen /HPF   EKG 12 Lead   Result Value Ref Range    Ventricular Rate 100 BPM    Atrial Rate 100 BPM    P-R Interval 198 ms    QRS Duration 134 ms    Q-T Interval 398 ms    QTc Calculation (Bazett) 513 ms    P Axis 35 degrees    R Axis -100 degrees    T Axis 16 degrees       RADIOLOGY:  XR SHOULDER RIGHT (MIN 2 VIEWS)   Final Result   No acute fractures or dislocations in the right shoulder. XR CHEST PORTABLE   Final Result   1. There are no findings of pneumonia or failure   2. The cardiac silhouette is at upper limits of normal in size. XR HIP BILATERAL W AP PELVIS (2 VIEWS)   Final Result   Moderate degenerative change femoroacetabular joints without evidence of   acute fracture or dislocation. CT Cervical Spine WO Contrast   Final Result   1. No acute fracture or subluxation. 2. Degenerative changes in the cervical spine with possible multilevel   central canal stenosis and neural foraminal narrowing. CT Head WO Contrast   Final Result   1. No acute intracranial abnormality. 2. Chronic small vessel ischemic disease. CT ABDOMEN PELVIS W IV CONTRAST Additional Contrast? None    (Results Pending)           ------------------------- NURSING NOTES AND VITALS REVIEWED ---------------------------  Date / Time Roomed:  12/6/2021 11:10 AM  ED Bed Assignment:  08/08    The nursing notes within the ED encounter and vital signs as below have been reviewed. Patient Vitals for the past 24 hrs:   BP Temp Temp src Pulse Resp SpO2 Weight   12/06/21 1704 110/67 -- -- 108 17 94 % --   12/06/21 1615 -- 100.8 °F (38.2 °C) Rectal -- -- -- --   12/06/21 1434 -- 98 °F (36.7 °C) -- -- -- -- --   12/06/21 1321 (!) 159/91 -- -- 104 18 92 % --   12/06/21 1113 (!) 156/94 97.4 °F (36.3 °C) -- 97 22 96 % 190 lb (86.2 kg)       Oxygen Saturation Interpretation: Normal    ------------------------------------------ PROGRESS NOTES ------------------------------------------  Re-evaluation(s):  See ED COURSE    Counseling:  I have spoken with the patient and discussed todays results, in addition to providing specific details for the plan of care and counseling regarding the diagnosis and prognosis.   Their questions are answered at this time and they are agreeable with the plan of admission.    --------------------------------- ADDITIONAL PROVIDER NOTES ---------------------------------  Consultations:  See ED COURSE  This patient's ED course included: a personal history and physicial examination, re-evaluation prior to disposition, multiple bedside re-evaluations, IV medications, cardiac monitoring, continuous pulse oximetry and complex medical decision making and emergency management    This patient has remained hemodynamically stable during their ED course. Diagnosis:  1. Acute metabolic encephalopathy    2. Acute alteration in mental status    3. Acute bacterial conjunctivitis of left eye    4. Sepsis, due to unspecified organism, unspecified whether acute organ dysfunction present [facility])        Disposition:  Patient's disposition: Admit to telemetry  Patient's condition is serious but stable.          [address], DO  Resident  12/06/21 5307

## 2021-12-06 NOTE — Clinical Note
Patient Class: Inpatient [101]   REQUIRED: Diagnosis: Acute alteration in mental status [[id number]]   Estimated Length of Stay: Estimated stay of more than 2 midnights   Admitting Provider: Koko Brian [[id number]]   Telemetry/Cardiac Monitoring Required?: Yes

## 2021-12-07 ENCOUNTER — APPOINTMENT (OUTPATIENT)
Dept: CT IMAGING | Age: 86
DRG: 947 | End: 2021-12-07
Payer: MEDICARE

## 2021-12-07 ENCOUNTER — APPOINTMENT (OUTPATIENT)
Dept: ULTRASOUND IMAGING | Age: 86
DRG: 947 | End: 2021-12-07
Payer: MEDICARE

## 2021-12-07 LAB
ALBUMIN SERPL-MCNC: 3.1 G/DL (ref 3.5–5.2)
ALP BLD-CCNC: 81 U/L (ref 40–129)
ALT SERPL-CCNC: 19 U/L (ref 0–40)
ANION GAP SERPL CALCULATED.3IONS-SCNC: 12 MMOL/L (ref 7–16)
APPEARANCE CSF: CLEAR
APPEARANCE CSF: CLEAR
AST SERPL-CCNC: 38 U/L (ref 0–39)
BASOPHILS ABSOLUTE: 0.02 E9/L (ref 0–0.2)
BASOPHILS RELATIVE PERCENT: 0.2 % (ref 0–2)
BILIRUB SERPL-MCNC: 0.3 MG/DL (ref 0–1.2)
BUN BLDV-MCNC: 17 MG/DL (ref 6–23)
CALCIUM SERPL-MCNC: 8.4 MG/DL (ref 8.6–10.2)
CHLORIDE BLD-SCNC: 102 MMOL/L (ref 98–107)
CO2: 19 MMOL/L (ref 22–29)
COLOR CSF: COLORLESS
COLOR CSF: COLORLESS
CREAT SERPL-MCNC: 1.2 MG/DL (ref 0.7–1.2)
EOSINOPHILS ABSOLUTE: 0 E9/L (ref 0.05–0.5)
EOSINOPHILS RELATIVE PERCENT: 0 % (ref 0–6)
GFR AFRICAN AMERICAN: >60
GFR NON-AFRICAN AMERICAN: 57 ML/MIN/1.73
GLUCOSE BLD-MCNC: 162 MG/DL (ref 74–99)
HCT VFR BLD CALC: 43.3 % (ref 37–54)
HEMOGLOBIN: 13.7 G/DL (ref 12.5–16.5)
IMMATURE GRANULOCYTES #: 0.14 E9/L
IMMATURE GRANULOCYTES %: 1.3 % (ref 0–5)
LACTIC ACID: 0.8 MMOL/L (ref 0.5–2.2)
LYMPHOCYTES ABSOLUTE: 0.68 E9/L (ref 1.5–4)
LYMPHOCYTES RELATIVE PERCENT: 6.4 % (ref 20–42)
MCH RBC QN AUTO: 25.8 PG (ref 26–35)
MCHC RBC AUTO-ENTMCNC: 31.6 % (ref 32–34.5)
MCV RBC AUTO: 81.7 FL (ref 80–99.9)
MONOCYTE, CSF: 100 % (ref 10–70)
MONOCYTE, CSF: 100 % (ref 10–70)
MONOCYTES ABSOLUTE: 0.54 E9/L (ref 0.1–0.95)
MONOCYTES RELATIVE PERCENT: 5.1 % (ref 2–12)
NEUTROPHILS ABSOLUTE: 9.23 E9/L (ref 1.8–7.3)
NEUTROPHILS RELATIVE PERCENT: 87 % (ref 43–80)
NEUTROPHILS, CSF: 0 % (ref 0–10)
NEUTROPHILS, CSF: 0 % (ref 0–10)
PDW BLD-RTO: 15.9 FL (ref 11.5–15)
PLATELET # BLD: 290 E9/L (ref 130–450)
PMV BLD AUTO: 10.4 FL (ref 7–12)
POTASSIUM REFLEX MAGNESIUM: 4.6 MMOL/L (ref 3.5–5)
RBC # BLD: 5.3 E12/L (ref 3.8–5.8)
RBC CSF: <2000 /UL
RBC CSF: <2000 /UL
SODIUM BLD-SCNC: 133 MMOL/L (ref 132–146)
TOTAL PROTEIN: 6.2 G/DL (ref 6.4–8.3)
TUBE NUMBER CSF: ABNORMAL
TUBE NUMBER CSF: ABNORMAL
WBC # BLD: 10.6 E9/L (ref 4.5–11.5)
WBC CSF: 3 /UL (ref 0–2)
WBC CSF: <3 /UL (ref 0–2)

## 2021-12-07 PROCEDURE — 93880 EXTRACRANIAL BILAT STUDY: CPT

## 2021-12-07 PROCEDURE — 6370000000 HC RX 637 (ALT 250 FOR IP): Performed by: EMERGENCY MEDICINE

## 2021-12-07 PROCEDURE — 85025 COMPLETE CBC W/AUTO DIFF WBC: CPT

## 2021-12-07 PROCEDURE — 2580000003 HC RX 258: Performed by: INTERNAL MEDICINE

## 2021-12-07 PROCEDURE — 6360000002 HC RX W HCPCS: Performed by: INTERNAL MEDICINE

## 2021-12-07 PROCEDURE — 80053 COMPREHEN METABOLIC PANEL: CPT

## 2021-12-07 PROCEDURE — 6360000004 HC RX CONTRAST MEDICATION: Performed by: RADIOLOGY

## 2021-12-07 PROCEDURE — 83605 ASSAY OF LACTIC ACID: CPT

## 2021-12-07 PROCEDURE — 74177 CT ABD & PELVIS W/CONTRAST: CPT

## 2021-12-07 PROCEDURE — 6370000000 HC RX 637 (ALT 250 FOR IP): Performed by: INTERNAL MEDICINE

## 2021-12-07 PROCEDURE — 2700000000 HC OXYGEN THERAPY PER DAY

## 2021-12-07 PROCEDURE — 2060000000 HC ICU INTERMEDIATE R&B

## 2021-12-07 PROCEDURE — 93880 EXTRACRANIAL BILAT STUDY: CPT | Performed by: RADIOLOGY

## 2021-12-07 RX ORDER — POTASSIUM CHLORIDE 20 MEQ/1
20 TABLET, EXTENDED RELEASE ORAL DAILY
Status: DISCONTINUED | OUTPATIENT
Start: 2021-12-07 | End: 2021-12-08 | Stop reason: HOSPADM

## 2021-12-07 RX ORDER — METOPROLOL SUCCINATE 25 MG/1
25 TABLET, EXTENDED RELEASE ORAL DAILY
Status: DISCONTINUED | OUTPATIENT
Start: 2021-12-07 | End: 2021-12-08 | Stop reason: HOSPADM

## 2021-12-07 RX ORDER — POTASSIUM CHLORIDE 20 MEQ/1
20 TABLET, EXTENDED RELEASE ORAL DAILY
COMMUNITY

## 2021-12-07 RX ORDER — METOPROLOL SUCCINATE 25 MG/1
25 TABLET, EXTENDED RELEASE ORAL DAILY
COMMUNITY

## 2021-12-07 RX ORDER — LEVOTHYROXINE SODIUM 137 UG/1
137 TABLET ORAL DAILY
COMMUNITY

## 2021-12-07 RX ADMIN — Medication 10 ML: at 21:12

## 2021-12-07 RX ADMIN — SODIUM CHLORIDE: 9 INJECTION, SOLUTION INTRAVENOUS at 09:11

## 2021-12-07 RX ADMIN — FLUDROCORTISONE ACETATE 0.1 MG: 0.1 TABLET ORAL at 13:59

## 2021-12-07 RX ADMIN — PANTOPRAZOLE SODIUM 40 MG: 40 TABLET, DELAYED RELEASE ORAL at 14:58

## 2021-12-07 RX ADMIN — Medication 10 ML: at 01:11

## 2021-12-07 RX ADMIN — Medication 1000 UNITS: at 21:12

## 2021-12-07 RX ADMIN — SODIUM CHLORIDE: 9 INJECTION, SOLUTION INTRAVENOUS at 21:12

## 2021-12-07 RX ADMIN — Medication 1000 UNITS: at 14:58

## 2021-12-07 RX ADMIN — CEFTRIAXONE SODIUM 2000 MG: 2 INJECTION, POWDER, FOR SOLUTION INTRAMUSCULAR; INTRAVENOUS at 05:30

## 2021-12-07 RX ADMIN — METOPROLOL SUCCINATE 25 MG: 25 TABLET, EXTENDED RELEASE ORAL at 14:00

## 2021-12-07 RX ADMIN — LEVOTHYROXINE SODIUM 137 MCG: 0.14 TABLET ORAL at 13:53

## 2021-12-07 RX ADMIN — POTASSIUM CHLORIDE 20 MEQ: 1500 TABLET, EXTENDED RELEASE ORAL at 14:58

## 2021-12-07 RX ADMIN — IOPAMIDOL 90 ML: 755 INJECTION, SOLUTION INTRAVENOUS at 01:11

## 2021-12-07 ASSESSMENT — PAIN SCALES - GENERAL
PAINLEVEL_OUTOF10: 0
PAINLEVEL_OUTOF10: 0

## 2021-12-07 NOTE — CONSULTS
[address] [place] [address] [facility]    Consultation Note     Admit Date: 12/6/2021 11:10 AM    Reason for Consult:       Attending Physician:  Jey Warren DO     Chief Complaint: Change in mental status over least a 2-day. Not answering phone; patient had Covid booster on 12/4/2021    HISTORY OF PRESENT ILLNESS:   The patient is a 80 y.o.  man  known to the Infectious Diseases service. The patient is admitted through the emergency room after EMS came to the house for medical check on the patient and was found on the floor during the transport to the ED apparently became confused and actually stopped answering questions. In the emergency room this 27-year-old man had a lumbar puncture which revealed less than 3 white cells a glucose of 60 and a CSF protein of 39. In the emergency room he was given acyclovir, ampicillin, ceftriaxone, vancomycin, hydrocortisone and dexamethasone. Patient had been afebrile except for temperature of 100.8. He was hypertensive coming into the ED with 159/91 blood pressure 90 became hypotensive. He is on 3 L. He had a trifecta of CAT scans including a CT of the cervical spine that showed no fractures or subluxation as CT of the head that revealed no intracranial bleed although there was chronic small vessel disease and a CT of the abdomen pelvis that showed diverticulosis and hiatal hernia. Chest x-ray was nondiagnostic. Of course to meningitis screen was negative Covid was negative. Son was in the room to give me information about patient's circumstances.                           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 ([facility] [address]     Hypothyroid     Osteopenia     Renal insufficiency     Right bundle branch block      Past Surgical History:        Procedure Laterality Date    UPPER GASTROINTESTINAL ENDOSCOPY N/A 8/19/2021    EGD ESOPHAGOGASTRODUODENOSCOPY performed by Audie Nissen, MD at [facility] ENDOSCOPY     Current Medications:   Scheduled Meds:   apixaban  2.5 mg Oral BID    metoprolol succinate  25 mg Oral Daily    potassium chloride  20 mEq Oral Daily    sodium chloride flush  5-40 mL IntraVENous 2 times per day    cefTRIAXone (ROCEPHIN) IV  2,000 mg IntraVENous Q24H    fludrocortisone  0.1 mg Oral Daily    levothyroxine  137 mcg Oral Daily    budesonide  1 mg Nebulization BID    pantoprazole  40 mg Oral BID    Vitamin D  1,000 Units Oral BID    acetaminophen  1,000 mg Oral Once    vancomycin  1,000 mg IntraVENous Q24H     Continuous Infusions:   sodium chloride 125 mL/hr at 12/07/21 0911    sodium chloride       PRN Meds:sodium chloride flush, sodium chloride, polyethylene glycol, acetaminophen **OR** acetaminophen, ondansetron    Allergies:  Codeine, Diazepam, Lorazepam, and Morphine    Social History:   Social History     Socioeconomic History    Marital status:      Spouse name: Not on file    Number of children: Not on file    Years of education: Not on file    Highest education level: Not on file   Occupational History    Not on file   Tobacco Use    Smoking status: Never Smoker    Smokeless tobacco: Never Used   Vaping Use    Vaping Use: Never used   Substance and Sexual Activity    Alcohol use: Not Currently    Drug use: Never    Sexual activity: Not on file   Other Topics Concern    Not on file   Social History Narrative    Not on file     Social Determinants of Health     Financial Resource Strain:     Difficulty of Paying Living Expenses: Not on file   Food Insecurity:     Worried About Running Out of Food in the Last Year: Not on file    Tej of Food in the Last Year: Not on file   Transportation Needs:     Lack of Transportation (Medical): Not on file    Lack of Transportation (Non-Medical):  Not on file   Physical Activity:     Days of Exercise per Week: Not on file    Minutes of Exercise per Session: Not on file   Stress:     Feeling of Stress : Not on file   Social Connections:     Frequency of Communication with Friends and Family: Not on file    Frequency of Social Gatherings with Friends and Family: Not on file    Attends Zoroastrianism Services: Not on file    Active Member of [address] [place] [address] or Organizations: Not on file    Attends Club or Organization Meetings: Not on file    Marital Status: Not on file   Intimate Partner Violence:     Fear of Current or Ex-Partner: Not on file    Emotionally Abused: Not on file    Physically Abused: Not on file    Sexually Abused: Not on file   Housing Stability:     Unable to Pay for Housing in the Last Year: Not on file    Number of [place] in the Last Year: Not on file    Unstable Housing in the Last Year: Not on file     Tobacco: No  Alcohol: No  Pets: No  Travel: No    Family History:   No family history on file. . Otherwise non-pertinent to the chief complaint. REVIEW OF SYSTEMS:    CONSTITUTIONAL:  No chills, fevers or night sweats. No loss of weight. EYES:  No double vision or drainage from eyes, ears or throat. HEENT:  No neck stiffness. No dysphagia. 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 CARDIOVASCULAR:  No chest pain, palpitations, orthopnea or dyspnea on exertion. GASTROINTESTINAL:  No nausea, vomiting, diarrhea or constipation or hematochezia   GENITOURINARY:  No frequency burning dysuria or hematuria. INTEGUMENT/BREAST:  No rash or breast masses. HEMATOLOGIC/LYMPHATIC:  No lymphadenopathy or blood dyscrasics. ALLERGIC/IMMUNOLOGIC:  No anaphylaxis. ENDOCRINE:  No polyuria or polydipsia or temperature intolerance. MUSCULOSKELETAL:  No myalgia or arthralgia. Full ROM. NEUROLOGICAL:  No focal motor sensory deficit. BEHAVIOR/PSYCH:  No psychosis.      PHYSICAL EXAM:    Vitals:    /63 Pulse 71   Temp 97.6 °F (36.4 °C) (Temporal)   Resp 17   Wt 190 lb (86.2 kg)   SpO2 96%   BMI 25.07 kg/m²   Constitutional: The patient is awake, alert, and oriented. Skin: Warm and dry. No rashes were noted. No jaundice. HEENT: Eyes show round, and reactive pupils. Moist mucous membranes, no ulcerations, no thrush. Poor dentition  Neck: Supple to movements. No lymphadenopathy. Chest: No use of accessory muscles to breathe. Symmetrical expansion. Auscultation reveals no wheezing, crackles, or rhonchi. Cardiovascular: S1 and S2 are rhythmic and regular. No murmurs appreciated. Abdomen: Positive bowel sounds to auscultation. Benign to palpation. No masses felt. No hepatosplenomegaly. Genitourinary: [place]  Extremities: No clubbing, no cyanosis, no edema. Musculoskeletal: Equal and symmetrical  Neurological: No focal  Lines: peripheral      CBC+dif:  Recent Labs     12/06/21  1120 12/06/21  1120 12/07/21  0532   WBC 16.2*  --  10.6   HGB 16.0   < > 13.7   HCT 49.9   < > 43.3   MCV 79.7*   < > 81.7      < > 290   NEUTROABS 11.34*   < > 9.23*    < > = values in this interval not displayed.      No results found for: CRP  No results found for: CRPHS  No results found for: SEDRATE  Lab Results   Component Value Date    ALT 19 12/07/2021    AST 38 12/07/2021    ALKPHOS 81 12/07/2021    BILITOT 0.3 12/07/2021     Lab Results   Component Value Date     12/07/2021    K 4.6 12/07/2021     12/07/2021    CO2 19 12/07/2021    BUN 17 12/07/2021    CREATININE 1.2 12/07/2021    GFRAA >60 12/07/2021    LABGLOM 57 12/07/2021    GLUCOSE 162 12/07/2021    PROT 6.2 12/07/2021    LABALBU 3.1 12/07/2021    CALCIUM 8.4 12/07/2021    BILITOT 0.3 12/07/2021    ALKPHOS 81 12/07/2021    AST 38 12/07/2021    ALT 19 12/07/2021       Lab Results   Component Value Date    PROTIME 11.5 12/06/2021    INR 1.1 12/06/2021       Lab Results   Component Value Date    TSH 1.040 12/06/2021       Lab Results   Component Value Date    COLORU Yellow 12/06/2021    PHUR 6.0 12/06/2021    WBCUA NONE 12/06/2021    RBCUA 0-1 12/06/2021    BACTERIA RARE 12/06/2021    CLARITYU Clear 12/06/2021    SPECGRAV 1.020 12/06/2021    LEUKOCYTESUR Negative 12/06/2021    UROBILINOGEN 0.2 12/06/2021    BILIRUBINUR Negative 12/06/2021    BLOODU TRACE-INTACT 12/06/2021    GLUCOSEU Negative 12/06/2021       No results found for: INM4FJM, BEART, S8NUYXHI, PHART, THGBART, NYR3HMH, PO2ART, AKU7YRH  Radiology:  CT ABDOMEN PELVIS W IV CONTRAST Additional Contrast? None   Final Result   1. Small hiatal hernia. 2. Diverticulosis. No evidence for acute diverticulitis. XR SHOULDER RIGHT (MIN 2 VIEWS)   Final Result   No acute fractures or dislocations in the right shoulder. XR CHEST PORTABLE   Final Result   1. There are no findings of pneumonia or failure   2. The cardiac silhouette is at upper limits of normal in size. XR HIP BILATERAL W AP PELVIS (2 VIEWS)   Final Result   Moderate degenerative change femoroacetabular joints without evidence of   acute fracture or dislocation. CT Cervical Spine WO Contrast   Final Result   1. No acute fracture or subluxation. 2. Degenerative changes in the cervical spine with possible multilevel   central canal stenosis and neural foraminal narrowing. CT Head WO Contrast   Final Result   1. No acute intracranial abnormality. 2. Chronic small vessel ischemic disease. Microbiology:  Pending  No results for input(s): BC in the last 72 hours. No results for input(s): ORG in the last 72 hours. No results for input(s): Crissie Soulier in the last 72 hours. No results for input(s): STREPNEUMAGU in the last 72 hours. No results for input(s): LP1UAG in the last 72 hours. No results for input(s): ASO in the last 72 hours. No results for input(s): CULTRESP in the last 72 hours.     Assessment:  · Patient with hypopituitary is him status post Covid vaccine booster developed a change in

## 2021-12-07 NOTE — ED NOTES
Pt saturating between 87-89% on room air.  Placed on 2L O2 via nasal canula, SpO2 increased to 95%       Warner Bryant RN  12/06/21 1670

## 2021-12-07 NOTE — ED NOTES
Dr. Luli Ponce paged for low BP, currently waiting for response, pt w/o acute change      Marino Berry RN  12/06/21 7603

## 2021-12-07 NOTE — PROGRESS NOTES
Hospitalist Progress Note      SYNOPSIS: Patient admitted on 2021 for syncope      SUBJECTIVE: Pt was found at home passed out on the floor. EMS was called and found pt to have  A blood pressure and of 80/50. He was given fluid bolus and when he arrived to emergency department  He was awake and a little confused. Pt son says this has happened to pt in the past whenever he has not take his oral hydrocortisone pills for his Addisons disease. Patient seen and examined  Records reviewed. Stable overnight. No other overnight issues reported. Temp (24hrs), Av °F (36.7 °C), Min:97.6 °F (36.4 °C), Max:98.3 °F (36.8 °C)    DIET: ADULT DIET; Easy to Chew  CODE: Full Code    Intake/Output Summary (Last 24 hours) at 2021 1706  Last data filed at 2021 1504  Gross per 24 hour   Intake 10 ml   Output 2400 ml   Net -2390 ml       Review of Systems  All bolded are positive; please see HPI  General:  Fever, chills, diaphoresis, fatigue, malaise, night sweats, weight loss  Psychological:  Anxiety, disorientation, hallucinations. ENT:  Epistaxis, headaches, vertigo, visual changes. Cardiovascular:  Chest pain, irregular heartbeats, palpitations, paroxysmal nocturnal dyspnea. Respiratory:  Shortness of breath, coughing, sputum production, hemoptysis, wheezing, orthopnea.   Gastrointestinal:  Nausea, vomiting, diarrhea, heartburn, constipation, abdominal pain, hematemesis, hematochezia, melena, acholic stools  Genito-Urinary:  Dysuria, urgency, frequency, hematuria  Musculoskeletal:  Joint pain, joint stiffness, joint swelling, muscle pain  Neurology:  Headache, focal neurological deficits, weakness, numbness, paresthesia  Derm:  Rashes, ulcers, excoriations, bruising  Extremities:  Decreased ROM, peripheral edema, mottling      OBJECTIVE:    /83   Pulse 83   Temp 98.1 °F (36.7 °C) (Temporal)   Resp 18   Wt 190 lb (86.2 kg)   SpO2 96%   BMI 25.07 kg/m²     General appearance:  awake, alert, and oriented to person, place, time, and purpose; appears stated age and cooperative; no apparent distress no labored breathing  HEENT:  Conjunctivae/corneas clear. Neck: Supple. No jugular venous distention. Respiratory: symmetrical; clear to auscultation bilaterally; no wheezes; no rhonchi; no rales  Cardiovascular: rhythm regular; rate controlled; no murmurs  Abdomen: Soft, nontender, nondistended  Extremities:  peripheral pulses present; no peripheral edema; no ulcers  Musculoskeletal: No clubbing, cyanosis, no bilateral lower extremity edema. Brisk capillary refill. Skin:  No rashes  on visible skin  Neurologic: awake, alert and following commands     ASSESSMENT    1. Syncope  2. 1st degree heart block  3. Leukocytosis ( resolved)       PLAN: Pt started back on his cortisone tablets. Cardiology consult and neurology consult.              DISPOSITION:-admission  CONDITION:Stable    Medications:  REVIEWED DAILY    Infusion Medications    sodium chloride 125 mL/hr at 12/07/21 0911    sodium chloride       Scheduled Medications    apixaban  2.5 mg Oral BID    metoprolol succinate  25 mg Oral Daily    potassium chloride  20 mEq Oral Daily    sodium chloride flush  5-40 mL IntraVENous 2 times per day    fludrocortisone  0.1 mg Oral Daily    levothyroxine  137 mcg Oral Daily    budesonide  1 mg Nebulization BID    pantoprazole  40 mg Oral BID    Vitamin D  1,000 Units Oral BID    acetaminophen  1,000 mg Oral Once     PRN Meds: sodium chloride flush, sodium chloride, polyethylene glycol, acetaminophen **OR** acetaminophen, ondansetron    Labs:     Recent Labs     12/06/21  1120 12/07/21  0532   WBC 16.2* 10.6   HGB 16.0 13.7   HCT 49.9 43.3    290       Recent Labs     12/06/21  1120 12/07/21  0532    133   K 4.1 4.6    102   CO2 23 19*   BUN 17 17   CREATININE 1.2 1.2   CALCIUM 9.8 8.4*       Recent Labs     12/06/21  1120 12/07/21  0532   PROT 7.8 6.2*   ALKPHOS 108 81   ALT 15 19 AST 31 38   BILITOT 0.6 0.3   LIPASE 19  --        Recent Labs     12/06/21  1120   INR 1.1       Recent Labs     12/06/21  1120   CKTOTAL 333*       Chronic labs:    Lab Results   Component Value Date    TSH 1.040 12/06/2021    INR 1.1 12/06/2021       Radiology: REVIEWED DAILY    +++++++++++++++++++++++++++++++++++++++++++++++++  Mulberry Drew, DO DO  [address]  +++++++++++++++++++++++++++++++++++++++++++++++++  NOTE: This report was transcribed using voice recognition software. Every effort was made to ensure accuracy; however, inadvertent computerized transcription errors may be present.

## 2021-12-07 NOTE — ED NOTES
Dr. Olivia Worley informed of pt continuously low blood pressure. This RN was instructed to increase NS infusion from 125ml/hr to an open bolus.       Hayden Mims RN  12/06/21 2344

## 2021-12-08 VITALS
BODY MASS INDEX: 25.07 KG/M2 | DIASTOLIC BLOOD PRESSURE: 62 MMHG | TEMPERATURE: 96.5 F | WEIGHT: 190 LBS | OXYGEN SATURATION: 92 % | RESPIRATION RATE: 18 BRPM | HEART RATE: 100 BPM | SYSTOLIC BLOOD PRESSURE: 133 MMHG

## 2021-12-08 LAB — CORTISOL TOTAL: 3.93 MCG/DL (ref 2.68–18.4)

## 2021-12-08 PROCEDURE — 2580000003 HC RX 258: Performed by: INTERNAL MEDICINE

## 2021-12-08 PROCEDURE — 97161 PT EVAL LOW COMPLEX 20 MIN: CPT

## 2021-12-08 PROCEDURE — 99221 1ST HOSP IP/OBS SF/LOW 40: CPT | Performed by: INTERNAL MEDICINE

## 2021-12-08 PROCEDURE — 97530 THERAPEUTIC ACTIVITIES: CPT

## 2021-12-08 PROCEDURE — 6370000000 HC RX 637 (ALT 250 FOR IP): Performed by: INTERNAL MEDICINE

## 2021-12-08 PROCEDURE — 6370000000 HC RX 637 (ALT 250 FOR IP): Performed by: EMERGENCY MEDICINE

## 2021-12-08 RX ORDER — LEVOTHYROXINE SODIUM 0.12 MG/1
125 TABLET ORAL DAILY
Qty: 30 TABLET | Refills: 3 | Status: SHIPPED | OUTPATIENT
Start: 2021-12-08 | End: 2022-05-12

## 2021-12-08 RX ORDER — SUCRALFATE 1 G/1
1 TABLET ORAL 3 TIMES DAILY
Qty: 120 TABLET | Refills: 3 | Status: SHIPPED | OUTPATIENT
Start: 2021-12-08 | End: 2022-05-12

## 2021-12-08 RX ORDER — HYDROCORTISONE 5 MG/1
5 TABLET ORAL
Qty: 30 TABLET | Refills: 0 | Status: SHIPPED | OUTPATIENT
Start: 2021-12-08

## 2021-12-08 RX ORDER — HYDROCORTISONE 5 MG/1
5 TABLET ORAL
Qty: 30 TABLET | Refills: 0 | Status: ON HOLD | OUTPATIENT
Start: 2021-12-08 | End: 2022-05-13

## 2021-12-08 RX ORDER — HYDROCORTISONE 5 MG/1
10 TABLET ORAL DAILY
Status: DISCONTINUED | OUTPATIENT
Start: 2021-12-08 | End: 2021-12-08 | Stop reason: HOSPADM

## 2021-12-08 RX ORDER — HYDROCORTISONE 5 MG/1
5 TABLET ORAL DAILY
Status: DISCONTINUED | OUTPATIENT
Start: 2021-12-08 | End: 2021-12-08 | Stop reason: HOSPADM

## 2021-12-08 RX ORDER — HYDROCORTISONE 10 MG/1
10 TABLET ORAL
Qty: 30 TABLET | Refills: 0 | Status: SHIPPED | OUTPATIENT
Start: 2021-12-08

## 2021-12-08 RX ORDER — HYDROCORTISONE 5 MG/1
TABLET ORAL SEE ADMIN INSTRUCTIONS
COMMUNITY
End: 2022-05-12

## 2021-12-08 RX ORDER — ALBUTEROL SULFATE 2.5 MG/3ML
2.5 SOLUTION RESPIRATORY (INHALATION) EVERY 6 HOURS PRN
Qty: 120 EACH | Refills: 3 | Status: SHIPPED | OUTPATIENT
Start: 2021-12-08

## 2021-12-08 RX ADMIN — PANTOPRAZOLE SODIUM 40 MG: 40 TABLET, DELAYED RELEASE ORAL at 06:59

## 2021-12-08 RX ADMIN — HYDROCORTISONE 5 MG: 5 TABLET ORAL at 11:56

## 2021-12-08 RX ADMIN — METOPROLOL SUCCINATE 25 MG: 25 TABLET, EXTENDED RELEASE ORAL at 09:46

## 2021-12-08 RX ADMIN — Medication 1000 UNITS: at 09:46

## 2021-12-08 RX ADMIN — Medication 10 ML: at 09:47

## 2021-12-08 RX ADMIN — POTASSIUM CHLORIDE 20 MEQ: 1500 TABLET, EXTENDED RELEASE ORAL at 09:45

## 2021-12-08 RX ADMIN — PANTOPRAZOLE SODIUM 40 MG: 40 TABLET, DELAYED RELEASE ORAL at 16:25

## 2021-12-08 RX ADMIN — FLUDROCORTISONE ACETATE 0.1 MG: 0.1 TABLET ORAL at 09:49

## 2021-12-08 RX ADMIN — HYDROCORTISONE 5 MG: 5 TABLET ORAL at 16:25

## 2021-12-08 RX ADMIN — LEVOTHYROXINE SODIUM 137 MCG: 0.14 TABLET ORAL at 06:59

## 2021-12-08 RX ADMIN — SODIUM CHLORIDE: 9 INJECTION, SOLUTION INTRAVENOUS at 07:01

## 2021-12-08 ASSESSMENT — PAIN SCALES - GENERAL: PAINLEVEL_OUTOF10: 0

## 2021-12-08 NOTE — PROGRESS NOTES
Physical Therapy  Physical Therapy Initial Assessment     Name: Marissa Ervin  : 1931  MRN: [id number]      Date of Service: 2021    Evaluating PT:  Manish Kang, PT, DPT    Room #:  6629/5220-D  Diagnosis:  Acute bacterial conjunctivitis of left eye [W73.79]  Acute metabolic encephalopathy [U46.68]  Acute alteration in mental status [R41.82]  AMS (altered mental status) [R41.82]  Sepsis, due to unspecified organism, unspecified whether acute organ dysfunction present ([facility] [address] [A41.9]  PMHx/PSHx:  Afib, hypothyroidism, osteopenia, RBBB  Procedure/Surgery:  N/A  Precautions:  Fall risk, cognitive deficits  Equipment Needs:  TBD    SUBJECTIVE:    Pt lives alone in a 2 story home with 2 steps to enter and no handrail. Bed/bath is on 1st floor. Pt ambulated with no AD PTA. OBJECTIVE:   Initial Evaluation  Date: 21 Treatment Short Term/ Long Term   Goals   AM-PAC 6 Clicks 32/42     Was pt agreeable to Eval/treatment? yes     Does pt have pain?  No pain     Bed Mobility  Rolling: SBA  Supine to sit: SBA  Sit to supine: SBA  Scooting: SBA  Rolling: IND  Supine to sit: IND  Sit to supine: IND  Scooting: IND   Transfers Sit to stand: CGA  Stand to sit: CGA  Stand pivot: CGA with no AD  Sit to stand: IND  Stand to sit: IND  Stand pivot: mod I with AAD   Ambulation    100'x2, 300'x1 with no AD CGA  400'+ with AAD mod I   Stair negotiation: ascended and descended  NT  2 steps with AAD mod I     Strength/ROM:   BLE AROM WFL  BLE grossly 4/5  Balance:   Static Sitting: SBA  Dynamic Sitting: SBA  Static Standing: CGA with no AD  Dynamic Standing: min A with no AD    Pt is A & O x 2, unable to recall place  Sensation:  Pt denies numbness and tingling to extremities  Edema:  unremarkable    Therapeutic Exercises:    Bed mobility: supine<>sit, cued for EOB positioning  Transfers: STSx3, cued for hand placement and postural correction  Ambulation: 100'x2, 300'x1 with no AD  BLE AROM    Patient education  Pt educated on role of PT, importance of functional mobility during hospital stay, hospital safety, PT POC    Patient response to education:   Pt verbalized understanding Pt demonstrated skill Pt requires further education in this area   yes yes reinforce     ASSESSMENT:    Conditions Requiring Skilled Therapeutic Intervention:    [x]Decreased strength     []Decreased ROM  []Decreased functional mobility  [x]Decreased balance   []Decreased endurance   []Decreased posture  []Decreased sensation  []Decreased coordination   []Decreased vision  [x]Decreased safety awareness   []Increased pain       Comments:    Pt supine in bed upon entering, agreeable to participate. Pt confused to place, thinking he was at home, reoriented frequently. Pt instructed to transfer to EOB, no c/o of dizziness with position change. Pt instructed to transfer from bed and ambulate to tolerance. Pt demonstrating decreased yuli and narrow JOE, CGA provided 2/2 increased lateral sway. Pt provided safety cues for safe hallway negotiation as needed. Pt provided a short seated rest break in between bouts. Pt returned to bed at the end of session. Call bell in reach and all needs met prior to exiting. Pt's nurse aware of pt's performance. Treatment:  Patient practiced and was instructed in the following treatment:     Bed mobility training - pt given verbal and tactile cues to facilitate proper sequencing and safety during rolling and supine>sit as well as provided with physical assistance to complete task    Gait training- pt was given verbal and tactile cues to facilitate good yuli and improve pt's balance during ambulation as well as provided with hands on assistance.  STS and pivot transfer training - pt educated on proper hand and foot placement, safety and sequencing, and use of verbal cues to safely complete sit<>stand and pivot transfers with hands on assistance to complete task safely     Pt's/ family goals   1.  Return home    Prognosis is good for reaching above PT goals. Patient and or family understand(s) diagnosis, prognosis, and plan of care. yes    PHYSICAL THERAPY PLAN OF CARE:    PT POC is established based on physician order and patient diagnosis     Referring provider/PT Order:  Dann Sunshine DO  Diagnosis:  Acute bacterial conjunctivitis of left eye [K28.58]  Acute metabolic encephalopathy [G38.29]  Acute alteration in mental status [R41.82]  AMS (altered mental status) [R41.82]  Sepsis, due to unspecified organism, unspecified whether acute organ dysfunction present ([facility] [address] [A41.9]  Specific instructions for next treatment:  Progress as tolerated, gait training    Current Treatment Recommendations:     [x] Strengthening to improve independence with functional mobility   [] ROM to improve independence with functional mobility   [x] Balance Training to improve static/dynamic balance and to reduce fall risk  [x] Endurance Training to improve activity tolerance during functional mobility   [x] Transfer Training to improve safety and independence with all functional transfers   [x] Gait Training to improve gait mechanics, endurance and assess need for appropriate assistive device  [] Stair Training in preparation for safe discharge home and/or into the community   [] Positioning to prevent skin breakdown and contractures  [x] Safety and Education Training   [x] Patient/Caregiver Education   [] HEP  [] Other     PT long term treatment goals are located in above grid    Frequency of treatments: 2-5x/week x 1-2 weeks. Time in  1040  Time out  1100    Total Treatment Time  10 minutes     Evaluation Time includes thorough review of current medical information, gathering information on past medical history/social history and prior level of function, completion of standardized testing/informal observation of tasks, assessment of data and education on plan of care and goals.     CPT codes:  [x] Low Complexity PT evaluation 20734  [] Moderate Complexity PT evaluation [id number]  [] High Complexity PT evaluation [id number]  [] PT Re-evaluation [id number]  [] Gait training [phone] -- minutes  [] Manual therapy 28300 -- minutes  [x] Therapeutic activities 49996 10 minutes  [] Therapeutic exercises 74348 -- minutes  [] Neuromuscular reeducation 41437 -- minutes     Julio Schulz, PT, DPT  [id number]

## 2021-12-08 NOTE — CARE COORDINATION
Met with pt and son at bedside, pt lives alone in a 2 story home, 2 steps to enter. Pt has a cane and a walker, but does not use. Pt is an active  and works part time 4hrs/day 5/days week. Plan is home at discharge, son to transport. PT 18/24, 300ft. PCP is Dr. Margarita [facility]. No needs addressed. Juan Henley, MSW, LSW

## 2021-12-08 NOTE — CONSULTS
Inpatient Cardiology Consultation      Reason for Consult:  syncope    Consulting Physician: Dr. Viviane Roland    Requesting Physician:  Dr. Bird Person    Date of Consultation: 12/8/2021    HISTORY OF PRESENT ILLNESS:     This 59-year-old male is known to [facility] cardiology. He was first evaluated by Nik Fuller in March 2020 for elevated troponin and atrial flutter and unresponsiveness. He had influenza A and tachycardia and elevated troponins which was thought due to rhabdomyolysis, multifactorial.  He was in a wide-complex tachycardia that was thought to be paroxysmal atrial flutter with complete right bundle branch block. He was started on IV to oral amiodarone and was on IV heparin. He never followed up at our office. He then had a subsequent admission with anemia and GI bleed and oral anticoagulation (aspirin and Eliquis) were stopped. He has not followed up with primary care since then and Eliquis was never resumed . The following information is taken from the patient's son as he does not recall much of the incident. The patient received his COVID-19 booster on December 4. He does not remember passing out and he does does remember paramedics finding him on the floor 6/12/1960 but he does not remember coming to the hospital or being in the emergency room. He presented to the emergency room on December 6 for altered mental status. He did not show up at work and paramedics were called to check on him. EKG on admission showed sinus rhythm with supraventricular complexes and PVCs and right bundle branch block and age undetermined inferior infarct. Blood pressure was 156/94 and initially afebrile but then temperature catina to 100.8 °F and there was no hypoxia on room air. Blood pressure then dropped to 78/49 60/41.     Potassium was 4.1 with a BUN of 17 and a creatinine of 1.2, procalcitonin 1.12 and  with total , troponin 24-22, tox screen negative, WBC 16.2 and H&H 16 and 49.9, so far blood cultures are negative, negative for COVID-19, ABGs PO2 70, PCO2 27, pH 7.48    CT of the head showed no acute abnormality and chest x-ray showed no acute findings. CT of the abdomen and pelvis showed a small hiatal hernia and carotid ultrasounds showed no hemodynamically significant stenosis. He underwent spinal tap the fluid was clear. He is being hydrated    He had conjunctivitis and septic due to unknown etiology  1. COPD/asthma  2. RBBB (noted on EKG 10/2019)  3. Osteoporosis  4. Chronic kidney disease (baseline unknown)  5. Adrenal insufficiency  6. Hypothyroidism: Previously on Synthroid  7. Lifelong non-smoker  8. Hiatal hernia  9. DDD  10. Hospital admission March 15, 2020 for hypoxic respiratory failure with influenza A and pneumonia, rhabdomyolysis and wide-complex tachycardia thought paroxysmal atrial flutter right bundle branch block, started on heparin and amiodarone  11.  2D echo March 14, 2020    Micro-bubble contrast injected to enhance left ventricular visualization. Normal left ventricular size and systolic function. Ejection fraction is visually estimated at 60-65%. Indeterminate diastolic function. No regional wall motion abnormalities seen. Mild left ventricular concentric hypertrophy noted. Valves not well visualized, no obvious abnormalities by Doppler   interrogation      12. COVID-19 infection with hypoxic respiratory failure at the [place]       13. Hospital admission July 31 August 2, 2021 with vertigo and dehydration , orthostatic hypotension  14. Hospital admission August 17 to August 21, 2021 with anemia and GI bleeding, hemoglobin was 6 and he was transfused. He was on aspirin with Eliquis at that time  15. Endoscopy August 2021 distal esophageal ulcer  16. Hypothyroidism      Medications Prior to admit:  Prior to Admission medications    Medication Sig Start Date End Date Taking?  Authorizing Provider   levothyroxine (SYNTHROID) 137 MCG tablet Take 137 mcg by mouth Daily    Historical Provider, MD   apixaban (ELIQUIS) 2.5 MG TABS tablet Take 2.5 mg by mouth 2 times daily    Historical Provider, MD   metoprolol succinate (TOPROL XL) 25 MG extended release tablet Take 25 mg by mouth daily    Historical Provider, MD   potassium chloride (KLOR-CON M) 20 MEQ extended release tablet Take 20 mEq by mouth daily    Historical Provider, MD   mometasone (ASMANEX) 200 MCG/ACT AERO inhaler Inhale 1 puff into the lungs 2 times daily    Historical Provider, MD   ferrous sulfate (IRON 325) 325 (65 Fe) MG tablet Take 1 tablet by mouth 2 times daily 8/21/21   Suzanne Ron MD   pantoprazole (PROTONIX) 40 MG tablet Take 40 mg by mouth 2 times daily    Historical Provider, MD   acetaminophen (TYLENOL) 325 MG tablet Take 650 mg by mouth every 6 hours as needed for Pain    Historical Provider, MD   Multiple Vitamins-Minerals (THERAPEUTIC MULTIVITAMIN-MINERALS) tablet Take 1 tablet by mouth daily    Historical Provider, MD   simvastatin (ZOCOR) 20 MG tablet Take 1/2 tablet by mouth Mon - Fri and take 1 tablet by mouth Sat and Sun    Historical Provider, MD   fludrocortisone (FLORINEF) 0.1 MG tablet Take 0.1 mg by mouth daily    Historical Provider, MD       Current Medications:    Current Facility-Administered Medications: apixaban (ELIQUIS) tablet 2.5 mg, 2.5 mg, Oral, BID  metoprolol succinate (TOPROL XL) extended release tablet 25 mg, 25 mg, Oral, Daily  potassium chloride (KLOR-CON M) extended release tablet 20 mEq, 20 mEq, Oral, Daily  0.9 % sodium chloride infusion, , IntraVENous, Continuous  sodium chloride flush 0.9 % injection 5-40 mL, 5-40 mL, IntraVENous, 2 times per day  sodium chloride flush 0.9 % injection 5-40 mL, 5-40 mL, IntraVENous, PRN  0.9 % sodium chloride infusion, 25 mL, IntraVENous, PRN  polyethylene glycol (GLYCOLAX) packet 17 g, 17 g, Oral, Daily PRN  acetaminophen (TYLENOL) tablet 650 mg, 650 mg, Oral, Q6H PRN **OR** acetaminophen (TYLENOL) suppository 650 mg, 650 mg, Rectal, Q6H PRN  ondansetron (ZOFRAN) injection 4 mg, 4 mg, IntraVENous, Q6H PRN  fludrocortisone (FLORINEF) tablet 0.1 mg, 0.1 mg, Oral, Daily  levothyroxine (SYNTHROID) tablet 137 mcg, 137 mcg, Oral, Daily  budesonide (PULMICORT) nebulizer suspension 1,000 mcg, 1 mg, Nebulization, BID  pantoprazole (PROTONIX) tablet 40 mg, 40 mg, Oral, BID  vitamin D (CHOLECALCIFEROL) tablet 1,000 Units, 1,000 Units, Oral, BID  acetaminophen (TYLENOL) tablet 1,000 mg, 1,000 mg, Oral, Once    Allergies:  Codeine, Diazepam, Lorazepam, and Morphine    Social History: Non-smoker nondrinker and , currently working      Family History:   No family history on file. REVIEW OF SYSTEMS:     · Constitutional: Denies fatigue, fevers, chills or night sweats  · Eyes: Denies visual changes or drainage  · ENT: Denies headaches or hearing loss. No mouth sores or sore throat. No epistaxis   · Cardiovascular: Denies chest pain, pressure or palpitations. No lower extremity swelling. · Respiratory: Denies GARCIA, cough, orthopnea or PND. No hemoptysis   · Gastrointestinal: Denies hematemesis or anorexia. No hematochezia or melena    · Genitourinary: Denies urgency, dysuria or hematuria. · Musculoskeletal: Denies gait disturbance, weakness or joint complaints  · Integumentary: Denies rash, hives or pruritis   · Neurological: Denies dizziness, headaches or seizures. No numbness or tingling  · Psychiatric: Denies anxiety or depression. · Endocrine: Denies temperature intolerance. No recent weight change. .  · Hematologic/Lymphatic: Denies abnormal bruising or bleeding. No swollen lymph nodes    PHYSICAL EXAM:   /62   Pulse 68   Temp 96 °F (35.6 °C) (Temporal)   Resp 18   Wt 190 lb (86.2 kg)   SpO2 93%   BMI 25.07 kg/m²   CONST:  Well developed, well nourished who appears of stated age. Awake, alert and cooperative. No apparent distress.    HEENT:   Head- Normocephalic, atraumatic   Eyes- Conjunctivae pink on the right and erythematous on the left, anicteric  Throat- Oral mucosa pink and moist  Neck-  No stridor, trachea midline, no jugular venous distention. No carotid bruit. CHEST: Chest symmetrical and non-tender to palpation. No accessory muscle use or intercostal retractions  RESPIRATORY: Lung sounds - clear throughout fields   CARDIOVASCULAR:     Heart Inspection- shows no noted pulsations  Heart Palpation- no heaves or thrills; PMI is non-displaced   Heart Ausculation- Regular rate and rhythm, no murmur. No s3, s4 or rub   PV: No lower extremity edema. No varicosities. Pedal pulses palpable, no clubbing or cyanosis   ABDOMEN: Soft, non-tender to light palpation. Bowel sounds present. No palpable masses no organomegaly; no abdominal bruit  MS: Good muscle strength and tone. No atrophy or abnormal movements. : Deferred  SKIN: Warm and dry no statis dermatitis or ulcers   NEURO / PSYCH: Oriented to person, place and time. Speech clear and appropriate. Follows all commands. Pleasant affect     DATA:    ECG / Tele strips: Sinus rhythm  Diagnostic:      Intake/Output Summary (Last 24 hours) at 12/8/2021 0829  Last data filed at 12/8/2021 0701  Gross per 24 hour   Intake 2051 ml   Output 1300 ml   Net 751 ml       Labs:   CBC:   Recent Labs     12/06/21  1120 12/07/21  0532   WBC 16.2* 10.6   HGB 16.0 13.7   HCT 49.9 43.3    290     BMP:   Recent Labs     12/06/21  1120 12/07/21  0532    133   K 4.1 4.6   CO2 23 19*   BUN 17 17   CREATININE 1.2 1.2   LABGLOM 57 57   CALCIUM 9.8 8.4*     Mag:   Recent Labs     12/06/21  1120   MG 2.0     Phos: No results for input(s): PHOS in the last 72 hours.   TFT:   Lab Results   Component Value Date    TSH 1.040 12/06/2021    X1TPEVL 84.80 10/06/2019    F4YBXLE 8.4 03/14/2020    T4FREE 1.22 03/14/2020      HgA1c: No results found for: LABA1C  No results found for: EAG  proBNP:   Recent Labs     12/06/21  1120   PROBNP 521*     PT/INR:   Recent Labs     12/06/21  1120 PROTIME 11.5   INR 1.1     APTT:  Recent Labs     12/06/21  1120   APTT 29.8     CARDIAC ENZYMES:  Recent Labs     12/06/21  1120 12/06/21  1400   CKTOTAL 333*  --    TROPHS 24* 22*     FASTING LIPID PANEL:No results found for: CHOL, HDL, LDLDIRECT, LDLCALC, TRIG  LIVER PROFILE:  Recent Labs     12/06/21  1120 12/07/21  0532   AST 31 38   ALT 15 19   LABALBU 4.0 3.1*     Impressions discussed with   1. Syncope, unclear etiology in the setting of a febrile illness, leukocytosis a few days after receiving COVID-19 booster. 2. Mildly elevated but flat troponins with no chest pain  3. Normal left ventricular systolic function by 2D echocardiogram in March 2020  4. History of paroxysmal atrial fibrillation in March 2020 and was on oral anticoagulation until August 2021 when he was admitted with anemia and a GI bleed in the setting of oral aspirin and low-dose apixaban and endoscopy found a distal esophageal ulcer, maintaining sinus rhythm  5. COVID-19 infection in March 2020 with hypoxic respiratory failure  6. History of orthostatic hypotension  7. 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 Hypothyroidism  9. Right bundle branch block  10. Non-smoker/but with COPD  11. Osteoporosis    Plans  1. Check orthostatic blood pressures  2. Continue Eliquis which has been restarted, continue beta-blockers  3. Cardiology to sign off and please call if needed  4. Follow-up with Mable timmons out pt. Electronically signed by Rhona Snellen, APRN - CNP on 12/8/2021 at 8:29 AM     Patient chart reviewed with Rhona Snellen, APRN - CNP. Patient seen and examined in the presence of his. son. Assessment and plan were made in collaboration with Rhona Snellen, APRN - CNP. 80-year-old obese male known to me from prior admission in March 2020 and was seen in consultation today due to syncope.   He has history of paroxysmal atrial fibrillation, hypothyroidism, orthostatic hypotension, hypopituitarism, adrenal urgency, status post COVID-19 infection, and GI bleed. Patient had his booster COVID-19 vaccine on December 4. Patient was found on the floor on December 6 and was febrile. He was brought to the emergency room and was admitted. Patient does not remember the event. Patient was orthostatic during hospitalization. His last vital signs done this afternoon revealed no orthostatic hypotension. Patient has been ambulating on the floor with no dizziness. His physical exam is pertinent for:  Elderly obese male laying in bed no acute distress. Dry mucous membranes. Regular heart with distant heart sounds with no murmur, rub or gallop. Clear lungs. No abdominal bruit. No lower extremity edema with palpable pedal pulses. EKG on admission revealed sinus tachycardia at 100 bpm, low voltage in frontal leads, chronic right bundle branch block with nonspecific ST-T wave changes and prolonged QTC at 513 ms. Blood test revealed: Creatinine 1.2, potassium 4.8, , high sensitivity troponin 24 and 22, WBC 16,000. Assessment:  1. Syncope probably due to dehydration, and orthostatic hypotension in the setting of a febrile illness, leukocytosis a few days after receiving COVID-19 booster. 2. Mildly elevated but flat troponins with no chest pain  3. Normal left ventricular systolic function by 2D echocardiogram in March 2020  4. History of paroxysmal atrial fibrillation in March 2020 and was on oral anticoagulation until August 2021 when he was admitted with anemia and a GI bleed in the setting of oral aspirin and low-dose apixaban and endoscopy found a distal esophageal ulcer, maintaining sinus rhythm  5. COVID-19 infection in March 2020 with hypoxic respiratory failure  6. History of orthostatic hypotension  7. 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 Hypothyroidism  9. Right bundle branch block  10.  Non-smoker/but with COPD  6. Osteoporosis     Plans  1. May discharge home from a cardiac standpoint of view. 2. Continue Eliquis which has been restarted, continue beta-blockers  3. Consider 14 days ZIO XT monitor if recurrence of syncope. 4. Cardiology will sign off. May call please call if needed. 5. Follow-up with  as out pt. Thank you for allowing me to share in the care of patient.   Kierra Tyler MD

## 2021-12-08 NOTE — PROGRESS NOTES
Hospitalist Progress Note      SYNOPSIS: Patient admitted on 2021 for <principal problem not specified>      SUBJECTIVE:  Day 2 of syncope. Pt up and about without any dizziness or unsteadiness. He says he feels   well and wants to go home. Waiting for cardiology consult today. Patient seen and examined  Records reviewed. Stable overnight. No other overnight issues reported. Temp (24hrs), Av.3 °F (35.7 °C), Min:96 °F (35.6 °C), Max:96.5 °F (35.8 °C)    DIET: ADULT DIET; Easy to Chew  CODE: Full Code    Intake/Output Summary (Last 24 hours) at 2021 1742  Last data filed at 2021 1419  Gross per 24 hour   Intake 2651 ml   Output --   Net 2651 ml       Review of Systems  All bolded are positive; please see HPI  General:  Fever, chills, diaphoresis, fatigue, malaise, night sweats, weight loss  Psychological:  Anxiety, disorientation, hallucinations. ENT:  Epistaxis, headaches, vertigo, visual changes. Cardiovascular:  Chest pain, irregular heartbeats, palpitations, paroxysmal nocturnal dyspnea. Respiratory:  Shortness of breath, coughing, sputum production, hemoptysis, wheezing, orthopnea. Gastrointestinal:  Nausea, vomiting, diarrhea, heartburn, constipation, abdominal pain, hematemesis, hematochezia, melena, acholic stools  Genito-Urinary:  Dysuria, urgency, frequency, hematuria  Musculoskeletal:  Joint pain, joint stiffness, joint swelling, muscle pain  Neurology:  Headache, focal neurological deficits, weakness, numbness, paresthesia  Derm:  Rashes, ulcers, excoriations, bruising  Extremities:  Decreased ROM, peripheral edema, mottling      OBJECTIVE:    /62   Pulse 100   Temp 96.5 °F (35.8 °C) (Temporal)   Resp 18   Wt 190 lb (86.2 kg)   SpO2 92%   BMI 25.07 kg/m²     General appearance:  awake, alert, and oriented to person, place, time, and purpose; appears stated age and cooperative; no apparent distress no labored breathing  HEENT:  Conjunctivae/corneas clear. Neck: Supple. No jugular venous distention. Respiratory: symmetrical; clear to auscultation bilaterally; no wheezes; no rhonchi; no rales  Cardiovascular: rhythm regular; rate controlled; no murmurs  Abdomen: Soft, nontender, nondistended  Extremities:  peripheral pulses present; no peripheral edema; no ulcers  Musculoskeletal: No clubbing, cyanosis, no bilateral lower extremity edema. Brisk capillary refill. Skin:  No rashes  on visible skin  Neurologic: awake, alert and following commands     ASSESSMENT:    1. Syncope  2. Leucocytosis (resolved)       PLAN: Pt was seen by cardiology and he was cleared to go home.            DISPOSITION:  Discharged    Medications:  REVIEWED DAILY    Infusion Medications    sodium chloride 75 mL/hr at 12/08/21 1626    sodium chloride       Scheduled Medications    hydrocortisone  10 mg Oral Daily    hydrocortisone  5 mg Oral Daily    hydrocortisone  5 mg Oral Daily    apixaban  2.5 mg Oral BID    metoprolol succinate  25 mg Oral Daily    potassium chloride  20 mEq Oral Daily    sodium chloride flush  5-40 mL IntraVENous 2 times per day    levothyroxine  137 mcg Oral Daily    budesonide  1 mg Nebulization BID    pantoprazole  40 mg Oral BID    Vitamin D  1,000 Units Oral BID    acetaminophen  1,000 mg Oral Once     PRN Meds: sodium chloride flush, sodium chloride, polyethylene glycol, acetaminophen **OR** acetaminophen, ondansetron    Labs:     Recent Labs     12/06/21  1120 12/07/21  0532   WBC 16.2* 10.6   HGB 16.0 13.7   HCT 49.9 43.3    290       Recent Labs     12/06/21  1120 12/07/21  0532    133   K 4.1 4.6    102   CO2 23 19*   BUN 17 17   CREATININE 1.2 1.2   CALCIUM 9.8 8.4*       Recent Labs     12/06/21  1120 12/07/21  0532   PROT 7.8 6.2*   ALKPHOS 108 81   ALT 15 19   AST 31 38   BILITOT 0.6 0.3   LIPASE 19  --        Recent Labs     12/06/21  1120   INR 1.1       Recent Labs     12/06/21  1120   CKTOTAL 333*       Chronic labs:    Lab Results   Component Value Date    TSH 1.040 12/06/2021    INR 1.1 12/06/2021       Radiology: REVIEWED DAILY    +++++++++++++++++++++++++++++++++++++++++++++++++  Thera Naas, DO DO  [facility] [address]  +++++++++++++++++++++++++++++++++++++++++++++++++  NOTE: This report was transcribed using voice recognition software. Every effort was made to ensure accuracy; however, inadvertent computerized transcription errors may be present.

## 2021-12-08 NOTE — PROGRESS NOTES
[address] [place] [address] [facility]  Progress Note      Chief Complaint   Patient presents with    Other     PD did wellness check after 2 days of not hearing from patient. Patient found on floor, unsure how long. Pt. takes Eliquis and is confused       SUBJECTIVE:  Patient is tolerating medications. No reported adverse drug reactions. No nausea, vomiting, diarrhea. During his entire breakfast and lunch  Still on fluids  Review of systems:  As stated above in the chief complaint, otherwise negative. Medications:  Scheduled Meds:   hydrocortisone  10 mg Oral Daily    hydrocortisone  5 mg Oral Daily    hydrocortisone  5 mg Oral Daily    apixaban  2.5 mg Oral BID    metoprolol succinate  25 mg Oral Daily    potassium chloride  20 mEq Oral Daily    sodium chloride flush  5-40 mL IntraVENous 2 times per day    levothyroxine  137 mcg Oral Daily    budesonide  1 mg Nebulization BID    pantoprazole  40 mg Oral BID    Vitamin D  1,000 Units Oral BID    acetaminophen  1,000 mg Oral Once     Continuous Infusions:   sodium chloride 125 mL/hr at 21 0701    sodium chloride       PRN Meds:sodium chloride flush, sodium chloride, polyethylene glycol, acetaminophen **OR** acetaminophen, ondansetron    OBJECTIVE:  /62   Pulse 100   Temp 96.5 °F (35.8 °C) (Temporal)   Resp 18   Wt 190 lb (86.2 kg)   SpO2 92%   BMI 25.07 kg/m²   Temp  Av.7 °F (35.9 °C)  Min: 96 °F (35.6 °C)  Max: 98.1 °F (36.7 °C)  Constitutional: The patient is awake, alert, and oriented. Skin: Warm and dry. No rashes were noted. HEENT: Round and reactive pupils. Moist mucous membranes. No ulcerations or thrush. Neck: Supple to movements. Chest: No use of accessory muscles to breathe. Symmetrical expansion. No wheezing, crackles or rhonchi. Cardiovascular: S1 and S2 are irregularly irregular. No murmurs appreciated. Abdomen: Positive bowel sounds to auscultation. Benign to palpation.  No masses felt. No hepatosplenomegaly. Genitourinary: Male  Extremities: No clubbing, no cyanosis, no edema.   Lines: peripheral    Laboratory and Tests Review:  Lab Results   Component Value Date    WBC 10.6 12/07/2021    WBC 16.2 (H) 12/06/2021    WBC 6.6 08/21/2021    HGB 13.7 12/07/2021    HCT 43.3 12/07/2021    MCV 81.7 12/07/2021     12/07/2021     Lab Results   Component Value Date    NEUTROABS 9.23 (H) 12/07/2021    NEUTROABS 11.34 (H) 12/06/2021    NEUTROABS 3.66 08/21/2021     No results found for: CRPHS  Lab Results   Component Value Date    ALT 19 12/07/2021    AST 38 12/07/2021    ALKPHOS 81 12/07/2021    BILITOT 0.3 12/07/2021     Lab Results   Component Value Date     12/07/2021    K 4.6 12/07/2021     12/07/2021    CO2 19 12/07/2021    BUN 17 12/07/2021    CREATININE 1.2 12/07/2021    CREATININE 1.2 12/06/2021    CREATININE 1.3 08/21/2021    GFRAA >60 12/07/2021    LABGLOM 57 12/07/2021    GLUCOSE 162 12/07/2021    PROT 6.2 12/07/2021    LABALBU 3.1 12/07/2021    CALCIUM 8.4 12/07/2021    BILITOT 0.3 12/07/2021    ALKPHOS 81 12/07/2021    AST 38 12/07/2021    ALT 19 12/07/2021     No results found for: CRP  No results found for: [address]  Radiology:  Reviewed    Microbiology:   Lab Results   Component Value Date    BC 24 Hours no growth 12/06/2021    BC 5 Days no growth 08/01/2021    BC 5 Days- no growth 03/14/2020     Lab Results   Component Value Date    BLOODCULT2 24 Hours no growth 12/06/2021    BLOODCULT2 5 Days no growth 08/01/2021    BLOODCULT2 5 Days- no growth 03/14/2020     No results found for: WNDABS  Smear, Respiratory   Date Value Ref Range Status   03/15/2020   Final    Group 5: >25 PMN's/LPF and <10 Epithelial cells/LPF  Abundant Polymorphonuclear leukocytes  Rare Epithelial cells  Few Gram positive cocci in pairs       No results found for: MPNEUMO, CLAMYDCU, LABLEGI, AFBCX, FUNGSM, LABFUNG  CULTURE, RESPIRATORY   Date Value Ref Range Status   03/15/2020 Oral Pharyngeal Kristin present  Final     No results found for: CXCATHTIP  No results found for: BFCS  No results found for: CXSURG  Urine Culture, Routine   Date Value Ref Range Status   08/01/2021 <10,000 CFU/mL  Mixed gram positive organisms    Final   10/05/2019 Growth not present  Final     MRSA Culture Only   Date Value Ref Range Status   03/14/2020 Methicillin resistant Staph aureus not isolated  Final       ASSESSMENT:  · No evidence of meningitis    PLAN:  · Continue off antibiotics;  Sine-Off as needed  · Check final cultures  · Monitor labs    Dora Cardozo MD  12:31 PM  12/8/2021

## 2021-12-08 NOTE — PROGRESS NOTES
Cardiology consult placed via [facility] serve to Dr Merle Fabian.   Elder Rivera NP taking messages

## 2021-12-09 ENCOUNTER — CARE COORDINATION (OUTPATIENT)
Dept: CASE MANAGEMENT | Age: 86
End: 2021-12-09

## 2021-12-09 NOTE — CARE COORDINATION
[facility] Transitions Initial Follow Up Call    Call within 2 business days of discharge: Yes    Patient: Marissa Ervin Patient : 1931   MRN: <[id number]>  Reason for Admission: acute metabolic encephalopathy  Discharge Date: 21 RARS: Readmission Risk Score: 15.6 ( )      Last Discharge [facility]       Complaint Diagnosis Description Type Department Provider    21 Other Acute metabolic encephalopathy . .. ED to Hosp-Admission (Discharged) (ADMITTED) [facility] 7WE 1 Destin Watson DO; Kane Chow. .. First attempt to reach the patient for initial [facility] PCP Care Transition call post hospital discharge. Message left with CTN's contact information requesting return phone call. Follow Up  No future appointments.     Jodie Car RN

## 2021-12-10 ENCOUNTER — TELEPHONE (OUTPATIENT)
Dept: CARDIOLOGY CLINIC | Age: 86
End: 2021-12-10

## 2021-12-10 ENCOUNTER — CARE COORDINATION (OUTPATIENT)
Dept: CASE MANAGEMENT | Age: 86
End: 2021-12-10

## 2021-12-10 NOTE — CARE COORDINATION
Emma 45 Transitions Initial Follow Up Call    Call within 2 business days of discharge: Yes    Patient: Ronaldo Ly Patient : 1931   MRN: <[id number]>  Reason for Admission: acute metabolic encephalopathy  Discharge Date: 21 RARS: Readmission Risk Score: 15.6 ( )      Last Discharge [facility]       Complaint Diagnosis Description Type Department Provider    21 Other Acute metabolic encephalopathy . .. ED to Hosp-Admission (Discharged) (ADMITTED) [facility] 7WE 1 Destin Watson DO; Viridiana Christy. .. Second and final attempt to reach the patient for initial Care Transition call post hospital discharge. Message left with CTN's contact information requesting return phone call. Will sign off. Follow Up  No future appointments.     Maria Guadalupe Berg RN

## 2021-12-11 LAB
BLOOD CULTURE, ROUTINE: NORMAL
CSF CULTURE: NORMAL
CULTURE, BLOOD 2: NORMAL
GRAM STAIN RESULT: NORMAL

## 2021-12-14 NOTE — PROGRESS NOTES
Physician Progress Note      PATIENT:               Allan Barrios  CSN #:                  [id number]  :                       1931  ADMIT DATE:       2021 11:10 AM  DISCH DATE:        2021 7:06 PM  RESPONDING  PROVIDER #:        Destiney Gilbert          QUERY TEXT:    Pt admitted with Altered Mental Status . 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 If possible, please document in   progress notes and discharge summary the relationship, if any, between Altered   Mental Status and Hypopituitarism. The medical record reflects the following:  Risk Factors: Chronic Illness. Clinical Indicators: Per ID consult note 21: Patient with hypopituitarism   status post COVID vaccine booster developed a change in mental status now   back to normal with hydrocortisone replacement. 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  Clinical Documentation Improvement. Options provided:  -- Altered Mental Status due to Hypopituitarism  -- Altered Mental Status unrelated to Hypopituitarism  -- Other - I will add my own diagnosis  -- Disagree - Not applicable / Not valid  -- Disagree - Clinically unable to determine / Unknown  -- Refer to Clinical Documentation Reviewer    PROVIDER RESPONSE TEXT:    Provider is clinically unable to determine a response to this query.     Query created by: Anne Alanis on 2021 7:34 AM      Electronically signed by:  Destiney Gilbert 2021 8:01 AM

## 2021-12-14 NOTE — DISCHARGE SUMMARY
Hospital Medicine Discharge Summary    Patient ID: Elias Valladares      Patient's PCP: Alcides Klein MD    Admit Date: 12/6/2021     Discharge Date: 12/8/2021      Admitting Physician: Deon Woods MD     Discharge Physician: Rodolfo Humphrey DO     Discharge Diagnoses: Active Hospital Problems    Diagnosis     Acute alteration in mental status [R41.82]     AMS (altered mental status) [R41.82]        The patient was seen and examined on day of discharge and this discharge summary is in conjunction with any daily progress note from day of discharge. Hospital Course: Pt was admitted for syncopal episode and leukocytosis. Pt was found by a neighbor on the floor in his home and was transported by EMS to [facility]. Over his 2 days stay he was stable and his wbc came back to normal after 24 hours. He was seen by cardiology and they felt his syncopal event was not cardiac related. He was also seen by Infectious disease and no infectious process was found for his leukocytosis. Pt had no other syncopal episodes during his stay. Pt was concerned that his fainting episode may have been related to this hypopituitary condition-and his hydrocortisone medication. He was evaluated by cardiology and they felt that his fainting was related to an orthostatic hypotensive event. Pt had a normal cortisol level. Physical Exam Performed:     /62   Pulse 100   Temp 96.5 °F (35.8 °C) (Temporal)   Resp 18   Wt 190 lb (86.2 kg)   SpO2 92%   BMI 25.07 kg/m²       General appearance:  No apparent distress, appears stated age and cooperative. HEENT:  Normal cephalic, atraumatic without obvious deformity. Pupils equal, round, and reactive to light. Extra ocular muscles intact. Conjunctivae/corneas clear. Neck: Supple, with full range of motion. No jugular venous distention. Trachea midline. Respiratory:  Normal respiratory effort.  Clear to auscultation, bilaterally without Rales/Wheezes/Rhonchi. Cardiovascular:  Regular rate and rhythm with normal S1/S2 without murmurs, rubs or gallops. Abdomen: Soft, non-tender, non-distended with normal bowel sounds. Musculoskeletal:  No clubbing, cyanosis or edema bilaterally. Full range of motion without deformity. Skin: Skin color, texture, turgor normal.  No rashes or lesions. Neurologic:  Neurovascularly intact without any focal sensory/motor deficits. Cranial nerves: II-XII intact, grossly non-focal.  Psychiatric:  Alert and oriented, thought content appropriate, normal insight        Labs: For convenience and continuity at follow-up the following most recent labs are provided:      CBC:    Lab Results   Component Value Date    WBC 10.6 12/07/2021    HGB 13.7 12/07/2021    HCT 43.3 12/07/2021     12/07/2021       Renal:    Lab Results   Component Value Date     12/07/2021    K 4.6 12/07/2021     12/07/2021    CO2 19 12/07/2021    BUN 17 12/07/2021    CREATININE 1.2 12/07/2021    CALCIUM 8.4 12/07/2021    PHOS 2.2 08/01/2021         Significant Diagnostic Studies    Radiology:   US CAROTID ARTERY BILATERAL   Final Result   Atherosclerotic disease. No hemodynamically significant stenosis is   identified   Estimated stenosis by NASCET criteria in the proximal right carotid   artery is between 0% and 49%. Estimated stenosis by NASCET criteria in the proximal left carotid   artery is between 0% and 49%. CT ABDOMEN PELVIS W IV CONTRAST Additional Contrast? None   Final Result   1. Small hiatal hernia. 2. Diverticulosis. No evidence for acute diverticulitis. XR SHOULDER RIGHT (MIN 2 VIEWS)   Final Result   No acute fractures or dislocations in the right shoulder. XR CHEST PORTABLE   Final Result   1. There are no findings of pneumonia or failure   2. The cardiac silhouette is at upper limits of normal in size.          XR HIP BILATERAL W AP PELVIS (2 VIEWS)   Final Result   Moderate degenerative change femoroacetabular joints without evidence of   acute fracture or dislocation. CT Cervical Spine WO Contrast   Final Result   1. No acute fracture or subluxation. 2. Degenerative changes in the cervical spine with possible multilevel   central canal stenosis and neural foraminal narrowing. CT Head WO Contrast   Final Result   1. No acute intracranial abnormality. 2. Chronic small vessel ischemic disease. Consults:     IP CONSULT TO INTERNAL MEDICINE  IP CONSULT TO INFECTIOUS DISEASES  IP CONSULT TO CARDIOLOGY     Disposition:  Discharged to home     Condition at Discharge: Stable    Discharge Instructions/Follow-up:      Code Status:  Prior    Activity: activity as tolerated    Diet: regular diet      Discharge Medications:     Discharge Medication List as of 12/8/2021  5:49 PM           Details   albuterol (PROVENTIL) (2.5 MG/3ML) 0.083% nebulizer solution Take 3 mLs by nebulization every 6 hours as needed for Wheezing, Disp-120 each, R-3Normal      !! hydrocortisone (CORTEF) 5 MG tablet Take 1 tablet by mouth Daily with lunch, Disp-30 tablet, R-0Normal      !! hydrocortisone (CORTEF) 5 MG tablet Take 1 tablet by mouth Daily with supper, Disp-30 tablet, R-0Normal      !! hydrocortisone (CORTEF) 10 MG tablet Take 1 tablet by mouth daily (with breakfast), Disp-30 tablet, R-0Normal      !! levothyroxine (SYNTHROID) 125 MCG tablet Take 1 tablet by mouth Daily, Disp-30 tablet, R-3Normal      sucralfate (CARAFATE) 1 GM tablet Take 1 tablet by mouth 3 times daily, Disp-120 tablet, R-3Normal      vitamin D (CHOLECALCIFEROL) 25 MCG (1000 UT) TABS tablet Take 1 tablet by mouth 2 times daily, Disp-30 tablet, R-0Normal       !! - Potential duplicate medications found. Please discuss with provider. Details   !! hydrocortisone (CORTEF) 5 MG tablet Take by mouth See Admin Instructions Take 2 tabs (10mg) every morning; Take 1 tab (5 mg) at noon;  Take 1 tab (5 mg) every evening. Historical Med      !! levothyroxine (SYNTHROID) 137 MCG tablet Take 137 mcg by mouth DailyHistorical Med      apixaban (ELIQUIS) 2.5 MG TABS tablet Take 2.5 mg by mouth 2 times dailyHistorical Med      metoprolol succinate (TOPROL XL) 25 MG extended release tablet Take 25 mg by mouth dailyHistorical Med      potassium chloride (KLOR-CON M) 20 MEQ extended release tablet Take 20 mEq by mouth dailyHistorical Med      mometasone (ASMANEX) 200 MCG/ACT AERO inhaler Inhale 1 puff into the lungs 2 times dailyHistorical Med      ferrous sulfate (IRON 325) 325 (65 Fe) MG tablet Take 1 tablet by mouth 2 times daily, Disp-180 tablet, R-1Normal      pantoprazole (PROTONIX) 40 MG tablet Take 40 mg by mouth 2 times dailyHistorical Med      Multiple Vitamins-Minerals (THERAPEUTIC MULTIVITAMIN-MINERALS) tablet Take 1 tablet by mouth dailyHistorical Med      simvastatin (ZOCOR) 20 MG tablet Take 1/2 tablet by mouth Mon - Fri and take 1 tablet by mouth Sat and SunHistorical Med      fludrocortisone (FLORINEF) 0.1 MG tablet Take 0.1 mg by mouth dailyHistorical Med       !! - Potential duplicate medications found. Please discuss with provider. Time Spent on discharge is more than 15 minutes in the examination, evaluation, counseling and review of medications and discharge plan. Signed:    Desiree Watkins DO   12/14/2021      Thank you Mali Waggoner MD for the opportunity to be involved in this patient's care. If you have any questions or concerns please feel free to contact me at [phone].

## 2022-05-12 ENCOUNTER — HOSPITAL ENCOUNTER (INPATIENT)
Age: 87
LOS: 6 days | Discharge: HOME HEALTH | DRG: 478 | End: 2022-05-18
Attending: EMERGENCY MEDICINE | Admitting: INTERNAL MEDICINE
Payer: MEDICARE

## 2022-05-12 ENCOUNTER — APPOINTMENT (OUTPATIENT)
Dept: CT IMAGING | Age: 87
DRG: 478 | End: 2022-05-12
Payer: MEDICARE

## 2022-05-12 ENCOUNTER — APPOINTMENT (OUTPATIENT)
Dept: GENERAL RADIOLOGY | Age: 87
DRG: 478 | End: 2022-05-12
Payer: MEDICARE

## 2022-05-12 DIAGNOSIS — S09.90XA CLOSED HEAD INJURY, INITIAL ENCOUNTER: ICD-10-CM

## 2022-05-12 DIAGNOSIS — R26.2 AMBULATORY DYSFUNCTION: ICD-10-CM

## 2022-05-12 DIAGNOSIS — S32.028A OTHER CLOSED FRACTURE OF SECOND LUMBAR VERTEBRA, INITIAL ENCOUNTER (HCC): Primary | ICD-10-CM

## 2022-05-12 PROBLEM — S32.029A CLOSED FRACTURE OF SECOND LUMBAR VERTEBRA (HCC): Status: ACTIVE | Noted: 2022-05-12

## 2022-05-12 LAB
ALBUMIN SERPL-MCNC: 4.2 G/DL (ref 3.5–5.2)
ALP BLD-CCNC: 74 U/L (ref 40–129)
ALT SERPL-CCNC: 11 U/L (ref 0–40)
ANION GAP SERPL CALCULATED.3IONS-SCNC: 11 MMOL/L (ref 7–16)
APTT: 31.6 SEC (ref 24.5–35.1)
AST SERPL-CCNC: 21 U/L (ref 0–39)
BASOPHILS ABSOLUTE: 0.05 E9/L (ref 0–0.2)
BASOPHILS RELATIVE PERCENT: 0.5 % (ref 0–2)
BILIRUB SERPL-MCNC: 0.3 MG/DL (ref 0–1.2)
BUN BLDV-MCNC: 24 MG/DL (ref 6–23)
CALCIUM SERPL-MCNC: 9.4 MG/DL (ref 8.6–10.2)
CHLORIDE BLD-SCNC: 105 MMOL/L (ref 98–107)
CO2: 24 MMOL/L (ref 22–29)
CREAT SERPL-MCNC: 1.4 MG/DL (ref 0.7–1.2)
EOSINOPHILS ABSOLUTE: 0.17 E9/L (ref 0.05–0.5)
EOSINOPHILS RELATIVE PERCENT: 1.7 % (ref 0–6)
GFR AFRICAN AMERICAN: 57
GFR NON-AFRICAN AMERICAN: 47 ML/MIN/1.73
GLUCOSE BLD-MCNC: 103 MG/DL (ref 74–99)
HCT VFR BLD CALC: 45.7 % (ref 37–54)
HEMOGLOBIN: 14.4 G/DL (ref 12.5–16.5)
IMMATURE GRANULOCYTES #: 0.24 E9/L
IMMATURE GRANULOCYTES %: 2.4 % (ref 0–5)
INR BLD: 1.1
LYMPHOCYTES ABSOLUTE: 1.29 E9/L (ref 1.5–4)
LYMPHOCYTES RELATIVE PERCENT: 12.9 % (ref 20–42)
MCH RBC QN AUTO: 26.8 PG (ref 26–35)
MCHC RBC AUTO-ENTMCNC: 31.5 % (ref 32–34.5)
MCV RBC AUTO: 84.9 FL (ref 80–99.9)
MONOCYTES ABSOLUTE: 1.13 E9/L (ref 0.1–0.95)
MONOCYTES RELATIVE PERCENT: 11.3 % (ref 2–12)
NEUTROPHILS ABSOLUTE: 7.12 E9/L (ref 1.8–7.3)
NEUTROPHILS RELATIVE PERCENT: 71.2 % (ref 43–80)
PDW BLD-RTO: 13.5 FL (ref 11.5–15)
PLATELET # BLD: 269 E9/L (ref 130–450)
PMV BLD AUTO: 10.5 FL (ref 7–12)
POTASSIUM REFLEX MAGNESIUM: 4.7 MMOL/L (ref 3.5–5)
PROTHROMBIN TIME: 12.5 SEC (ref 9.3–12.4)
RBC # BLD: 5.38 E12/L (ref 3.8–5.8)
SODIUM BLD-SCNC: 140 MMOL/L (ref 132–146)
TOTAL PROTEIN: 7.2 G/DL (ref 6.4–8.3)
WBC # BLD: 10 E9/L (ref 4.5–11.5)

## 2022-05-12 PROCEDURE — 1200000000 HC SEMI PRIVATE

## 2022-05-12 PROCEDURE — 90714 TD VACC NO PRESV 7 YRS+ IM: CPT | Performed by: EMERGENCY MEDICINE

## 2022-05-12 PROCEDURE — 72125 CT NECK SPINE W/O DYE: CPT

## 2022-05-12 PROCEDURE — 93005 ELECTROCARDIOGRAM TRACING: CPT | Performed by: EMERGENCY MEDICINE

## 2022-05-12 PROCEDURE — 6360000002 HC RX W HCPCS: Performed by: EMERGENCY MEDICINE

## 2022-05-12 PROCEDURE — 80053 COMPREHEN METABOLIC PANEL: CPT

## 2022-05-12 PROCEDURE — 85610 PROTHROMBIN TIME: CPT

## 2022-05-12 PROCEDURE — 70450 CT HEAD/BRAIN W/O DYE: CPT

## 2022-05-12 PROCEDURE — 99285 EMERGENCY DEPT VISIT HI MDM: CPT

## 2022-05-12 PROCEDURE — 90471 IMMUNIZATION ADMIN: CPT | Performed by: EMERGENCY MEDICINE

## 2022-05-12 PROCEDURE — 6370000000 HC RX 637 (ALT 250 FOR IP): Performed by: EMERGENCY MEDICINE

## 2022-05-12 PROCEDURE — 72170 X-RAY EXAM OF PELVIS: CPT

## 2022-05-12 PROCEDURE — 85025 COMPLETE CBC W/AUTO DIFF WBC: CPT

## 2022-05-12 PROCEDURE — 72131 CT LUMBAR SPINE W/O DYE: CPT

## 2022-05-12 PROCEDURE — 2580000003 HC RX 258: Performed by: EMERGENCY MEDICINE

## 2022-05-12 PROCEDURE — 85730 THROMBOPLASTIN TIME PARTIAL: CPT

## 2022-05-12 PROCEDURE — 71045 X-RAY EXAM CHEST 1 VIEW: CPT

## 2022-05-12 RX ORDER — 0.9 % SODIUM CHLORIDE 0.9 %
500 INTRAVENOUS SOLUTION INTRAVENOUS ONCE
Status: COMPLETED | OUTPATIENT
Start: 2022-05-12 | End: 2022-05-12

## 2022-05-12 RX ORDER — TETANUS AND DIPHTHERIA TOXOIDS ADSORBED 2; 2 [LF]/.5ML; [LF]/.5ML
0.5 INJECTION INTRAMUSCULAR ONCE
Status: COMPLETED | OUTPATIENT
Start: 2022-05-12 | End: 2022-05-12

## 2022-05-12 RX ORDER — ACETAMINOPHEN 500 MG
1000 TABLET ORAL ONCE
Status: COMPLETED | OUTPATIENT
Start: 2022-05-12 | End: 2022-05-12

## 2022-05-12 RX ADMIN — SODIUM CHLORIDE 500 ML: 9 INJECTION, SOLUTION INTRAVENOUS at 18:33

## 2022-05-12 RX ADMIN — TETANUS AND DIPHTHERIA TOXOIDS ADSORBED 0.5 ML: 2; 2 INJECTION INTRAMUSCULAR at 18:39

## 2022-05-12 RX ADMIN — ACETAMINOPHEN 1000 MG: 500 TABLET ORAL at 22:02

## 2022-05-12 ASSESSMENT — PAIN - FUNCTIONAL ASSESSMENT: PAIN_FUNCTIONAL_ASSESSMENT: NONE - DENIES PAIN

## 2022-05-12 ASSESSMENT — PAIN DESCRIPTION - DESCRIPTORS: DESCRIPTORS: ACHING

## 2022-05-12 ASSESSMENT — PAIN DESCRIPTION - LOCATION: LOCATION: GENERALIZED

## 2022-05-12 ASSESSMENT — PAIN DESCRIPTION - ORIENTATION: ORIENTATION: RIGHT;LEFT

## 2022-05-12 ASSESSMENT — PAIN SCALES - GENERAL: PAINLEVEL_OUTOF10: 5

## 2022-05-12 NOTE — ED PROVIDER NOTES
Result Value Ref Range    WBC 10.0 4.5 - 11.5 E9/L    RBC 5.38 3.80 - 5.80 E12/L    Hemoglobin 14.4 12.5 - 16.5 g/dL    Hematocrit 45.7 37.0 - 54.0 %    MCV 84.9 80.0 - 99.9 fL    MCH 26.8 26.0 - 35.0 pg    MCHC 31.5 (L) 32.0 - 34.5 %    RDW 13.5 11.5 - 15.0 fL    Platelets 398 505 - 591 E9/L    MPV 10.5 7.0 - 12.0 fL    Neutrophils % 71.2 43.0 - 80.0 %    Immature Granulocytes % 2.4 0.0 - 5.0 %    Lymphocytes % 12.9 (L) 20.0 - 42.0 %    Monocytes % 11.3 2.0 - 12.0 %    Eosinophils % 1.7 0.0 - 6.0 %    Basophils % 0.5 0.0 - 2.0 %    Neutrophils Absolute 7.12 1.80 - 7.30 E9/L    Immature Granulocytes # 0.24 E9/L    Lymphocytes Absolute 1.29 (L) 1.50 - 4.00 E9/L    Monocytes Absolute 1.13 (H) 0.10 - 0.95 E9/L    Eosinophils Absolute 0.17 0.05 - 0.50 E9/L    Basophils Absolute 0.05 0.00 - 0.20 E9/L   Comprehensive Metabolic Panel w/ Reflex to MG   Result Value Ref Range    Sodium 140 132 - 146 mmol/L    Potassium reflex Magnesium 4.7 3.5 - 5.0 mmol/L    Chloride 105 98 - 107 mmol/L    CO2 24 22 - 29 mmol/L    Anion Gap 11 7 - 16 mmol/L    Glucose 103 (H) 74 - 99 mg/dL    BUN 24 (H) 6 - 23 mg/dL    CREATININE 1.4 (H) 0.7 - 1.2 mg/dL    GFR Non-African American 47 >=60 mL/min/1.73    GFR African American 57     Calcium 9.4 8.6 - 10.2 mg/dL    Total Protein 7.2 6.4 - 8.3 g/dL    Albumin 4.2 3.5 - 5.2 g/dL    Total Bilirubin 0.3 0.0 - 1.2 mg/dL    Alkaline Phosphatase 74 40 - 129 U/L    ALT 11 0 - 40 U/L    AST 21 0 - 39 U/L   Protime-INR   Result Value Ref Range    Protime 12.5 (H) 9.3 - 12.4 sec    INR 1.1    APTT   Result Value Ref Range    aPTT 31.6 24.5 - 35.1 sec       RADIOLOGY:  Interpreted by Radiologist.  CT Head WO Contrast   Final Result   No acute intracranial abnormality. There is age-appropriate atrophy,   small-vessel ischemic disease and multiple lacunar CVAs predominantly in the   basal ganglia bilaterally.       Sinus disease with the soft tissue densities in the maxillary, ethmoid and   sphenoid sinuses. CT cervical spine. There is no acute fracture or dislocation in the cervical spine. The   prevertebral soft tissues are normal.  There is mild degenerative changes   from C3-T1 . There is atelectasis/minimal ground-glass densities in the   right upper lobe. Impression      No acute fractures. Diffuse degenerative changes from C3-T1. Atelectasis/ground-glass densities in the right upper lobe. Clinical   assessment is recommended to evaluate for potential pneumonia or edema. CT lumbar spine. There is a compression fracture involving the superior endplate of L2 with   nearly 15% loss of height. There is diffuse osteopenia and degenerative   changes. Mild disc bulges are present at L4-5 and L5-S1. Impression      15% compression deformity of the superior endplate of L2. Diffuse degenerative changes with disc bulges from L4-S1.      RECOMMENDATIONS:   Unavailable         CT Cervical Spine WO Contrast   Final Result   No acute intracranial abnormality. There is age-appropriate atrophy,   small-vessel ischemic disease and multiple lacunar CVAs predominantly in the   basal ganglia bilaterally. Sinus disease with the soft tissue densities in the maxillary, ethmoid and   sphenoid sinuses. CT cervical spine. There is no acute fracture or dislocation in the cervical spine. The   prevertebral soft tissues are normal.  There is mild degenerative changes   from C3-T1 . There is atelectasis/minimal ground-glass densities in the   right upper lobe. Impression      No acute fractures. Diffuse degenerative changes from C3-T1. Atelectasis/ground-glass densities in the right upper lobe. Clinical   assessment is recommended to evaluate for potential pneumonia or edema. CT lumbar spine. There is a compression fracture involving the superior endplate of L2 with   nearly 15% loss of height.   There is diffuse osteopenia and degenerative changes. Mild disc bulges are present at L4-5 and L5-S1. Impression      15% compression deformity of the superior endplate of L2. Diffuse degenerative changes with disc bulges from L4-S1.      RECOMMENDATIONS:   Unavailable         CT LUMBAR SPINE WO CONTRAST   Final Result   No acute intracranial abnormality. There is age-appropriate atrophy,   small-vessel ischemic disease and multiple lacunar CVAs predominantly in the   basal ganglia bilaterally. Sinus disease with the soft tissue densities in the maxillary, ethmoid and   sphenoid sinuses. CT cervical spine. There is no acute fracture or dislocation in the cervical spine. The   prevertebral soft tissues are normal.  There is mild degenerative changes   from C3-T1 . There is atelectasis/minimal ground-glass densities in the   right upper lobe. Impression      No acute fractures. Diffuse degenerative changes from C3-T1. Atelectasis/ground-glass densities in the right upper lobe. Clinical   assessment is recommended to evaluate for potential pneumonia or edema. CT lumbar spine. There is a compression fracture involving the superior endplate of L2 with   nearly 15% loss of height. There is diffuse osteopenia and degenerative   changes. Mild disc bulges are present at L4-5 and L5-S1. Impression      15% compression deformity of the superior endplate of L2. Diffuse degenerative changes with disc bulges from L4-S1.      RECOMMENDATIONS:   Unavailable         XR PELVIS (1-2 VIEWS)   Final Result   No definite radiographic evidence of acute pelvic pathology on this limited   pelvis radiograph. XR CHEST PORTABLE   Final Result   Expiratory chest radiograph with minimal left lower lobe atelectasis.       Stable enlargement of the cardiac silhouette.             ------------------------- NURSING NOTES AND VITALS REVIEWED ---------------------------   The nursing notes within the ED encounter and vital signs as below have been reviewed. BP (!) 176/108   Pulse 83   Temp 98.5 °F (36.9 °C) (Oral)   Resp 18   Ht 6' 1\" (1.854 m)   Wt 235 lb (106.6 kg)   SpO2 96%   BMI 31.00 kg/m²   Oxygen Saturation Interpretation: Normal      ---------------------------------------------------PHYSICAL EXAM--------------------------------------      PHYSICAL EXAM:  Vitals Reviewed  Constitutional/General: Alert and oriented x3, well appearing, non toxic in NAD. HEENT: Normocephalic. Cephalohematoma with abrasion to posterior scalp, no active bleeding. PERRL, EOMI. Oropharynx clear, handling secretions, no trismus. No raccoon eyes. No vega's sign. Neck: Supple, full ROM, no cervical spine tenderness. Pulmonary: Lungs clear to auscultation bilaterally, no wheezes, rales, or rhonchi. Not in respiratory distress. Cardiovascular:  Regular rate and rhythm, no murmurs, gallops, or rubs. 2+ distal pulses. Chest: No chest wall tenderness. No crepitus. Abdomen: Soft, non tender, non distended. Pelvis: Pelvis stable. No tenderness to hips bilaterally. Normal range of motion to hips bilaterally. Extremities: Moves all extremities x 4. Warm and well perfused. Back: No midline thoracic tenderness. Tenderness to lumbar spine, no step-offs or deformities  Skin: warm and dry without rash. Neurologic: GCS 15, 5/5 strength in all extremities. Normal sensation in all extremities. Psych: Normal Affect. Normal behavior. ------------------------------ ED COURSE/MEDICAL DECISION MAKING----------------------  Medications   diptheria-tetanus toxoids [facility]) 2-2 LF/0.5ML injection 0.5 mL (0.5 mLs IntraMUSCular Given 5/12/22 1839)   0.9 % sodium chloride bolus (0 mLs IntraVENous Stopped 5/12/22 2005)   acetaminophen (TYLENOL) tablet 1,000 mg (1,000 mg Oral Given 5/12/22 2202)       Medical Decision Making/ED COURSE:   Patient is a 55-year-old male presenting after mechanical fall with headache and low back pain.  In the ED, patient was hemodynamically stable and afebrile. On exam, he was awake and alert with a nonfocal neurologic exam.  Labs and imaging obtained. Patient administered boostrix. I reviewed and interpreted labs. Patient had mild elevation in creatinine and was gently hydrated with IV fluids found to have a lumbar fracture but otherwise no acute traumatic findings. Patient unable to ambulate and feels uncomfortable discharge home. He does live alone. Plan for admission. Patient remained hemodynamically stable throughout ED course. ED Course as of 05/12/22 2219   u May 12, 2022   1738 EKG: This EKG is signed and interpreted by me. Rate: 93  Rhythm: Sinus  Interpretation: Sinus rhythm, sinus arrhythmia, first-degree heart block, left axis deviation, right bundle branch block, QTC prolonged at 504, no acute ST or T wave changes  Comparison: stable as compared to patient's most recent EKG   [JA]   2027 Patient is awake and alert on reevaluation. His cervical spine has been cleared. [JA]   2027 Chart indicates the patient is saturating 77% on room air; however, was just in the room, he was saturating 94% on room air [JA]   2124 Patient unable to get up or ambulate. He lives alone at home. Plan for admission. [JA]   2141 Hospitalist was consulted. Dr. Pablo Dill accepted the patient for admission under Dr. Carol Otero     [JA]      ED Course User Index  [JA] Shameka Sanchez MD       New Prescriptions    No medications on file     Shameka Sanchez MD      Counseling: The emergency provider has spoken with the patient and discussed todays results, in addition to providing specific details for the plan of care and counseling regarding the diagnosis and prognosis. Questions are answered at this time and they are agreeable with the plan.      --------------------------------- IMPRESSION AND DISPOSITION ---------------------------------    IMPRESSION  1.  Other closed fracture of second lumbar vertebra, initial encounter ([facility] [address]    2. Ambulatory dysfunction    3. Closed head injury, initial encounter        DISPOSITION  Disposition: Admit to med/surg floor  Patient condition is stable      NOTE: This report was transcribed using voice recognition software.  Every effort was made to ensure accuracy; however, inadvertent computerized transcription errors may be present    ICarmen MD, am the primary provider of this record       Carmen Roche MD  05/12/22 0163

## 2022-05-13 ENCOUNTER — APPOINTMENT (OUTPATIENT)
Dept: MRI IMAGING | Age: 87
DRG: 478 | End: 2022-05-13
Payer: MEDICARE
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
## 2022-05-13 PROCEDURE — 6360000002 HC RX W HCPCS: Performed by: INTERNAL MEDICINE

## 2022-05-13 PROCEDURE — 2700000000 HC OXYGEN THERAPY PER DAY

## 2022-05-13 PROCEDURE — 99222 1ST HOSP IP/OBS MODERATE 55: CPT | Performed by: NEUROLOGICAL SURGERY

## 2022-05-13 PROCEDURE — 72148 MRI LUMBAR SPINE W/O DYE: CPT

## 2022-05-13 PROCEDURE — 94640 AIRWAY INHALATION TREATMENT: CPT

## 2022-05-13 PROCEDURE — 6370000000 HC RX 637 (ALT 250 FOR IP): Performed by: INTERNAL MEDICINE

## 2022-05-13 PROCEDURE — 1200000000 HC SEMI PRIVATE

## 2022-05-13 PROCEDURE — 6370000000 HC RX 637 (ALT 250 FOR IP): Performed by: PHYSICIAN ASSISTANT

## 2022-05-13 PROCEDURE — 2580000003 HC RX 258: Performed by: INTERNAL MEDICINE

## 2022-05-13 RX ORDER — TRAMADOL HYDROCHLORIDE 50 MG/1
50 TABLET ORAL EVERY 6 HOURS PRN
Status: DISCONTINUED | OUTPATIENT
Start: 2022-05-13 | End: 2022-05-18 | Stop reason: HOSPADM

## 2022-05-13 RX ORDER — POTASSIUM CHLORIDE 20 MEQ/1
20 TABLET, EXTENDED RELEASE ORAL DAILY
Status: DISCONTINUED | OUTPATIENT
Start: 2022-05-13 | End: 2022-05-18 | Stop reason: HOSPADM

## 2022-05-13 RX ORDER — SIMVASTATIN 20 MG
10 TABLET ORAL NIGHTLY
Status: DISCONTINUED | OUTPATIENT
Start: 2022-05-13 | End: 2022-05-13 | Stop reason: CLARIF

## 2022-05-13 RX ORDER — ENOXAPARIN SODIUM 100 MG/ML
30 INJECTION SUBCUTANEOUS 2 TIMES DAILY
Status: DISCONTINUED | OUTPATIENT
Start: 2022-05-13 | End: 2022-05-16

## 2022-05-13 RX ORDER — ACETAMINOPHEN 650 MG/1
650 SUPPOSITORY RECTAL EVERY 6 HOURS PRN
Status: DISCONTINUED | OUTPATIENT
Start: 2022-05-13 | End: 2022-05-18 | Stop reason: HOSPADM

## 2022-05-13 RX ORDER — SODIUM CHLORIDE 0.9 % (FLUSH) 0.9 %
5-40 SYRINGE (ML) INJECTION PRN
Status: DISCONTINUED | OUTPATIENT
Start: 2022-05-13 | End: 2022-05-17 | Stop reason: SDUPTHER

## 2022-05-13 RX ORDER — METOPROLOL SUCCINATE 25 MG/1
25 TABLET, EXTENDED RELEASE ORAL DAILY
Status: DISCONTINUED | OUTPATIENT
Start: 2022-05-13 | End: 2022-05-18 | Stop reason: HOSPADM

## 2022-05-13 RX ORDER — ACETAMINOPHEN 325 MG/1
650 TABLET ORAL EVERY 6 HOURS PRN
Status: DISCONTINUED | OUTPATIENT
Start: 2022-05-13 | End: 2022-05-18 | Stop reason: HOSPADM

## 2022-05-13 RX ORDER — HYDROCORTISONE 5 MG/1
10 TABLET ORAL
Status: DISCONTINUED | OUTPATIENT
Start: 2022-05-13 | End: 2022-05-18 | Stop reason: HOSPADM

## 2022-05-13 RX ORDER — LEVOTHYROXINE SODIUM 137 UG/1
137 TABLET ORAL DAILY
Status: DISCONTINUED | OUTPATIENT
Start: 2022-05-13 | End: 2022-05-18 | Stop reason: HOSPADM

## 2022-05-13 RX ORDER — PANTOPRAZOLE SODIUM 40 MG/1
40 TABLET, DELAYED RELEASE ORAL 2 TIMES DAILY
Status: DISCONTINUED | OUTPATIENT
Start: 2022-05-13 | End: 2022-05-18 | Stop reason: HOSPADM

## 2022-05-13 RX ORDER — FLUDROCORTISONE ACETATE 0.1 MG/1
0.1 TABLET ORAL DAILY
Status: DISCONTINUED | OUTPATIENT
Start: 2022-05-13 | End: 2022-05-18 | Stop reason: HOSPADM

## 2022-05-13 RX ORDER — HYDROCORTISONE 5 MG/1
5 TABLET ORAL NIGHTLY
Status: DISCONTINUED | OUTPATIENT
Start: 2022-05-13 | End: 2022-05-18 | Stop reason: HOSPADM

## 2022-05-13 RX ORDER — ALBUTEROL SULFATE 2.5 MG/3ML
2.5 SOLUTION RESPIRATORY (INHALATION) EVERY 6 HOURS PRN
Status: DISCONTINUED | OUTPATIENT
Start: 2022-05-13 | End: 2022-05-18 | Stop reason: HOSPADM

## 2022-05-13 RX ORDER — BUDESONIDE 0.5 MG/2ML
0.5 INHALANT ORAL 2 TIMES DAILY
Status: DISCONTINUED | OUTPATIENT
Start: 2022-05-13 | End: 2022-05-18 | Stop reason: HOSPADM

## 2022-05-13 RX ORDER — ONDANSETRON 4 MG/1
4 TABLET, ORALLY DISINTEGRATING ORAL EVERY 8 HOURS PRN
Status: DISCONTINUED | OUTPATIENT
Start: 2022-05-13 | End: 2022-05-13

## 2022-05-13 RX ORDER — ONDANSETRON 2 MG/ML
4 INJECTION INTRAMUSCULAR; INTRAVENOUS EVERY 6 HOURS PRN
Status: DISCONTINUED | OUTPATIENT
Start: 2022-05-13 | End: 2022-05-13

## 2022-05-13 RX ORDER — VITAMIN B COMPLEX
1000 TABLET ORAL 2 TIMES DAILY
Status: DISCONTINUED | OUTPATIENT
Start: 2022-05-13 | End: 2022-05-18 | Stop reason: HOSPADM

## 2022-05-13 RX ORDER — ATORVASTATIN CALCIUM 10 MG/1
10 TABLET, FILM COATED ORAL NIGHTLY
Status: DISCONTINUED | OUTPATIENT
Start: 2022-05-13 | End: 2022-05-18 | Stop reason: HOSPADM

## 2022-05-13 RX ORDER — HYDROCORTISONE 5 MG/1
5 TABLET ORAL
Status: DISCONTINUED | OUTPATIENT
Start: 2022-05-14 | End: 2022-05-18 | Stop reason: HOSPADM

## 2022-05-13 RX ORDER — SODIUM CHLORIDE 0.9 % (FLUSH) 0.9 %
5-40 SYRINGE (ML) INJECTION EVERY 12 HOURS SCHEDULED
Status: DISCONTINUED | OUTPATIENT
Start: 2022-05-13 | End: 2022-05-17 | Stop reason: SDUPTHER

## 2022-05-13 RX ORDER — SODIUM CHLORIDE 9 MG/ML
INJECTION, SOLUTION INTRAVENOUS PRN
Status: DISCONTINUED | OUTPATIENT
Start: 2022-05-13 | End: 2022-05-17 | Stop reason: SDUPTHER

## 2022-05-13 RX ORDER — POLYETHYLENE GLYCOL 3350 17 G/17G
17 POWDER, FOR SOLUTION ORAL DAILY PRN
Status: DISCONTINUED | OUTPATIENT
Start: 2022-05-13 | End: 2022-05-18 | Stop reason: HOSPADM

## 2022-05-13 RX ADMIN — LEVOTHYROXINE SODIUM 137 MCG: 0.14 TABLET ORAL at 06:12

## 2022-05-13 RX ADMIN — SODIUM CHLORIDE, PRESERVATIVE FREE 10 ML: 5 INJECTION INTRAVENOUS at 09:46

## 2022-05-13 RX ADMIN — BUDESONIDE 500 MCG: 0.5 SUSPENSION RESPIRATORY (INHALATION) at 07:51

## 2022-05-13 RX ADMIN — PANTOPRAZOLE SODIUM 40 MG: 40 TABLET, DELAYED RELEASE ORAL at 23:56

## 2022-05-13 RX ADMIN — HYDROCORTISONE 5 MG: 5 TABLET ORAL at 23:58

## 2022-05-13 RX ADMIN — ENOXAPARIN SODIUM 30 MG: 100 INJECTION SUBCUTANEOUS at 23:57

## 2022-05-13 RX ADMIN — SODIUM CHLORIDE, PRESERVATIVE FREE 10 ML: 5 INJECTION INTRAVENOUS at 23:58

## 2022-05-13 RX ADMIN — HYDROCORTISONE 10 MG: 5 TABLET ORAL at 09:45

## 2022-05-13 RX ADMIN — Medication 1000 UNITS: at 09:45

## 2022-05-13 RX ADMIN — POTASSIUM CHLORIDE 20 MEQ: 20 TABLET, EXTENDED RELEASE ORAL at 09:47

## 2022-05-13 RX ADMIN — TRAMADOL HYDROCHLORIDE 50 MG: 50 TABLET, COATED ORAL at 16:39

## 2022-05-13 RX ADMIN — BUDESONIDE 500 MCG: 0.5 SUSPENSION RESPIRATORY (INHALATION) at 19:19

## 2022-05-13 RX ADMIN — PANTOPRAZOLE SODIUM 40 MG: 40 TABLET, DELAYED RELEASE ORAL at 09:46

## 2022-05-13 RX ADMIN — METOPROLOL SUCCINATE 25 MG: 25 TABLET, EXTENDED RELEASE ORAL at 09:45

## 2022-05-13 RX ADMIN — ACETAMINOPHEN 650 MG: 325 TABLET ORAL at 09:46

## 2022-05-13 RX ADMIN — ENOXAPARIN SODIUM 30 MG: 100 INJECTION SUBCUTANEOUS at 09:46

## 2022-05-13 RX ADMIN — ATORVASTATIN CALCIUM 10 MG: 10 TABLET, FILM COATED ORAL at 23:56

## 2022-05-13 RX ADMIN — FLUDROCORTISONE ACETATE 0.1 MG: 0.1 TABLET ORAL at 09:45

## 2022-05-13 RX ADMIN — Medication 1000 UNITS: at 23:56

## 2022-05-13 ASSESSMENT — ENCOUNTER SYMPTOMS
BACK PAIN: 1
TROUBLE SWALLOWING: 0
ABDOMINAL PAIN: 0
PHOTOPHOBIA: 0
SHORTNESS OF BREATH: 0

## 2022-05-13 ASSESSMENT — PAIN DESCRIPTION - DESCRIPTORS: DESCRIPTORS: ACHING;DISCOMFORT;SORE

## 2022-05-13 ASSESSMENT — PAIN SCALES - GENERAL
PAINLEVEL_OUTOF10: 4
PAINLEVEL_OUTOF10: 0
PAINLEVEL_OUTOF10: 0
PAINLEVEL_OUTOF10: 4

## 2022-05-13 ASSESSMENT — PAIN DESCRIPTION - ORIENTATION: ORIENTATION: RIGHT;LEFT;LOWER;POSTERIOR

## 2022-05-13 ASSESSMENT — PAIN DESCRIPTION - LOCATION: LOCATION: BACK;SHOULDER

## 2022-05-13 ASSESSMENT — PAIN - FUNCTIONAL ASSESSMENT: PAIN_FUNCTIONAL_ASSESSMENT: NONE - DENIES PAIN

## 2022-05-13 NOTE — H&P
Massiel Lares is a 80 y.o. male  Chief Complaint   Patient presents with   Jaja Sanchez     was trying to get something off  and  fell back wards     HPI  As above, pt is awake and alert, states his back hurts when he moves, asking for pain medication. He denies any cp, sob, n/v. He admits he lives alone in his home, still drives a car. He has friends who take care of him. Allergies   Allergen Reactions    Codeine Itching    Diazepam      hallucinations    Lorazepam      hallucinations    Morphine      Stomach ache       No current facility-administered medications on file prior to encounter.      Current Outpatient Medications on File Prior to Encounter   Medication Sig Dispense Refill    albuterol (PROVENTIL) (2.5 MG/3ML) 0.083% nebulizer solution Take 3 mLs by nebulization every 6 hours as needed for Wheezing 120 each 3    hydrocortisone (CORTEF) 5 MG tablet Take 1 tablet by mouth Daily with lunch (Patient taking differently: Take 5 mg by mouth nightly ) 30 tablet 0    hydrocortisone (CORTEF) 10 MG tablet Take 1 tablet by mouth daily (with breakfast) 30 tablet 0    vitamin D (CHOLECALCIFEROL) 25 MCG (1000 UT) TABS tablet Take 1 tablet by mouth 2 times daily 30 tablet 0    levothyroxine (SYNTHROID) 137 MCG tablet Take 137 mcg by mouth Daily      metoprolol succinate (TOPROL XL) 25 MG extended release tablet Take 25 mg by mouth daily      potassium chloride (KLOR-CON M) 20 MEQ extended release tablet Take 20 mEq by mouth daily      mometasone (ASMANEX) 200 MCG/ACT AERO inhaler Inhale 1 puff into the lungs 2 times daily      ferrous sulfate (IRON 325) 325 (65 Fe) MG tablet Take 1 tablet by mouth 2 times daily 180 tablet 1    pantoprazole (PROTONIX) 40 MG tablet Take 40 mg by mouth 2 times daily      Multiple Vitamins-Minerals (THERAPEUTIC MULTIVITAMIN-MINERALS) tablet Take 1 tablet by mouth daily      simvastatin (ZOCOR) 20 MG tablet Take 1/2 tablet by mouth Mon - Fri and take 1 tablet by mouth Sat and Sun      fludrocortisone (FLORINEF) 0.1 MG tablet Take 0.1 mg by mouth daily       Past Medical History:   Diagnosis Date    [facility]     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 ([facility]     Hypothyroid     Osteopenia     Renal insufficiency     Right bundle branch block    Code Status: FULL CODE  Past Surgical History:   Procedure Laterality Date    UPPER GASTROINTESTINAL ENDOSCOPY N/A 8/19/2021    EGD ESOPHAGOGASTRODUODENOSCOPY performed by Carlene Villalta MD at [address] History     Socioeconomic History    Marital status:      Spouse name: Not on file    Number of children: Not on file    Years of education: Not on file    Highest education level: Not on file   Occupational History    Not on file   Tobacco Use    Smoking status: Never Smoker    Smokeless tobacco: Never Used   Vaping Use    Vaping Use: Never used   Substance and Sexual Activity    Alcohol use: Not Currently    Drug use: Never    Sexual activity: Not on file   Other Topics Concern    Not on file   Social History Narrative    Not on file     Social Determinants of Health     Financial Resource Strain:     Difficulty of Paying Living Expenses: Not on file   Food Insecurity:     Worried About Running Out of Food in the Last Year: Not on file    Tej of Food in the Last Year: Not on file   Transportation Needs:     Lack of Transportation (Medical): Not on file    Lack of Transportation (Non-Medical):  Not on file   Physical Activity:     Days of Exercise per Week: Not on file    Minutes of Exercise per Session: Not on file   Stress:     Feeling of Stress : Not on file   Social Connections:     Frequency of Communication with Friends and Family: Not on file    Frequency of Social Gatherings with Friends and Family: Not on file    Attends Bahai Services: Not on file    Active Member of Clubs or Organizations: Not on file    Attends Club or Organization Meetings: Not on file   Enrique Treviño Marital Status: Not on file   Intimate Partner Violence:     Fear of Current or Ex-Partner: Not on file    Emotionally Abused: Not on file    Physically Abused: Not on file    Sexually Abused: Not on file   Housing Stability:     Unable to Pay for Housing in the Last Year: Not on file    Number of [place] in the Last Year: Not on file    Unstable Housing in the Last Year: Not on file     History reviewed. No pertinent family history. ROS  Patient positive for  Back pain   Patient denies any fever, chills, night sweats, weight changes   Denies headache, visual changes,   Denies dysphagia, odynophagia dysphonia,   Denies SOB, cough, sputum production,   Denies chest pain, pressure, orthopnea, palpitations,   Denies abd pain, N/V/D/C/melena, hematochezia,   Denies urinary frequency, urgency, dysuria, hematuria,   Denies any paresthesias, weakness, seizure, syncopal episodes, Denies depression, anxiety.   OBJECTIVE  Vitals:    05/13/22 0315   BP: (!) 157/109   Pulse: 75   Resp: 18   Temp: 99.4 °F (37.4 °C)   SpO2: 96%     Gen: AO3, NAD  Head: AT/NC, PERRL, EOMIx2, no icterus, conjunctival injection  Neck: No JVD, carotid bruits, LAD, thyroid non-palpable  Heart: RRR with no murmurs, rubs, gallops  Lungs: CTA B/L, no W/R/R  Abd: soft, NT, ND, BS+, no G/R, no HSM  Ext: No C/C/E, pulses intact distally B/L  Neuro: CN 2-12 grossly intact with no focal deficits  Lab Results   Component Value Date    WBC 10.0 05/12/2022    HGB 14.4 05/12/2022    HCT 45.7 05/12/2022    MCV 84.9 05/12/2022     05/12/2022     Lab Results   Component Value Date     05/12/2022    K 4.7 05/12/2022     05/12/2022    CO2 24 05/12/2022    BUN 24 05/12/2022    CREATININE 1.4 05/12/2022    GLUCOSE 103 05/12/2022    CALCIUM 9.4 05/12/2022      CT LUMBAR SPINE WO CONTRAST  Narrative: EXAMINATION:  CT OF THE HEAD WITHOUT CONTRAST; CT OF THE CERVICAL SPINE WITHOUT CONTRAST;  CT OF THE LUMBAR SPINE WITHOUT CONTRAST skull or soft  tissues. Impression: No acute intracranial abnormality. There is age-appropriate atrophy,  small-vessel ischemic disease and multiple lacunar CVAs predominantly in the  basal ganglia bilaterally. Sinus disease with the soft tissue densities in the maxillary, ethmoid and  sphenoid sinuses. CT cervical spine. There is no acute fracture or dislocation in the cervical spine. The  prevertebral soft tissues are normal.  There is mild degenerative changes  from C3-T1 . There is atelectasis/minimal ground-glass densities in the  right upper lobe. Impression    No acute fractures. Diffuse degenerative changes from C3-T1. Atelectasis/ground-glass densities in the right upper lobe. Clinical  assessment is recommended to evaluate for potential pneumonia or edema. CT lumbar spine. There is a compression fracture involving the superior endplate of L2 with  nearly 15% loss of height. There is diffuse osteopenia and degenerative  changes. Mild disc bulges are present at L4-5 and L5-S1. Impression    15% compression deformity of the superior endplate of L2. Diffuse degenerative changes with disc bulges from L4-S1.    RECOMMENDATIONS:  Unavailable  CT Cervical Spine WO Contrast  Narrative: EXAMINATION:  CT OF THE HEAD WITHOUT CONTRAST; CT OF THE CERVICAL SPINE WITHOUT CONTRAST;  CT OF THE LUMBAR SPINE WITHOUT CONTRAST  5/12/2022 7:30 pm    TECHNIQUE:  CT of the head was performed without the administration of intravenous  contrast. Automated exposure control, iterative reconstruction, and/or weight  based adjustment of the mA/kV was utilized to reduce the radiation dose to as  low as reasonably achievable.; CT of the cervical spine was performed without  the administration of intravenous contrast. Multiplanar reformatted images  are provided for review.  Automated exposure control, iterative  reconstruction, and/or weight based adjustment of the mA/kV was utilized to  reduce the radiation dose to as low as reasonably achievable.; CT of the  lumbar spine was performed without the administration of intravenous  contrast. Multiplanar reformatted images are provided for review. Adjustment  of mA and/or kV according to patient size was utilized. Automated exposure  control, iterative reconstruction, and/or weight based adjustment of the  mA/kV was utilized to reduce the radiation dose to as low as reasonably  achievable. COMPARISON:  December 6, 2021    HISTORY:  ORDERING SYSTEM PROVIDED HISTORY: fall, head trauma  TECHNOLOGIST PROVIDED HISTORY:  Reason for exam:->fall, head trauma  Has a \"code stroke\" or \"stroke alert\" been called? ->No  Decision Support Exception - unselect if not a suspected or confirmed  emergency medical condition->Emergency Medical Condition (MA)  What reading provider will be dictating this exam?->CRC    FINDINGS:  BRAIN/VENTRICLES: There is no acute intracranial hemorrhage, mass effect or  midline shift. No abnormal extra-axial fluid collection. The gray-white  differentiation is maintained without evidence of an acute infarct. There is  no evidence of hydrocephalus. ORBITS: The visualized portion of the orbits demonstrate no acute abnormality. SINUSES: There are soft tissue densities in the maxillary, ethmoid and  sphenoid sinuses. SOFT TISSUES/SKULL:  No acute abnormality of the visualized skull or soft  tissues. Impression: No acute intracranial abnormality. There is age-appropriate atrophy,  small-vessel ischemic disease and multiple lacunar CVAs predominantly in the  basal ganglia bilaterally. Sinus disease with the soft tissue densities in the maxillary, ethmoid and  sphenoid sinuses. CT cervical spine. There is no acute fracture or dislocation in the cervical spine. The  prevertebral soft tissues are normal.  There is mild degenerative changes  from C3-T1 .   There is atelectasis/minimal ground-glass densities in the  right upper lobe.    Impression    No acute fractures. Diffuse degenerative changes from C3-T1. Atelectasis/ground-glass densities in the right upper lobe. Clinical  assessment is recommended to evaluate for potential pneumonia or edema. CT lumbar spine. There is a compression fracture involving the superior endplate of L2 with  nearly 15% loss of height. There is diffuse osteopenia and degenerative  changes. Mild disc bulges are present at L4-5 and L5-S1. Impression    15% compression deformity of the superior endplate of L2. Diffuse degenerative changes with disc bulges from L4-S1.    RECOMMENDATIONS:  Unavailable  CT Head WO Contrast  Narrative: EXAMINATION:  CT OF THE HEAD WITHOUT CONTRAST; CT OF THE CERVICAL SPINE WITHOUT CONTRAST;  CT OF THE LUMBAR SPINE WITHOUT CONTRAST  5/12/2022 7:30 pm    TECHNIQUE:  CT of the head was performed without the administration of intravenous  contrast. Automated exposure control, iterative reconstruction, and/or weight  based adjustment of the mA/kV was utilized to reduce the radiation dose to as  low as reasonably achievable.; CT of the cervical spine was performed without  the administration of intravenous contrast. Multiplanar reformatted images  are provided for review. Automated exposure control, iterative  reconstruction, and/or weight based adjustment of the mA/kV was utilized to  reduce the radiation dose to as low as reasonably achievable.; CT of the  lumbar spine was performed without the administration of intravenous  contrast. Multiplanar reformatted images are provided for review. Adjustment  of mA and/or kV according to patient size was utilized. Automated exposure  control, iterative reconstruction, and/or weight based adjustment of the  mA/kV was utilized to reduce the radiation dose to as low as reasonably  achievable.     COMPARISON:  December 6, 2021    HISTORY:  ORDERING SYSTEM PROVIDED HISTORY: fall, head trauma  TECHNOLOGIST PROVIDED HISTORY:  Reason for exam:->fall, head trauma  Has a \"code stroke\" or \"stroke alert\" been called? ->No  Decision Support Exception - unselect if not a suspected or confirmed  emergency medical condition->Emergency Medical Condition (MA)  What reading provider will be dictating this exam?->CRC    FINDINGS:  BRAIN/VENTRICLES: There is no acute intracranial hemorrhage, mass effect or  midline shift. No abnormal extra-axial fluid collection. The gray-white  differentiation is maintained without evidence of an acute infarct. There is  no evidence of hydrocephalus. ORBITS: The visualized portion of the orbits demonstrate no acute abnormality. SINUSES: There are soft tissue densities in the maxillary, ethmoid and  sphenoid sinuses. SOFT TISSUES/SKULL:  No acute abnormality of the visualized skull or soft  tissues. Impression: No acute intracranial abnormality. There is age-appropriate atrophy,  small-vessel ischemic disease and multiple lacunar CVAs predominantly in the  basal ganglia bilaterally. Sinus disease with the soft tissue densities in the maxillary, ethmoid and  sphenoid sinuses. CT cervical spine. There is no acute fracture or dislocation in the cervical spine. The  prevertebral soft tissues are normal.  There is mild degenerative changes  from C3-T1 . There is atelectasis/minimal ground-glass densities in the  right upper lobe. Impression    No acute fractures. Diffuse degenerative changes from C3-T1. Atelectasis/ground-glass densities in the right upper lobe. Clinical  assessment is recommended to evaluate for potential pneumonia or edema. CT lumbar spine. There is a compression fracture involving the superior endplate of L2 with  nearly 15% loss of height. There is diffuse osteopenia and degenerative  changes. Mild disc bulges are present at L4-5 and L5-S1. Impression    15% compression deformity of the superior endplate of L2.     Diffuse degenerative changes with

## 2022-05-13 NOTE — ED NOTES
Taken to [facility] in stable condition with all personal belongings by transport     Kalin Merino RN  05/13/22 9548

## 2022-05-13 NOTE — CARE COORDINATION
Presented to Ed after a fall and c/o headache and back pain. /122 in ED. BP presently 157/109. Neurosurgery consulted. Met with patient at bedside to discuss transition of care. patient lives alone in a 2 story home but has set up on first level. He owns a walker but does not use. His PCP is Dr. Juanita Felipe from the [facility] and he uses the [facility] for prescriptions. He has h/o [place] and if he qualifies at discharge would be willing to go there again. Receptive also to Nehal Lara and referral made to Lizzie from [address] [facility]. Will need PT/OT orders when appropriate to further assist in discharge planning. One of his children can transport when medically cleared. Lizzie has accepted and will follow. Will need [facility] orders. BARBARA/MARIANO will continue to follow.

## 2022-05-13 NOTE — PROGRESS NOTES
Sent a message to Dr. Vince Espitia. The patient and his daughter advised me that he takes hydrocortisone three times a day. Dr. Martinez called and gave me verbal to change hydrocortisone 10 mg in the morning and 5 mg in the afternoon and evening.

## 2022-05-13 NOTE — CONSULTS
NEUROSURGERY CONSULTATION     Chief Complaint: back pain     HPI:   Chalo Stratton is a 80 y.o.  male who presents to the ED c/o low back pain after falling. Patient states he was attempting to fix his lawnmower when he fell backwards onto the ground. Currently c/o midline low back pain. Pain is sharp and does not radiate. Any type of movement makes the pain worse. CT scan demonstrates age indeterminate L2 compression fracture for which neurosurgery was consulted. Denies loss of bowel or bladder, saddle anesthesia, pain down the legs, numbness, tingling, fever, chills, N/V, or chest pain. Past Medical History:   Diagnosis Date    A-fib 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 No pertinent family history. Social History     Socioeconomic History    Marital status:      Spouse name: Not on file    Number of children: Not on file    Years of education: Not on file    Highest education level: Not on file   Occupational History    Not on file   Tobacco Use    Smoking status: Never Smoker    Smokeless tobacco: Never Used   Vaping Use    Vaping Use: Never used   Substance and Sexual Activity    Alcohol use: Not Currently    Drug use: Never    Sexual activity: Not on file   Other Topics Concern    Not on file   Social History Narrative    Not on file     Social Determinants of Health     Financial Resource Strain:     Difficulty of Paying Living Expenses: Not on file   Food Insecurity:     Worried About [address] in the Last Year: Not on file    Ran Out of Food in the Last Year: Not on file   Transportation Needs:     Lack of Transportation (Medical):  Not on file    Lack of Transportation (Non-Medical):  Not on file   Physical Activity:     Days of Exercise per Week: Not on file    Minutes of Exercise per Session: Not on file   Stress:     Feeling of Stress : Not on file   Social Connections:     Frequency of Communication with Friends and Family: Not on file    Frequency of Social Gatherings with Friends and Family: Not on file    Attends Scientology Services: Not on file    Active Member of Clubs or Organizations: Not on file    Attends Club or Organization Meetings: Not on file    Marital Status: Not on file   Intimate Partner Violence:     Fear of Current or Ex-Partner: Not on file    Emotionally Abused: Not on file    Physically Abused: Not on file    Sexually Abused: Not on file   Housing Stability:     Unable to Pay for Housing in the Last Year: Not on file    Number of [place] in the Last Year: Not on file    Unstable Housing in the Last Year: Not on file       Medications:   Current Facility-Administered Medications   Medication Dose Route Frequency Provider Last Rate Last Admin    albuterol (PROVENTIL) nebulizer solution 2.5 mg  2.5 mg Nebulization Q6H PRN Tildon Gains, DO        fludrocortisone (FLORINEF) tablet 0.1 mg  0.1 mg Oral Daily Tildon Gains, DO   0.1 mg at 05/13/22 0945    hydrocortisone (CORTEF) tablet 10 mg  10 mg Oral Daily with breakfast Tildon Gains, DO   10 mg at 05/13/22 0945    hydrocortisone (CORTEF) tablet 5 mg  5 mg Oral Nightly Tildon Gains, DO        levothyroxine (SYNTHROID) tablet 137 mcg  137 mcg Oral Daily Tildon Gains, DO   137 mcg at 05/13/22 8636    metoprolol succinate (TOPROL XL) extended release tablet 25 mg  25 mg Oral Daily Tildon Gains, DO   25 mg at 05/13/22 0945    pantoprazole (PROTONIX) tablet 40 mg  40 mg Oral BID Tildon Gains, DO   40 mg at 05/13/22 0946    potassium chloride (KLOR-CON M) extended release tablet 20 mEq  20 mEq Oral Daily Tildon Gains, DO   20 mEq at 05/13/22 0947    vitamin D (CHOLECALCIFEROL) tablet 1,000 Units 1,000 Units Oral BID Jeral Musty, DO   1,000 Units at 05/13/22 0945    sodium chloride flush 0.9 % injection 5-40 mL  5-40 mL IntraVENous 2 times per day Jeral Musty, DO   10 mL at 05/13/22 0946    sodium chloride flush 0.9 % injection 5-40 mL  5-40 mL IntraVENous PRN Jeral Musty, DO        0.9 % sodium chloride infusion   IntraVENous PRN Jeral Musty, DO        enoxaparin Sodium (LOVENOX) injection 30 mg  30 mg SubCUTAneous BID Jeral Musty, DO   30 mg at 05/13/22 0946    polyethylene glycol (GLYCOLAX) packet 17 g  17 g Oral Daily PRN Jeral Musty, DO        acetaminophen (TYLENOL) tablet 650 mg  650 mg Oral Q6H PRN Jeral Musty, DO   650 mg at 05/13/22 1847    Or    acetaminophen (TYLENOL) suppository 650 mg  650 mg Rectal Q6H PRN Jeral Musty, DO        atorvastatin (LIPITOR) tablet 10 mg  10 mg Oral Nightly Jeral Musty, DO        budesonide (PULMICORT) nebulizer suspension 500 mcg  0.5 mg Nebulization BID Jeral Musty, DO   500 mcg at 05/13/22 0751    traMADol (ULTRAM) tablet 50 mg  50 mg Oral Q6H PRN Damaris Sargent PA-C            Allergies:    Codeine, Diazepam, Lorazepam, and Morphine       Review of Systems   Constitutional: Negative for fever and unexpected weight change. HENT: Negative for trouble swallowing. Eyes: Negative for photophobia and visual disturbance. Respiratory: Negative for shortness of breath. Cardiovascular: Negative for chest pain. Gastrointestinal: Negative for abdominal pain. Endocrine: Negative for heat intolerance. Genitourinary: Negative for flank pain. Musculoskeletal: Positive for back pain. Negative for gait problem, myalgias and neck pain. Skin: Negative for wound. Neurological: Negative for weakness, numbness and headaches. Psychiatric/Behavioral: Negative for confusion. Physical Exam  Constitutional:       Appearance: Normal appearance. He is well-developed. HENT:      Head: Normocephalic and atraumatic.    Eyes: Extraocular Movements: Extraocular movements intact. Conjunctiva/sclera: Conjunctivae normal.      Pupils: Pupils are equal, round, and reactive to light. Cardiovascular:      Rate and Rhythm: Normal rate. Pulmonary:      Effort: Pulmonary effort is normal.   Abdominal:      General: There is no distension. Musculoskeletal:      Cervical back: Normal range of motion and neck supple. Comments: Tenderness to palpation midline lumbar spine   Skin:     General: Skin is warm and dry. Neurological:      Mental Status: He is alert. Comments: Alert and oriented x3  CN3-12 intact  Moving all extremities, pain in back with exam  Sensation intact to light touch     Psychiatric:         Thought Content: Thought content normal.          /88   Pulse 85   Temp 97.1 °F (36.2 °C) (Temporal)   Resp 16   Ht 6' 1\" (1.854 m)   Wt 235 lb (106.6 kg)   SpO2 96%   BMI 31.00 kg/m²        Assessment:   Age indeterminate L2 compression fracture    Plan:  -MRI lumbar spine ordered to determine acuity of L2 compression fracture  -Will discuss further treatment plan after imaging completed  -Pain control      Electronically signed by Justo Gillis PA-C on 5/13/2022 at 3:27 PM     I have interviewed and examined the patient and agree with above. He has a compression fracture with back pain. I spent over 30 minutes discussing this condition with him and on medical decision making.   We will get an MRI and will consider TLSO versus kyphoplasty    Jude Jones MD

## 2022-05-14 PROBLEM — K92.2 GASTROINTESTINAL BLEEDING: Status: RESOLVED | Noted: 2021-08-21 | Resolved: 2022-05-14

## 2022-05-14 PROBLEM — G93.41 METABOLIC ENCEPHALOPATHY: Status: RESOLVED | Noted: 2020-03-21 | Resolved: 2022-05-14

## 2022-05-14 PROBLEM — M62.82 NON-TRAUMATIC RHABDOMYOLYSIS: Status: RESOLVED | Noted: 2020-03-21 | Resolved: 2022-05-14

## 2022-05-14 PROBLEM — E87.20 LACTIC ACIDOSIS: Status: RESOLVED | Noted: 2019-10-06 | Resolved: 2022-05-14

## 2022-05-14 PROBLEM — J69.0 ASPIRATION PNEUMONIA (HCC): Status: RESOLVED | Noted: 2020-03-21 | Resolved: 2022-05-14

## 2022-05-14 PROBLEM — R57.9 SHOCK (HCC): Status: RESOLVED | Noted: 2019-10-05 | Resolved: 2022-05-14

## 2022-05-14 LAB
BASOPHILS ABSOLUTE: 0.05 E9/L (ref 0–0.2)
BASOPHILS RELATIVE PERCENT: 0.5 % (ref 0–2)
EKG ATRIAL RATE: 93 BPM
EKG P AXIS: 52 DEGREES
EKG P-R INTERVAL: 218 MS
EKG Q-T INTERVAL: 406 MS
EKG QRS DURATION: 152 MS
EKG QTC CALCULATION (BAZETT): 504 MS
EKG R AXIS: -83 DEGREES
EKG T AXIS: 9 DEGREES
EKG VENTRICULAR RATE: 93 BPM
EOSINOPHILS ABSOLUTE: 0.46 E9/L (ref 0.05–0.5)
EOSINOPHILS RELATIVE PERCENT: 4.3 % (ref 0–6)
HCT VFR BLD CALC: 45.7 % (ref 37–54)
HEMOGLOBIN: 14.5 G/DL (ref 12.5–16.5)
IMMATURE GRANULOCYTES #: 0.11 E9/L
IMMATURE GRANULOCYTES %: 1 % (ref 0–5)
LYMPHOCYTES ABSOLUTE: 1.19 E9/L (ref 1.5–4)
LYMPHOCYTES RELATIVE PERCENT: 11 % (ref 20–42)
MCH RBC QN AUTO: 26.9 PG (ref 26–35)
MCHC RBC AUTO-ENTMCNC: 31.7 % (ref 32–34.5)
MCV RBC AUTO: 84.8 FL (ref 80–99.9)
MONOCYTES ABSOLUTE: 1.61 E9/L (ref 0.1–0.95)
MONOCYTES RELATIVE PERCENT: 14.9 % (ref 2–12)
NEUTROPHILS ABSOLUTE: 7.35 E9/L (ref 1.8–7.3)
NEUTROPHILS RELATIVE PERCENT: 68.3 % (ref 43–80)
PDW BLD-RTO: 13.5 FL (ref 11.5–15)
PLATELET # BLD: 228 E9/L (ref 130–450)
PMV BLD AUTO: 10 FL (ref 7–12)
POLYCHROMASIA: ABNORMAL
RBC # BLD: 5.39 E12/L (ref 3.8–5.8)
WBC # BLD: 10.8 E9/L (ref 4.5–11.5)

## 2022-05-14 PROCEDURE — 6360000002 HC RX W HCPCS: Performed by: INTERNAL MEDICINE

## 2022-05-14 PROCEDURE — 6370000000 HC RX 637 (ALT 250 FOR IP): Performed by: PHYSICIAN ASSISTANT

## 2022-05-14 PROCEDURE — 99232 SBSQ HOSP IP/OBS MODERATE 35: CPT | Performed by: NEUROLOGICAL SURGERY

## 2022-05-14 PROCEDURE — 2580000003 HC RX 258: Performed by: INTERNAL MEDICINE

## 2022-05-14 PROCEDURE — 6370000000 HC RX 637 (ALT 250 FOR IP): Performed by: INTERNAL MEDICINE

## 2022-05-14 PROCEDURE — 36415 COLL VENOUS BLD VENIPUNCTURE: CPT

## 2022-05-14 PROCEDURE — 85025 COMPLETE CBC W/AUTO DIFF WBC: CPT

## 2022-05-14 PROCEDURE — 93010 ELECTROCARDIOGRAM REPORT: CPT | Performed by: INTERNAL MEDICINE

## 2022-05-14 PROCEDURE — 94640 AIRWAY INHALATION TREATMENT: CPT

## 2022-05-14 PROCEDURE — 1200000000 HC SEMI PRIVATE

## 2022-05-14 RX ADMIN — SODIUM CHLORIDE, PRESERVATIVE FREE 10 ML: 5 INJECTION INTRAVENOUS at 23:08

## 2022-05-14 RX ADMIN — METOPROLOL SUCCINATE 25 MG: 25 TABLET, EXTENDED RELEASE ORAL at 09:34

## 2022-05-14 RX ADMIN — ENOXAPARIN SODIUM 30 MG: 100 INJECTION SUBCUTANEOUS at 09:29

## 2022-05-14 RX ADMIN — Medication 1000 UNITS: at 22:24

## 2022-05-14 RX ADMIN — SODIUM CHLORIDE, PRESERVATIVE FREE 10 ML: 5 INJECTION INTRAVENOUS at 09:42

## 2022-05-14 RX ADMIN — ATORVASTATIN CALCIUM 10 MG: 10 TABLET, FILM COATED ORAL at 22:23

## 2022-05-14 RX ADMIN — Medication 1000 UNITS: at 09:27

## 2022-05-14 RX ADMIN — HYDROCORTISONE 5 MG: 5 TABLET ORAL at 17:20

## 2022-05-14 RX ADMIN — PANTOPRAZOLE SODIUM 40 MG: 40 TABLET, DELAYED RELEASE ORAL at 22:23

## 2022-05-14 RX ADMIN — LEVOTHYROXINE SODIUM 137 MCG: 0.14 TABLET ORAL at 09:27

## 2022-05-14 RX ADMIN — ENOXAPARIN SODIUM 30 MG: 100 INJECTION SUBCUTANEOUS at 23:07

## 2022-05-14 RX ADMIN — HYDROCORTISONE 10 MG: 5 TABLET ORAL at 12:10

## 2022-05-14 RX ADMIN — POTASSIUM CHLORIDE 20 MEQ: 20 TABLET, EXTENDED RELEASE ORAL at 12:10

## 2022-05-14 RX ADMIN — ACETAMINOPHEN 650 MG: 325 TABLET ORAL at 22:24

## 2022-05-14 RX ADMIN — FLUDROCORTISONE ACETATE 0.1 MG: 0.1 TABLET ORAL at 09:30

## 2022-05-14 RX ADMIN — PANTOPRAZOLE SODIUM 40 MG: 40 TABLET, DELAYED RELEASE ORAL at 09:29

## 2022-05-14 RX ADMIN — HYDROCORTISONE 5 MG: 5 TABLET ORAL at 22:23

## 2022-05-14 RX ADMIN — BUDESONIDE 500 MCG: 0.5 SUSPENSION RESPIRATORY (INHALATION) at 19:32

## 2022-05-14 RX ADMIN — BUDESONIDE 500 MCG: 0.5 SUSPENSION RESPIRATORY (INHALATION) at 08:59

## 2022-05-14 RX ADMIN — TRAMADOL HYDROCHLORIDE 50 MG: 50 TABLET, COATED ORAL at 09:34

## 2022-05-14 ASSESSMENT — PAIN DESCRIPTION - DESCRIPTORS: DESCRIPTORS: ACHING;DISCOMFORT;SORE

## 2022-05-14 ASSESSMENT — PAIN DESCRIPTION - LOCATION: LOCATION: GENERALIZED

## 2022-05-14 ASSESSMENT — PAIN SCALES - GENERAL: PAINLEVEL_OUTOF10: 4

## 2022-05-14 NOTE — PROGRESS NOTES
PT SEEN AND EXAMINED. Chart reviewed. meds reviewed. D/w nursing + family as available. EXAM: IN GENERAL, NAD. AWAKE AND ALERT. ROS NEGx10 EXCEPT: Seems a bit confused. Had some nausea last night. I orderedAntiemetic.  /70   Pulse 84   Temp 97.7 °F (36.5 °C) (Temporal)   Resp 18   Ht 6' 1\" (1.854 m)   Wt 235 lb (106.6 kg)   SpO2 92%   BMI 31.00 kg/m²   GEN: A+O NAD. HEENT: NCAT. EOMI. KASEY  NECK: NO JVD. TRACH MIDLINE. NO BRUITS. NO THYROMEGALY. LUNGS: CTA BL NO RALES, RHONCHI OR WHEEZES. GOOD EXCURSION. CV: Regular rate and rhythm, NO Murmurs, Rubs, Or gallops  ABD: Soft. Nontender. Normal bowel sounds. No organomegaly  EXT:No clubbing cyanosis or edema  Neuro: Alert and oriented x 3. No focal motor deficits. No sensory deficits. Reflexes appear intact.   Labs/data reviewedLABS: CBC with Differential:    Lab Results   Component Value Date    WBC 10.8 05/14/2022    RBC 5.39 05/14/2022    HGB 14.5 05/14/2022    HCT 45.7 05/14/2022     05/14/2022    MCV 84.8 05/14/2022    MCH 26.9 05/14/2022    MCHC 31.7 05/14/2022    RDW 13.5 05/14/2022    NRBC 1.7 08/18/2021    BLASTSPCT 1.0 03/23/2020    METASPCT 0.9 08/18/2021    LYMPHOPCT 12.9 05/12/2022    PROMYELOPCT 1.0 03/23/2020    MONOPCT 11.3 05/12/2022    MYELOPCT 7.0 03/23/2020    BASOPCT 0.5 05/12/2022    MONOSABS 1.13 05/12/2022    LYMPHSABS 1.29 05/12/2022    EOSABS 0.17 05/12/2022    BASOSABS 0.05 05/12/2022     Platelets:    Lab Results   Component Value Date     05/14/2022     CMP:    Lab Results   Component Value Date     05/12/2022    K 4.7 05/12/2022     05/12/2022    CO2 24 05/12/2022    BUN 24 05/12/2022    CREATININE 1.4 05/12/2022    GFRAA 57 05/12/2022    LABGLOM 47 05/12/2022    GLUCOSE 103 05/12/2022    PROT 7.2 05/12/2022    LABALBU 4.2 05/12/2022    CALCIUM 9.4 05/12/2022    BILITOT 0.3 05/12/2022    ALKPHOS 74 05/12/2022    AST 21 05/12/2022    ALT 11 05/12/2022     Magnesium:    Lab Results   Component Value Date    MG 2.0 12/06/2021     LDH:  No results found for: LDH  PT/INR:    Lab Results   Component Value Date    PROTIME 12.5 05/12/2022    INR 1.1 05/12/2022     Last 3 Troponin:    Lab Results   Component Value Date    TROPONINI 0.18 03/15/2020    TROPONINI 0.16 03/14/2020    TROPONINI 0.12 03/14/2020     ABG:    Lab Results   Component Value Date    PH 7.484 12/06/2021    PCO2 27.0 12/06/2021    PO2 70.5 12/06/2021    HCO3 19.8 12/06/2021    BE -1.8 12/06/2021    O2SAT 95.4 12/06/2021     IRON:    Lab Results   Component Value Date    IRON 6 08/18/2021     IMAGING    XR PELVIS (1-2 VIEWS)    Result Date: 5/12/2022  EXAMINATION: ONE XRAY VIEW OF THE PELVIS 5/12/2022 6:19 pm COMPARISON: 12/07/2021 abdomen/pelvis CT. HISTORY: ORDERING SYSTEM PROVIDED HISTORY: fall TECHNOLOGIST PROVIDED HISTORY: Reason for exam:->fall What reading provider will be dictating this exam?->CRC FINDINGS: No definite acute fracture and no dislocation. There is joint space narrowing and marginal osteophyte formation at the hips. The right SI joint is normal.  The left SI joint is obscured by overlying metallic buckle. There is mild lower lumbar spondylosis and marked disc disease at L4-5 and L5-S1. No definite radiographic evidence of acute pelvic pathology on this limited pelvis radiograph. CT Head WO Contrast    Result Date: 5/12/2022  EXAMINATION: CT OF THE HEAD WITHOUT CONTRAST; CT OF THE CERVICAL SPINE WITHOUT CONTRAST; CT OF THE LUMBAR SPINE WITHOUT CONTRAST  5/12/2022 7:30 pm TECHNIQUE: CT of the head was performed without the administration of intravenous contrast. Automated exposure control, iterative reconstruction, and/or weight based adjustment of the mA/kV was utilized to reduce the radiation dose to as low as reasonably achievable.; CT of the cervical spine was performed without the administration of intravenous contrast. Multiplanar reformatted images are provided for review.  Automated exposure control, iterative reconstruction, and/or weight based adjustment of the mA/kV was utilized to reduce the radiation dose to as low as reasonably achievable.; CT of the lumbar spine was performed without the administration of intravenous contrast. Multiplanar reformatted images are provided for review. Adjustment of mA and/or kV according to patient size was utilized. Automated exposure control, iterative reconstruction, and/or weight based adjustment of the mA/kV was utilized to reduce the radiation dose to as low as reasonably achievable. COMPARISON: December 6, 2021 HISTORY: ORDERING SYSTEM PROVIDED HISTORY: fall, head trauma TECHNOLOGIST PROVIDED HISTORY: Reason for exam:->fall, head trauma Has a \"code stroke\" or \"stroke alert\" been called? ->No Decision Support Exception - unselect if not a suspected or confirmed emergency medical condition->Emergency Medical Condition (MA) What reading provider will be dictating this exam?->CRC FINDINGS: BRAIN/VENTRICLES: There is no acute intracranial hemorrhage, mass effect or midline shift. No abnormal extra-axial fluid collection. The gray-white differentiation is maintained without evidence of an acute infarct. There is no evidence of hydrocephalus. ORBITS: The visualized portion of the orbits demonstrate no acute abnormality. SINUSES: There are soft tissue densities in the maxillary, ethmoid and sphenoid sinuses. SOFT TISSUES/SKULL:  No acute abnormality of the visualized skull or soft tissues. No acute intracranial abnormality. There is age-appropriate atrophy, small-vessel ischemic disease and multiple lacunar CVAs predominantly in the basal ganglia bilaterally. Sinus disease with the soft tissue densities in the maxillary, ethmoid and sphenoid sinuses. CT cervical spine. There is no acute fracture or dislocation in the cervical spine. The prevertebral soft tissues are normal.  There is mild degenerative changes from C3-T1 .   There is atelectasis/minimal ground-glass densities in the right upper lobe. Impression No acute fractures. Diffuse degenerative changes from C3-T1. Atelectasis/ground-glass densities in the right upper lobe. Clinical assessment is recommended to evaluate for potential pneumonia or edema. CT lumbar spine. There is a compression fracture involving the superior endplate of L2 with nearly 15% loss of height. There is diffuse osteopenia and degenerative changes. Mild disc bulges are present at L4-5 and L5-S1. Impression 15% compression deformity of the superior endplate of L2. Diffuse degenerative changes with disc bulges from L4-S1. RECOMMENDATIONS: Unavailable     CT Cervical Spine WO Contrast    Result Date: 5/12/2022  EXAMINATION: CT OF THE HEAD WITHOUT CONTRAST; CT OF THE CERVICAL SPINE WITHOUT CONTRAST; CT OF THE LUMBAR SPINE WITHOUT CONTRAST  5/12/2022 7:30 pm TECHNIQUE: CT of the head was performed without the administration of intravenous contrast. Automated exposure control, iterative reconstruction, and/or weight based adjustment of the mA/kV was utilized to reduce the radiation dose to as low as reasonably achievable.; CT of the cervical spine was performed without the administration of intravenous contrast. Multiplanar reformatted images are provided for review. Automated exposure control, iterative reconstruction, and/or weight based adjustment of the mA/kV was utilized to reduce the radiation dose to as low as reasonably achievable.; CT of the lumbar spine was performed without the administration of intravenous contrast. Multiplanar reformatted images are provided for review. Adjustment of mA and/or kV according to patient size was utilized. Automated exposure control, iterative reconstruction, and/or weight based adjustment of the mA/kV was utilized to reduce the radiation dose to as low as reasonably achievable.  COMPARISON: December 6, 2021 HISTORY: ORDERING SYSTEM PROVIDED HISTORY: fall, head trauma TECHNOLOGIST PROVIDED HISTORY: Reason for exam:->fall, head trauma Has a \"code stroke\" or \"stroke alert\" been called? ->No Decision Support Exception - unselect if not a suspected or confirmed emergency medical condition->Emergency Medical Condition (MA) What reading provider will be dictating this exam?->CRC FINDINGS: BRAIN/VENTRICLES: There is no acute intracranial hemorrhage, mass effect or midline shift. No abnormal extra-axial fluid collection. The gray-white differentiation is maintained without evidence of an acute infarct. There is no evidence of hydrocephalus. ORBITS: The visualized portion of the orbits demonstrate no acute abnormality. SINUSES: There are soft tissue densities in the maxillary, ethmoid and sphenoid sinuses. SOFT TISSUES/SKULL:  No acute abnormality of the visualized skull or soft tissues. No acute intracranial abnormality. There is age-appropriate atrophy, small-vessel ischemic disease and multiple lacunar CVAs predominantly in the basal ganglia bilaterally. Sinus disease with the soft tissue densities in the maxillary, ethmoid and sphenoid sinuses. CT cervical spine. There is no acute fracture or dislocation in the cervical spine. The prevertebral soft tissues are normal.  There is mild degenerative changes from C3-T1 . There is atelectasis/minimal ground-glass densities in the right upper lobe. Impression No acute fractures. Diffuse degenerative changes from C3-T1. Atelectasis/ground-glass densities in the right upper lobe. Clinical assessment is recommended to evaluate for potential pneumonia or edema. CT lumbar spine. There is a compression fracture involving the superior endplate of L2 with nearly 15% loss of height. There is diffuse osteopenia and degenerative changes. Mild disc bulges are present at L4-5 and L5-S1. Impression 15% compression deformity of the superior endplate of L2.  Diffuse degenerative changes with disc bulges from L4-S1. RECOMMENDATIONS: Unavailable     CT LUMBAR SPINE WO CONTRAST    Result Date: 5/12/2022  EXAMINATION: CT OF THE HEAD WITHOUT CONTRAST; CT OF THE CERVICAL SPINE WITHOUT CONTRAST; CT OF THE LUMBAR SPINE WITHOUT CONTRAST  5/12/2022 7:30 pm TECHNIQUE: CT of the head was performed without the administration of intravenous contrast. Automated exposure control, iterative reconstruction, and/or weight based adjustment of the mA/kV was utilized to reduce the radiation dose to as low as reasonably achievable.; CT of the cervical spine was performed without the administration of intravenous contrast. Multiplanar reformatted images are provided for review. Automated exposure control, iterative reconstruction, and/or weight based adjustment of the mA/kV was utilized to reduce the radiation dose to as low as reasonably achievable.; CT of the lumbar spine was performed without the administration of intravenous contrast. Multiplanar reformatted images are provided for review. Adjustment of mA and/or kV according to patient size was utilized. Automated exposure control, iterative reconstruction, and/or weight based adjustment of the mA/kV was utilized to reduce the radiation dose to as low as reasonably achievable. COMPARISON: December 6, 2021 HISTORY: ORDERING SYSTEM PROVIDED HISTORY: fall, head trauma TECHNOLOGIST PROVIDED HISTORY: Reason for exam:->fall, head trauma Has a \"code stroke\" or \"stroke alert\" been called? ->No Decision Support Exception - unselect if not a suspected or confirmed emergency medical condition->Emergency Medical Condition (MA) What reading provider will be dictating this exam?->CRC FINDINGS: BRAIN/VENTRICLES: There is no acute intracranial hemorrhage, mass effect or midline shift. No abnormal extra-axial fluid collection. The gray-white differentiation is maintained without evidence of an acute infarct. There is no evidence of hydrocephalus. ORBITS: The visualized portion of the orbits demonstrate no acute abnormality.  SINUSES: There are soft tissue densities in the maxillary, ethmoid and sphenoid sinuses. SOFT TISSUES/SKULL:  No acute abnormality of the visualized skull or soft tissues. No acute intracranial abnormality. There is age-appropriate atrophy, small-vessel ischemic disease and multiple lacunar CVAs predominantly in the basal ganglia bilaterally. Sinus disease with the soft tissue densities in the maxillary, ethmoid and sphenoid sinuses. CT cervical spine. There is no acute fracture or dislocation in the cervical spine. The prevertebral soft tissues are normal.  There is mild degenerative changes from C3-T1 . There is atelectasis/minimal ground-glass densities in the right upper lobe. Impression No acute fractures. Diffuse degenerative changes from C3-T1. Atelectasis/ground-glass densities in the right upper lobe. Clinical assessment is recommended to evaluate for potential pneumonia or edema. CT lumbar spine. There is a compression fracture involving the superior endplate of L2 with nearly 15% loss of height. There is diffuse osteopenia and degenerative changes. Mild disc bulges are present at L4-5 and L5-S1. Impression 15% compression deformity of the superior endplate of L2. Diffuse degenerative changes with disc bulges from L4-S1. RECOMMENDATIONS: Unavailable     MRI LUMBAR SPINE WO CONTRAST    Result Date: 5/13/2022  EXAMINATION: MRI OF THE LUMBAR SPINE WITHOUT CONTRAST, 5/13/2022 8:53 pm TECHNIQUE: Multiplanar multisequence MRI of the lumbar spine was performed without the administration of intravenous contrast. COMPARISON: None. HISTORY: ORDERING SYSTEM PROVIDED HISTORY: determine acuity of fx TECHNOLOGIST PROVIDED HISTORY: Reason for exam:->determine acuity of fx What reading provider will be dictating this exam?->CRC FINDINGS: BONES/ALIGNMENT: There is a normal lumbar lordosis. Assuming that the last well-formed disc represents L5-S1, the conus medullaris ends at L1 and is within normal limits.   An acute superior endplate fracture mg Oral BID    potassium chloride  20 mEq Oral Daily    Vitamin D  1,000 Units Oral BID    sodium chloride flush  5-40 mL IntraVENous 2 times per day    enoxaparin  30 mg SubCUTAneous BID    atorvastatin  10 mg Oral Nightly    budesonide  0.5 mg Nebulization BID    hydrocortisone  5 mg Oral Lunch       Continuous Infusions:   sodium chloride         PRN Meds:trimethobenzamide, albuterol, sodium chloride flush, sodium chloride, polyethylene glycol, acetaminophen **OR** acetaminophen, traMADol    A/P:      Patient Active Problem List   Diagnosis    [facility]    Weakness    Hypothyroidism    Renal insufficiency    A-fib (HCC)    QT prolongation    RBBB    Acute respiratory failure with hypoxia (HCC)    Acute renal failure superimposed on stage 2 chronic kidney disease (HCC)    Adrenal insufficiency (Halsey's disease) (HCC)    Wide-complex tachycardia (HCC)    Orthostatic hypotension    Anemia    Acute alteration in mental status    AMS (altered mental status)    Closed fracture of second lumbar vertebra ([facility] [address]    Fall at home, sequela    Compression fracture    PLAN:Neurosurgery discuss with patient on using the brace for healing    I can be reached though [employer] or [facility] [place] at [phone]

## 2022-05-14 NOTE — PROGRESS NOTES
Department of Neurosurgery  Attending Progress Note    CHIEF COMPLAINT: back pain.   Seen for compression fracture    SUBJECTIVE:  C/o back pain    ROS:  HEENT: negative for headache  CVS: negative for chest pain  Resp: negative for SOB    OBJECTIVE  Physical  VITALS:  BP (!) 138/96   Pulse 83   Temp 98.8 °F (37.1 °C) (Temporal)   Resp 16   Ht 6' 1\" (1.854 m)   Wt 235 lb (106.6 kg)   SpO2 93%   BMI 31.00 kg/m²   NEUROLOGIC:  Mental Status Exam:  Orientation:   person, place, time  Motor Exam:  Motor exam is symmetrical 5 out of 5 all extremities bilaterally  Sensory:  Sensory intact    Data  CBC:   Lab Results   Component Value Date    WBC 10.0 05/12/2022    RBC 5.38 05/12/2022    HGB 14.4 05/12/2022    HCT 45.7 05/12/2022    MCV 84.9 05/12/2022    MCH 26.8 05/12/2022    MCHC 31.5 05/12/2022    RDW 13.5 05/12/2022     05/12/2022    MPV 10.5 05/12/2022     BMP:    Lab Results   Component Value Date     05/12/2022    K 4.7 05/12/2022     05/12/2022    CO2 24 05/12/2022    BUN 24 05/12/2022    LABALBU 4.2 05/12/2022    CREATININE 1.4 05/12/2022    CALCIUM 9.4 05/12/2022    GFRAA 57 05/12/2022    LABGLOM 47 05/12/2022    GLUCOSE 103 05/12/2022     Current Inpatient Medications  Current Facility-Administered Medications: trimethobenzamide (TIGAN) injection 200 mg, 200 mg, IntraMUSCular, Q6H PRN  albuterol (PROVENTIL) nebulizer solution 2.5 mg, 2.5 mg, Nebulization, Q6H PRN  fludrocortisone (FLORINEF) tablet 0.1 mg, 0.1 mg, Oral, Daily  hydrocortisone (CORTEF) tablet 10 mg, 10 mg, Oral, Daily with breakfast  hydrocortisone (CORTEF) tablet 5 mg, 5 mg, Oral, Nightly  levothyroxine (SYNTHROID) tablet 137 mcg, 137 mcg, Oral, Daily  metoprolol succinate (TOPROL XL) extended release tablet 25 mg, 25 mg, Oral, Daily  pantoprazole (PROTONIX) tablet 40 mg, 40 mg, Oral, BID  potassium chloride (KLOR-CON M) extended release tablet 20 mEq, 20 mEq, Oral, Daily  vitamin D (CHOLECALCIFEROL) tablet 1,000 Units, 1,000 Units, Oral, BID  sodium chloride flush 0.9 % injection 5-40 mL, 5-40 mL, IntraVENous, 2 times per day  sodium chloride flush 0.9 % injection 5-40 mL, 5-40 mL, IntraVENous, PRN  0.9 % sodium chloride infusion, , IntraVENous, PRN  enoxaparin Sodium (LOVENOX) injection 30 mg, 30 mg, SubCUTAneous, BID  polyethylene glycol (GLYCOLAX) packet 17 g, 17 g, Oral, Daily PRN  acetaminophen (TYLENOL) tablet 650 mg, 650 mg, Oral, Q6H PRN **OR** acetaminophen (TYLENOL) suppository 650 mg, 650 mg, Rectal, Q6H PRN  atorvastatin (LIPITOR) tablet 10 mg, 10 mg, Oral, Nightly  budesonide (PULMICORT) nebulizer suspension 500 mcg, 0.5 mg, Nebulization, BID  traMADol (ULTRAM) tablet 50 mg, 50 mg, Oral, Q6H PRN  hydrocortisone (CORTEF) tablet 5 mg, 5 mg, Oral, Lunch    ASSESSMENT AND PLAN:    Patient with compression fracture. We discussed brace versus kypho.   He wants to try a brace    Stuart Hunt MD

## 2022-05-14 NOTE — PLAN OF CARE
Problem: Safety - Adult  Goal: Free from fall injury  Outcome: Completed     Problem: ABCDS Injury Assessment  Goal: Absence of physical injury  Outcome: Completed     Problem: ABCDS Injury Assessment  Goal: Absence of physical injury  Outcome: Completed

## 2022-05-15 PROCEDURE — 99232 SBSQ HOSP IP/OBS MODERATE 35: CPT | Performed by: STUDENT IN AN ORGANIZED HEALTH CARE EDUCATION/TRAINING PROGRAM

## 2022-05-15 PROCEDURE — 6360000002 HC RX W HCPCS: Performed by: INTERNAL MEDICINE

## 2022-05-15 PROCEDURE — 1200000000 HC SEMI PRIVATE

## 2022-05-15 PROCEDURE — 6370000000 HC RX 637 (ALT 250 FOR IP): Performed by: INTERNAL MEDICINE

## 2022-05-15 PROCEDURE — 6370000000 HC RX 637 (ALT 250 FOR IP): Performed by: PHYSICIAN ASSISTANT

## 2022-05-15 PROCEDURE — 94640 AIRWAY INHALATION TREATMENT: CPT

## 2022-05-15 PROCEDURE — 2580000003 HC RX 258: Performed by: INTERNAL MEDICINE

## 2022-05-15 RX ADMIN — FLUDROCORTISONE ACETATE 0.1 MG: 0.1 TABLET ORAL at 09:22

## 2022-05-15 RX ADMIN — BUDESONIDE 500 MCG: 0.5 SUSPENSION RESPIRATORY (INHALATION) at 06:31

## 2022-05-15 RX ADMIN — BUDESONIDE 500 MCG: 0.5 SUSPENSION RESPIRATORY (INHALATION) at 19:40

## 2022-05-15 RX ADMIN — SODIUM CHLORIDE, PRESERVATIVE FREE 10 ML: 5 INJECTION INTRAVENOUS at 09:21

## 2022-05-15 RX ADMIN — ENOXAPARIN SODIUM 30 MG: 100 INJECTION SUBCUTANEOUS at 09:21

## 2022-05-15 RX ADMIN — TRAMADOL HYDROCHLORIDE 50 MG: 50 TABLET, COATED ORAL at 20:09

## 2022-05-15 RX ADMIN — METOPROLOL SUCCINATE 25 MG: 25 TABLET, EXTENDED RELEASE ORAL at 09:21

## 2022-05-15 RX ADMIN — ATORVASTATIN CALCIUM 10 MG: 10 TABLET, FILM COATED ORAL at 20:09

## 2022-05-15 RX ADMIN — POTASSIUM CHLORIDE 20 MEQ: 20 TABLET, EXTENDED RELEASE ORAL at 09:22

## 2022-05-15 RX ADMIN — SODIUM CHLORIDE, PRESERVATIVE FREE 10 ML: 5 INJECTION INTRAVENOUS at 20:15

## 2022-05-15 RX ADMIN — PANTOPRAZOLE SODIUM 40 MG: 40 TABLET, DELAYED RELEASE ORAL at 09:22

## 2022-05-15 RX ADMIN — HYDROCORTISONE 10 MG: 5 TABLET ORAL at 09:21

## 2022-05-15 RX ADMIN — Medication 1000 UNITS: at 09:22

## 2022-05-15 RX ADMIN — ENOXAPARIN SODIUM 30 MG: 100 INJECTION SUBCUTANEOUS at 20:11

## 2022-05-15 RX ADMIN — ACETAMINOPHEN 650 MG: 325 TABLET ORAL at 15:18

## 2022-05-15 RX ADMIN — PANTOPRAZOLE SODIUM 40 MG: 40 TABLET, DELAYED RELEASE ORAL at 20:09

## 2022-05-15 RX ADMIN — HYDROCORTISONE 5 MG: 5 TABLET ORAL at 20:09

## 2022-05-15 RX ADMIN — HYDROCORTISONE 5 MG: 5 TABLET ORAL at 12:48

## 2022-05-15 RX ADMIN — ACETAMINOPHEN 650 MG: 325 TABLET ORAL at 09:22

## 2022-05-15 RX ADMIN — LEVOTHYROXINE SODIUM 137 MCG: 0.14 TABLET ORAL at 09:22

## 2022-05-15 RX ADMIN — Medication 1000 UNITS: at 20:09

## 2022-05-15 ASSESSMENT — PAIN DESCRIPTION - DESCRIPTORS
DESCRIPTORS: ACHING
DESCRIPTORS: ACHING

## 2022-05-15 ASSESSMENT — PAIN SCALES - GENERAL
PAINLEVEL_OUTOF10: 5
PAINLEVEL_OUTOF10: 8

## 2022-05-15 ASSESSMENT — PAIN DESCRIPTION - LOCATION
LOCATION: BACK
LOCATION: BACK

## 2022-05-15 ASSESSMENT — PAIN DESCRIPTION - ORIENTATION
ORIENTATION: LOWER
ORIENTATION: LOWER

## 2022-05-15 ASSESSMENT — PAIN - FUNCTIONAL ASSESSMENT: PAIN_FUNCTIONAL_ASSESSMENT: ACTIVITIES ARE NOT PREVENTED

## 2022-05-15 NOTE — PROGRESS NOTES
Department of Neurosurgery  Progress Note    CHIEF COMPLAINT: L2 compression fracture. SUBJECTIVE:  No acute events overnight. Patient resting in bed. States back pain is a little better today. Brace at bedside. REVIEW OF SYSTEMS :  Constitutional: Negative for chills and fever. Neurological: Negative for dizziness, tremors and speech change.      OBJECTIVE:   VITALS:  /78   Pulse 92   Temp 97.9 °F (36.6 °C) (Temporal)   Resp 17   Ht 6' 1\" (1.854 m)   Wt 235 lb (106.6 kg)   SpO2 96%   BMI 31.00 kg/m²     PHYSICAL:  Alert, oriented  Appears stated age  PERRL  EOMI  Strength full  Sensation intact to light touch    DATA:  CBC:   Lab Results   Component Value Date    WBC 10.8 05/14/2022    RBC 5.39 05/14/2022    HGB 14.5 05/14/2022    HCT 45.7 05/14/2022    MCV 84.8 05/14/2022    MCH 26.9 05/14/2022    MCHC 31.7 05/14/2022    RDW 13.5 05/14/2022     05/14/2022    MPV 10.0 05/14/2022     BMP:    Lab Results   Component Value Date     05/12/2022    K 4.7 05/12/2022     05/12/2022    CO2 24 05/12/2022    BUN 24 05/12/2022    LABALBU 4.2 05/12/2022    CREATININE 1.4 05/12/2022    CALCIUM 9.4 05/12/2022    GFRAA 57 05/12/2022    LABGLOM 47 05/12/2022    GLUCOSE 103 05/12/2022     PT/INR:    Lab Results   Component Value Date    PROTIME 12.5 05/12/2022    INR 1.1 05/12/2022     PTT:    Lab Results   Component Value Date    APTT 31.6 05/12/2022   [APTT}    Current Inpatient Medications  Current Facility-Administered Medications: trimethobenzamide (TIGAN) injection 200 mg, 200 mg, IntraMUSCular, Q6H PRN  albuterol (PROVENTIL) nebulizer solution 2.5 mg, 2.5 mg, Nebulization, Q6H PRN  fludrocortisone (FLORINEF) tablet 0.1 mg, 0.1 mg, Oral, Daily  hydrocortisone (CORTEF) tablet 10 mg, 10 mg, Oral, Daily with breakfast  hydrocortisone (CORTEF) tablet 5 mg, 5 mg, Oral, Nightly  levothyroxine (SYNTHROID) tablet 137 mcg, 137 mcg, Oral, Daily  metoprolol succinate (TOPROL XL) extended release tablet 25 mg, 25 mg, Oral, Daily  pantoprazole (PROTONIX) tablet 40 mg, 40 mg, Oral, BID  potassium chloride (KLOR-CON M) extended release tablet 20 mEq, 20 mEq, Oral, Daily  vitamin D (CHOLECALCIFEROL) tablet 1,000 Units, 1,000 Units, Oral, BID  sodium chloride flush 0.9 % injection 5-40 mL, 5-40 mL, IntraVENous, 2 times per day  sodium chloride flush 0.9 % injection 5-40 mL, 5-40 mL, IntraVENous, PRN  0.9 % sodium chloride infusion, , IntraVENous, PRN  enoxaparin Sodium (LOVENOX) injection 30 mg, 30 mg, SubCUTAneous, BID  polyethylene glycol (GLYCOLAX) packet 17 g, 17 g, Oral, Daily PRN  acetaminophen (TYLENOL) tablet 650 mg, 650 mg, Oral, Q6H PRN **OR** acetaminophen (TYLENOL) suppository 650 mg, 650 mg, Rectal, Q6H PRN  atorvastatin (LIPITOR) tablet 10 mg, 10 mg, Oral, Nightly  budesonide (PULMICORT) nebulizer suspension 500 mcg, 0.5 mg, Nebulization, BID  traMADol (ULTRAM) tablet 50 mg, 50 mg, Oral, Q6H PRN  hydrocortisone (CORTEF) tablet 5 mg, 5 mg, Oral, Lunch    ASSESSMENT:   -Dani Mendez is a 79 y/o male who presents with L2 compression fracture    PLAN:  -Pain control  -TLSO brace x 3 months  -Brace on when >45 degrees  -PT/OT with brace  -Follow up in clinic in 4 weeks with Lumbar XR      Electronically signed by MICHELLE Christian on 5/15/2022 at 1:19 PM

## 2022-05-15 NOTE — PROGRESS NOTES
PT SEEN AND EXAMINED. Chart reviewed. meds reviewed. D/w nursing + family as available. EXAM: IN GENERAL, NAD. AWAKE AND ALERT. ROS NEGx10 EXCEPT: /78   Pulse 92   Temp 97.9 °F (36.6 °C) (Temporal)   Resp 17   Ht 6' 1\" (1.854 m)   Wt 235 lb (106.6 kg)   SpO2 96%   BMI 31.00 kg/m²   GEN: A+O NAD. HEENT: NCAT. EOMI. KASEY  NECK: NO JVD. TRACH MIDLINE. NO BRUITS. NO THYROMEGALY. LUNGS: CTA BL NO RALES, RHONCHI OR WHEEZES. GOOD EXCURSION. CV: Regular rate and rhythm, NO Murmurs, Rubs, Or gallops  ABD: Soft. Nontender. Normal bowel sounds. No organomegaly  EXT:No clubbing cyanosis or edema  Neuro: Alert and oriented x 3. No focal motor deficits. No sensory deficits. Reflexes appear intact.   Labs/data reviewedLABS: CBC with Differential:    Lab Results   Component Value Date    WBC 10.8 05/14/2022    RBC 5.39 05/14/2022    HGB 14.5 05/14/2022    HCT 45.7 05/14/2022     05/14/2022    MCV 84.8 05/14/2022    MCH 26.9 05/14/2022    MCHC 31.7 05/14/2022    RDW 13.5 05/14/2022    NRBC 1.7 08/18/2021    BLASTSPCT 1.0 03/23/2020    METASPCT 0.9 08/18/2021    LYMPHOPCT 11.0 05/14/2022    PROMYELOPCT 1.0 03/23/2020    MONOPCT 14.9 05/14/2022    MYELOPCT 7.0 03/23/2020    BASOPCT 0.5 05/14/2022    MONOSABS 1.61 05/14/2022    LYMPHSABS 1.19 05/14/2022    EOSABS 0.46 05/14/2022    BASOSABS 0.05 05/14/2022     Platelets:    Lab Results   Component Value Date     05/14/2022     CMP:    Lab Results   Component Value Date     05/12/2022    K 4.7 05/12/2022     05/12/2022    CO2 24 05/12/2022    BUN 24 05/12/2022    CREATININE 1.4 05/12/2022    GFRAA 57 05/12/2022    LABGLOM 47 05/12/2022    GLUCOSE 103 05/12/2022    PROT 7.2 05/12/2022    LABALBU 4.2 05/12/2022    CALCIUM 9.4 05/12/2022    BILITOT 0.3 05/12/2022    ALKPHOS 74 05/12/2022    AST 21 05/12/2022    ALT 11 05/12/2022     Magnesium:    Lab Results   Component Value Date    MG 2.0 12/06/2021     LDH:  No results found for: LDH  PT/INR: Lab Results   Component Value Date    PROTIME 12.5 05/12/2022    INR 1.1 05/12/2022     Last 3 Troponin:    Lab Results   Component Value Date    TROPONINI 0.18 03/15/2020    TROPONINI 0.16 03/14/2020    TROPONINI 0.12 03/14/2020     ABG:    Lab Results   Component Value Date    PH 7.484 12/06/2021    PCO2 27.0 12/06/2021    PO2 70.5 12/06/2021    HCO3 19.8 12/06/2021    BE -1.8 12/06/2021    O2SAT 95.4 12/06/2021     IRON:    Lab Results   Component Value Date    IRON 6 08/18/2021     IMAGING    XR PELVIS (1-2 VIEWS)    Result Date: 5/12/2022  EXAMINATION: ONE XRAY VIEW OF THE PELVIS 5/12/2022 6:19 pm COMPARISON: 12/07/2021 abdomen/pelvis CT. HISTORY: ORDERING SYSTEM PROVIDED HISTORY: fall TECHNOLOGIST PROVIDED HISTORY: Reason for exam:->fall What reading provider will be dictating this exam?->CRC FINDINGS: No definite acute fracture and no dislocation. There is joint space narrowing and marginal osteophyte formation at the hips. The right SI joint is normal.  The left SI joint is obscured by overlying metallic buckle. There is mild lower lumbar spondylosis and marked disc disease at L4-5 and L5-S1. No definite radiographic evidence of acute pelvic pathology on this limited pelvis radiograph. CT Head WO Contrast    Result Date: 5/12/2022  EXAMINATION: CT OF THE HEAD WITHOUT CONTRAST; CT OF THE CERVICAL SPINE WITHOUT CONTRAST; CT OF THE LUMBAR SPINE WITHOUT CONTRAST  5/12/2022 7:30 pm TECHNIQUE: CT of the head was performed without the administration of intravenous contrast. Automated exposure control, iterative reconstruction, and/or weight based adjustment of the mA/kV was utilized to reduce the radiation dose to as low as reasonably achievable.; CT of the cervical spine was performed without the administration of intravenous contrast. Multiplanar reformatted images are provided for review.  Automated exposure control, iterative reconstruction, and/or weight based adjustment of the mA/kV was utilized to reduce the radiation dose to as low as reasonably achievable.; CT of the lumbar spine was performed without the administration of intravenous contrast. Multiplanar reformatted images are provided for review. Adjustment of mA and/or kV according to patient size was utilized. Automated exposure control, iterative reconstruction, and/or weight based adjustment of the mA/kV was utilized to reduce the radiation dose to as low as reasonably achievable. COMPARISON: December 6, 2021 HISTORY: ORDERING SYSTEM PROVIDED HISTORY: fall, head trauma TECHNOLOGIST PROVIDED HISTORY: Reason for exam:->fall, head trauma Has a \"code stroke\" or \"stroke alert\" been called? ->No Decision Support Exception - unselect if not a suspected or confirmed emergency medical condition->Emergency Medical Condition (MA) What reading provider will be dictating this exam?->CRC FINDINGS: BRAIN/VENTRICLES: There is no acute intracranial hemorrhage, mass effect or midline shift. No abnormal extra-axial fluid collection. The gray-white differentiation is maintained without evidence of an acute infarct. There is no evidence of hydrocephalus. ORBITS: The visualized portion of the orbits demonstrate no acute abnormality. SINUSES: There are soft tissue densities in the maxillary, ethmoid and sphenoid sinuses. SOFT TISSUES/SKULL:  No acute abnormality of the visualized skull or soft tissues. No acute intracranial abnormality. There is age-appropriate atrophy, small-vessel ischemic disease and multiple lacunar CVAs predominantly in the basal ganglia bilaterally. Sinus disease with the soft tissue densities in the maxillary, ethmoid and sphenoid sinuses. CT cervical spine. There is no acute fracture or dislocation in the cervical spine. The prevertebral soft tissues are normal.  There is mild degenerative changes from C3-T1 . There is atelectasis/minimal ground-glass densities in the right upper lobe. Impression No acute fractures.  Diffuse degenerative changes from C3-T1. Atelectasis/ground-glass densities in the right upper lobe. Clinical assessment is recommended to evaluate for potential pneumonia or edema. CT lumbar spine. There is a compression fracture involving the superior endplate of L2 with nearly 15% loss of height. There is diffuse osteopenia and degenerative changes. Mild disc bulges are present at L4-5 and L5-S1. Impression 15% compression deformity of the superior endplate of L2. Diffuse degenerative changes with disc bulges from L4-S1. RECOMMENDATIONS: Unavailable     CT Cervical Spine WO Contrast    Result Date: 5/12/2022  EXAMINATION: CT OF THE HEAD WITHOUT CONTRAST; CT OF THE CERVICAL SPINE WITHOUT CONTRAST; CT OF THE LUMBAR SPINE WITHOUT CONTRAST  5/12/2022 7:30 pm TECHNIQUE: CT of the head was performed without the administration of intravenous contrast. Automated exposure control, iterative reconstruction, and/or weight based adjustment of the mA/kV was utilized to reduce the radiation dose to as low as reasonably achievable.; CT of the cervical spine was performed without the administration of intravenous contrast. Multiplanar reformatted images are provided for review. Automated exposure control, iterative reconstruction, and/or weight based adjustment of the mA/kV was utilized to reduce the radiation dose to as low as reasonably achievable.; CT of the lumbar spine was performed without the administration of intravenous contrast. Multiplanar reformatted images are provided for review. Adjustment of mA and/or kV according to patient size was utilized. Automated exposure control, iterative reconstruction, and/or weight based adjustment of the mA/kV was utilized to reduce the radiation dose to as low as reasonably achievable.  COMPARISON: December 6, 2021 HISTORY: ORDERING SYSTEM PROVIDED HISTORY: fall, head trauma TECHNOLOGIST PROVIDED HISTORY: Reason for exam:->fall, head trauma Has a \"code stroke\" or \"stroke alert\" been called? ->No Decision Support Exception - unselect if not a suspected or confirmed emergency medical condition->Emergency Medical Condition (MA) What reading provider will be dictating this exam?->CRC FINDINGS: BRAIN/VENTRICLES: There is no acute intracranial hemorrhage, mass effect or midline shift. No abnormal extra-axial fluid collection. The gray-white differentiation is maintained without evidence of an acute infarct. There is no evidence of hydrocephalus. ORBITS: The visualized portion of the orbits demonstrate no acute abnormality. SINUSES: There are soft tissue densities in the maxillary, ethmoid and sphenoid sinuses. SOFT TISSUES/SKULL:  No acute abnormality of the visualized skull or soft tissues. No acute intracranial abnormality. There is age-appropriate atrophy, small-vessel ischemic disease and multiple lacunar CVAs predominantly in the basal ganglia bilaterally. Sinus disease with the soft tissue densities in the maxillary, ethmoid and sphenoid sinuses. CT cervical spine. There is no acute fracture or dislocation in the cervical spine. The prevertebral soft tissues are normal.  There is mild degenerative changes from C3-T1 . There is atelectasis/minimal ground-glass densities in the right upper lobe. Impression No acute fractures. Diffuse degenerative changes from C3-T1. Atelectasis/ground-glass densities in the right upper lobe. Clinical assessment is recommended to evaluate for potential pneumonia or edema. CT lumbar spine. There is a compression fracture involving the superior endplate of L2 with nearly 15% loss of height. There is diffuse osteopenia and degenerative changes. Mild disc bulges are present at L4-5 and L5-S1. Impression 15% compression deformity of the superior endplate of L2.  Diffuse degenerative changes with disc bulges from L4-S1. RECOMMENDATIONS: Unavailable     CT LUMBAR SPINE WO CONTRAST    Result Date: 5/12/2022  EXAMINATION: CT OF THE HEAD WITHOUT CONTRAST; CT OF THE CERVICAL SPINE WITHOUT CONTRAST; CT OF THE LUMBAR SPINE WITHOUT CONTRAST  5/12/2022 7:30 pm TECHNIQUE: CT of the head was performed without the administration of intravenous contrast. Automated exposure control, iterative reconstruction, and/or weight based adjustment of the mA/kV was utilized to reduce the radiation dose to as low as reasonably achievable.; CT of the cervical spine was performed without the administration of intravenous contrast. Multiplanar reformatted images are provided for review. Automated exposure control, iterative reconstruction, and/or weight based adjustment of the mA/kV was utilized to reduce the radiation dose to as low as reasonably achievable.; CT of the lumbar spine was performed without the administration of intravenous contrast. Multiplanar reformatted images are provided for review. Adjustment of mA and/or kV according to patient size was utilized. Automated exposure control, iterative reconstruction, and/or weight based adjustment of the mA/kV was utilized to reduce the radiation dose to as low as reasonably achievable. COMPARISON: December 6, 2021 HISTORY: ORDERING SYSTEM PROVIDED HISTORY: fall, head trauma TECHNOLOGIST PROVIDED HISTORY: Reason for exam:->fall, head trauma Has a \"code stroke\" or \"stroke alert\" been called? ->No Decision Support Exception - unselect if not a suspected or confirmed emergency medical condition->Emergency Medical Condition ([place]) What reading provider will be dictating this exam?->CRC FINDINGS: BRAIN/VENTRICLES: There is no acute intracranial hemorrhage, mass effect or midline shift. No abnormal extra-axial fluid collection. The gray-white differentiation is maintained without evidence of an acute infarct. There is no evidence of hydrocephalus. ORBITS: The visualized portion of the orbits demonstrate no acute abnormality. SINUSES: There are soft tissue densities in the maxillary, ethmoid and sphenoid sinuses.  SOFT TISSUES/SKULL:  No acute abnormality of the visualized skull or soft tissues. No acute intracranial abnormality. There is age-appropriate atrophy, small-vessel ischemic disease and multiple lacunar CVAs predominantly in the basal ganglia bilaterally. Sinus disease with the soft tissue densities in the maxillary, ethmoid and sphenoid sinuses. CT cervical spine. There is no acute fracture or dislocation in the cervical spine. The prevertebral soft tissues are normal.  There is mild degenerative changes from C3-T1 . There is atelectasis/minimal ground-glass densities in the right upper lobe. Impression No acute fractures. Diffuse degenerative changes from C3-T1. Atelectasis/ground-glass densities in the right upper lobe. Clinical assessment is recommended to evaluate for potential pneumonia or edema. CT lumbar spine. There is a compression fracture involving the superior endplate of L2 with nearly 15% loss of height. There is diffuse osteopenia and degenerative changes. Mild disc bulges are present at L4-5 and L5-S1. Impression 15% compression deformity of the superior endplate of L2. Diffuse degenerative changes with disc bulges from L4-S1. RECOMMENDATIONS: Unavailable     MRI LUMBAR SPINE WO CONTRAST    Result Date: 5/13/2022  EXAMINATION: MRI OF THE LUMBAR SPINE WITHOUT CONTRAST, 5/13/2022 8:53 pm TECHNIQUE: Multiplanar multisequence MRI of the lumbar spine was performed without the administration of intravenous contrast. COMPARISON: None. HISTORY: ORDERING SYSTEM PROVIDED HISTORY: determine acuity of fx TECHNOLOGIST PROVIDED HISTORY: Reason for exam:->determine acuity of fx What reading provider will be dictating this exam?->CRC FINDINGS: BONES/ALIGNMENT: There is a normal lumbar lordosis. Assuming that the last well-formed disc represents L5-S1, the conus medullaris ends at L1 and is within normal limits. An acute superior endplate fracture of L2 results in mild loss of vertebral body height.   No associated epidural hematoma is identified. There is moderate to severe degenerative disc disease at L4-5. Milder degenerative changes are present at the remaining lumbar disc levels. SPINAL CORD: The conus terminates normally. SOFT TISSUES: No paraspinal mass identified. L1-L2: There is no significant disc herniation, spinal canal stenosis or neural foraminal narrowing. L2-L3: There is no significant disc herniation, spinal canal stenosis or neural foraminal narrowing. L3-L4: Disc bulge resulting in mild narrowing of the thecal sac and both neural foramen. L4-L5: Lateralized disc bulge resulting mild-to-moderate narrowing of the right neural foramen. L5-S1: There is no significant disc herniation, spinal canal stenosis or neural foraminal narrowing. Acute superior endplate fracture of L2, resulting in mild loss of vertebral body height. XR CHEST PORTABLE    Result Date: 5/12/2022  EXAMINATION: ONE XRAY VIEW OF THE CHEST 5/12/2022 6:18 pm COMPARISON: 12/06/2021 chest radiograph. HISTORY: ORDERING SYSTEM PROVIDED HISTORY: fall TECHNOLOGIST PROVIDED HISTORY: Reason for exam:->fall What reading provider will be dictating this exam?->CRC FINDINGS: The lungs are hypoinflated. There are thin linear densities at the left lung base. Cardiac silhouette is enlarged. There is atherosclerotic calcification and tortuosity of the thoracic aorta. There is moderate thoracic spondylosis. There are remote healed bilateral rib fractures. No pneumothorax. Expiratory chest radiograph with minimal left lower lobe atelectasis. Stable enlargement of the cardiac silhouette. DIET:  ADULT DIET;  Regular    Medications:    Scheduled Meds:   fludrocortisone  0.1 mg Oral Daily    hydrocortisone  10 mg Oral Daily with breakfast    hydrocortisone  5 mg Oral Nightly    levothyroxine  137 mcg Oral Daily    metoprolol succinate  25 mg Oral Daily    pantoprazole  40 mg Oral BID    potassium chloride  20 mEq Oral Daily    Vitamin D  1,000 Units Oral BID    sodium chloride flush  5-40 mL IntraVENous 2 times per day    enoxaparin  30 mg SubCUTAneous BID    atorvastatin  10 mg Oral Nightly    budesonide  0.5 mg Nebulization BID    hydrocortisone  5 mg Oral Lunch       Continuous Infusions:   sodium chloride         PRN Meds:trimethobenzamide, albuterol, sodium chloride flush, sodium chloride, polyethylene glycol, acetaminophen **OR** acetaminophen, traMADol    A/P:      Patient Active Problem List   Diagnosis    Hypopituitarism ([facility]    Weakness    Hypothyroidism    Renal insufficiency    [facility]-fib ([facility])    QT prolongation    RBBB    Acute respiratory failure with hypoxia (HCC)    Acute renal failure superimposed on stage 2 chronic kidney disease (HCC)    Adrenal insufficiency (Ziebach's disease) (HCC)    Wide-complex tachycardia (HCC)    Orthostatic hypotension    Anemia    Acute alteration in mental status    AMS (altered mental status)    Closed fracture of second lumbar vertebra ([facility]    Fall at home, sequela    Compression fracture    PLAN:Neurosurgery To brace patient on using the brace for healing    I can be reached though [employer] or Med Page at [phone]

## 2022-05-16 PROCEDURE — 6370000000 HC RX 637 (ALT 250 FOR IP): Performed by: PHYSICIAN ASSISTANT

## 2022-05-16 PROCEDURE — 6360000002 HC RX W HCPCS: Performed by: INTERNAL MEDICINE

## 2022-05-16 PROCEDURE — 1200000000 HC SEMI PRIVATE

## 2022-05-16 PROCEDURE — 2580000003 HC RX 258: Performed by: INTERNAL MEDICINE

## 2022-05-16 PROCEDURE — 6370000000 HC RX 637 (ALT 250 FOR IP): Performed by: INTERNAL MEDICINE

## 2022-05-16 PROCEDURE — 94640 AIRWAY INHALATION TREATMENT: CPT

## 2022-05-16 RX ADMIN — FLUDROCORTISONE ACETATE 0.1 MG: 0.1 TABLET ORAL at 08:39

## 2022-05-16 RX ADMIN — BUDESONIDE 500 MCG: 0.5 SUSPENSION RESPIRATORY (INHALATION) at 06:29

## 2022-05-16 RX ADMIN — Medication 1000 UNITS: at 08:39

## 2022-05-16 RX ADMIN — ATORVASTATIN CALCIUM 10 MG: 10 TABLET, FILM COATED ORAL at 22:38

## 2022-05-16 RX ADMIN — TRAMADOL HYDROCHLORIDE 50 MG: 50 TABLET, COATED ORAL at 22:37

## 2022-05-16 RX ADMIN — HYDROCORTISONE 10 MG: 5 TABLET ORAL at 08:40

## 2022-05-16 RX ADMIN — ENOXAPARIN SODIUM 30 MG: 100 INJECTION SUBCUTANEOUS at 08:38

## 2022-05-16 RX ADMIN — PANTOPRAZOLE SODIUM 40 MG: 40 TABLET, DELAYED RELEASE ORAL at 08:38

## 2022-05-16 RX ADMIN — HYDROCORTISONE 5 MG: 5 TABLET ORAL at 22:36

## 2022-05-16 RX ADMIN — SODIUM CHLORIDE, PRESERVATIVE FREE 10 ML: 5 INJECTION INTRAVENOUS at 08:40

## 2022-05-16 RX ADMIN — PANTOPRAZOLE SODIUM 40 MG: 40 TABLET, DELAYED RELEASE ORAL at 22:36

## 2022-05-16 RX ADMIN — BUDESONIDE 500 MCG: 0.5 SUSPENSION RESPIRATORY (INHALATION) at 20:39

## 2022-05-16 RX ADMIN — HYDROCORTISONE 5 MG: 5 TABLET ORAL at 11:37

## 2022-05-16 RX ADMIN — POTASSIUM CHLORIDE 20 MEQ: 20 TABLET, EXTENDED RELEASE ORAL at 08:39

## 2022-05-16 RX ADMIN — LEVOTHYROXINE SODIUM 137 MCG: 0.14 TABLET ORAL at 06:34

## 2022-05-16 RX ADMIN — SODIUM CHLORIDE, PRESERVATIVE FREE 10 ML: 5 INJECTION INTRAVENOUS at 22:39

## 2022-05-16 RX ADMIN — METOPROLOL SUCCINATE 25 MG: 25 TABLET, EXTENDED RELEASE ORAL at 08:39

## 2022-05-16 RX ADMIN — Medication 1000 UNITS: at 22:37

## 2022-05-16 NOTE — CARE COORDINATION
5/16/22 Update CM note; Patient is npo for possible kyphoplasty today. Will need therapy evals postop.  Electronically signed by Perlita Alexis RN CM on 5/16/2022 at 1:11 PM

## 2022-05-17 ENCOUNTER — ANESTHESIA EVENT (OUTPATIENT)
Dept: OPERATING ROOM | Age: 87
DRG: 478 | End: 2022-05-17
Payer: MEDICARE

## 2022-05-17 ENCOUNTER — APPOINTMENT (OUTPATIENT)
Dept: GENERAL RADIOLOGY | Age: 87
DRG: 478 | End: 2022-05-17
Payer: MEDICARE

## 2022-05-17 ENCOUNTER — ANESTHESIA (OUTPATIENT)
Dept: OPERATING ROOM | Age: 87
DRG: 478 | End: 2022-05-17
Payer: MEDICARE

## 2022-05-17 PROCEDURE — 97530 THERAPEUTIC ACTIVITIES: CPT

## 2022-05-17 PROCEDURE — 7100000001 HC PACU RECOVERY - ADDTL 15 MIN: Performed by: NEUROLOGICAL SURGERY

## 2022-05-17 PROCEDURE — 22514 PERQ VERTEBRAL AUGMENTATION: CPT | Performed by: NEUROLOGICAL SURGERY

## 2022-05-17 PROCEDURE — 99232 SBSQ HOSP IP/OBS MODERATE 35: CPT | Performed by: NEUROLOGICAL SURGERY

## 2022-05-17 PROCEDURE — 2580000003 HC RX 258

## 2022-05-17 PROCEDURE — 6360000002 HC RX W HCPCS: Performed by: PHYSICIAN ASSISTANT

## 2022-05-17 PROCEDURE — L0464 TLSO 4MOD SACRO-SCAP PRE: HCPCS

## 2022-05-17 PROCEDURE — 0QB03ZX EXCISION OF LUMBAR VERTEBRA, PERCUTANEOUS APPROACH, DIAGNOSTIC: ICD-10-PCS | Performed by: NEUROLOGICAL SURGERY

## 2022-05-17 PROCEDURE — 6370000000 HC RX 637 (ALT 250 FOR IP): Performed by: PHYSICIAN ASSISTANT

## 2022-05-17 PROCEDURE — 3700000001 HC ADD 15 MINUTES (ANESTHESIA): Performed by: NEUROLOGICAL SURGERY

## 2022-05-17 PROCEDURE — 0QS03ZZ REPOSITION LUMBAR VERTEBRA, PERCUTANEOUS APPROACH: ICD-10-PCS | Performed by: NEUROLOGICAL SURGERY

## 2022-05-17 PROCEDURE — 6360000002 HC RX W HCPCS: Performed by: INTERNAL MEDICINE

## 2022-05-17 PROCEDURE — 0QU03JZ SUPPLEMENT LUMBAR VERTEBRA WITH SYNTHETIC SUBSTITUTE, PERCUTANEOUS APPROACH: ICD-10-PCS | Performed by: NEUROLOGICAL SURGERY

## 2022-05-17 PROCEDURE — 97161 PT EVAL LOW COMPLEX 20 MIN: CPT

## 2022-05-17 PROCEDURE — 2500000003 HC RX 250 WO HCPCS: Performed by: NEUROLOGICAL SURGERY

## 2022-05-17 PROCEDURE — 97535 SELF CARE MNGMENT TRAINING: CPT

## 2022-05-17 PROCEDURE — 6360000002 HC RX W HCPCS

## 2022-05-17 PROCEDURE — 88307 TISSUE EXAM BY PATHOLOGIST: CPT

## 2022-05-17 PROCEDURE — 3209999900 FLUORO FOR SURGICAL PROCEDURES

## 2022-05-17 PROCEDURE — 6370000000 HC RX 637 (ALT 250 FOR IP): Performed by: INTERNAL MEDICINE

## 2022-05-17 PROCEDURE — 3600000004 HC SURGERY LEVEL 4 BASE: Performed by: NEUROLOGICAL SURGERY

## 2022-05-17 PROCEDURE — C1894 INTRO/SHEATH, NON-LASER: HCPCS | Performed by: NEUROLOGICAL SURGERY

## 2022-05-17 PROCEDURE — 94640 AIRWAY INHALATION TREATMENT: CPT

## 2022-05-17 PROCEDURE — 1200000000 HC SEMI PRIVATE

## 2022-05-17 PROCEDURE — 2580000003 HC RX 258: Performed by: INTERNAL MEDICINE

## 2022-05-17 PROCEDURE — 7100000000 HC PACU RECOVERY - FIRST 15 MIN: Performed by: NEUROLOGICAL SURGERY

## 2022-05-17 PROCEDURE — 3700000000 HC ANESTHESIA ATTENDED CARE: Performed by: NEUROLOGICAL SURGERY

## 2022-05-17 PROCEDURE — 3600000014 HC SURGERY LEVEL 4 ADDTL 15MIN: Performed by: NEUROLOGICAL SURGERY

## 2022-05-17 PROCEDURE — 6360000002 HC RX W HCPCS: Performed by: NEUROLOGICAL SURGERY

## 2022-05-17 PROCEDURE — 2580000003 HC RX 258: Performed by: PHYSICIAN ASSISTANT

## 2022-05-17 PROCEDURE — 97165 OT EVAL LOW COMPLEX 30 MIN: CPT

## 2022-05-17 PROCEDURE — 2709999900 HC NON-CHARGEABLE SUPPLY: Performed by: NEUROLOGICAL SURGERY

## 2022-05-17 PROCEDURE — 2720000010 HC SURG SUPPLY STERILE: Performed by: NEUROLOGICAL SURGERY

## 2022-05-17 PROCEDURE — 2700000000 HC OXYGEN THERAPY PER DAY

## 2022-05-17 DEVICE — BONE CEMENT C01B HV-R WITH MIXER  US
Type: IMPLANTABLE DEVICE | Site: BACK | Status: FUNCTIONAL
Brand: KYPHON® HV-R® BONE CEMENT AND KYPHON® MIXER PACK

## 2022-05-17 RX ORDER — PROPOFOL 10 MG/ML
INJECTION, EMULSION INTRAVENOUS PRN
Status: DISCONTINUED | OUTPATIENT
Start: 2022-05-17 | End: 2022-05-17 | Stop reason: SDUPTHER

## 2022-05-17 RX ORDER — BUPIVACAINE HYDROCHLORIDE 2.5 MG/ML
INJECTION, SOLUTION EPIDURAL; INFILTRATION; INTRACAUDAL PRN
Status: DISCONTINUED | OUTPATIENT
Start: 2022-05-17 | End: 2022-05-17 | Stop reason: ALTCHOICE

## 2022-05-17 RX ORDER — FENTANYL CITRATE 50 UG/ML
INJECTION, SOLUTION INTRAMUSCULAR; INTRAVENOUS PRN
Status: DISCONTINUED | OUTPATIENT
Start: 2022-05-17 | End: 2022-05-17 | Stop reason: SDUPTHER

## 2022-05-17 RX ORDER — LIDOCAINE HYDROCHLORIDE AND EPINEPHRINE 10; 10 MG/ML; UG/ML
INJECTION, SOLUTION INFILTRATION; PERINEURAL PRN
Status: DISCONTINUED | OUTPATIENT
Start: 2022-05-17 | End: 2022-05-17 | Stop reason: ALTCHOICE

## 2022-05-17 RX ORDER — ONDANSETRON 2 MG/ML
4 INJECTION INTRAMUSCULAR; INTRAVENOUS EVERY 6 HOURS PRN
Status: DISCONTINUED | OUTPATIENT
Start: 2022-05-17 | End: 2022-05-17

## 2022-05-17 RX ORDER — SODIUM CHLORIDE 0.9 % (FLUSH) 0.9 %
5-40 SYRINGE (ML) INJECTION EVERY 12 HOURS SCHEDULED
Status: DISCONTINUED | OUTPATIENT
Start: 2022-05-17 | End: 2022-05-18 | Stop reason: HOSPADM

## 2022-05-17 RX ORDER — ONDANSETRON 4 MG/1
4 TABLET, ORALLY DISINTEGRATING ORAL EVERY 8 HOURS PRN
Status: DISCONTINUED | OUTPATIENT
Start: 2022-05-17 | End: 2022-05-17

## 2022-05-17 RX ORDER — ACETAMINOPHEN 325 MG/1
650 TABLET ORAL EVERY 6 HOURS
Status: DISCONTINUED | OUTPATIENT
Start: 2022-05-17 | End: 2022-05-18 | Stop reason: HOSPADM

## 2022-05-17 RX ORDER — SODIUM CHLORIDE 9 MG/ML
INJECTION, SOLUTION INTRAVENOUS PRN
Status: DISCONTINUED | OUTPATIENT
Start: 2022-05-17 | End: 2022-05-17 | Stop reason: HOSPADM

## 2022-05-17 RX ORDER — SODIUM CHLORIDE 9 MG/ML
INJECTION, SOLUTION INTRAVENOUS PRN
Status: DISCONTINUED | OUTPATIENT
Start: 2022-05-17 | End: 2022-05-18 | Stop reason: HOSPADM

## 2022-05-17 RX ORDER — SODIUM CHLORIDE 0.9 % (FLUSH) 0.9 %
5-40 SYRINGE (ML) INJECTION PRN
Status: DISCONTINUED | OUTPATIENT
Start: 2022-05-17 | End: 2022-05-18 | Stop reason: HOSPADM

## 2022-05-17 RX ORDER — SODIUM CHLORIDE 0.9 % (FLUSH) 0.9 %
5-40 SYRINGE (ML) INJECTION EVERY 12 HOURS SCHEDULED
Status: DISCONTINUED | OUTPATIENT
Start: 2022-05-17 | End: 2022-05-17 | Stop reason: HOSPADM

## 2022-05-17 RX ORDER — SODIUM CHLORIDE 0.9 % (FLUSH) 0.9 %
5-40 SYRINGE (ML) INJECTION PRN
Status: DISCONTINUED | OUTPATIENT
Start: 2022-05-17 | End: 2022-05-17 | Stop reason: HOSPADM

## 2022-05-17 RX ORDER — LIDOCAINE HYDROCHLORIDE AND EPINEPHRINE BITARTRATE 20; .01 MG/ML; MG/ML
INJECTION, SOLUTION SUBCUTANEOUS PRN
Status: DISCONTINUED | OUTPATIENT
Start: 2022-05-17 | End: 2022-05-17 | Stop reason: ALTCHOICE

## 2022-05-17 RX ORDER — SODIUM CHLORIDE 9 MG/ML
INJECTION, SOLUTION INTRAVENOUS CONTINUOUS PRN
Status: DISCONTINUED | OUTPATIENT
Start: 2022-05-17 | End: 2022-05-17 | Stop reason: SDUPTHER

## 2022-05-17 RX ADMIN — METOPROLOL SUCCINATE 25 MG: 25 TABLET, EXTENDED RELEASE ORAL at 08:42

## 2022-05-17 RX ADMIN — Medication 1000 UNITS: at 08:42

## 2022-05-17 RX ADMIN — PROPOFOL 25 MCG/KG/MIN: 10 INJECTION, EMULSION INTRAVENOUS at 10:54

## 2022-05-17 RX ADMIN — HYDROCORTISONE 10 MG: 5 TABLET ORAL at 08:41

## 2022-05-17 RX ADMIN — SODIUM CHLORIDE: 0.9 INJECTION, SOLUTION INTRAVENOUS at 10:46

## 2022-05-17 RX ADMIN — FLUDROCORTISONE ACETATE 0.1 MG: 0.1 TABLET ORAL at 08:42

## 2022-05-17 RX ADMIN — FENTANYL CITRATE 50 MCG: 50 INJECTION, SOLUTION INTRAMUSCULAR; INTRAVENOUS at 10:46

## 2022-05-17 RX ADMIN — HYDROCORTISONE 5 MG: 5 TABLET ORAL at 13:00

## 2022-05-17 RX ADMIN — BUDESONIDE 500 MCG: 0.5 SUSPENSION RESPIRATORY (INHALATION) at 22:05

## 2022-05-17 RX ADMIN — BUDESONIDE 500 MCG: 0.5 SUSPENSION RESPIRATORY (INHALATION) at 07:33

## 2022-05-17 RX ADMIN — SODIUM CHLORIDE, PRESERVATIVE FREE 10 ML: 5 INJECTION INTRAVENOUS at 08:49

## 2022-05-17 RX ADMIN — ACETAMINOPHEN 650 MG: 325 TABLET ORAL at 13:00

## 2022-05-17 RX ADMIN — PANTOPRAZOLE SODIUM 40 MG: 40 TABLET, DELAYED RELEASE ORAL at 08:42

## 2022-05-17 RX ADMIN — LEVOTHYROXINE SODIUM 137 MCG: 0.14 TABLET ORAL at 08:43

## 2022-05-17 RX ADMIN — PROPOFOL 50 MG: 10 INJECTION, EMULSION INTRAVENOUS at 10:51

## 2022-05-17 RX ADMIN — ACETAMINOPHEN 650 MG: 325 TABLET ORAL at 21:35

## 2022-05-17 RX ADMIN — HYDROCORTISONE 5 MG: 5 TABLET ORAL at 21:35

## 2022-05-17 RX ADMIN — ATORVASTATIN CALCIUM 10 MG: 10 TABLET, FILM COATED ORAL at 21:35

## 2022-05-17 RX ADMIN — FENTANYL CITRATE 25 MCG: 50 INJECTION, SOLUTION INTRAMUSCULAR; INTRAVENOUS at 11:01

## 2022-05-17 RX ADMIN — SODIUM CHLORIDE, PRESERVATIVE FREE 10 ML: 5 INJECTION INTRAVENOUS at 21:35

## 2022-05-17 RX ADMIN — Medication 1000 UNITS: at 21:35

## 2022-05-17 RX ADMIN — PANTOPRAZOLE SODIUM 40 MG: 40 TABLET, DELAYED RELEASE ORAL at 21:35

## 2022-05-17 RX ADMIN — Medication 2000 MG: at 10:56

## 2022-05-17 RX ADMIN — POTASSIUM CHLORIDE 20 MEQ: 20 TABLET, EXTENDED RELEASE ORAL at 08:42

## 2022-05-17 NOTE — PROGRESS NOTES
Department of Neurosurgery  Attending Progress Note    CHIEF COMPLAINT: back pain.   Seen for compression fracture    SUBJECTIVE:  Complains of back pain    ROS:  HEENT: negative for headache  CVS: negative for chest pain  Resp: negative for SOB    OBJECTIVE  Physical  VITALS:  /80   Pulse 85   Temp 97.6 °F (36.4 °C) (Oral)   Resp 16   Ht 6' 1\" (1.854 m)   Wt 235 lb (106.6 kg)   SpO2 97%   BMI 31.00 kg/m²   NEUROLOGIC:  Mental Status Exam:  Level of Alertness:   awake  Orientation:   person, place, time  Motor Exam:  Motor exam is symmetrical 5 out of 5 all extremities bilaterally  Sensory:  Sensory intact    Data  CBC:   Lab Results   Component Value Date    WBC 10.8 05/14/2022    RBC 5.39 05/14/2022    HGB 14.5 05/14/2022    HCT 45.7 05/14/2022    MCV 84.8 05/14/2022    MCH 26.9 05/14/2022    MCHC 31.7 05/14/2022    RDW 13.5 05/14/2022     05/14/2022    MPV 10.0 05/14/2022     BMP:    Lab Results   Component Value Date     05/12/2022    K 4.7 05/12/2022     05/12/2022    CO2 24 05/12/2022    BUN 24 05/12/2022    LABALBU 4.2 05/12/2022    CREATININE 1.4 05/12/2022    CALCIUM 9.4 05/12/2022    GFRAA 57 05/12/2022    LABGLOM 47 05/12/2022    GLUCOSE 103 05/12/2022     Current Inpatient Medications  Current Facility-Administered Medications: trimethobenzamide (TIGAN) injection 200 mg, 200 mg, IntraMUSCular, Q6H PRN  albuterol (PROVENTIL) nebulizer solution 2.5 mg, 2.5 mg, Nebulization, Q6H PRN  fludrocortisone (FLORINEF) tablet 0.1 mg, 0.1 mg, Oral, Daily  hydrocortisone (CORTEF) tablet 10 mg, 10 mg, Oral, Daily with breakfast  hydrocortisone (CORTEF) tablet 5 mg, 5 mg, Oral, Nightly  levothyroxine (SYNTHROID) tablet 137 mcg, 137 mcg, Oral, Daily  metoprolol succinate (TOPROL XL) extended release tablet 25 mg, 25 mg, Oral, Daily  pantoprazole (PROTONIX) tablet 40 mg, 40 mg, Oral, BID  potassium chloride (KLOR-CON M) extended release tablet 20 mEq, 20 mEq, Oral, Daily  vitamin D (CHOLECALCIFEROL) tablet 1,000 Units, 1,000 Units, Oral, BID  sodium chloride flush 0.9 % injection 5-40 mL, 5-40 mL, IntraVENous, 2 times per day  sodium chloride flush 0.9 % injection 5-40 mL, 5-40 mL, IntraVENous, PRN  0.9 % sodium chloride infusion, , IntraVENous, PRN  polyethylene glycol (GLYCOLAX) packet 17 g, 17 g, Oral, Daily PRN  acetaminophen (TYLENOL) tablet 650 mg, 650 mg, Oral, Q6H PRN **OR** acetaminophen (TYLENOL) suppository 650 mg, 650 mg, Rectal, Q6H PRN  atorvastatin (LIPITOR) tablet 10 mg, 10 mg, Oral, Nightly  budesonide (PULMICORT) nebulizer suspension 500 mcg, 0.5 mg, Nebulization, BID  traMADol (ULTRAM) tablet 50 mg, 50 mg, Oral, Q6H PRN  hydrocortisone (CORTEF) tablet 5 mg, 5 mg, Oral, Lunch    ASSESSMENT AND PLAN:    Patient with compression fracture. Allergic to narcotic pain medication and unable to tolerate brace. Still having significant back pain.   Will proceed with kyphoplasty    Kay Cheng MD

## 2022-05-17 NOTE — PROGRESS NOTES
Physical Therapy  Physical Therapy Initial Assessment     Name: Franklin Sykes  : 1931  MRN: [id number]      Date of Service: 2022    Evaluating PT:  Tiana Diallo, PT, DPT    Room #:  6335/2469-E  Diagnosis:  Closed head injury, initial encounter [S09.90XA]  Ambulatory dysfunction [R26.2]  Other closed [facility] second [facility], [facility] Yung Mcdaniel  Fall at home, sequela [W19. Yan Mancia, P46.582]  PMHx/PSHx:  Afib, hypopituitarism, hypothyroidism  Procedure/Surgery:  S/p L2 kyphoplasty  Precautions:  Fall risk  Equipment Needs:  TBD    SUBJECTIVE:    Pt lives alone in a 2 story home with 2 steps to enter and no handrail. Bed/bath is on 1st floor. Pt ambulated with no AD PTA. OBJECTIVE:   Initial Evaluation  Date: 22 Treatment Short Term/ Long Term   Goals   AM-PAC 6 Clicks      Was pt agreeable to Eval/treatment? yes     Does pt have pain? 4/10 back     Bed Mobility  Rolling: NT  Supine to sit: NT  Sit to supine: NT  Scooting: SBA  Rolling: mod I  Supine to sit: mod I  Sit to supine: mod I  Scooting: mod I   Transfers Sit to stand: CGA  Stand to sit: CGA  Stand pivot: CGA with no AD  Sit to stand: mod I  Stand to sit: mod I  Stand pivot: mod I with AAD   Ambulation    100'x2 with no AD CGA  300'+ with AAD mod I   Stair negotiation: ascended and descended  NT  2 steps with no handrail IND     Strength/ROM:   BLE grossly 4/5  BLE AROM WFL    Balance:   Static Sitting: SBA  Dynamic Sitting: CGA  Static Standing: SBA with no AD  Dynamic Standing: CGA with no AD    Pt is A & O x 3  Sensation:  Pt denies numbness and tingling to extremities  Edema:  unremarkable    Therapeutic Exercises:    Bed mobility: supine<>sit, cued for EOB positioning  Transfers:  STSx1, cued for hand placement and postural correction  Ambulation: 100'x2 with no AD  BLE AROM    Patient education  Pt educated on role of PT, importance of functional mobility during hospital stay, safety with functional mobility    Patient response to education:   Pt verbalized understanding Pt demonstrated skill Pt requires further education in this area   yes yes reinforce     ASSESSMENT:    Conditions Requiring Skilled Therapeutic Intervention:    [x]Decreased strength     []Decreased ROM  [x]Decreased functional mobility  [x]Decreased balance   [x]Decreased endurance   [x]Decreased posture  []Decreased sensation  []Decreased coordination   []Decreased vision  []Decreased safety awareness   [x]Increased pain       Comments:    Pt sitting in bedside chair upon entering, agreeable to participate. Pt's dtr present. Pt instructed to stand from bedside chair, cued for hand placement. Pt standing with good static standing balance with no AD. Pt instructed to ambulate to tolerance. Pt completing good distance with no AD, demonstrating short step length at times, however, maintained good balance throughout. Pt reporting no SOB after bout, while on 2L of O2. Pt SpO2 reading 97%. Pt remained in bedside chair at the end of session, all needs met and call bell in reach prior to exiting. Treatment:  Patient practiced and was instructed in the following treatment:      STS and pivot transfer training - pt educated on proper hand and foot placement, safety and sequencing, and use of verbal and tactile cues to safely complete sit<>stand and pivot transfers with hands on assistance to complete task safely    Gait training- pt was given verbal and tactile cues to facilitate pt safety during ambulation as well as provided with hands on assistance. Pt's/ family goals   1. Return home    Prognosis is good for reaching above PT goals. Patient and or family understand(s) diagnosis, prognosis, and plan of care.   yes    PHYSICAL THERAPY PLAN OF CARE:    PT POC is established based on physician order and patient diagnosis     Referring provider/PT Order:  MICHELLE Mike  Diagnosis:  Closed head injury, initial encounter [S09.90XA]  Ambulatory dysfunction [R26.2]  Other closed fracture of second lumbar vertebra, initial encounter ([facility] Jaclyn Barclay  Fall at home, sequela [W19. XXXS, Y92.009]  Specific instructions for next treatment:  Progress as tolerated    Current Treatment Recommendations:     [x] Strengthening to improve independence with functional mobility   [] ROM to improve independence with functional mobility   [x] Balance Training to improve static/dynamic balance and to reduce fall risk  [x] Endurance Training to improve activity tolerance during functional mobility   [x] Transfer Training to improve safety and independence with all functional transfers   [x] Gait Training to improve gait mechanics, endurance and assess need for appropriate assistive device  [x] Stair Training in preparation for safe discharge home and/or into the community   [] Positioning to prevent skin breakdown and contractures  [x] Safety and Education Training   [x] Patient/Caregiver Education   [] HEP  [] Other     PT long term treatment goals are located in above grid    Frequency of treatments: 2-5x/week x 1-2 weeks. Time in  1515  Time out  1535    Total Treatment Time  8 minutes     Evaluation Time includes thorough review of current medical information, gathering information on past medical history/social history and prior level of function, completion of standardized testing/informal observation of tasks, assessment of data and education on plan of care and goals.     CPT codes:  [x] Low Complexity PT evaluation 52182  [] Moderate Complexity PT evaluation 02062  [] High Complexity PT evaluation 17722  [] PT Re-evaluation 90154  [] Gait training 72671 -- minutes  [] Manual therapy [address] [facility] -- minutes  [x] Therapeutic activities 03387 8 minutes  [] Therapeutic exercises 89586 -- minutes  [] Neuromuscular reeducation 05304 -- minutes     Zacarias Cuellar, PT, DPT  [id number]

## 2022-05-17 NOTE — ANESTHESIA PRE PROCEDURE
Department of Anesthesiology  Preprocedure Note       Name:  Barbara Mart   Age:  80 y.o.  :  1931                                          MRN:  [id number]         Date:  2022      Surgeon: Nataliia Muniz):  Amaury Minor MD    Procedure: Procedure(s):  KYPHOPLASTY, L 2    Medications prior to admission:   Prior to Admission medications    Medication Sig Start Date End Date Taking?  Authorizing Provider   albuterol (PROVENTIL) (2.5 MG/3ML) 0.083% nebulizer solution Take 3 mLs by nebulization every 6 hours as needed for Wheezing 21   Nicholas Vázquez DO   hydrocortisone (CORTEF) 5 MG tablet Take 1 tablet by mouth Daily with lunch  Patient taking differently: Take 5 mg by mouth nightly  21   Nicholas Vázquez DO   hydrocortisone (CORTEF) 10 MG tablet Take 1 tablet by mouth daily (with breakfast) 21   Nicholas Vázquez DO   vitamin D (CHOLECALCIFEROL) 25 MCG (1000 UT) TABS tablet Take 1 tablet by mouth 2 times daily 21   Nicholas Vázquez DO   levothyroxine (SYNTHROID) 137 MCG tablet Take 137 mcg by mouth Daily    Historical Provider, MD   metoprolol succinate (TOPROL XL) 25 MG extended release tablet Take 25 mg by mouth daily    Historical Provider, MD   potassium chloride (KLOR-CON M) 20 MEQ extended release tablet Take 20 mEq by mouth daily    Historical Provider, MD   mometasone (ASMANEX) 200 MCG/ACT AERO inhaler Inhale 1 puff into the lungs 2 times daily    Historical Provider, MD   ferrous sulfate (IRON 325) 325 (65 Fe) MG tablet Take 1 tablet by mouth 2 times daily 21   Delmar Bonner MD   pantoprazole (PROTONIX) 40 MG tablet Take 40 mg by mouth 2 times daily    Historical Provider, MD   Multiple Vitamins-Minerals (THERAPEUTIC MULTIVITAMIN-MINERALS) tablet Take 1 tablet by mouth daily    Historical Provider, MD   simvastatin (ZOCOR) 20 MG tablet Take 1/2 tablet by mouth Mon - Fri and take 1 tablet by mouth Sat and Sun    Historical Provider, MD   fludrocortisone (FLORINEF) 0.1 MG tablet Take 0.1 mg by mouth daily    Historical Provider, MD       Current medications:    Current Facility-Administered Medications   Medication Dose Route Frequency Provider Last Rate Last Admin    sodium chloride flush 0.9 % injection 5-40 mL  5-40 mL IntraVENous 2 times per day Yonatan Proctor MD        sodium chloride flush 0.9 % injection 5-40 mL  5-40 mL IntraVENous PRN Yonatan Proctor MD        0.9 % sodium chloride infusion   IntraVENous PRN Yonatan Proctor MD        ceFAZolin (ANCEF) 2000 mg in sterile water 20 mL IV syringe  2,000 mg IntraVENous On Call to OR Yonatan Proctor MD        trimethobenzamide Maudry Hieu) injection 200 mg  200 mg IntraMUSCular Q6H PRN Columba Yeager MD        albuterol (PROVENTIL) nebulizer solution 2.5 mg  2.5 mg Nebulization Q6H PRN Jeral Musty, DO        fludrocortisone (FLORINEF) tablet 0.1 mg  0.1 mg Oral Daily Jeral Musty, DO   0.1 mg at 05/17/22 2108    hydrocortisone (CORTEF) tablet 10 mg  10 mg Oral Daily with breakfast Jeral Musty, DO   10 mg at 05/17/22 0841    hydrocortisone (CORTEF) tablet 5 mg  5 mg Oral Nightly Jeral Musty, DO   5 mg at 05/16/22 2236    levothyroxine (SYNTHROID) tablet 137 mcg  137 mcg Oral Daily Jeral Musty, DO   137 mcg at 05/17/22 7895    metoprolol succinate (TOPROL XL) extended release tablet 25 mg  25 mg Oral Daily Jeral Musty, DO   25 mg at 05/17/22 8490    pantoprazole (PROTONIX) tablet 40 mg  40 mg Oral BID Jeral Musty, DO   40 mg at 05/17/22 3483    potassium chloride (KLOR-CON M) extended release tablet 20 mEq  20 mEq Oral Daily Jeral Musty, DO   20 mEq at 05/17/22 6686    vitamin D (CHOLECALCIFEROL) tablet 1,000 Units  1,000 Units Oral BID Jeral Musty, DO   1,000 Units at 05/17/22 5360    sodium chloride flush 0.9 % injection 5-40 mL  5-40 mL IntraVENous 2 times per day Jeral Musty, DO   10 mL at 05/17/22 0849    sodium chloride flush 0.9 % injection 5-40 mL  5-40 mL IntraVENous PRN Jeral Musty, DO        0.9 % sodium chloride infusion   IntraVENous PRN Janalyn Aid, DO        polyethylene glycol [facility]) packet 17 g  17 g Oral Daily PRN Janalyn Aid, DO        acetaminophen (TYLENOL) tablet 650 mg  650 mg Oral Q6H PRN Janalyn Aid, DO   650 mg at 05/15/22 1518    Or    acetaminophen (TYLENOL) suppository 650 mg  650 mg Rectal Q6H PRN Janalyn Aid, DO        atorvastatin (LIPITOR) tablet 10 mg  10 mg Oral Nightly Janalyn Aid, DO   10 mg at 05/16/22 2238    budesonide (PULMICORT) nebulizer suspension 500 mcg  0.5 mg Nebulization BID Janalyn Aid, DO   500 mcg at 05/17/22 3151    traMADol (ULTRAM) tablet 50 mg  50 mg Oral Q6H PRN Holger Gaona PA-C   50 mg at 05/16/22 2237    hydrocortisone (CORTEF) tablet 5 mg  5 mg Oral Lunch Janalyn Aid, DO   5 mg at 05/16/22 1137       Allergies: Allergies   Allergen Reactions    Codeine Itching    Diazepam      hallucinations    Lorazepam      hallucinations    Morphine      Stomach ache       Problem List:    Patient Active Problem List   Diagnosis Code    Hypopituitarism ([facility] E23.0    Weakness R53.1    Hypothyroidism E03.9    Renal insufficiency N28.9    A-fib ([facility]) I48.91    QT prolongation R94.31    RBBB I45.10    Acute respiratory failure with hypoxia ([facility]) J96.01    Acute renal failure superimposed on stage 2 chronic kidney disease ([facility]) N17.9, N18.2    Adrenal insufficiency ([place]'s disease) ([facility]) E27.1    Wide-complex tachycardia ([facility]) I47.2    Orthostatic hypotension I95.1    Anemia D64.9    Acute alteration in mental status R41.82    AMS (altered mental status) R41.82    Closed fracture of second lumbar vertebra ([facility] S32.029A    Fall at home, sequela W19. 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 ([facility]     Hypothyroid     Osteopenia     Renal insufficiency     Right bundle branch block        Past Surgical History:        Procedure Laterality Date    UPPER GASTROINTESTINAL ENDOSCOPY N/A 8/19/2021    EGD ESOPHAGOGASTRODUODENOSCOPY performed by Cassidy Blandon MD at [facility] History:    Social History     Tobacco Use    Smoking status: Never Smoker    Smokeless tobacco: Never Used   Substance Use Topics    Alcohol use: Not Currently                                Counseling given: Not Answered      Vital Signs (Current):   Vitals:    05/16/22 2230 05/16/22 2300 05/16/22 2307 05/17/22 0735   BP: (!) 150/88 138/80  (!) 140/89   Pulse: 85   80   Resp: 18 16 16 16   Temp: 97.5 °F (36.4 °C) 97.6 °F (36.4 °C)  97.5 °F (36.4 °C)   TempSrc: Temporal Oral  Temporal   SpO2:    95%   Weight:       Height:                                                  BP Readings from Last 3 Encounters:   05/17/22 (!) 140/89   12/08/21 133/62   08/21/21 138/88       NPO Status:  > 8hrs                                                                               BMI:   Wt Readings from Last 3 Encounters:   05/12/22 235 lb (106.6 kg)   05/13/22 235 lb (106.6 kg)   12/06/21 190 lb (86.2 kg)     Body mass index is 31 kg/m². CBC:   Lab Results   Component Value Date    WBC 10.8 05/14/2022    RBC 5.39 05/14/2022    HGB 14.5 05/14/2022    HCT 45.7 05/14/2022    MCV 84.8 05/14/2022    RDW 13.5 05/14/2022     05/14/2022       CMP:   Lab Results   Component Value Date     05/12/2022    K 4.7 05/12/2022     05/12/2022    CO2 24 05/12/2022    BUN 24 05/12/2022    CREATININE 1.4 05/12/2022    GFRAA 57 05/12/2022    LABGLOM 47 05/12/2022    GLUCOSE 103 05/12/2022    PROT 7.2 05/12/2022    CALCIUM 9.4 05/12/2022    BILITOT 0.3 05/12/2022    ALKPHOS 74 05/12/2022    AST 21 05/12/2022    ALT 11 05/12/2022       POC Tests: No results for input(s): POCGLU, POCNA, POCK, POCCL, POCBUN, POCHEMO, POCHCT in the last 72 hours.     Coags:   Lab Results   Component Value Date    PROTIME 12.5 05/12/2022    INR 1.1 05/12/2022    APTT 31.6 05/12/2022 HCG (If Applicable): No results found for: PREGTESTUR, PREGSERUM, HCG, HCGQUANT     ABGs: No results found for: PHART, PO2ART, VSE8WBO, NJJ1JRR, BEART, Q4TPFGUK     Type & Screen (If Applicable):  No results found for: LABABO, LABRH    Drug/Infectious Status (If Applicable):  No results found for: HIV, HEPCAB    COVID-19 Screening (If Applicable):   Lab Results   Component Value Date    COVID19 Not Detected 12/06/2021           Anesthesia Evaluation  Patient summary reviewed and Nursing notes reviewed  Airway: Mallampati: II  TM distance: >3 FB   Neck ROM: limited  Mouth opening: > = 3 FB Dental:      Comment: None loose    Pulmonary:Negative Pulmonary ROS   (+) decreased breath sounds,                             Cardiovascular:    (+) dysrhythmias: atrial fibrillation,         Rhythm: regular  Rate: normal                 ROS comment: Prolonged QT     Technically difficult study - limited visualization. Micro-bubble contrast injected to enhance left ventricular visualization. Normal left ventricular size and systolic function. Ejection fraction is visually estimated at 60-65%. Indeterminate diastolic function. No regional wall motion abnormalities seen. Mild left ventricular concentric hypertrophy noted. Valves not well visualized, no obvious abnormalities by Doppler   interrogation. Neuro/Psych:                ROS comment: Back pain s/p fall with compression fractures    Allergic to PO pain medicine and not tolerating brace so for kyphoplasty today GI/Hepatic/Renal:   (+) renal disease:,           Endo/Other:    (+) hypothyroidism::., .                  ROS comment: addisons disease Abdominal:             Vascular: Other Findings:             Anesthesia Plan      MAC     ASA 4     (Will avoid zofran as patient has prolonged QT    Will give decadron as patient with addisons and on chronic steroid supplementation)  Induction: intravenous.     MIPS: prophylactic pharmacologic antiemetic agents not administered perioperatively for documented reasons. Anesthetic plan and risks discussed with patient. Plan discussed with CRNA.                   Etta Silva DO   5/17/2022

## 2022-05-17 NOTE — ANESTHESIA POSTPROCEDURE EVALUATION
Department of Anesthesiology  Postprocedure Note    Patient: Alea Edouard  MRN: [id number]  YOB: 1931  Date of evaluation: 5/17/2022  Time:  4:43 PM     Procedure Summary     Date: 05/17/22 Room / Location: [facility] OR 06 / [facility]    Anesthesia Start: 7389 Anesthesia Stop: 4730    Procedure: KYPHOPLASTY, L 2 (N/A Back) Diagnosis: (,)    Surgeons: Karen Casarez MD Responsible Provider: Alphonso Brunner DO    Anesthesia Type: MAC ASA Status: 4          Anesthesia Type: No value filed. Moose Phase I: Moose Score: 9    Moose Phase II:      Last vitals: Reviewed and per EMR flowsheets.        Anesthesia Post Evaluation    Patient location during evaluation: PACU  Patient participation: complete - patient participated  Level of consciousness: awake and alert  Airway patency: patent  Nausea & Vomiting: no nausea and no vomiting  Complications: no  Cardiovascular status: blood pressure returned to baseline  Respiratory status: acceptable  Hydration status: euvolemic

## 2022-05-17 NOTE — BRIEF OP NOTE
Brief Postoperative Note      Patient: Leanne Hansen  YOB: 1931  MRN: [id number]    Date of Procedure: 5/17/2022    Pre-Op Diagnosis: L2 compression fracture    Post-Op Diagnosis: Same       Procedure(s):  KYPHOPLASTY, L 2    Surgeon(s):  Winnie Murcia MD    Assistant:  * No surgical staff found *    Anesthesia: General    Estimated Blood Loss (mL): Minimal    Complications: None    Specimens:   ID Type Source Tests Collected by Time Destination   A : BONE BIOPSY L2 Bone Biopsy SURGICAL PATHOLOGY Winnie Murcia MD 5/17/2022 1053        Implants:  Implant Name Type Inv.  Item Serial No.  Lot No. LRB No. Used Action   KIT CEMENT BONE COMBO Alecia Phenes HV-[employer] - [id number]  KIT CEMENT BONE COMBO Alecia Phenes HV-R  [employer] [facility]- [id number] N/A 1 Implanted         Drains: * No LDAs found *    Findings: see dictated op note    Electronically signed by Nichola Kawasaki, MD on 5/17/2022 at 12:10 PM

## 2022-05-17 NOTE — PROGRESS NOTES
Subjective:  Patient was seen on the floor earlier this morning at the main campus of [facility]  Daughter at bedside  Admission details noted  Data reviewed in detail  Lying in bed comfortably  Pain control adequate as long as he does not move in the bed  But has pain when he moves around  Brace is not on yet  Tolerating medications otherwise  Objective:    BP (!) 148/86   Pulse 84   Temp 97 °F (36.1 °C) (Temporal)   Resp 13   Ht 6' 1\" (1.854 m)   Wt 235 lb (106.6 kg)   SpO2 97%   BMI 31.00 kg/m²   Awake alert oriented  No acute distress noted  Ectropion of the left lower leg noted  Oral mucosa moist without exudates  Neck supple without adenopathy  Heart:  RRR, no murmurs, gallops, or rubs.   Lungs:  CTA bilaterally, no wheeze, rales or rhonchi  Abd: bowel sounds present, nontender, nondistended, no masses  Extrem:  No clubbing, cyanosis, or edema  Neuro grossly intact        Assessment:  Endplate compression fracture L2  Comorbidities present and past as listed below  Patient Active Problem List   Diagnosis    Hypopituitarism ([address]    Weakness    Hypothyroidism    Renal insufficiency    A-fib (HCC)    QT prolongation    RBBB    Acute respiratory failure with hypoxia (HCC)    Acute renal failure superimposed on stage 2 chronic kidney disease (HCC)    Adrenal insufficiency ([place]'s disease) (HCC)    Wide-complex tachycardia (HCC)    Orthostatic hypotension    Anemia    Acute alteration in mental status    AMS (altered mental status)    Closed fracture of second lumbar vertebra ([address]    Fall at home, sequela       Plan:  Reviewed in detail with the daughter  Proceed with kyphoplasty for pain control  Questions answered to her satisfaction  Patient will benefit from rehab inpatient        Carroll Abreu MD  12:55 PM  5/17/2022

## 2022-05-18 VITALS
WEIGHT: 235 LBS | OXYGEN SATURATION: 93 % | RESPIRATION RATE: 19 BRPM | HEART RATE: 81 BPM | SYSTOLIC BLOOD PRESSURE: 95 MMHG | DIASTOLIC BLOOD PRESSURE: 63 MMHG | HEIGHT: 73 IN | BODY MASS INDEX: 31.14 KG/M2 | TEMPERATURE: 97.3 F

## 2022-05-18 PROCEDURE — 6360000002 HC RX W HCPCS: Performed by: PHYSICIAN ASSISTANT

## 2022-05-18 PROCEDURE — 6370000000 HC RX 637 (ALT 250 FOR IP): Performed by: PHYSICIAN ASSISTANT

## 2022-05-18 PROCEDURE — 94640 AIRWAY INHALATION TREATMENT: CPT

## 2022-05-18 PROCEDURE — 97535 SELF CARE MNGMENT TRAINING: CPT

## 2022-05-18 PROCEDURE — 2580000003 HC RX 258: Performed by: PHYSICIAN ASSISTANT

## 2022-05-18 PROCEDURE — 97530 THERAPEUTIC ACTIVITIES: CPT

## 2022-05-18 PROCEDURE — 99024 POSTOP FOLLOW-UP VISIT: CPT | Performed by: PHYSICIAN ASSISTANT

## 2022-05-18 RX ADMIN — BUDESONIDE 500 MCG: 0.5 SUSPENSION RESPIRATORY (INHALATION) at 09:37

## 2022-05-18 RX ADMIN — ACETAMINOPHEN 650 MG: 325 TABLET ORAL at 06:46

## 2022-05-18 RX ADMIN — POLYETHYLENE GLYCOL 3350 17 G: 17 POWDER, FOR SOLUTION ORAL at 12:32

## 2022-05-18 RX ADMIN — LEVOTHYROXINE SODIUM 137 MCG: 0.14 TABLET ORAL at 06:46

## 2022-05-18 RX ADMIN — POTASSIUM CHLORIDE 20 MEQ: 20 TABLET, EXTENDED RELEASE ORAL at 10:46

## 2022-05-18 RX ADMIN — HYDROCORTISONE 5 MG: 5 TABLET ORAL at 12:32

## 2022-05-18 RX ADMIN — HYDROCORTISONE 10 MG: 5 TABLET ORAL at 10:50

## 2022-05-18 RX ADMIN — Medication 1000 UNITS: at 10:49

## 2022-05-18 RX ADMIN — ACETAMINOPHEN 650 MG: 325 TABLET ORAL at 10:46

## 2022-05-18 RX ADMIN — SODIUM CHLORIDE, PRESERVATIVE FREE 10 ML: 5 INJECTION INTRAVENOUS at 10:51

## 2022-05-18 RX ADMIN — PANTOPRAZOLE SODIUM 40 MG: 40 TABLET, DELAYED RELEASE ORAL at 10:47

## 2022-05-18 RX ADMIN — METOPROLOL SUCCINATE 25 MG: 25 TABLET, EXTENDED RELEASE ORAL at 10:48

## 2022-05-18 RX ADMIN — FLUDROCORTISONE ACETATE 0.1 MG: 0.1 TABLET ORAL at 10:50

## 2022-05-18 ASSESSMENT — PAIN SCALES - GENERAL
PAINLEVEL_OUTOF10: 5
PAINLEVEL_OUTOF10: 4

## 2022-05-18 ASSESSMENT — PAIN DESCRIPTION - LOCATION: LOCATION: BACK

## 2022-05-18 ASSESSMENT — PAIN DESCRIPTION - DESCRIPTORS: DESCRIPTORS: ACHING;DISCOMFORT;NAGGING

## 2022-05-18 NOTE — PROGRESS NOTES
Department of Neurosurgery  Progress Note    CHIEF COMPLAINT: L2 compression fracture. SUBJECTIVE:  Pre op back pain improved    REVIEW OF SYSTEMS :  Constitutional: Negative for chills and fever. Neurological: Negative for dizziness, tremors and speech change.      OBJECTIVE:   VITALS:  BP (!) 89/56   Pulse 77   Temp 97.7 °F (36.5 °C) (Temporal)   Resp 19   Ht 6' 1\" (1.854 m)   Wt 235 lb (106.6 kg)   SpO2 93%   BMI 31.00 kg/m²     PHYSICAL:  Alert, oriented  Appears stated age  PERRL  EOMI  Strength full  Sensation intact to light touch    DATA:  CBC:   Lab Results   Component Value Date    WBC 10.8 05/14/2022    RBC 5.39 05/14/2022    HGB 14.5 05/14/2022    HCT 45.7 05/14/2022    MCV 84.8 05/14/2022    MCH 26.9 05/14/2022    MCHC 31.7 05/14/2022    RDW 13.5 05/14/2022     05/14/2022    MPV 10.0 05/14/2022     BMP:    Lab Results   Component Value Date     05/12/2022    K 4.7 05/12/2022     05/12/2022    CO2 24 05/12/2022    BUN 24 05/12/2022    LABALBU 4.2 05/12/2022    CREATININE 1.4 05/12/2022    CALCIUM 9.4 05/12/2022    GFRAA 57 05/12/2022    LABGLOM 47 05/12/2022    GLUCOSE 103 05/12/2022     PT/INR:    Lab Results   Component Value Date    PROTIME 12.5 05/12/2022    INR 1.1 05/12/2022     PTT:    Lab Results   Component Value Date    APTT 31.6 05/12/2022   [APTT}    Current Inpatient Medications  Current Facility-Administered Medications: sodium chloride flush 0.9 % injection 5-40 mL, 5-40 mL, IntraVENous, 2 times per day  sodium chloride flush 0.9 % injection 5-40 mL, 5-40 mL, IntraVENous, PRN  0.9 % sodium chloride infusion, , IntraVENous, PRN  acetaminophen (TYLENOL) tablet 650 mg, 650 mg, Oral, Q6H  trimethobenzamide (TIGAN) injection 200 mg, 200 mg, IntraMUSCular, Q6H PRN  albuterol (PROVENTIL) nebulizer solution 2.5 mg, 2.5 mg, Nebulization, Q6H PRN  fludrocortisone (FLORINEF) tablet 0.1 mg, 0.1 mg, Oral, Daily  hydrocortisone (CORTEF) tablet 10 mg, 10 mg, Oral, Daily with breakfast  hydrocortisone (CORTEF) tablet 5 mg, 5 mg, Oral, Nightly  levothyroxine (SYNTHROID) tablet 137 mcg, 137 mcg, Oral, Daily  metoprolol succinate (TOPROL XL) extended release tablet 25 mg, 25 mg, Oral, Daily  pantoprazole (PROTONIX) tablet 40 mg, 40 mg, Oral, BID  potassium chloride (KLOR-CON M) extended release tablet 20 mEq, 20 mEq, Oral, Daily  vitamin D (CHOLECALCIFEROL) tablet 1,000 Units, 1,000 Units, Oral, BID  polyethylene glycol (GLYCOLAX) packet 17 g, 17 g, Oral, Daily PRN  acetaminophen (TYLENOL) tablet 650 mg, 650 mg, Oral, Q6H PRN **OR** acetaminophen (TYLENOL) suppository 650 mg, 650 mg, Rectal, Q6H PRN  atorvastatin (LIPITOR) tablet 10 mg, 10 mg, Oral, Nightly  budesonide (PULMICORT) nebulizer suspension 500 mcg, 0.5 mg, Nebulization, BID  traMADol (ULTRAM) tablet 50 mg, 50 mg, Oral, Q6H PRN  hydrocortisone (CORTEF) tablet 5 mg, 5 mg, Oral, Lunch    ASSESSMENT:   -Leoncio Gale is a 81 y/o male  s/p L2 kyphoplasty    PLAN:  -Pain control  -PT/OT with brace  -Follow up in clinic in 4 weeks       Electronically signed by MICHELLE Steward on 5/18/2022 at 10:35 AM

## 2022-05-18 NOTE — OP NOTE
[address]                  [address] [facility]  [facility]                                OPERATIVE REPORT    PATIENT NAME: Robby Johnson                       :        1931  MED REC NO:   [id number]                            ROOM:       52  ACCOUNT NO:   [de-identified]                           ADMIT DATE: 2022  PROVIDER:     Emani Garcia MD    DATE OF PROCEDURE:  2022    PREOPERATIVE DIAGNOSIS:  Acute L2 osteoporotic compression fracture. POSTOPERATIVE DIAGNOSIS:  Acute L2 osteoporotic compression fracture. OPERATIVE PROCEDURES:  1.  Bilateral percutaneous transpedicular approach to L2 vertebral body  for L2 vertebral body bone biopsy and L2 vertebral body balloon  kyphoplasty with use of Medtronic Kyphon balloon and  polymethylmethacrylate. 2.  Use of biplanar fluoroscopy interpreted by myself, the surgeon. ANESTHESIA:  Monitored anesthesia care. SURGEON:  Emani Garcia MD    ASSISTANT:  None. COMPLICATIONS:  None. ESTIMATED BLOOD LOSS:  Minimal.    SPECIMEN:  Bone. OPERATIVE INDICATIONS:  The patient is a 80-year-old gentleman who  presented to the hospital with excruciating back pain, was found to have  an acute L2 compression fracture. He had failed conservative therapy  and that he is allergic to all narcotic pain medication, was unable to  tolerate a brace and after risks, benefits, and alternatives were  discussed with the patient, it was determined that he would undergo the  above-listed procedure. DESCRIPTION OF OPERATIVE PROCEDURE:  The patient was brought into the  operating room. A time-out was performed where he was identified by his  name, medical record number, and the operative procedure which he was  about to undergo. Next, induction of monitored anesthesia care was then  commenced. Upon completion of induction of monitored anesthesia care,  he received preoperative antibiotics.   He was flipped in prone position  on a Jaguar table. All pressure points were padded. His lumbosacral  region was prepped and draped in the usual sterile fashion. Next, using  biplanar fluoroscopy I marked entry point into the L2 pedicles  bilaterally. I infiltrated the skin with lidocaine with epinephrine  1:200,000. I used a 15-blade to start on the left side. I then docked  the Jamshidi One-Step Osteo Introducer on the facet, advanced it through  the pedicle into the vertebral body. I then proceeded to then remove  the inner stylet, left the outer working cannula in place. I inserted  bone biopsy tool. After obtaining a biopsy, I inserted Medtronic Kyphon  balloon that was inflated to 200 psi. I deflated balloon, injected a  total of 3 mL of polymethylmethacrylate into the vertebral body from the  left side. I then cannulated the pedicle on the right side. I docked  the Jamshidi One-Step Osteo Introducer on the facet, advanced it through  the pedicle into the vertebral body. I then removed the inner stylet,  left the outer working cannula in place, inserted a bone biopsy tool. After obtaining a biopsy, I inserted Medtronic Kyphon balloon that was  inflated to 350 psi. I deflated the balloon, injected another 3 mL of  polymethylmethacrylate into the vertebral body. A total of 6 mL of  polymethylmethacrylate into the vertebral body. I removed the outer  working cannula. I obtained a final AP and lateral fluoroscopic image. There was no evidence of extravasation of cement. The skin was closed  with Dermabond at both incisions. Dry sterile dressing was placed over  this. The patient was then flipped in supine position on his hospital  bed and transported to the postanesthesia care unit in stable condition. There were no complications. Counts were correct. I was present for  the entire case.         Juan Langley MD    D: 05/17/2022 21:27:10       T: 05/17/2022 21:29:27     [email or web address]  Job#: [id number] Doc#: [id number]    CC:

## 2022-05-18 NOTE — PROGRESS NOTES
Occupational Therapy  OT BEDSIDE TREATMENT NOTE   [address] [facility] [address] 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      Recommended Adaptive Equipment:  AE for LB dressing PRN - provided sock aid.       Precautions:  Fall Risk, spinal precautions      Assessment of current deficits    [x]? Functional mobility            [x]?ADLs           [x]? Strength                  [x]? Cognition    [x]? Functional transfers          [x]? IADLs         [x]? Safety Awareness   [x]? Endurance    []? Fine Coordination                         [x]? Balance      []? Vision/perception   []? Sensation      []? Gross Motor Coordination             []? ROM           []?  Delirium                   []? Motor Control      OT PLAN OF CARE   OT POC based on physician orders, patient diagnosis and results of clinical assessment     Frequency/Duration 3-5 days/wk for 2 weeks PRN   Specific OT Treatment Interventions to include:   * Instruction/training on adapted ADL techniques and AE recommendations to increase functional independence within precautions       * Training on energy conservation strategies, correct breathing pattern and techniques to improve independence/tolerance for self-care routine  * Functional transfer/mobility training/DME recommendations for increased independence, safety, and fall prevention  * Patient/Family education to increase follow through with safety techniques and functional independence  * Recommendation of environmental modifications for increased safety with functional transfers/mobility and ADLs  * Therapeutic exercise to improve motor endurance, ROM, and functional strength for ADLs/functional transfers  * Therapeutic activities to facilitate/challenge dynamic balance, stand tolerance for increased safety and independence with ADLs  * Positioning to improve skin integrity, interaction with environment and functional independence     Home Living: Pt lives alone in 2 story home with 2 steps & 0 handrails. Bed & bath located on 1st floor. Bathroom setup: Walk-in shower with grab bars, built-in shower chair, elevated commode  Equipment owned: www, cane     Prior Level of Function: IND with ADLs , IND with IADLs; ambulated without AD  Driving: Yes  Occupation: None reported     Pain Level: Pt c/o min back pain; educated on spinal precautions  Cognition: A&O: 4/4; Follows 2 step directions              Memory:  Good              Sequencing:  Fair+              Problem solving:  Fair+              Judgement/safety:  Fair+                Functional Assessment:  AM-PAC Daily Activity Raw Score: 18/24    Initial Eval Status  Date: 5/17/22 Treatment Status  Date: 5/18/22 STGs = LTGs  Time frame: 10-14 days   Feeding Stand by Assist   Spillage noted on gown  per last tx Independent    Grooming Stand by Assist   SBA  To wash hands standing at sink Modified [place]    UB Dressing Stand by Assist  SBA  To don/doff gown seated EOB  Modified [place]    LB Dressing Maximal Assist to don socks & briefs seated EOB. Minimal Assist to don socks with use of sockaid.  Min A  Pt educated on LB AE, donned pants and donned/doffed socks seated EOB and standing to pull over hips  Dependent    Bathing Moderate Assist Min A  Simulated seated   Modified [place]    Toileting Maximal Assist   SBA- clothing management Modified [place]    Bed Mobility  Log Roll: Minimal Assist  Supine to sit: Minimal Assist   Sit to supine: NT SBA- supine>sit  Educated pt on technique to increase independence.    Supine to sit: Independent   Sit to supine: Independent    Functional Transfers Sit to stand:Minimal Assist   Stand to sit:Minimal Assist  Stand pivot: Minimal Assist  Commode: Minimal Assist CGA- sit<->stand  Cuing for hand placement and body mechanics   CGA- toilet transfer using grab bar Sit to stand:Modified [place]    Stand to sit:Modified [place]   Stand pivot: Modified [place]   Commode: Modified [place]     Functional Mobility Minimal Assist  Use of ww within household distance SBA  Home distance using w/w  Modified [place] with Appropriate AD   Balance Sitting:     Static - Supervision     Dynamic - SBA  Standing: Minimal Assist Sitting:     Static - Ind     Dynamic - Ind  Standing: SBA Sitting:     Static: Independent     Dynamic: Independent  Standing: Modified [place]    Activity Tolerance Fair  Fair+ Good   Visual/  Perceptual Glasses: Yes  Appears WFL          Safety Fair  Fair+ Good  during ADL completion following spinal precautions       Comments: Upon arrival pt supine in bed. Educated on spinal precautions, demonstrates fair understanding. Pt educated on adaptive techniques to increase independence and safety during ADL's, bed mobility, and functional transfers while maintaining precautions. Discussed home set up with pt, giving suggestions to increase safety at discharge. At end of session pt left seated in bedside chair, call light within reach. · Pt has made fair+ progress towards set goals.      · Continue with current plan of care    Treatment Time In: 10:10            Treatment Time Out: 10:40             Treatment Charges: Mins Units   Ther Ex  [id number]     Manual Therapy [phone]     Thera Activities [id number] 10 1   ADL/Home Mgt [id number] 20 1   Neuro Re-ed 308 [facility] manage/training  38578 Non-Billable Time     Total Timed Treatment 30 [address]

## 2022-05-18 NOTE — CARE COORDINATION
5/18/22 Update CM note; Patient is POD 1 Kyphoplasty. He is up walking 100ft with minimal assistance. He has a walker at home he states. He is being followed by [place] [facility] home care and Lizzie liaison has been notified of discharge for today. Homecare orders placed and Dr Yasmine Juarez notified.  Electronically signed by Elsy Cerda RN CM on 5/18/2022 at 10:32 AM

## 2022-05-19 ENCOUNTER — CARE COORDINATION (OUTPATIENT)
Dept: CASE MANAGEMENT | Age: 87
End: 2022-05-19

## 2022-05-19 NOTE — CARE COORDINATION
Em[facility] Transitions Initial Follow Up Call    Call within 2 business days of discharge: Yes    Patient: Massiel Lares Patient : 1931   MRN: <[id number]>  Reason for Admission: 2022 - 2022 [address] Fall, KYPHOPLASTY L2. Discharge Date: 22 RARS: Readmission Risk Score: 15.5 ( )  NR  CT    Last Discharge [facility]       Complaint Diagnosis Description Type Department Provider    22 Fall Other closed fracture of second lumbar vertebra, initial encounter ([facility] [address] . .. ED to Hosp-Admission (Discharged) (ADMITTED) SEYZ 5S NS Luz Marina Barrett MD; Jonnathan Ramirez... Initial CT outreach outreach leaving [place]  w/ my outreach info. Advised to schedule 7 day hosp fu w/ [facility] PCP. [facility]. [facility]    MICHELLE Mott/neurosx  1:15    No changes were made to your medications.     Care Transitions 24 Hour Call    Do you have all of your prescriptions and are they filled?: Yes  Care Transitions Interventions         Follow Up  Future Appointments   Date Time Provider Martín Zhou   2022  1:15 PM Margarita Gramajo, [address] [place] [address]

## 2022-05-20 ENCOUNTER — CARE COORDINATION (OUTPATIENT)
Dept: CASE MANAGEMENT | Age: 87
End: 2022-05-20

## 2022-05-20 NOTE — CARE COORDINATION
[facility] Trans[facility] Initial Follow Up Call    Call within 2 business days of discharge: Yes    Patient: Wicho Vo Patient : 1931   MRN: <[id number]>  Reason for Admission: 2022 - 2022 [facility]. Fall, KYPHOPLASTY L2. Discharge Date: 22 RARS: Readmission Risk Score: 15.5 ( )  NR  CT    Last Discharge [address] [address]       Complaint Diagnosis Description Type Department Provider    22 Fall Other closed fracture of second lumbar vertebra, initial encounter ([facility] [address] [facility] . .. ED to Hosp-Admission (Discharged) (ADMITTED) SEYZ 5S NS Radha Stone MD; Jason Rosasd-S. .. Second initial CT outreach outreach leaving [place]  w/ my outreach info. Advised to schedule 7 day hosp fu w/ [facility] PCP. Advised of post-op appt. CTN s/o.                [facility] PCP. Med from [place]. [facility]     MICHELLE Ayala/neurosx  1:15     No changes were made to your medications.     Care Transitions 24 Hour Call    Do you have all of your prescriptions and are they filled?: Yes  Care Transitions Interventions         Follow Up  Future Appointments   Date Time Provider Martín Zhou   2022  1:15 PM Iraida Heredia, [address] [place] [address]

## 2022-05-24 ENCOUNTER — APPOINTMENT (OUTPATIENT)
Dept: CT IMAGING | Age: 87
End: 2022-05-24
Payer: MEDICARE

## 2022-05-24 ENCOUNTER — HOSPITAL ENCOUNTER (OUTPATIENT)
Age: 87
Setting detail: OBSERVATION
Discharge: HOME | End: 2022-05-27
Attending: EMERGENCY MEDICINE | Admitting: INTERNAL MEDICINE
Payer: MEDICARE

## 2022-05-24 DIAGNOSIS — R51.9 HEADACHE, UNSPECIFIED HEADACHE TYPE: Primary | ICD-10-CM

## 2022-05-24 DIAGNOSIS — I10 HYPERTENSION, UNSPECIFIED TYPE: ICD-10-CM

## 2022-05-24 LAB
ALBUMIN SERPL-MCNC: 3.8 G/DL (ref 3.5–5.2)
ALP BLD-CCNC: 126 U/L (ref 40–129)
ALT SERPL-CCNC: 10 U/L (ref 0–40)
ANION GAP SERPL CALCULATED.3IONS-SCNC: 10 MMOL/L (ref 7–16)
AST SERPL-CCNC: 23 U/L (ref 0–39)
ATYPICAL LYMPHOCYTE RELATIVE PERCENT: 1.7 % (ref 0–4)
BASOPHILS ABSOLUTE: 0 E9/L (ref 0–0.2)
BASOPHILS RELATIVE PERCENT: 0.5 % (ref 0–2)
BILIRUB SERPL-MCNC: 0.2 MG/DL (ref 0–1.2)
BUN BLDV-MCNC: 20 MG/DL (ref 6–23)
CALCIUM SERPL-MCNC: 9 MG/DL (ref 8.6–10.2)
CHLORIDE BLD-SCNC: 95 MMOL/L (ref 98–107)
CO2: 27 MMOL/L (ref 22–29)
CREAT SERPL-MCNC: 1.4 MG/DL (ref 0.7–1.2)
EOSINOPHILS ABSOLUTE: 0.46 E9/L (ref 0.05–0.5)
EOSINOPHILS RELATIVE PERCENT: 4.4 % (ref 0–6)
GFR AFRICAN AMERICAN: 57
GFR NON-AFRICAN AMERICAN: 47 ML/MIN/1.73
GLUCOSE BLD-MCNC: 99 MG/DL (ref 74–99)
HCT VFR BLD CALC: 46.5 % (ref 37–54)
HEMOGLOBIN: 15.1 G/DL (ref 12.5–16.5)
LYMPHOCYTES ABSOLUTE: 1.26 E9/L (ref 1.5–4)
LYMPHOCYTES RELATIVE PERCENT: 10.4 % (ref 20–42)
MCH RBC QN AUTO: 27.1 PG (ref 26–35)
MCHC RBC AUTO-ENTMCNC: 32.5 % (ref 32–34.5)
MCV RBC AUTO: 83.3 FL (ref 80–99.9)
METAMYELOCYTES RELATIVE PERCENT: 0.9 % (ref 0–1)
MONOCYTES ABSOLUTE: 1.47 E9/L (ref 0.1–0.95)
MONOCYTES RELATIVE PERCENT: 13.9 % (ref 2–12)
MYELOCYTE PERCENT: 4.3 % (ref 0–0)
NEUTROPHILS ABSOLUTE: 7.35 E9/L (ref 1.8–7.3)
NEUTROPHILS RELATIVE PERCENT: 64.4 % (ref 43–80)
PDW BLD-RTO: 13.5 FL (ref 11.5–15)
PLATELET # BLD: 339 E9/L (ref 130–450)
PMV BLD AUTO: 9.5 FL (ref 7–12)
POTASSIUM SERPL-SCNC: 3.9 MMOL/L (ref 3.5–5)
RBC # BLD: 5.58 E12/L (ref 3.8–5.8)
SODIUM BLD-SCNC: 132 MMOL/L (ref 132–146)
TOTAL PROTEIN: 7.7 G/DL (ref 6.4–8.3)
TROPONIN, HIGH SENSITIVITY: 29 NG/L (ref 0–11)
WBC # BLD: 10.5 E9/L (ref 4.5–11.5)

## 2022-05-24 PROCEDURE — 80053 COMPREHEN METABOLIC PANEL: CPT

## 2022-05-24 PROCEDURE — 6360000002 HC RX W HCPCS: Performed by: EMERGENCY MEDICINE

## 2022-05-24 PROCEDURE — 85651 RBC SED RATE NONAUTOMATED: CPT

## 2022-05-24 PROCEDURE — 84484 ASSAY OF TROPONIN QUANT: CPT

## 2022-05-24 PROCEDURE — 93005 ELECTROCARDIOGRAM TRACING: CPT | Performed by: EMERGENCY MEDICINE

## 2022-05-24 PROCEDURE — 70450 CT HEAD/BRAIN W/O DYE: CPT

## 2022-05-24 PROCEDURE — 85025 COMPLETE CBC W/AUTO DIFF WBC: CPT

## 2022-05-24 PROCEDURE — 96374 THER/PROPH/DIAG INJ IV PUSH: CPT

## 2022-05-24 PROCEDURE — 6370000000 HC RX 637 (ALT 250 FOR IP): Performed by: EMERGENCY MEDICINE

## 2022-05-24 PROCEDURE — 99285 EMERGENCY DEPT VISIT HI MDM: CPT

## 2022-05-24 RX ORDER — ACETAMINOPHEN 325 MG/1
650 TABLET ORAL ONCE
Status: COMPLETED | OUTPATIENT
Start: 2022-05-24 | End: 2022-05-24

## 2022-05-24 RX ORDER — HYDRALAZINE HYDROCHLORIDE 20 MG/ML
10 INJECTION INTRAMUSCULAR; INTRAVENOUS ONCE
Status: COMPLETED | OUTPATIENT
Start: 2022-05-24 | End: 2022-05-24

## 2022-05-24 RX ADMIN — ACETAMINOPHEN 650 MG: 325 TABLET ORAL at 23:27

## 2022-05-24 RX ADMIN — HYDRALAZINE HYDROCHLORIDE 10 MG: 20 INJECTION INTRAMUSCULAR; INTRAVENOUS at 23:27

## 2022-05-24 ASSESSMENT — PAIN - FUNCTIONAL ASSESSMENT: PAIN_FUNCTIONAL_ASSESSMENT: 0-10

## 2022-05-24 ASSESSMENT — PAIN SCALES - GENERAL
PAINLEVEL_OUTOF10: 5
PAINLEVEL_OUTOF10: 8

## 2022-05-25 PROBLEM — R51.9 HEADACHE: Status: ACTIVE | Noted: 2022-05-25

## 2022-05-25 LAB
EKG ATRIAL RATE: 89 BPM
EKG P AXIS: 24 DEGREES
EKG P-R INTERVAL: 142 MS
EKG Q-T INTERVAL: 412 MS
EKG QRS DURATION: 150 MS
EKG QTC CALCULATION (BAZETT): 501 MS
EKG R AXIS: -95 DEGREES
EKG T AXIS: 10 DEGREES
EKG VENTRICULAR RATE: 89 BPM
SEDIMENTATION RATE, ERYTHROCYTE: 33 MM/HR (ref 0–15)

## 2022-05-25 PROCEDURE — G0378 HOSPITAL OBSERVATION PER HR: HCPCS

## 2022-05-25 PROCEDURE — 99223 1ST HOSP IP/OBS HIGH 75: CPT | Performed by: PSYCHIATRY & NEUROLOGY

## 2022-05-25 PROCEDURE — 94640 AIRWAY INHALATION TREATMENT: CPT

## 2022-05-25 PROCEDURE — 94664 DEMO&/EVAL PT USE INHALER: CPT

## 2022-05-25 PROCEDURE — 6360000002 HC RX W HCPCS: Performed by: INTERNAL MEDICINE

## 2022-05-25 PROCEDURE — 6370000000 HC RX 637 (ALT 250 FOR IP): Performed by: INTERNAL MEDICINE

## 2022-05-25 RX ORDER — LEVOTHYROXINE SODIUM 137 UG/1
137 TABLET ORAL DAILY
Status: DISCONTINUED | OUTPATIENT
Start: 2022-05-25 | End: 2022-05-27 | Stop reason: HOSPADM

## 2022-05-25 RX ORDER — ACETAMINOPHEN 500 MG
500 TABLET ORAL EVERY 4 HOURS PRN
Status: DISCONTINUED | OUTPATIENT
Start: 2022-05-25 | End: 2022-05-27 | Stop reason: HOSPADM

## 2022-05-25 RX ORDER — VITAMIN B COMPLEX
1000 TABLET ORAL 2 TIMES DAILY
Status: DISCONTINUED | OUTPATIENT
Start: 2022-05-25 | End: 2022-05-27 | Stop reason: HOSPADM

## 2022-05-25 RX ORDER — HYDROCORTISONE 5 MG/1
10 TABLET ORAL
Status: DISCONTINUED | OUTPATIENT
Start: 2022-05-25 | End: 2022-05-27 | Stop reason: HOSPADM

## 2022-05-25 RX ORDER — POTASSIUM CHLORIDE 20 MEQ/1
20 TABLET, EXTENDED RELEASE ORAL DAILY
Status: DISCONTINUED | OUTPATIENT
Start: 2022-05-25 | End: 2022-05-27 | Stop reason: HOSPADM

## 2022-05-25 RX ORDER — FERROUS SULFATE 325(65) MG
325 TABLET ORAL 2 TIMES DAILY
Status: DISCONTINUED | OUTPATIENT
Start: 2022-05-25 | End: 2022-05-27 | Stop reason: HOSPADM

## 2022-05-25 RX ORDER — HYDROCORTISONE 5 MG/1
5 TABLET ORAL NIGHTLY
Status: DISCONTINUED | OUTPATIENT
Start: 2022-05-25 | End: 2022-05-27 | Stop reason: HOSPADM

## 2022-05-25 RX ORDER — PANTOPRAZOLE SODIUM 40 MG/1
40 TABLET, DELAYED RELEASE ORAL 2 TIMES DAILY
Status: DISCONTINUED | OUTPATIENT
Start: 2022-05-25 | End: 2022-05-27 | Stop reason: HOSPADM

## 2022-05-25 RX ORDER — FLUDROCORTISONE ACETATE 0.1 MG/1
0.1 TABLET ORAL DAILY
Status: DISCONTINUED | OUTPATIENT
Start: 2022-05-25 | End: 2022-05-27 | Stop reason: HOSPADM

## 2022-05-25 RX ORDER — METOPROLOL SUCCINATE 25 MG/1
25 TABLET, EXTENDED RELEASE ORAL DAILY
Status: DISCONTINUED | OUTPATIENT
Start: 2022-05-25 | End: 2022-05-27 | Stop reason: HOSPADM

## 2022-05-25 RX ORDER — ATORVASTATIN CALCIUM 20 MG/1
20 TABLET, FILM COATED ORAL DAILY
Status: DISCONTINUED | OUTPATIENT
Start: 2022-05-25 | End: 2022-05-27 | Stop reason: HOSPADM

## 2022-05-25 RX ORDER — BUDESONIDE 0.5 MG/2ML
0.5 INHALANT ORAL 2 TIMES DAILY
Status: DISCONTINUED | OUTPATIENT
Start: 2022-05-25 | End: 2022-05-27 | Stop reason: HOSPADM

## 2022-05-25 RX ORDER — CARBAMAZEPINE 100 MG/1
100 TABLET, CHEWABLE ORAL 3 TIMES DAILY
Status: DISCONTINUED | OUTPATIENT
Start: 2022-05-25 | End: 2022-05-27 | Stop reason: HOSPADM

## 2022-05-25 RX ORDER — ALBUTEROL SULFATE 2.5 MG/3ML
2.5 SOLUTION RESPIRATORY (INHALATION) EVERY 6 HOURS PRN
Status: DISCONTINUED | OUTPATIENT
Start: 2022-05-25 | End: 2022-05-27 | Stop reason: HOSPADM

## 2022-05-25 RX ADMIN — BUDESONIDE 500 MCG: 0.5 SUSPENSION RESPIRATORY (INHALATION) at 21:08

## 2022-05-25 RX ADMIN — ATORVASTATIN CALCIUM 20 MG: 20 TABLET, FILM COATED ORAL at 22:14

## 2022-05-25 RX ADMIN — CARBAMAZEPINE 100 MG: 100 TABLET, CHEWABLE ORAL at 22:15

## 2022-05-25 RX ADMIN — CARBAMAZEPINE 100 MG: 100 TABLET, CHEWABLE ORAL at 13:02

## 2022-05-25 RX ADMIN — BUDESONIDE 500 MCG: 0.5 SUSPENSION RESPIRATORY (INHALATION) at 08:29

## 2022-05-25 RX ADMIN — FERROUS SULFATE TAB 325 MG (65 MG ELEMENTAL FE) 325 MG: 325 (65 FE) TAB at 22:14

## 2022-05-25 RX ADMIN — PANTOPRAZOLE SODIUM 40 MG: 40 TABLET, DELAYED RELEASE ORAL at 22:14

## 2022-05-25 RX ADMIN — ACETAMINOPHEN 500 MG: 500 TABLET ORAL at 08:49

## 2022-05-25 RX ADMIN — PANTOPRAZOLE SODIUM 40 MG: 40 TABLET, DELAYED RELEASE ORAL at 08:47

## 2022-05-25 RX ADMIN — HYDROCORTISONE 5 MG: 5 TABLET ORAL at 22:15

## 2022-05-25 RX ADMIN — METOPROLOL SUCCINATE 25 MG: 25 TABLET, EXTENDED RELEASE ORAL at 08:47

## 2022-05-25 RX ADMIN — FLUDROCORTISONE ACETATE 0.1 MG: 0.1 TABLET ORAL at 08:47

## 2022-05-25 RX ADMIN — Medication 1000 UNITS: at 22:15

## 2022-05-25 RX ADMIN — FERROUS SULFATE TAB 325 MG (65 MG ELEMENTAL FE) 325 MG: 325 (65 FE) TAB at 08:47

## 2022-05-25 RX ADMIN — Medication 1000 UNITS: at 08:47

## 2022-05-25 RX ADMIN — HYDROCORTISONE 10 MG: 5 TABLET ORAL at 08:47

## 2022-05-25 RX ADMIN — POTASSIUM CHLORIDE 20 MEQ: 20 TABLET, EXTENDED RELEASE ORAL at 08:47

## 2022-05-25 ASSESSMENT — PAIN SCALES - GENERAL
PAINLEVEL_OUTOF10: 7
PAINLEVEL_OUTOF10: 0

## 2022-05-25 NOTE — ED PROVIDER NOTES
HPI:  5/24/22, Time: 8:57 PM EDT         Lisy Leung is a 80 y.o. male presenting to the ED for headache, beginning since 1 in the afternoon ago. The complaint has been persistent, moderate in severity, and worsened by nothing. Patient reporting pain right side of head mainly temple region. Patient reports symptoms started about 1 PM.  Patient reporting no nausea vomiting does report some pressure in his eyes. Patient reporting no chest pain no difficulty breathing does report some pain in his jaw. Patient reporting no upper or lower extremity weakness he reports no fever or chills. Patient reporting no slurred speech. Patient was just in the hospital for fracture to his lumbar spine from mechanical fall. Patient reporting no new back pain. Patient reporting no cough or fever. There is no vomiting    ROS:   Pertinent positives and negatives are stated within HPI, all other systems reviewed and are negative.  --------------------------------------------- PAST HISTORY ---------------------------------------------  Past Medical History:  has a 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 Hypothyroid, Osteopenia, Renal insufficiency, and Right bundle branch block. Past Surgical History:  has a past surgical history that includes Upper gastrointestinal endoscopy (N/A, 8/19/2021) and Spine surgery (N/A, 5/17/2022). Social History:  reports that he has never smoked. He has never used smokeless tobacco. He reports previous alcohol use. He reports that he does not use drugs. Family History: family history is not on file. The patients home medications have been reviewed.     Allergies: Codeine, Diazepam, Lorazepam, and Morphine    ---------------------------------------------------PHYSICAL EXAM--------------------------------------    Constitutional/General: Alert and oriented x3, well appearing, non toxic in NAD  Head: Normocephalic, tenderness to right temple  Eyes: PERRL, EOMI  Mouth: Oropharynx clear, handling secretions, no trismus  Neck: Supple, full ROM, non tender to palpation in the midline, no stridor, no crepitus, no meningeal signs  Pulmonary: Lungs clear to auscultation bilaterally, no wheezes, rales, or rhonchi. Not in respiratory distress  Cardiovascular:  Regular rate. Regular rhythm. No murmurs, gallops, or rubs. 2+ distal pulses  Chest: no chest wall tenderness  Abdomen: Soft. Non tender. Non distended. +BS. No rebound, guarding, or rigidity. No pulsatile masses appreciated. Musculoskeletal: Moves all extremities x 4. Warm and well perfused, no clubbing, cyanosis, or edema. Capillary refill <3 seconds  Skin: warm and dry. No rashes. Neurologic: GCS 15, CN 2-12 grossly intact, no focal deficits, symmetric strength 5/5 in the upper and lower extremities bilaterally  Psych: Normal Affect    -------------------------------------------------- RESULTS -------------------------------------------------  I have personally reviewed all laboratory and imaging results for this patient. Results are listed below.      LABS:  Results for orders placed or performed during the hospital encounter of 05/24/22   CBC with Auto Differential   Result Value Ref Range    WBC 10.5 4.5 - 11.5 E9/L    RBC 5.58 3.80 - 5.80 E12/L    Hemoglobin 15.1 12.5 - 16.5 g/dL    Hematocrit 46.5 37.0 - 54.0 %    MCV 83.3 80.0 - 99.9 fL    MCH 27.1 26.0 - 35.0 pg    MCHC 32.5 32.0 - 34.5 %    RDW 13.5 11.5 - 15.0 fL    Platelets 139 186 - 872 E9/L    MPV 9.5 7.0 - 12.0 fL    Neutrophils % 64.4 43.0 - 80.0 %    Lymphocytes % 10.4 (L) 20.0 - 42.0 %    Monocytes % 13.9 (H) 2.0 - 12.0 %    Eosinophils % 4.4 0.0 - 6.0 %    Basophils % 0.5 0.0 - 2.0 %    Neutrophils Absolute 7.35 (H) 1.80 - 7.30 E9/L    Lymphocytes Absolute 1.26 (L) 1.50 - 4.00 E9/L    Monocytes Absolute 1.47 (H) 0.10 - 0.95 E9/L    Eosinophils Absolute 0.46 0.05 - 0.50 E9/L    Basophils Absolute 0.00 0.00 - 0.20 E9/L Atypical Lymphocytes Relative 1.7 0.0 - 4.0 %    Metamyelocytes Relative 0.9 0.0 - 1.0 %    Myelocyte Percent 4.3 0 - 0 %   Comprehensive Metabolic Panel   Result Value Ref Range    Sodium 132 132 - 146 mmol/L    Potassium 3.9 3.5 - 5.0 mmol/L    Chloride 95 (L) 98 - 107 mmol/L    CO2 27 22 - 29 mmol/L    Anion Gap 10 7 - 16 mmol/L    Glucose 99 74 - 99 mg/dL    BUN 20 6 - 23 mg/dL    CREATININE 1.4 (H) 0.7 - 1.2 mg/dL    GFR Non-African American 47 >=60 mL/min/1.73    GFR African American 57     Calcium 9.0 8.6 - 10.2 mg/dL    Total Protein 7.7 6.4 - 8.3 g/dL    Albumin 3.8 3.5 - 5.2 g/dL    Total Bilirubin 0.2 0.0 - 1.2 mg/dL    Alkaline Phosphatase 126 40 - 129 U/L    ALT 10 0 - 40 U/L    AST 23 0 - 39 U/L   Troponin   Result Value Ref Range    Troponin, High Sensitivity 29 (H) 0 - 11 ng/L   Sedimentation Rate   Result Value Ref Range    Sed Rate 33 (H) 0 - 15 mm/Hr       RADIOLOGY:  Interpreted by Radiologist.  CT HEAD WO CONTRAST   Final Result   No acute intracranial abnormality. RECOMMENDATIONS:   Unavailable               EKG:  This EKG is signed and interpreted by me. Rate: 89  Rhythm: Sinus  Interpretation: non-specific EKG, right bundle branch block  Comparison: stable as compared to patient's most recent EKG        ------------------------- NURSING NOTES AND VITALS REVIEWED ---------------------------   The nursing notes within the ED encounter and vital signs as below have been reviewed by myself. BP (!) 142/90   Pulse (!) 110   Temp 98.5 °F (36.9 °C) (Oral)   Resp 19   Ht 6' (1.829 m)   Wt 210 lb (95.3 kg)   SpO2 92%   BMI 28.48 kg/m²   Oxygen Saturation Interpretation: Normal    The patients available past medical records and past encounters were reviewed.         ------------------------------ ED COURSE/MEDICAL DECISION MAKING----------------------  Medications   hydrALAZINE (APRESOLINE) injection 10 mg (10 mg IntraVENous Given 5/24/22 3247)   acetaminophen (TYLENOL) tablet 650 mg (650 mg Oral Given 5/24/22 3109)             Medical Decision Making:   Present here because of severe headache right-sided. Patient reporting never having headaches like this in the past is mainly in his temple he states that pressure in his eyes. Mainly in the right eye. Patient reporting also having some jaw pain. Patient reporting no fever no chills he reports no slurred speech. He reports no new upper or lower extremity weakness he was just recently in hospital for history of spine fracture. Patient reports it was a mechanical fall. Patient reporting no neck pain. Patient labs are reviewed as well as CT. Patient was noted to be hypertensive. He was given hydralazine. He was given Tylenol. Patient still having headache. Patient concerned this is a new headache for him never had a headaches like this in the past.  Patient will be admitted for further evaluation treatment. Re-Evaluations:             Re-evaluation. Patients symptoms show no change  Patient reevaluated blood pressure has improved still has headache. Patient medicated. Patient made aware of findings and plan. Consultations:               Dr. Brendan Gee: This patient's ED course included: a personal history and physicial eaxmination    This patient has been closely monitored during their ED course. Counseling: The emergency provider has spoken with the patient and discussed todays results, in addition to providing specific details for the plan of care and counseling regarding the diagnosis and prognosis. Questions are answered at this time and they are agreeable with the plan.       --------------------------------- IMPRESSION AND DISPOSITION ---------------------------------    IMPRESSION  1. Headache, unspecified headache type    2.  Hypertension, unspecified type        DISPOSITION  Disposition: Admit  Patient condition is stable        NOTE: This report was transcribed using voice recognition software.  Every effort was made to ensure accuracy; however, inadvertent computerized transcription errors may be present          Celeste Angela MD  05/25/22 7626

## 2022-05-25 NOTE — ED NOTES
Patient stated increased internal pressure in his head 10/10 patient is alert and oriented x4 patient connected to monitor call light within reach      Terril, [address] [place] [address]  05/24/22 2118

## 2022-05-25 NOTE — PLAN OF CARE
Problem: Safety - Adult  Goal: Free from fall injury  Outcome: Progressing     Problem: Pain  Goal: Verbalizes/displays adequate comfort level or baseline comfort level  Outcome: Progressing     Problem: ABCDS Injury Assessment  Goal: Absence of physical injury  Outcome: Progressing

## 2022-05-25 NOTE — CONSULTS
NEUROLOGY CONSULT NOTE      Requesting Physician: Zain Gant MD    Reason for Consult:  Evaluate for headache    History of Present Illness:  Willow Kaba is a 80 y.o. male  with h/o A. fib, right bundle branch block, pituitary adenoma status postresection and resultant hypopituitarism, and hypothyroidism who was admitted to [facility]  on 5/24/2022 with presentation of acute headache. Patient has recently undergone a kyphoplasty for endplate compression fracture of L2 with no reported complications noted. About 3 prior, he reports falling at home with resultant injury to back of his head. He denies any bleeding or bruising. Over the prior 2 days, patient has been experiencing intermittent right-sided headache pain which she indicated was the worst headache ever. Pain was described as severe (7-8/10) aching pain in the right temple and parietal area pain with no report of any associated neurologic deficits or symptoms. He denies any h/oheadaches in the past.  Patient presented to the ED for further evaluation with CT scan of the head done showing no acute intracranial abnormality. Past Medical History:        Diagnosis Date    A-fib [facility])     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 ([facility] [address]     Hypothyroid     Osteopenia     Renal insufficiency     Right bundle branch block            Procedure Laterality Date    SPINE SURGERY N/A 5/17/2022    KYPHOPLASTY, L 2 performed by Geovanny Friedman MD at [facility] ENDOSCOPY N/A 8/19/2021    EGD ESOPHAGOGASTRODUODENOSCOPY performed by Gwyn Bingham MD at [facility] History:  Social History     Tobacco Use   Smoking Status Never Smoker   Smokeless Tobacco Never Used     Social History     Substance and Sexual Activity   Alcohol Use Not Currently     Social History     Substance and Sexual Activity   Drug Use Never         Family History:   No family history on file.     Review of Systems:  All systems reviewed are negative except what is mentioned in history of present illness. Allergies: Allergies   Allergen Reactions    Codeine Itching    Diazepam      hallucinations    Lorazepam      hallucinations    Morphine      Stomach ache        Current Medications:   albuterol (PROVENTIL) nebulizer solution 2.5 mg, Q6H PRN  ferrous sulfate (IRON 325) tablet 325 mg, BID  fludrocortisone (FLORINEF) tablet 0.1 mg, Daily  hydrocortisone (CORTEF) tablet 10 mg, Daily with breakfast  hydrocortisone (CORTEF) tablet 5 mg, Nightly  levothyroxine (SYNTHROID) tablet 137 mcg, Daily  metoprolol succinate (TOPROL XL) extended release tablet 25 mg, Daily  budesonide (PULMICORT) nebulizer suspension 500 mcg, BID  pantoprazole (PROTONIX) tablet 40 mg, BID  potassium chloride (KLOR-CON M) extended release tablet 20 mEq, Daily  atorvastatin (LIPITOR) tablet 20 mg, Daily  vitamin D (CHOLECALCIFEROL) tablet 1,000 Units, BID  acetaminophen (TYLENOL) tablet 500 mg, Q4H PRN  carBAMazepine (TEGRETOL) chewable tablet 100 mg, TID       Physical Exam:  BP (!) 176/116   Pulse (!) 106   Temp 98 °F (36.7 °C) (Temporal)   Resp 18   Ht 6' (1.829 m)   Wt 210 lb (95.3 kg)   SpO2 95%   BMI 28.48 kg/m²  I Body mass index is 28.48 kg/m². I   Wt Readings from Last 1 Encounters:   05/24/22 210 lb (95.3 kg)          HEENT: Normocephalic, atraumatic, redness and swelling in left lower eylid with some associted scleral erythemia. Neck:  supple with full ROM; no masses, nodes or bruits; no cervical tenderness on palpation. Lungs:  clear to auscultation  bilaterally     CV: RRR without gallops or murmurs     Extremities: no c/c/e          Neurologic Exam   Mental Status:  Patient was alert, responsive, oriented, appropriate, answering questions, and following commands. Speech was fluent with normal sensorium and cognition.    Cranial Nerves: Pupils were equal round and reactive to light and accommodation;    Visual fields were full on confrontation; Extraocular movements were intact; no nystagmus; Intact facial sensation to temp, pinprick, and light touch; Symmetric facial movements with good lip and eye closure bilaterally; Hearing was intact; Garnica was midline;    Normal palatal elevation with midline uvula  Trapezius strength of 5/5. Tongue was midline with no atrophy or fasciculations  Motor Exam:  Strength was 5/5 throughout; normal tone and bulk; no atrophy or fasiculations noted. Sensory Exam:  Normal sensation to light touch, pin-prick, and temperature except for distal decreased sensation in bilateral hands compared to forearm; No sensory extinction. Cerebella Exam:  Intact finger-nose-finger, rapidly alternating movements, fine motor movements, and heel-down-shin maneuvers; No cerebellar rebound or drift; No tremors or abnormal movements. Gait:  Gait was not tested. Reflexes: diffuse hyporeflexia that was worse in LE vs UE bilaterally; Babinski is negative    Labs:    CBC:   Recent Labs     05/24/22 2121   WBC 10.5   HGB 15.1      MCV 83.3   MCH 27.1   MCHC 32.5   RDW 13.5     CMP:  Recent Labs     05/24/22 2121      K 3.9   CL 95*   CO2 27   BUN 20   CREATININE 1.4*   GFRAA 57   LABGLOM 47   GLUCOSE 99   CALCIUM 9.0     Liver:   Recent Labs     05/24/22 2121   AST 23   ALT 10   ALKPHOS 126   PROT 7.7   LABALBU 3.8   BILITOT 0.2     INR: No results for input(s): PROTIME, INR in the last 72 hours. ToxicologyNo results for input(s): PHENYTOIN, CARBTOT, PHENOBARB, VALPROATE in the last 72 hours. Invalid input(s): LAMOTRIG,  KEPPRA  No results for input(s): AMPMETHURSCR, BARBTQTU, BDZQTU, CANNABQUANT, COCMETQTU, OPIAU, PCPQUANT in the last 72 hours. Radiology:  XR PELVIS (1-2 VIEWS)    Result Date: 5/12/2022  EXAMINATION: ONE XRAY VIEW OF THE PELVIS 5/12/2022 6:19 pm COMPARISON: 12/07/2021 abdomen/pelvis CT.  HISTORY: ORDERING SYSTEM PROVIDED HISTORY: fall TECHNOLOGIST PROVIDED HISTORY: Reason for exam:->fall What reading provider will be dictating this exam?->CRC FINDINGS: No definite acute fracture and no dislocation. There is joint space narrowing and marginal osteophyte formation at the hips. The right SI joint is normal.  The left SI joint is obscured by overlying metallic buckle. There is mild lower lumbar spondylosis and marked disc disease at L4-5 and L5-S1. No definite radiographic evidence of acute pelvic pathology on this limited pelvis radiograph. CT HEAD WO CONTRAST    Result Date: 5/24/2022  EXAMINATION: CT OF THE HEAD WITHOUT CONTRAST  5/24/2022 11:01 pm TECHNIQUE: CT of the head was performed without the administration of intravenous contrast. Automated exposure control, iterative reconstruction, and/or weight based adjustment of the mA/kV was utilized to reduce the radiation dose to as low as reasonably achievable. COMPARISON: CT. HISTORY: ORDERING SYSTEM PROVIDED HISTORY: Evaluate intracranial abnormality TECHNOLOGIST PROVIDED HISTORY: Has a \"code stroke\" or \"stroke alert\" been called? ->No Reason for exam:->Evaluate intracranial abnormality Decision Support Exception - unselect if not a suspected or confirmed emergency medical condition->Emergency Medical Condition (MA) What reading provider will be dictating this exam?->CRC FINDINGS: BRAIN/VENTRICLES: No mass effect, edema or hemorrhage is seen. mild-to-moderate volume loss and microvascular ischemic changes seen in the brain. No hydrocephalus or extra-axial fluid is seen. ORBITS: The visualized portion of the orbits demonstrate no acute abnormality. SINUSES: Moderate mucosal thickening in the paranasal sinuses. The mastoids are clear. SOFT TISSUES/SKULL:  No acute abnormality of the visualized skull or soft tissues. No acute intracranial abnormality.  RECOMMENDATIONS: Unavailable     CT Head WO Contrast    Result Date: 5/12/2022  EXAMINATION: CT OF THE HEAD WITHOUT CONTRAST; CT OF THE CERVICAL SPINE WITHOUT CONTRAST; CT OF THE LUMBAR SPINE WITHOUT CONTRAST  5/12/2022 7:30 pm TECHNIQUE: CT of the head was performed without the administration of intravenous contrast. Automated exposure control, iterative reconstruction, and/or weight based adjustment of the mA/kV was utilized to reduce the radiation dose to as low as reasonably achievable.; CT of the cervical spine was performed without the administration of intravenous contrast. Multiplanar reformatted images are provided for review. Automated exposure control, iterative reconstruction, and/or weight based adjustment of the mA/kV was utilized to reduce the radiation dose to as low as reasonably achievable.; CT of the lumbar spine was performed without the administration of intravenous contrast. Multiplanar reformatted images are provided for review. Adjustment of mA and/or kV according to patient size was utilized. Automated exposure control, iterative reconstruction, and/or weight based adjustment of the mA/kV was utilized to reduce the radiation dose to as low as reasonably achievable. COMPARISON: December 6, 2021 HISTORY: ORDERING SYSTEM PROVIDED HISTORY: fall, head trauma TECHNOLOGIST PROVIDED HISTORY: Reason for exam:->fall, head trauma Has a \"code stroke\" or \"stroke alert\" been called? ->No Decision Support Exception - unselect if not a suspected or confirmed emergency medical condition->Emergency Medical Condition (MA) What reading provider will be dictating this exam?->CRC FINDINGS: BRAIN/VENTRICLES: There is no acute intracranial hemorrhage, mass effect or midline shift. No abnormal extra-axial fluid collection. The gray-white differentiation is maintained without evidence of an acute infarct. There is no evidence of hydrocephalus. ORBITS: The visualized portion of the orbits demonstrate no acute abnormality. SINUSES: There are soft tissue densities in the maxillary, ethmoid and sphenoid sinuses.  SOFT TISSUES/SKULL:  No acute abnormality of the visualized skull or soft tissues. No acute intracranial abnormality. There is age-appropriate atrophy, small-vessel ischemic disease and multiple lacunar CVAs predominantly in the basal ganglia bilaterally. Sinus disease with the soft tissue densities in the maxillary, ethmoid and sphenoid sinuses. CT cervical spine. There is no acute fracture or dislocation in the cervical spine. The prevertebral soft tissues are normal.  There is mild degenerative changes from C3-T1 . There is atelectasis/minimal ground-glass densities in the right upper lobe. Impression No acute fractures. Diffuse degenerative changes from C3-T1. Atelectasis/ground-glass densities in the right upper lobe. Clinical assessment is recommended to evaluate for potential pneumonia or edema. CT lumbar spine. There is a compression fracture involving the superior endplate of L2 with nearly 15% loss of height. There is diffuse osteopenia and degenerative changes. Mild disc bulges are present at L4-5 and L5-S1. Impression 15% compression deformity of the superior endplate of L2. Diffuse degenerative changes with disc bulges from L4-S1. RECOMMENDATIONS: Unavailable     CT Cervical Spine WO Contrast    Result Date: 5/12/2022  EXAMINATION: CT OF THE HEAD WITHOUT CONTRAST; CT OF THE CERVICAL SPINE WITHOUT CONTRAST; CT OF THE LUMBAR SPINE WITHOUT CONTRAST  5/12/2022 7:30 pm TECHNIQUE: CT of the head was performed without the administration of intravenous contrast. Automated exposure control, iterative reconstruction, and/or weight based adjustment of the mA/kV was utilized to reduce the radiation dose to as low as reasonably achievable.; CT of the cervical spine was performed without the administration of intravenous contrast. Multiplanar reformatted images are provided for review.  Automated exposure control, iterative reconstruction, and/or weight based adjustment of the mA/kV was utilized to reduce the radiation dose to as low as reasonably achievable.; CT of the lumbar spine was performed without the administration of intravenous contrast. Multiplanar reformatted images are provided for review. Adjustment of mA and/or kV according to patient size was utilized. Automated exposure control, iterative reconstruction, and/or weight based adjustment of the mA/kV was utilized to reduce the radiation dose to as low as reasonably achievable. COMPARISON: December 6, 2021 HISTORY: ORDERING SYSTEM PROVIDED HISTORY: fall, head trauma TECHNOLOGIST PROVIDED HISTORY: Reason for exam:->fall, head trauma Has a \"code stroke\" or \"stroke alert\" been called? ->No Decision Support Exception - unselect if not a suspected or confirmed emergency medical condition->Emergency Medical Condition (MA) What reading provider will be dictating this exam?->CRC FINDINGS: BRAIN/VENTRICLES: There is no acute intracranial hemorrhage, mass effect or midline shift. No abnormal extra-axial fluid collection. The gray-white differentiation is maintained without evidence of an acute infarct. There is no evidence of hydrocephalus. ORBITS: The visualized portion of the orbits demonstrate no acute abnormality. SINUSES: There are soft tissue densities in the maxillary, ethmoid and sphenoid sinuses. SOFT TISSUES/SKULL:  No acute abnormality of the visualized skull or soft tissues. No acute intracranial abnormality. There is age-appropriate atrophy, small-vessel ischemic disease and multiple lacunar CVAs predominantly in the basal ganglia bilaterally. Sinus disease with the soft tissue densities in the maxillary, ethmoid and sphenoid sinuses. CT cervical spine. There is no acute fracture or dislocation in the cervical spine. The prevertebral soft tissues are normal.  There is mild degenerative changes from C3-T1 . There is atelectasis/minimal ground-glass densities in the right upper lobe. Impression No acute fractures. Diffuse degenerative changes from C3-T1.  Atelectasis/ground-glass densities in the right upper lobe. Clinical assessment is recommended to evaluate for potential pneumonia or edema. CT lumbar spine. There is a compression fracture involving the superior endplate of L2 with nearly 15% loss of height. There is diffuse osteopenia and degenerative changes. Mild disc bulges are present at L4-5 and L5-S1. Impression 15% compression deformity of the superior endplate of L2. Diffuse degenerative changes with disc bulges from L4-S1. RECOMMENDATIONS: Unavailable     CT LUMBAR SPINE WO CONTRAST    Result Date: 5/12/2022  EXAMINATION: CT OF THE HEAD WITHOUT CONTRAST; CT OF THE CERVICAL SPINE WITHOUT CONTRAST; CT OF THE LUMBAR SPINE WITHOUT CONTRAST  5/12/2022 7:30 pm TECHNIQUE: CT of the head was performed without the administration of intravenous contrast. Automated exposure control, iterative reconstruction, and/or weight based adjustment of the mA/kV was utilized to reduce the radiation dose to as low as reasonably achievable.; CT of the cervical spine was performed without the administration of intravenous contrast. Multiplanar reformatted images are provided for review. Automated exposure control, iterative reconstruction, and/or weight based adjustment of the mA/kV was utilized to reduce the radiation dose to as low as reasonably achievable.; CT of the lumbar spine was performed without the administration of intravenous contrast. Multiplanar reformatted images are provided for review. Adjustment of mA and/or kV according to patient size was utilized. Automated exposure control, iterative reconstruction, and/or weight based adjustment of the mA/kV was utilized to reduce the radiation dose to as low as reasonably achievable. COMPARISON: December 6, 2021 HISTORY: ORDERING SYSTEM PROVIDED HISTORY: fall, head trauma TECHNOLOGIST PROVIDED HISTORY: Reason for exam:->fall, head trauma Has a \"code stroke\" or \"stroke alert\" been called? ->No Decision Support Exception - unselect if not a suspected or confirmed was performed without the administration of intravenous contrast. COMPARISON: None. HISTORY: ORDERING SYSTEM PROVIDED HISTORY: determine acuity of fx TECHNOLOGIST PROVIDED HISTORY: Reason for exam:->determine acuity of fx What reading provider will be dictating this exam?->CRC FINDINGS: BONES/ALIGNMENT: There is a normal lumbar lordosis. Assuming that the last well-formed disc represents L5-S1, the conus medullaris ends at L1 and is within normal limits. An acute superior endplate fracture of L2 results in mild loss of vertebral body height. No associated epidural hematoma is identified. There is moderate to severe degenerative disc disease at L4-5. Milder degenerative changes are present at the remaining lumbar disc levels. SPINAL CORD: The conus terminates normally. SOFT TISSUES: No paraspinal mass identified. L1-L2: There is no significant disc herniation, spinal canal stenosis or neural foraminal narrowing. L2-L3: There is no significant disc herniation, spinal canal stenosis or neural foraminal narrowing. L3-L4: Disc bulge resulting in mild narrowing of the thecal sac and both neural foramen. L4-L5: Lateralized disc bulge resulting mild-to-moderate narrowing of the right neural foramen. L5-S1: There is no significant disc herniation, spinal canal stenosis or neural foraminal narrowing. Acute superior endplate fracture of L2, resulting in mild loss of vertebral body height. XR CHEST PORTABLE    Result Date: 5/12/2022  EXAMINATION: ONE XRAY VIEW OF THE CHEST 5/12/2022 6:18 pm COMPARISON: 12/06/2021 chest radiograph. HISTORY: ORDERING SYSTEM PROVIDED HISTORY: fall TECHNOLOGIST PROVIDED HISTORY: Reason for exam:->fall What reading provider will be dictating this exam?->CRC FINDINGS: The lungs are hypoinflated. There are thin linear densities at the left lung base. Cardiac silhouette is enlarged. There is atherosclerotic calcification and tortuosity of the thoracic aorta.   There is moderate thoracic spondylosis. There are remote healed bilateral rib fractures. No pneumothorax. Expiratory chest radiograph with minimal left lower lobe atelectasis. Stable enlargement of the cardiac silhouette. FLUORO FOR SURGICAL PROCEDURES    Result Date: 5/17/2022  EXAMINATION: SPOT FLUOROSCOPIC IMAGES 5/17/2022 1:05 pm TECHNIQUE: Fluoroscopy was provided by the radiology department for procedure. Radiologist was not present during examination. FLUOROSCOPY DOSE AND TYPE OR TIME AND EXPOSURES: Fluoroscopy time equals 56.2 seconds. Total dose equals 39.49 mGy COMPARISON: None HISTORY: ORDERING SYSTEM PROVIDED HISTORY: kyphoplasty TECHNOLOGIST PROVIDED HISTORY: Reason for exam:->kyphoplasty What reading provider will be dictating this exam?->CRC Intraprocedural imaging. FINDINGS: 1 spot images of the lumbar spine were obtained. Intraprocedural fluoroscopic spot images as above. See separate procedure report for more information. The patient's records from referring provider and available information in the EHR was reviewed. Impression:  1. New onset Headache: post-traumatic; occurring after fall at home; no evidence of CNS bleed on exam or on CT. Patient was concerned about possible CNS bleed secondary to his fall. No evidence of b  2. H/o head trauma that precipitated his headache. 3. H/o A-Fib: patient not on anticoagulant therapy. Principal Problem:    Headache  Resolved Problems:    * No resolved hospital problems. *      Recommendations:  1. Naprosyn 500 mg BID prn headache  2. MRI pending for completion of the work-up. 3. Continue medications as before. .  4. Further pending MRI results with recommendation for prn follow-up if study negative for acute pathology. It was my pleasure to evaluate Adriano Salvador today. Please call with questions.       Electronically signed by Keyonna Condon MD on 5/25/2022 at 4:07 PM

## 2022-05-25 NOTE — H&P
likely right-sided trigeminal neuralgia  Comorbidities present and past as listed below  Patient Active Problem List   Diagnosis    Hypopituitarism ([address]    Weakness    Hypothyroidism    Renal insufficiency    A-fib ([address]    QT prolongation    RBBB    Acute respiratory failure with hypoxia (HCC)    Acute renal failure superimposed on stage 2 chronic kidney disease (HCC)    Adrenal insufficiency ([place]'s disease) (HCC)    Wide-complex tachycardia (HCC)    Orthostatic hypotension    Anemia    Acute alteration in mental status    AMS (altered mental status)    Closed fracture of second lumbar vertebra ([address]    Fall at home, sequela    Headache           PLAN:  Proceed with MRI studies  Trial of Tegretol  Resume home medications  Neurology consult  Questions answered to his satisfaction    Paul Lorenz MD  10:47 AM  5/25/2022

## 2022-05-25 NOTE — CARE COORDINATION
5/25 Care Coordination: Ptpresenting to the ED for headache. Patient was just in the hospital for fracture to his lumbar spine from mechanical fall. Post Kyphoplasty. Went home with jaimie Lara. Met with pt in his room, still with c/o headache. States PTA since discharge his son and daughter have been with him. Discussed discharge plan. He wants to go back home and cont with [facility]. Will await tests and PT/OT evals to be complete. CM/SW will continue to follow for discharge planning.    Maria Esther MOJICAN,RN--BC  [phone]

## 2022-05-26 ENCOUNTER — APPOINTMENT (OUTPATIENT)
Dept: MRI IMAGING | Age: 87
End: 2022-05-26
Payer: MEDICARE

## 2022-05-26 PROCEDURE — 70553 MRI BRAIN STEM W/O & W/DYE: CPT

## 2022-05-26 PROCEDURE — A9579 GAD-BASE MR CONTRAST NOS,1ML: HCPCS | Performed by: RADIOLOGY

## 2022-05-26 PROCEDURE — 6360000004 HC RX CONTRAST MEDICATION: Performed by: RADIOLOGY

## 2022-05-26 PROCEDURE — 6360000002 HC RX W HCPCS: Performed by: INTERNAL MEDICINE

## 2022-05-26 PROCEDURE — G0378 HOSPITAL OBSERVATION PER HR: HCPCS

## 2022-05-26 PROCEDURE — 99233 SBSQ HOSP IP/OBS HIGH 50: CPT | Performed by: NURSE PRACTITIONER

## 2022-05-26 PROCEDURE — 6370000000 HC RX 637 (ALT 250 FOR IP): Performed by: INTERNAL MEDICINE

## 2022-05-26 PROCEDURE — 94640 AIRWAY INHALATION TREATMENT: CPT

## 2022-05-26 PROCEDURE — 97165 OT EVAL LOW COMPLEX 30 MIN: CPT

## 2022-05-26 RX ADMIN — BUDESONIDE 500 MCG: 0.5 SUSPENSION RESPIRATORY (INHALATION) at 09:10

## 2022-05-26 RX ADMIN — HYDROCORTISONE 5 MG: 5 TABLET ORAL at 20:56

## 2022-05-26 RX ADMIN — FERROUS SULFATE TAB 325 MG (65 MG ELEMENTAL FE) 325 MG: 325 (65 FE) TAB at 09:09

## 2022-05-26 RX ADMIN — GADOTERIDOL 20 ML: 279.3 INJECTION, SOLUTION INTRAVENOUS at 16:31

## 2022-05-26 RX ADMIN — CARBAMAZEPINE 100 MG: 100 TABLET, CHEWABLE ORAL at 09:10

## 2022-05-26 RX ADMIN — BUDESONIDE 500 MCG: 0.5 SUSPENSION RESPIRATORY (INHALATION) at 20:05

## 2022-05-26 RX ADMIN — FLUDROCORTISONE ACETATE 0.1 MG: 0.1 TABLET ORAL at 09:10

## 2022-05-26 RX ADMIN — HYDROCORTISONE 10 MG: 5 TABLET ORAL at 09:09

## 2022-05-26 RX ADMIN — Medication 1000 UNITS: at 09:10

## 2022-05-26 RX ADMIN — PANTOPRAZOLE SODIUM 40 MG: 40 TABLET, DELAYED RELEASE ORAL at 20:56

## 2022-05-26 RX ADMIN — CARBAMAZEPINE 100 MG: 100 TABLET, CHEWABLE ORAL at 20:56

## 2022-05-26 RX ADMIN — Medication 1000 UNITS: at 20:56

## 2022-05-26 RX ADMIN — CARBAMAZEPINE 100 MG: 100 TABLET, CHEWABLE ORAL at 15:37

## 2022-05-26 RX ADMIN — LEVOTHYROXINE SODIUM 137 MCG: 0.14 TABLET ORAL at 06:49

## 2022-05-26 RX ADMIN — FERROUS SULFATE TAB 325 MG (65 MG ELEMENTAL FE) 325 MG: 325 (65 FE) TAB at 20:56

## 2022-05-26 RX ADMIN — PANTOPRAZOLE SODIUM 40 MG: 40 TABLET, DELAYED RELEASE ORAL at 09:09

## 2022-05-26 RX ADMIN — ATORVASTATIN CALCIUM 20 MG: 20 TABLET, FILM COATED ORAL at 09:10

## 2022-05-26 RX ADMIN — POTASSIUM CHLORIDE 20 MEQ: 20 TABLET, EXTENDED RELEASE ORAL at 09:10

## 2022-05-26 ASSESSMENT — PAIN DESCRIPTION - LOCATION: LOCATION: MOUTH

## 2022-05-26 ASSESSMENT — PAIN DESCRIPTION - DESCRIPTORS: DESCRIPTORS: SORE;DISCOMFORT

## 2022-05-26 ASSESSMENT — PAIN SCALES - GENERAL
PAINLEVEL_OUTOF10: 5
PAINLEVEL_OUTOF10: 1

## 2022-05-26 NOTE — PROGRESS NOTES
Odalis Farfan MD [facility]  Internal medicine  Progress Notes      CHIEF COMPLAINT: Headache      HISTORY OF PRESENT ILLNESS:    5/25/2022  Patient was seen on the floor earlier this morning at the main campus of [facility] with the ER physician at the time of admission  Data reviewed in detail  68-year-old  man recently underwent kyphoplasty for endplate compression fracture of L2  Now presents with 2-day history of intermittent right-sided headache  He describes this as the worst headache ever  Located anterior to the right ear below the right eye  Admitted for further management  No other associated symptoms such as visual or nausea  5/26/2022  Patient was seen on the floor earlier this morning  Registered nurse at bedside  Tolerating medications  He claims that the headache is much better  MRI is pending    Past Medical History:    Past Medical History:   Diagnosis Date    [facility]     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 ([facility]     Hypothyroid     Osteopenia     Renal insufficiency     Right bundle branch block        Past Surgical History:    Past Surgical History:   Procedure Laterality Date    SPINE SURGERY N/A 5/17/2022    KYPHOPLASTY, L 2 performed by Mayda Duncan MD at [address] N/A 8/19/2021    EGD ESOPHAGOGASTRODUODENOSCOPY performed by Franck Mccarty MD at [facility]       Medications Prior to Admission:    Medications Prior to Admission: albuterol (PROVENTIL) (2.5 MG/3ML) 0.083% nebulizer solution, Take 3 mLs by nebulization every 6 hours as needed for Wheezing  hydrocortisone (CORTEF) 5 MG tablet, Take 1 tablet by mouth Daily with lunch (Patient taking differently: Take 5 mg by mouth nightly )  hydrocortisone (CORTEF) 10 MG tablet, Take 1 tablet by mouth daily (with breakfast)  vitamin D (CHOLECALCIFEROL) 25 MCG (1000 UT) TABS tablet, Take 1 tablet by mouth 2 times daily  levothyroxine (SYNTHROID) 137 MCG tablet, Take 137 mcg by mouth Daily  metoprolol succinate (TOPROL XL) 25 MG extended release tablet, Take 25 mg by mouth daily  potassium chloride (KLOR-CON M) 20 MEQ extended release tablet, Take 20 mEq by mouth daily  mometasone (ASMANEX) 200 MCG/ACT AERO inhaler, Inhale 1 puff into the lungs 2 times daily  ferrous sulfate (IRON 325) 325 (65 Fe) MG tablet, Take 1 tablet by mouth 2 times daily  pantoprazole (PROTONIX) 40 MG tablet, Take 40 mg by mouth 2 times daily  Multiple Vitamins-Minerals (THERAPEUTIC MULTIVITAMIN-MINERALS) tablet, Take 1 tablet by mouth daily  simvastatin (ZOCOR) 20 MG tablet, Take 1/2 tablet by mouth Mon - Fri and take 1 tablet by mouth Sat and Sun  fludrocortisone (FLORINEF) 0.1 MG tablet, Take 0.1 mg by mouth daily    Allergies:    Codeine, Diazepam, Lorazepam, and Morphine    Social History:    reports that he has never smoked. He has never used smokeless tobacco. He reports previous alcohol use. He reports that he does not use drugs. Family History:   family history is not on file. REVIEW OF SYSTEMS:  As above in the HPI, otherwise negative    PHYSICAL EXAM:    Vitals:  /62   Pulse 92   Temp 97.9 °F (36.6 °C) (Oral)   Resp 20   Ht 6' (1.829 m)   Wt 210 lb (95.3 kg)   SpO2 (!) 89%   BMI 28.48 kg/m²     General:  Awake, alert, oriented X 3. Moderate distress noted  HEENT:  Normocephalic, atraumatic. Pupils equal, round, reactive to light. No scleral icterus. No conjunctival injection. Left eye lower lid has ectropion no nasal discharge. Tapping over the anterior aspect of the right ear reproduced the pain radiating to the right jaw  Neck:  Supple  Heart:  RRR, no murmurs, gallops, rubs  Lungs:  CTA bilaterally, bilat symmetrical expansion, no wheeze, rales, or rhonchi  Abdomen:   Bowel sounds present, soft, nontender, no masses, no organomegaly, no peritoneal signs  Extremities:  No clubbing, cyanosis, or edema  Skin:  Warm and dry, no open lesions or rash  Neuro:  Cranial nerves 2-12 intact, no focal deficits  Breast: deferred  Rectal: deferred  Genitalia:  deferred    LABS:  Data reviewed      ASSESSMENT:      Principal Problem:    Headache  More than likely right-sided trigeminal neuralgia/symptoms improved with Tegretol  Comorbidities present and past as listed below  Patient Active Problem List   Diagnosis    [facility]    Weakness    Hypothyroidism    Renal insufficiency    [facility]-fib ([facility]    QT prolongation    RBBB    Acute respiratory failure with hypoxia ([facility]    Acute renal failure superimposed on stage 2 chronic kidney disease (HCC)    Adrenal insufficiency (Wade's disease) (HCC)    Wide-complex tachycardia (HCC)    Orthostatic hypotension    Anemia    Acute alteration in mental status    AMS (altered mental status)    Closed fracture of second lumbar vertebra ([facility]    Fall at home, sequela    Headache, unspecified headache type           PLAN:  Proceed with MRI studies  Trial of Tegretol  Resumed home medications  Neurology consult/input noted  Questions answered to his satisfaction    Angelique Robbins MD  1:48 PM  5/26/2022

## 2022-05-26 NOTE — PROGRESS NOTES
[address] CARE [place], [address]                                                  Patient Name: Massiel Lares    MRN: [id number]    : 1931    Room: [address] [place] [address]      Evaluating OT: Bunnie Boxer, OTR/GABRIEL, [id number]    Referring Provider: Luz Marina Barrett MD  Specific Provider Orders/Date: OT eval and treat 22    Diagnosis: Headache [R51.9]  Headache, unspecified headache type [R51.9]  Hypertension, unspecified type [I10]   Surgery: NA this stay     Pertinent Medical History:      Past Medical History:   Diagnosis Date    A-fib [facility])     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 ([facility] [facility]     Hypothyroid     Osteopenia     Renal insufficiency     Right bundle branch block          Past Surgical History:   Procedure Laterality Date    SPINE SURGERY N/A 2022    KYPHOPLASTY, L 2 performed by Venkatesh Peter MD at [place] [address] N/A 2021    EGD ESOPHAGOGASTRODUODENOSCOPY performed by Delray Dakin, MD at [facility]COPY     Precautions:  Fall Risk, spinal precautions (recent kypho - L2 compression fx)    Assessment of current deficits    [x] Functional mobility  [x]ADLs  [x] Strength               [x]Cognition    [x] Functional transfers   [x] IADLs         [] Safety Awareness   [x]Endurance    [] Fine Coordination              [x] Balance      [] Vision/perception   [x]Sensation     []Gross Motor Coordination  [] ROM  [] Delirium                   [] Motor Control     OT PLAN OF CARE   OT POC based on physician orders, patient diagnosis and results of clinical assessment    Frequency/Duration 1-3 days/wk for 2 weeks PRN   Specific OT Treatment Interventions to include:   * Instruction/training on adapted ADL techniques and AE recommendations to increase functional independence within precautions       * Training on energy conservation strategies, correct breathing pattern and techniques to improve independence/tolerance for self-care routine  * Functional transfer/mobility training/DME recommendations for increased independence, safety, and fall prevention  * Patient/Family education to increase follow through with safety techniques and functional independence  * Recommendation of environmental modifications for increased safety with functional transfers/mobility and ADLs  * Therapeutic exercise to improve motor endurance, ROM, and functional strength for ADLs/functional transfers  * Therapeutic activities to facilitate/challenge dynamic balance, stand tolerance for increased safety and independence with ADLs  * Therapeutic activities to facilitate gross/fine motor skills for increased independence with ADLs  * Positioning to improve skin integrity, interaction with environment and functional independence  * Delirium prevention/treatment    Recommended Adaptive Equipment:  TBD    Comments: Based on patient's functional performance as stated below and level of assistance needed prior to admission, this therapist believes that the patient would benefit from further skilled OT following hospital stay in an effort to increase safety, functional independence, and quality of life. Home Living: Pt lives alone in a 2 story home with 2 step(s) to enter and no rail(s); bed/bath on main floor  Bathroom setup: walk-in shower with safety bar, built-in shower chair  Equipment owned: quad cane, ww, w/c, reacher , sock aide    Prior Level of Function: Independent/modified independent with ADLs and with IADLs; using no device for ambulation.    Driving: yes  Occupation: retired     Pain Level: reports 3/10 pain in R side of face, reports RN aware  Cognition: A&O: 4/4; Follows 3 step directions   Memory: G   Sequencing: G   Problem solving: G   Judgement/safety: G     Functional Assessment: AM-PAC Daily Activity Raw Score: 18/24   Initial Eval Status  Date: 5/26/22 Treatment Status  Date: STGs = LTGs  Time frame: 10-14 days   Feeding Set up A        Grooming SBA standing  To wash hands at sink, cues for use of soap    Independent   UB Dressing SBA  simulated    Independent    LB Dressing SBA  simulated    Modified independent   Bathing Min A  simulated    Modified Independent    Toileting SBA  simulated    Modified Independent    Bed Mobility  Rolling: NT  Supine to sit: SBA  Sit to supine: SBA  Independent    Functional Transfers Sit to stand: SBA  Stand to sit: SBA  Independent    Functional Mobility SBA ww  For in-room distance  Independent    Balance Sitting:     Static:  supervision    Dynamic: SBA  Standing: SBA ww  Independent    Endurance/Activity Tolerance Fair+  good   Visual/  Perceptual Glasses: yes              Hand Dominance R   AROM (PROM) Strength Additional Info:    RUE  WFL NT d/t cervical precautions, grossly WFL good  and wfl FMC/dexterity noted during ADL tasks   LUE WFL NT d/t cervical precautions, grossly WFL good  and wfl FMC/dexterity noted during ADL tasks     Hearing: mild [place]  Sensation: No c/o numbness or tingling  Tone: WNL  Edema: unremarkable                            Comments: Upon arrival patient supine with HOB slightly elevated. Bed mobility, functional mobility, functional transfers, standing grooming addressed. Patient demo's good recall of spinal precautions. After session, patient with all devices within reach, all lines and tubes intact. Pt required cues and education as noted above for safe facilitation and completion of tasks. Therapist provided skilled monitoring of patient's response during treatment session. Prior to and at the end of session, environmental modifications/line management completed for patients safety and efficiency of treatment session. Overall, patient demonstrates mild difficulties with completion of BADLs and IADLs.  Factors contributing to these difficulties include continued need for spinal precautions, decreased endurance, and generalized weakness. As noted above, patient likely to benefit from further OT intervention to increase independence, safety, and overall quality of life. Eval Complexity:   · Low Complexity    Rehab Potential:  Good for established goals     Patient / Family Goal: None stated      Patient and/or family were instructed on functional diagnosis, prognosis/goals and OT plan of care. Demonstrated good understanding. Eval Complexity: Low      Time In: 1227  Time Out: 1236  Total Time: 9 minutes    Min Units   OT Eval Low 97165  X 1    OT Eval Medium 52244      OT Eval High 49911       OT Re-Eval [id number]       Therapeutic Ex 84400       Therapeutic Activities 86081       ADL/Self Care 79477       Orthotic Management 52701       Neuro Re-Ed 65126       Non-Billable Time          Evaluation Time additionally includes thorough review of current medical information, gathering information on past medical history/social history and prior level of function, completion of standardized testing/informal observation of tasks, assessment of data and education on plan of care and goals.     Claudeen Rack, OTR/L  [id number]

## 2022-05-26 NOTE — PROGRESS NOTES
Dani Mendez is a 80 y.o. right handed male     Neurology is following for headache    PMH: A. fib, right BBB, pituitary adenoma status postresection, hypopituitarism    Patient presented to ED on 5/24 for acute headache. Patient states that headache began around 1 PM the day of arrival with pain to the right head mainly the temple region and parietal area. Patient reported headache of 10/10 but was oriented x4. Patient reported no nausea or vomiting but some pressure in his eyes. Patient most recently in the hospital for fracture to his lumbar spine from a mechanical fall. Recently underwent kyphoplasty for endplate compression fracture of L2.  CT head showed no acute intracranial pathology. Patient has been hypertensive most of admission with BPs as high as 170s over 120s; better control currently    There is no family present at bedside currently    Patient reports headache resolved with some point overnight and now is 0/10    Objective:     BP 96/60 Comment: manual  Pulse 88   Temp 97.4 °F (36.3 °C) (Oral)   Resp 20   Ht 6' (1.829 m)   Wt 210 lb (95.3 kg)   SpO2 (!) 89%   BMI 28.48 kg/m²     General appearance: alert, appears stated age, cooperative and no distress  Head: normocephalic, without obvious abnormality, atraumatic  Eyes: Red conjunctiva and eyelids- chronic?   Neck: supple, symmetrical, trachea midline and thyroid not enlarged  Lungs: clear to auscultation bilaterally  Heart: regular rate and rhythm  Extremities: normal, atraumatic, no cyanosis or edema  Pulses: 2+ and symmetric  Skin: color, texture, turgor normal---no rashes or lesions      Mental Status: Awake, alert and oriented x4, laconic, follows commands    Appropriate attention/concentration  Intact fundus of knowledge  Intact memories    Speech: no dysarthria  Language: no aphasias    Cranial Nerves:  I: smell NA   II: visual acuity  NA   II: visual fields  blinks to threat bilaterally   II: pupils PERRL   III,VII: ptosis None   III,IV,VI: extraocular muscles   EOMI without nystagmus   V: mastication Normal   V: facial light touch sensation  Normal   V,VII: corneal reflex     VII: facial muscle function - upper  Normal   VII: facial muscle function - lower Normal   VIII: hearing Normal   IX: soft palate elevation  Normal   IX,X: gag reflex    XI: trapezius strength  5/5   XI: sternocleidomastoid strength 5/5   XI: neck extension strength  5/5   XII: tongue strength  Normal     Motor:  Grossly 5/5 throughout  Normal bulk and tone  No abnormal movements    Sensory:  LT intact throughout    Coordination:   FN, FFM and ROYA intact    Gait:  Not tested    DTR:   Not tested     No Babinskis  No Brown's    No pathological reflexes    Laboratory/Radiology:     CBC:   Lab Results   Component Value Date    WBC 10.5 05/24/2022    RBC 5.58 05/24/2022    HGB 15.1 05/24/2022    HCT 46.5 05/24/2022    MCV 83.3 05/24/2022    MCH 27.1 05/24/2022    MCHC 32.5 05/24/2022    RDW 13.5 05/24/2022     05/24/2022    MPV 9.5 05/24/2022     CMP:    Lab Results   Component Value Date     05/24/2022    K 3.9 05/24/2022    K 4.7 05/12/2022    CL 95 05/24/2022    CO2 27 05/24/2022    BUN 20 05/24/2022    CREATININE 1.4 05/24/2022    GFRAA 57 05/24/2022    LABGLOM 47 05/24/2022    GLUCOSE 99 05/24/2022    PROT 7.7 05/24/2022    LABALBU 3.8 05/24/2022    CALCIUM 9.0 05/24/2022    BILITOT 0.2 05/24/2022    ALKPHOS 126 05/24/2022    AST 23 05/24/2022    ALT 10 05/24/2022     CT HEAD WO CONTRAST   Final Result   No acute intracranial abnormality. RECOMMENDATIONS:   Unavailable         MRI BRAIN W WO CONTRAST    (Results Pending)       All pertinent labs and images personally reviewed today    Assessment:     New onset headache   Possible posttraumatic as this occurred after a fall at home   CT head showed no hemorrhage   Await MRI brain; at present time patient denies headache and is otherwise doing well     History of A.  Fib   Not currently on [facility] due to recent surgery    Plan:     Continue supportive care  MRI Brain without contrast  PRN Naprosyn for headache  Continue telemetry  PT/OT to evaluate fall risk    KRISTIN Moore  11:45 AM  5/26/2022

## 2022-05-27 VITALS
HEART RATE: 81 BPM | TEMPERATURE: 97.2 F | BODY MASS INDEX: 28.44 KG/M2 | WEIGHT: 210 LBS | OXYGEN SATURATION: 95 % | DIASTOLIC BLOOD PRESSURE: 70 MMHG | HEIGHT: 72 IN | RESPIRATION RATE: 18 BRPM | SYSTOLIC BLOOD PRESSURE: 126 MMHG

## 2022-05-27 PROCEDURE — 6370000000 HC RX 637 (ALT 250 FOR IP): Performed by: INTERNAL MEDICINE

## 2022-05-27 PROCEDURE — 94640 AIRWAY INHALATION TREATMENT: CPT

## 2022-05-27 PROCEDURE — 6360000002 HC RX W HCPCS: Performed by: INTERNAL MEDICINE

## 2022-05-27 PROCEDURE — G0378 HOSPITAL OBSERVATION PER HR: HCPCS

## 2022-05-27 PROCEDURE — 97161 PT EVAL LOW COMPLEX 20 MIN: CPT

## 2022-05-27 RX ORDER — CARBAMAZEPINE 100 MG/1
100 TABLET, CHEWABLE ORAL 3 TIMES DAILY
Qty: 90 TABLET | Refills: 3 | Status: SHIPPED | OUTPATIENT
Start: 2022-05-27

## 2022-05-27 RX ORDER — AMOXICILLIN AND CLAVULANATE POTASSIUM 875; 125 MG/1; MG/1
1 TABLET, FILM COATED ORAL 2 TIMES DAILY
Qty: 20 TABLET | Refills: 0 | Status: SHIPPED | OUTPATIENT
Start: 2022-05-27 | End: 2022-06-06

## 2022-05-27 RX ADMIN — LEVOTHYROXINE SODIUM 137 MCG: 0.14 TABLET ORAL at 06:35

## 2022-05-27 RX ADMIN — FLUDROCORTISONE ACETATE 0.1 MG: 0.1 TABLET ORAL at 09:44

## 2022-05-27 RX ADMIN — CARBAMAZEPINE 100 MG: 100 TABLET, CHEWABLE ORAL at 13:52

## 2022-05-27 RX ADMIN — BUDESONIDE 500 MCG: 0.5 SUSPENSION RESPIRATORY (INHALATION) at 08:55

## 2022-05-27 RX ADMIN — METOPROLOL SUCCINATE 25 MG: 25 TABLET, EXTENDED RELEASE ORAL at 09:44

## 2022-05-27 RX ADMIN — FERROUS SULFATE TAB 325 MG (65 MG ELEMENTAL FE) 325 MG: 325 (65 FE) TAB at 09:44

## 2022-05-27 RX ADMIN — ATORVASTATIN CALCIUM 20 MG: 20 TABLET, FILM COATED ORAL at 09:44

## 2022-05-27 RX ADMIN — CARBAMAZEPINE 100 MG: 100 TABLET, CHEWABLE ORAL at 09:44

## 2022-05-27 RX ADMIN — POTASSIUM CHLORIDE 20 MEQ: 20 TABLET, EXTENDED RELEASE ORAL at 09:44

## 2022-05-27 RX ADMIN — HYDROCORTISONE 10 MG: 5 TABLET ORAL at 09:44

## 2022-05-27 RX ADMIN — Medication 1000 UNITS: at 09:44

## 2022-05-27 RX ADMIN — PANTOPRAZOLE SODIUM 40 MG: 40 TABLET, DELAYED RELEASE ORAL at 09:44

## 2022-05-27 ASSESSMENT — PAIN SCALES - GENERAL: PAINLEVEL_OUTOF10: 1

## 2022-05-27 ASSESSMENT — PAIN DESCRIPTION - LOCATION: LOCATION: MOUTH;FACE

## 2022-05-27 NOTE — PROGRESS NOTES
Physical Therapy  Physical Therapy Initial Assessment     Name: Maryjo Vallejo  : 1931  MRN: [id number]      Date of Service: 2022    Evaluating PT:  Rosalee Mac PT 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 has a past surgical history that includes Upper gastrointestinal endoscopy (N/A, 2021) and Spine surgery (N/A, 2022). Procedure/Surgery:  None at this admission    Precautions:  Falls risk, recent lumbar kyphoplasty  Equipment Needs:  Recommendation for front wheeled walker    SUBJECTIVE:    Pt lives alone but will patient's son states he will assist temporarily at d/c. Patient lives in 2 story home with 2 stairs to enter and no rail. Bed is on 1 floor and bath is on 1 floor. Pt ambulated with Ind and no AD PTA. OBJECTIVE:   Initial Evaluation  Date: 22 Treatment Short Term/ Long Term   Goals   AM-PAC 6 Clicks 40/92     Was pt agreeable to Eval/treatment? Yes     Does pt have pain? Yes - rated 2/10 and unspecified prior to session     Bed Mobility  Rolling: SBA  Supine to sit: SBA  Sit to supine: SBA  Scooting: SBA  Rolling: Ind  Supine to sit: Ind  Sit to supine: Ind  Scooting: Ind   Transfers Sit to stand: SBA with fww  Stand to sit: SBA with fww  Stand pivot: SBA with fww  Sit to stand: Mod Ind with fww  Stand to sit: Mod Ind with fww  Stand pivot:  Mod Ind with fww   Ambulation    50 feet with SBA with fww   >150 feet with Mod Ind with fww    Stair negotiation: ascended and descended  NT  2 steps with 1 rail Ind   ROM BUE:  Refer to OT eval  BLE:  WFL     Strength BUE:  Refer to OT eval  BLE:  [facility]  WFL   Balance Sitting EOB:  supervision  Dynamic Standing:  supervision  Sitting EOB: POC is established based on physician order and patient diagnosis     Referring provider/PT Order:    05/25/22 1030  PT eval and treat           Leane Gaucher, MD     Diagnosis:  Headache [R51.9]  Headache, unspecified headache type [R51.9]  Hypertension, unspecified type [I10]  Specific instructions for next treatment:  Increase ambulation distance    Current Treatment Recommendations:     [x] Strengthening to improve independence with functional mobility   [x] ROM to improve independence with functional mobility   [x] Balance Training to improve static/dynamic balance and to reduce fall risk  [x] Endurance Training to improve activity tolerance during functional mobility   [x] Transfer Training to improve safety and independence with all functional transfers   [x] Gait Training to improve gait mechanics, endurance and assess need for appropriate assistive device  [] Stair Training in preparation for safe discharge home and/or into the community   [] Positioning to prevent skin breakdown and contractures  [] Safety and Education Training   [x] Patient/Caregiver Education   [] HEP  [] Other     PT long term treatment goals are located in above grid    Frequency of treatments: 2-5x/week x 1-2 weeks. Time in  1030  Time out  1045    Total Treatment Time  0 minutes     Evaluation Time includes thorough review of current medical information, gathering information on past medical history/social history and prior level of function, completion of standardized testing/informal observation of tasks, assessment of data and education on plan of care and goals.     CPT codes:  [x] Low Complexity PT evaluation 88483  [] Moderate Complexity PT evaluation 96837  [] High Complexity PT evaluation 19234  [] PT Re-evaluation 26303  [] Gait training 00558 - minutes  [] Manual therapy 93076 - minutes  [] Therapeutic activities 06459 - minutes  [] Therapeutic exercises 44978 - minutes  [] Neuromuscular reeducation 65532 - minutes

## 2022-05-27 NOTE — PROGRESS NOTES
CLINICAL PHARMACY NOTE: MEDS TO BEDS    Total # of Prescriptions Filled: 2   The following medications were delivered to the patient:  · Carbamazepine 100 mg chewable  · Amoxicillin-pot-clavul 875-125  · Delivered to pt @ 1:48    Additional Documentation:

## 2022-05-27 NOTE — PLAN OF CARE
MRI reviewed---  No acute pathology. Current complaint of jaw pain per staff but no reported headache. Patient was started on Tegretol 100mg TID. Patient should follow-up with PCP as outpatient and neurology as needed at discharge. Neurology will sign off for pending discharge.

## 2022-05-31 ENCOUNTER — CARE COORDINATION (OUTPATIENT)
Dept: CASE MANAGEMENT | Age: 87
End: 2022-05-31

## 2022-05-31 NOTE — CARE COORDINATION
Emma [facility] Transitions Initial Follow Up Call    Call within 2 business days of discharge: Yes    Patient: Barbara Mart Patient : 1931   MRN: [id number]  Reason for Admission: Headache; Unspecified Type; Hypertension  Discharge Date: 22 RARS: Readmission Risk Score: 15.5 ( )      Last Discharge [facility]       Complaint Diagnosis Description Type Department Provider    22 Headache Headache, unspecified headache type . .. ED to Hosp-Admission (Discharged) (ADMITTED) [facility]S [facility]U Laith Lazo MD; Ralph Wilson. .. Transitions of Care Initial Call    Was this an external facility discharge? No Discharge Facility: [facility]      Challenges to be reviewed by the provider   Additional needs identified to be addressed with provider: No         Method of communication with provider : none    Advance Care Planning:   Does patient have an Advance Directive: decision maker updated. Care Transition Nurse contacted the patient by telephone to perform post hospital discharge assessment. Verified name and  with patient as identifiers. Provided introduction to self, and explanation of the CTN role. CTN reviewed discharge instructions, medical action plan and red flags with patient who verbalized understanding. Patient given an opportunity to ask questions and does not have any further questions or concerns at this time. Were discharge instructions available to patient? Yes. Reviewed appropriate site of care based on symptoms and resources available to patient including: PCP  Specialist  Home health  When to call 911. The patient agrees to contact the PCP office for questions related to their healthcare. Medication reconciliation was performed with patient, who verbalizes understanding of administration of home medications. CTN provided contact information.        Care Transitions 24 Hour Call    Do you have a copy of your discharge instructions?: Yes  Do you have all of your prescriptions and are they filled?: Yes  Have you scheduled your follow up appointment?:  (Comment: [facility])  Do you feel like you have everything you need to keep you well at home?: Yes  Care Transitions Interventions:  CTN left a voice message with [facility] requesting SARAH for this patient. CTN contact information provided. Patient is pleasant in conversation. -patient offers C/O \"back aches\"   -reports headache has resolved   -denies any C/O chest pain, palpitations, shortness of breath, lightheaded, or dizziness   -denies any swelling   -reports appetite fluctuates   -reports normal bladder/bowel elimination  -independent with ambulation   -receives medications through [place]   -no transportation issues    Emotional support provided.       Follow Up  Future Appointments   Date Time Provider Martín Zhou   6/14/2022  1:15 PM Carlos Dewitt, [address] Bobbi Beebe RN

## 2022-05-31 NOTE — CARE COORDINATION
Emma 45 Transitions Initial Follow Up Call    Call within 2 business days of discharge: Yes    Patient: Brenda Limon Patient : 1931   MRN: [id number]  Reason for Admission: Headache; Unspecified Headache Type; Hypertension  Discharge Date: 22 RARS: Readmission Risk Score: 15.5 ( )      Last Discharge [facility]       Complaint Diagnosis Description Type Department Provider    22 Headache Headache, unspecified headache type . .. ED to Hosp-Admission (Discharged) (ADMITTED) [facility] Mor Ace MD; Mary Acevedo. .. CTN spoke with Sandy from [facility] and confirmed [address] today, 2022. CTN thanked Sandy for her time regarding this patient.      Follow Up  Future Appointments   Date Time Provider Martín Zhou   2022  1:15 PM Arianne Kaba, [address]be RN

## 2022-06-15 NOTE — DISCHARGE SUMMARY
Physician Discharge Summary     Patient ID:  Kenton Hylton  [id number]  52 y.o.  4/24/1931    Admit date: 5/12/2022    Discharge date and time: 5/18/2022  2:26 PM     Admission Diagnoses: Closed head injury, initial encounter [S09.90XA]  Ambulatory dysfunction [R26.2]  Other closed fracture of second lumbar vertebra, initial encounter ([facility] [address] Peng Peterson  Fall at home, sequela [W19. XXXS, K79.347]    Discharge Diagnoses:   L2 compression fracture  Patient Active Problem List   Diagnosis    Hypopituitarism ([facility] [address]    Weakness    Hypothyroidism    Renal insufficiency    A-fib ([facility] [address]    QT prolongation    RBBB    Acute respiratory failure with hypoxia (HCC)    Acute renal failure superimposed on stage 2 chronic kidney disease (HCC)    Adrenal insufficiency ([place]'s disease) (HCC)    Wide-complex tachycardia (HCC)    Orthostatic hypotension    Anemia    Acute alteration in mental status    AMS (altered mental status)    Closed fracture of second lumbar vertebra ([facility] [address] [facility]    Fall at home, sequela    Headache, unspecified headache type       Consults: Neurosurgery    Procedures: L2 kyphoplasty    Hospital Course:   80-year-old  man admitted through emergency room due to a mechanical fall at home that resulted in L2 compression fracture  Patient was seen by neurology and neurosurgery  He underwent kyphoplasty of L2 with excellent postop results  Discharge to home    Discharge Exam:  Patient was seen on the floor on the day of discharge  Pain control adequate  Vital signs stable  Neurologically intact    Disposition Home with home care  Stable at the time of discharge  Patient Instructions:   Discharge Medication List as of 5/18/2022  1:24 PM      CONTINUE these medications which have NOT CHANGED    Details   albuterol (PROVENTIL) (2.5 MG/3ML) 0.083% nebulizer solution Take 3 mLs by nebulization every 6 hours as needed for Wheezing, Disp-120 each, R-3Normal      !! hydrocortisone (CORTEF) 5 MG tablet Take 1 tablet by mouth Daily with lunch, Disp-30 tablet, R-0Normal      !! hydrocortisone (CORTEF) 10 MG tablet Take 1 tablet by mouth daily (with breakfast), Disp-30 tablet, R-0Normal      vitamin D (CHOLECALCIFEROL) 25 MCG (1000 UT) TABS tablet Take 1 tablet by mouth 2 times daily, Disp-30 tablet, R-0Normal      levothyroxine (SYNTHROID) 137 MCG tablet Take 137 mcg by mouth DailyHistorical Med      metoprolol succinate (TOPROL XL) 25 MG extended release tablet Take 25 mg by mouth dailyHistorical Med      potassium chloride (KLOR-CON M) 20 MEQ extended release tablet Take 20 mEq by mouth dailyHistorical Med      mometasone (ASMANEX) 200 MCG/ACT AERO inhaler Inhale 1 puff into the lungs 2 times dailyHistorical Med      ferrous sulfate (IRON 325) 325 (65 Fe) MG tablet Take 1 tablet by mouth 2 times daily, Disp-180 tablet, R-1Normal      pantoprazole (PROTONIX) 40 MG tablet Take 40 mg by mouth 2 times dailyHistorical Med      Multiple Vitamins-Minerals (THERAPEUTIC MULTIVITAMIN-MINERALS) tablet Take 1 tablet by mouth dailyHistorical Med      simvastatin (ZOCOR) 20 MG tablet Take 1/2 tablet by mouth Mon - Fri and take 1 tablet by mouth Sat and SunHistorical Med      fludrocortisone (FLORINEF) 0.1 MG tablet Take 0.1 mg by mouth dailyHistorical Med       !! - Potential duplicate medications found. Please discuss with provider.       STOP taking these medications       sucralfate (CARAFATE) 1 GM tablet Comments:   Reason for Stopping:         apixaban (ELIQUIS) 2.5 MG TABS tablet Comments:   Reason for Stopping:             Activity: As tolerated  Diet: General    Follow-up with PCP and neurosurgery    Note that over 40 minutes was spent in preparing discharge papers, discussing discharge with patient, medication review, etc.    Signed:  Yahir Funk MD  6/15/2022  3:01 PM

## 2022-06-15 NOTE — DISCHARGE SUMMARY
Physician Discharge Summary     Patient ID:  Maryjo Vallejo  [id number]  17 y.o.  4/24/1931    Admit date: 5/24/2022    Discharge date and time: 5/27/2022  3:02 PM     Admission Diagnoses: Headache [R51.9]  Headache, unspecified headache type [R51.9]  Hypertension, unspecified type [I10]    Discharge Diagnoses:   Trigeminal neuralgia  Right maxillary sinusitis  Patient Active Problem List   Diagnosis    [facility]op[facility]ism ([facility] [address]    Weakness    Hypothyroidism    Renal insufficiency    A-fib ([address]    QT prolongation    RBBB    Acute respiratory failure with hypoxia ([facility] [address]    Acute renal failure superimposed on stage 2 chronic kidney disease (HCC)    Adrenal insufficiency ([place]'s disease) (HCC)    Wide-complex tachycardia (HCC)    Orthostatic hypotension    Anemia    Acute alteration in mental status    AMS (altered mental status)    Closed fracture of second lumbar vertebra ([facility] [address]    Fall at home, sequela    Headache, unspecified headache type       Consults: Neurology    Procedures:     Hospital Course:   77-year-old  man admitted through emergency room due to intractable right-sided facial pain  Trigeminal neuralgia was suspected  Tegretol was started  In addition he was found to have sinusitis based on MRI of the head  Symptoms improved  Discharged to home in stable condition    Discharge Exam:  Patient was seen on the floor prior to discharge  Facial pain much better  Neurologically intact  Data reviewed in detail    Disposition: Home  Stable at the time of discharge  Patient Instructions:   Discharge Medication List as of 5/27/2022  2:05 PM      START taking these medications    Details   carBAMazepine (TEGRETOL) 100 MG chewable tablet Take 1 tablet by mouth 3 times daily, Disp-90 tablet, R-3Normal      amoxicillin-clavulanate (AUGMENTIN) 875-125 MG per tablet Take 1 tablet by mouth 2 times daily for 10 days, Disp-20 tablet, R-0Normal         CONTINUE these medications which have NOT CHANGED Details   albuterol (PROVENTIL) (2.5 MG/3ML) 0.083% nebulizer solution Take 3 mLs by nebulization every 6 hours as needed for Wheezing, Disp-120 each, R-3Normal      !! hydrocortisone (CORTEF) 5 MG tablet Take 1 tablet by mouth Daily with lunch, Disp-30 tablet, R-0Normal      !! hydrocortisone (CORTEF) 10 MG tablet Take 1 tablet by mouth daily (with breakfast), Disp-30 tablet, R-0Normal      vitamin D (CHOLECALCIFEROL) 25 MCG (1000 UT) TABS tablet Take 1 tablet by mouth 2 times daily, Disp-30 tablet, R-0Normal      levothyroxine (SYNTHROID) 137 MCG tablet Take 137 mcg by mouth DailyHistorical Med      metoprolol succinate (TOPROL XL) 25 MG extended release tablet Take 25 mg by mouth dailyHistorical Med      potassium chloride (KLOR-CON M) 20 MEQ extended release tablet Take 20 mEq by mouth dailyHistorical Med      mometasone (ASMANEX) 200 MCG/ACT AERO inhaler Inhale 1 puff into the lungs 2 times dailyHistorical Med      ferrous sulfate (IRON 325) 325 (65 Fe) MG tablet Take 1 tablet by mouth 2 times daily, Disp-180 tablet, R-1Normal      pantoprazole (PROTONIX) 40 MG tablet Take 40 mg by mouth 2 times dailyHistorical Med      Multiple Vitamins-Minerals (THERAPEUTIC MULTIVITAMIN-MINERALS) tablet Take 1 tablet by mouth dailyHistorical Med      simvastatin (ZOCOR) 20 MG tablet Take 1/2 tablet by mouth Mon - Fri and take 1 tablet by mouth Sat and SunHistorical Med      fludrocortisone (FLORINEF) 0.1 MG tablet Take 0.1 mg by mouth dailyHistorical Med       !! - Potential duplicate medications found. Please discuss with provider.         Activity: As tolerated  Diet: General    Follow-up with PCP    Note that over 40 minutes was spent in preparing discharge papers, discussing discharge with patient, medication review, etc.    Signed:  Claudia Gresham MD  6/15/2022  3:05 PM

## 2022-08-17 ENCOUNTER — TELEPHONE (OUTPATIENT)
Dept: FAMILY MEDICINE CLINIC | Age: 87
End: 2022-08-17

## 2022-08-17 NOTE — TELEPHONE ENCOUNTER
----- Message from Suzan Boone sent at 8/17/2022 11:13 AM EDT -----  Subject: Message to Provider    QUESTIONS  Information for Provider? Joshua Nugent from [employer] would like to get   verification on the pt est. care with Dr. Taj Roblero so that she can send a fax   requests over to the office. ---------------------------------------------------------------------------  --------------  Sunil Eid [employer]  [phone]; OK to leave message on voicemail  ---------------------------------------------------------------------------  --------------  SCRIPT ANSWERS  Relationship to Patient? Third Party  Third Party Type? Insurance?    Representative Name? [employer]

## 2022-12-05 ENCOUNTER — OFFICE VISIT (OUTPATIENT)
Dept: FAMILY MEDICINE CLINIC | Age: 87
End: 2022-12-05
Payer: MEDICARE

## 2022-12-05 VITALS
RESPIRATION RATE: 18 BRPM | HEART RATE: 103 BPM | WEIGHT: 200 LBS | SYSTOLIC BLOOD PRESSURE: 180 MMHG | BODY MASS INDEX: 26.51 KG/M2 | DIASTOLIC BLOOD PRESSURE: 76 MMHG | TEMPERATURE: 97.9 F | OXYGEN SATURATION: 94 % | HEIGHT: 73 IN

## 2022-12-05 DIAGNOSIS — L02.91 ABSCESS: Primary | ICD-10-CM

## 2022-12-05 PROCEDURE — 1123F ACP DISCUSS/DSCN MKR DOCD: CPT | Performed by: NURSE PRACTITIONER

## 2022-12-05 PROCEDURE — 99213 OFFICE O/P EST LOW 20 MIN: CPT | Performed by: NURSE PRACTITIONER

## 2022-12-05 RX ORDER — DOXYCYCLINE HYCLATE 100 MG/1
100 CAPSULE ORAL 2 TIMES DAILY
Qty: 20 CAPSULE | Refills: 0 | Status: SHIPPED | OUTPATIENT
Start: 2022-12-05 | End: 2022-12-15

## 2022-12-05 SDOH — ECONOMIC STABILITY: FOOD INSECURITY: WITHIN THE PAST 12 MONTHS, THE FOOD YOU BOUGHT JUST DIDN'T LAST AND YOU DIDN'T HAVE MONEY TO GET MORE.: NEVER TRUE

## 2022-12-05 SDOH — ECONOMIC STABILITY: FOOD INSECURITY: WITHIN THE PAST 12 MONTHS, YOU WORRIED THAT YOUR FOOD WOULD RUN OUT BEFORE YOU GOT MONEY TO BUY MORE.: NEVER TRUE

## 2022-12-05 ASSESSMENT — PATIENT HEALTH QUESTIONNAIRE - PHQ9
1. LITTLE INTEREST OR PLEASURE IN DOING THINGS: 0
SUM OF ALL RESPONSES TO PHQ QUESTIONS 1-9: 0
SUM OF ALL RESPONSES TO PHQ QUESTIONS 1-9: 0
SUM OF ALL RESPONSES TO PHQ9 QUESTIONS 1 & 2: 0
SUM OF ALL RESPONSES TO PHQ QUESTIONS 1-9: 0
2. FEELING DOWN, DEPRESSED OR HOPELESS: 0
SUM OF ALL RESPONSES TO PHQ QUESTIONS 1-9: 0

## 2022-12-05 ASSESSMENT — SOCIAL DETERMINANTS OF HEALTH (SDOH): HOW HARD IS IT FOR YOU TO PAY FOR THE VERY BASICS LIKE FOOD, HOUSING, MEDICAL CARE, AND HEATING?: NOT HARD AT ALL

## 2022-12-05 NOTE — PROGRESS NOTES
22  Iwona Pearce : 1931 Sex: male  Age 80 y.o. Subjective:  Chief Complaint   Patient presents with    Mass     On right side of neck, uses [facility], had  one higher on face but has subsided 3-4 months ago       HPI:   Iwona Pearce , 80 y.o. male presents to the clinic for evaluation of abscess to neck x 2 weeks. The patient also reports the area is warm to touch, tender, and swollen. The patient has not taken any treatment for symptoms. The patient reports unchanged symptoms over time. The patient denies bleeding, drainage, lymphangitic streaking, fever, myalgia, arthralgia, chills, and lethargy. The patient also denies headache, chest pain, abdominal pain, shortness of breath, and nausea / vomiting / diarrhea. ROS:   Unless otherwise stated in this report the patient's positive and negative responses for review of systems for constitutional, eyes, ENT, cardiovascular, respiratory, gastrointestinal, neurological, , musculoskeletal, and integument systems and related systems to the presenting problem are either stated in the history of present illness or were not pertinent or were negative for the symptoms and/or complaints related to the presenting medical problem. Positives and pertinent negatives as per HPI. All others reviewed and are negative. 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 No pertinent family history.     Medications:     Current Outpatient Medications:     doxycycline hyclate (VIBRAMYCIN) 100 MG capsule, Take 1 capsule by mouth 2 times daily for 10 days, Disp: 20 capsule, Rfl: 0    carBAMazepine (TEGRETOL) 100 MG chewable tablet, Take 1 tablet by mouth 3 times daily, Disp: 90 tablet, Rfl: 3    albuterol (PROVENTIL) (2.5 MG/3ML) 0.083% nebulizer solution, Take 3 mLs by nebulization every 6 hours as needed for Wheezing, Disp: 120 each, Rfl: 3    hydrocortisone (CORTEF) 10 MG tablet, Take 1 tablet by mouth daily (with breakfast), Disp: 30 tablet, Rfl: 0    vitamin D (CHOLECALCIFEROL) 25 MCG (1000 UT) TABS tablet, Take 1 tablet by mouth 2 times daily, Disp: 30 tablet, Rfl: 0    levothyroxine (SYNTHROID) 137 MCG tablet, Take 137 mcg by mouth Daily, Disp: , Rfl:     potassium chloride (KLOR-CON M) 20 MEQ extended release tablet, Take 20 mEq by mouth daily, Disp: , Rfl:     mometasone (ASMANEX) 200 MCG/ACT AERO inhaler, Inhale 1 puff into the lungs 2 times daily, Disp: , Rfl:     Multiple Vitamins-Minerals (THERAPEUTIC MULTIVITAMIN-MINERALS) tablet, Take 1 tablet by mouth daily, Disp: , Rfl:     simvastatin (ZOCOR) 20 MG tablet, Take 1/2 tablet by mouth Mon - Fri and take 1 tablet by mouth Sat and Sun, Disp: , Rfl:     fludrocortisone (FLORINEF) 0.1 MG tablet, Take 0.1 mg by mouth daily, Disp: , Rfl:     Allergies:      Allergies   Allergen Reactions    Codeine Itching    Diazepam      hallucinations    Lorazepam      hallucinations    Morphine      Stomach ache       Social History:     Social History     Tobacco Use    Smoking status: Former     Types: Cigarettes     Quit date: 1969     Years since quittin.5    Smokeless tobacco: Never   Vaping Use    Vaping Use: Never used   Substance Use Topics    Alcohol use: Not Currently    Drug use: Never       Physical Exam:     Vitals:    22 1338   BP: (!) 180/76   Pulse: (!) 103   Resp: 18   Temp: 97.9 °F (36.6 °C)   TempSrc: Temporal   SpO2: 94%   Weight: 200 lb (90.7 kg)   Height: 6' 1\" (1.854 m)       Physical Exam (PE)   Constitutional: Alert, development consistent with age. HENT:      Head: Normocephalic. Right Ear: External ear normal.      Left Ear: External ear normal.      Nose: Normal.      Mouth/Throat:     Mouth: Mucous membranes are moist.      Pharynx: Oropharynx is clear. Eyes: Pupils: Pupils are equal, round, and reactive to light. Neck: Normal ROM. Supple. Cardiovascular: Heart RRR without pathologic murmurs or gallops. Pulmonary: Respiratory effort normal.  Normal breath sounds. Abdomen: Soft, nontender, normal bowel sounds. Lymphadenopathy: Cervical: No cervical adenopathy. Skin:     General: Skin is warm and dry. Capillary Refill: Capillary refill takes less than 2 seconds. Comments: Raised, erythematous region over the right upper neck area measuring approximately 2 cm in size, consistent with an abscess. TTP and warmth over the same area. No bleeding or drainage noted. No lymphangitic streaking. Negative induration. Positive fluctuation. Musculoskeletal: General: Normal strength / ROM. Neurological: General: No focal deficit present. Mental Status: Alert and oriented to person, place, and time. Psychiatric: Mood and Affect: Mood normal. Behavior: Behavior normal.     Testing:   (All laboratory and radiology results have been personally reviewed by myself)  Labs:  No results found for this visit on 12/05/22. Imaging: All Radiology results interpreted by Radiologist unless otherwise noted. No orders to display       Assessment / Plan:   The patient's vitals, allergies, medications, and past medical history have been reviewed. Jacinta Herring was seen today for mass. Diagnoses and all orders for this visit:    Abscess  -     doxycycline hyclate (VIBRAMYCIN) 100 MG capsule; Take 1 capsule by mouth 2 times daily for 10 days  -     [facility]        - Disposition: Home    - Educational material printed for patient's review and were included in patient instructions.  After Visit Summary was given to patient at the end of visit. - The patient is directed to take the prescribed medication as ordered. Wound care measures discussed at length. Discussed other symptomatic treatments with the patient today. The patient is to schedule a follow-up with PCP in the next 2-3 days for reevaluation. Red flag symptoms were also discussed with the patient today. If symptoms worsen the patient is to go directly to the emergency department for reevaluation and treatment. Pt verbalizes understanding and is in agreement with plan of care. All questions answered. SIGNATURE: Rogelio Parson, KRISTIN-CNP    *NOTE: This report was transcribed using voice recognition software. Every effort was made to ensure accuracy; however, inadvertent computerized transcription errors may be present.

## 2024-04-15 ENCOUNTER — APPOINTMENT (OUTPATIENT)
Dept: GENERAL RADIOLOGY | Age: 89
End: 2024-04-15
Payer: MEDICARE

## 2024-04-15 ENCOUNTER — HOSPITAL ENCOUNTER (EMERGENCY)
Age: 89
Discharge: HOME | End: 2024-04-15
Attending: EMERGENCY MEDICINE
Payer: MEDICARE

## 2024-04-15 ENCOUNTER — APPOINTMENT (OUTPATIENT)
Dept: CT IMAGING | Age: 89
End: 2024-04-15
Payer: MEDICARE

## 2024-04-15 ENCOUNTER — APPOINTMENT (OUTPATIENT)
Dept: ULTRASOUND IMAGING | Age: 89
End: 2024-04-15
Payer: MEDICARE

## 2024-04-15 VITALS
TEMPERATURE: 97.9 F | RESPIRATION RATE: 16 BRPM | BODY MASS INDEX: 27.71 KG/M2 | DIASTOLIC BLOOD PRESSURE: 80 MMHG | WEIGHT: 210 LBS | OXYGEN SATURATION: 96 % | SYSTOLIC BLOOD PRESSURE: 149 MMHG | HEART RATE: 76 BPM

## 2024-04-15 DIAGNOSIS — L03.115 CELLULITIS OF RIGHT LOWER EXTREMITY: Primary | ICD-10-CM

## 2024-04-15 LAB
ALBUMIN SERPL-MCNC: 3.8 G/DL (ref 3.5–5.2)
ALP SERPL-CCNC: 61 U/L (ref 40–129)
ALT SERPL-CCNC: 7 U/L (ref 0–40)
ANION GAP SERPL CALCULATED.3IONS-SCNC: 11 MMOL/L (ref 7–16)
AST SERPL-CCNC: 16 U/L (ref 0–39)
BASOPHILS # BLD: 0.05 K/UL (ref 0–0.2)
BASOPHILS NFR BLD: 0 % (ref 0–2)
BILIRUB SERPL-MCNC: 0.4 MG/DL (ref 0–1.2)
BUN SERPL-MCNC: 23 MG/DL (ref 6–23)
CALCIUM SERPL-MCNC: 9.1 MG/DL (ref 8.6–10.2)
CHLORIDE SERPL-SCNC: 104 MMOL/L (ref 98–107)
CO2 SERPL-SCNC: 26 MMOL/L (ref 22–29)
CREAT SERPL-MCNC: 1.2 MG/DL (ref 0.7–1.2)
EOSINOPHIL # BLD: 0.22 K/UL (ref 0.05–0.5)
EOSINOPHILS RELATIVE PERCENT: 2 % (ref 0–6)
ERYTHROCYTE [DISTWIDTH] IN BLOOD BY AUTOMATED COUNT: 13.6 % (ref 11.5–15)
GFR SERPL CREATININE-BSD FRML MDRD: 57 ML/MIN/1.73M2
GLUCOSE SERPL-MCNC: 93 MG/DL (ref 74–99)
HCT VFR BLD AUTO: 43.4 % (ref 37–54)
HGB BLD-MCNC: 14.1 G/DL (ref 12.5–16.5)
IMM GRANULOCYTES # BLD AUTO: 0.12 K/UL (ref 0–0.58)
IMM GRANULOCYTES NFR BLD: 1 % (ref 0–5)
LYMPHOCYTES NFR BLD: 1.38 K/UL (ref 1.5–4)
LYMPHOCYTES RELATIVE PERCENT: 12 % (ref 20–42)
MCH RBC QN AUTO: 28.8 PG (ref 26–35)
MCHC RBC AUTO-ENTMCNC: 32.5 G/DL (ref 32–34.5)
MCV RBC AUTO: 88.8 FL (ref 80–99.9)
MONOCYTES NFR BLD: 1.38 K/UL (ref 0.1–0.95)
MONOCYTES NFR BLD: 12 % (ref 2–12)
NEUTROPHILS NFR BLD: 72 % (ref 43–80)
NEUTS SEG NFR BLD: 8.06 K/UL (ref 1.8–7.3)
PLATELET # BLD AUTO: 299 K/UL (ref 130–450)
PMV BLD AUTO: 10.6 FL (ref 7–12)
POTASSIUM SERPL-SCNC: 4.4 MMOL/L (ref 3.5–5)
PROT SERPL-MCNC: 7.2 G/DL (ref 6.4–8.3)
RBC # BLD AUTO: 4.89 M/UL (ref 3.8–5.8)
SODIUM SERPL-SCNC: 141 MMOL/L (ref 132–146)
WBC OTHER # BLD: 11.2 K/UL (ref 4.5–11.5)

## 2024-04-15 PROCEDURE — 6370000000 HC RX 637 (ALT 250 FOR IP)

## 2024-04-15 PROCEDURE — 70450 CT HEAD/BRAIN W/O DYE: CPT

## 2024-04-15 PROCEDURE — 80053 COMPREHEN METABOLIC PANEL: CPT

## 2024-04-15 PROCEDURE — 99284 EMERGENCY DEPT VISIT MOD MDM: CPT

## 2024-04-15 PROCEDURE — 73610 X-RAY EXAM OF ANKLE: CPT

## 2024-04-15 PROCEDURE — 85025 COMPLETE CBC W/AUTO DIFF WBC: CPT

## 2024-04-15 PROCEDURE — 73590 X-RAY EXAM OF LOWER LEG: CPT

## 2024-04-15 PROCEDURE — 73630 X-RAY EXAM OF FOOT: CPT

## 2024-04-15 PROCEDURE — 93971 EXTREMITY STUDY: CPT

## 2024-04-15 RX ORDER — DOXYCYCLINE HYCLATE 100 MG/1
100 CAPSULE ORAL 2 TIMES DAILY
Status: DISCONTINUED | OUTPATIENT
Start: 2024-04-15 | End: 2024-04-16 | Stop reason: HOSPADM

## 2024-04-15 RX ADMIN — DOXYCYCLINE HYCLATE 100 MG: 100 CAPSULE ORAL at 22:51

## 2024-04-15 ASSESSMENT — PAIN DESCRIPTION - ORIENTATION: ORIENTATION: RIGHT

## 2024-04-15 ASSESSMENT — PAIN DESCRIPTION - FREQUENCY: FREQUENCY: CONTINUOUS

## 2024-04-15 ASSESSMENT — PAIN DESCRIPTION - LOCATION: LOCATION: LEG

## 2024-04-15 ASSESSMENT — PAIN DESCRIPTION - PAIN TYPE: TYPE: ACUTE PAIN

## 2024-04-15 ASSESSMENT — PAIN - FUNCTIONAL ASSESSMENT: PAIN_FUNCTIONAL_ASSESSMENT: 0-10
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
## 2024-06-07 ENCOUNTER — HOSPITAL ENCOUNTER (OUTPATIENT)
Dept: ULTRASOUND IMAGING | Age: 89
Discharge: HOME | End: 2024-06-07
Payer: OTHER GOVERNMENT

## 2024-06-07 DIAGNOSIS — M79.661 PAIN IN RIGHT LOWER LEG: ICD-10-CM

## 2024-06-07 PROCEDURE — 93971 EXTREMITY STUDY: CPT

## (undated) DEVICE — SYRINGE 20ML LL S/C 50

## (undated) DEVICE — C-ARM: Brand: UNBRANDED

## (undated) DEVICE — ADHESIVE SKIN CLOSURE TOP 36 CC HI VISC DERMBND MINI

## (undated) DEVICE — HYPODERMIC SAFETY NEEDLE: Brand: MAGELLAN

## (undated) DEVICE — GAUZE,SPONGE,POST-OP,4X3,STRL,LF: Brand: MEDLINE

## (undated) DEVICE — TAMP K08A XPAN INFLAT BONE  SIZE 20/3-RB: Brand: KYPHON XPANDER™ INFLATABLE BONE TAMP

## (undated) DEVICE — CONNECTOR IRRIGATION AUXILIARY H2O JET W/ PRT MTL THRD HYDR

## (undated) DEVICE — SYRINGE A08E KIS INFLATION HP: Brand: KYPHON®  INFLATION SYRINGE

## (undated) DEVICE — GLOVE ORANGE PI 8   MSG9080

## (undated) DEVICE — BITEBLOCK 54FR W/ DENT RIM BLOX

## (undated) DEVICE — GOWN,SIRUS,FABRNF,XL,20/CS: Brand: MEDLINE

## (undated) DEVICE — JACKSON TABLE POSITIONER KIT: Brand: MEDLINE INDUSTRIES, INC.

## (undated) DEVICE — BONE FILLER DEVICE F04B SIZE 3

## (undated) DEVICE — BONE BIOPSY DEVICE F05A BBD SIZE 3: Brand: MEDTRONIC REUSABLE INSTRUMENTS

## (undated) DEVICE — Z DISCONTINUED NO SUB IDED TUBING ETCO2 AD L6.5FT NSL ORAL CVD PRNG NONFLARED TIP OVR

## (undated) DEVICE — DEFENDO AIR WATER SUCTION AND BIOPSY VALVE KIT FOR  OLYMPUS: Brand: DEFENDO AIR/WATER/SUCTION AND BIOPSY VALVE

## (undated) DEVICE — GAUZE,SPONGE,4"X4",16PLY,XRAY,STRL,LF: Brand: MEDLINE

## (undated) DEVICE — INTRODUCER T15K ONE STEP OID BEVEL: Brand: KYPHON® ONE-STEP™ OSTEO INTRODUCER™ SYSTEM

## (undated) DEVICE — 3M™ IOBAN™ 2 ANTIMICROBIAL INCISE DRAPE 6650EZ: Brand: IOBAN™ 2

## (undated) DEVICE — GLOVE SURG 8.5 PF POLYMER WHT STRL SIGN LTX ESSENTIAL LTX

## (undated) DEVICE — MEDIA CONTRAST RX ISOVUE-300 61% 30ML VIALS